# Patient Record
Sex: MALE | Race: WHITE | NOT HISPANIC OR LATINO | Employment: FULL TIME | URBAN - METROPOLITAN AREA
[De-identification: names, ages, dates, MRNs, and addresses within clinical notes are randomized per-mention and may not be internally consistent; named-entity substitution may affect disease eponyms.]

---

## 2017-12-18 ENCOUNTER — ALLSCRIPTS OFFICE VISIT (OUTPATIENT)
Dept: OTHER | Facility: OTHER | Age: 47
End: 2017-12-18

## 2017-12-28 NOTE — PROGRESS NOTES
Assessment   1  Bug bite (919 4,E906 4) (W57 XXXA)    Plan   Bug bite    · Cefuroxime Axetil 250 MG Oral Tablet; one tab po bid    Discussion/Summary      Does not appear as tick bite-but pt w concern sec hx tick born illness spider bite--will cover w abx as not healing w conservative mgt prn  Possible side effects of new medications were reviewed with the patient/guardian today  The treatment plan was reviewed with the patient/guardian  The patient/guardian understands and agrees with the treatment plan      Chief Complaint   Patient states that he was bit on the upper left thigh about one week  ago  States that he has had Lyme in the past  bh/lpn      History of Present Illness   Insect Bite/Sting (Brief): The patient is being seen for an initial evaluation of an insect bite/sting  Symptoms:  localized itching,-- localized pain,-- localized redness-- and-- localized swelling  Associated symptoms:  no fever,-- no headache-- and-- no vomiting  Active Problems   1  Bug bite (919 4,E906 4) (W57 XXXA)   2  Hyperlipidemia (272 4) (E78 5)   3  Insect bites, initial encounter   4  Local skin infection (686 9) (L08 9)    Past Medical History   1  History of Arthralgia (719 40) (M25 50)   2  History of Benign paroxysmal vertigo, unspecified laterality (386 11) (H81 10)   3  History of Dizziness (780 4) (R42)   4  History of babesiosis (V12 09) (Z86 19)   5  History of fatigue (V13 89) (Z87 898)   6  History of fever (V13 89) (Z87 898)   7  History of headache (V13 89) (Z87 898)   8  History of labyrinthitis (V12 49) (Z86 69)   9  History of Lateral epicondylitis, unspecified laterality (726 32) (M77 10)   10  History of MÃÂ©niÃÂ¨re's disease (386 00) (H81 09)   11  History of Other muscle spasm (728 85) (M62 838)   12  History of Pneumonia of left lower lobe due to infectious organism (486) (J18 1)   13  History of Vitamin D deficiency (268 9) (E55 9)    Family History   Family History    1   No pertinent family history    Social History    · Being A Social Drinker   · Daily Coffee Consumption (6  Cups/Day)   · Former smoker (P23 48) (J56 466)    Current Meds      The medication list was reviewed and updated today  Allergies   1  No Known Drug Allergies    Vitals    Recorded: [id number] 03:35PM   Temperature 97 9 F   Heart Rate 76   Respiration 12   Systolic 110   Diastolic 60   Height 6 ft    Weight 207 lb    BMI Calculated 28 07   BSA Calculated 2 16     Physical Exam        Constitutional      General appearance: No acute distress, well appearing and well nourished  overweight  Pulmonary      Respiratory effort: No increased work of breathing or signs of respiratory distress  Auscultation of lungs: Clear to auscultation, equal breath sounds bilaterally, no wheezes, no rales, no rhonci  Cardiovascular      Auscultation of heart: Normal rate and rhythm, normal S1 and S2, without murmurs  Lymphatic      Palpation of lymph nodes in neck: No lymphadenopathy  Musculoskeletal      Gait and station: Normal        Skin      Examination of the skin for lesions: Abnormal   A superficial bite was noted left lat thigh described as punctate w mild surounding erythema--no visualized FB-no streaking from site  Neurologic      Cranial nerves: Cranial nerves 2-12 intact  Psychiatric      Orientation to person, place and time: Normal        Mood and affect: Normal           Results/Data   PHQ-2 Adult Depression Screening [id number] 03:36PM User, s      Test Name Result Flag Reference   PHQ-2 Adult Depression Score 0     Over the last two weeks, how often have you been bothered by any of the following problems?      Little interest or pleasure in doing things: Not at all - 0     Feeling down, depressed, or hopeless: Not at all - 0   PHQ-2 Adult Depression Screening Negative          Signatures    Electronically signed by : VI Martinez ; Dec 27 2017  9:16AM EST (Author)

## 2018-01-23 VITALS
BODY MASS INDEX: 28.04 KG/M2 | SYSTOLIC BLOOD PRESSURE: 110 MMHG | HEART RATE: 76 BPM | TEMPERATURE: 97.9 F | DIASTOLIC BLOOD PRESSURE: 60 MMHG | WEIGHT: 207 LBS | RESPIRATION RATE: 12 BRPM | HEIGHT: 72 IN

## 2018-03-29 ENCOUNTER — OFFICE VISIT (OUTPATIENT)
Dept: FAMILY MEDICINE CLINIC | Facility: CLINIC | Age: 48
End: 2018-03-29
Payer: COMMERCIAL

## 2018-03-29 VITALS
TEMPERATURE: 97.8 F | WEIGHT: 205.2 LBS | SYSTOLIC BLOOD PRESSURE: 128 MMHG | RESPIRATION RATE: 16 BRPM | DIASTOLIC BLOOD PRESSURE: 78 MMHG | BODY MASS INDEX: 27.79 KG/M2 | HEART RATE: 94 BPM | HEIGHT: 72 IN

## 2018-03-29 DIAGNOSIS — H81.02 MENIERE DISEASE, LEFT: Primary | ICD-10-CM

## 2018-03-29 DIAGNOSIS — H61.23 BILATERAL IMPACTED CERUMEN: ICD-10-CM

## 2018-03-29 DIAGNOSIS — B35.6 JOCK ITCH: ICD-10-CM

## 2018-03-29 PROBLEM — H81.01 MENIERE'S DISEASE OF RIGHT EAR: Status: ACTIVE | Noted: 2018-03-29

## 2018-03-29 PROBLEM — H81.01 MENIERE'S DISEASE OF RIGHT EAR: Status: RESOLVED | Noted: 2018-03-29 | Resolved: 2018-03-29

## 2018-03-29 PROCEDURE — 99214 OFFICE O/P EST MOD 30 MIN: CPT | Performed by: FAMILY MEDICINE

## 2018-03-29 RX ORDER — MECLIZINE HYDROCHLORIDE 25 MG/1
25 TABLET ORAL 3 TIMES DAILY PRN
Qty: 60 TABLET | Refills: 1 | Status: SHIPPED | OUTPATIENT
Start: 2018-03-29 | End: 2018-09-11

## 2018-03-29 RX ORDER — CLOTRIMAZOLE AND BETAMETHASONE DIPROPIONATE 10; .64 MG/G; MG/G
CREAM TOPICAL 2 TIMES DAILY
Qty: 30 G | Refills: 6 | Status: SHIPPED | OUTPATIENT
Start: 2018-03-29 | End: 2018-09-11

## 2018-03-29 NOTE — PROGRESS NOTES
Assessment/Plan:    Problem List Items Addressed This Visit     Meniere disease, left - Primary     Referral to ENT and start meclizine as needed          Relevant Medications    meclizine (ANTIVERT) 25 mg tablet    Other Relevant Orders    Ambulatory Referral to Otolaryngology      Other Visit Diagnoses     Bilateral impacted cerumen        use debrox for wax removal     Jock itch        use lotrisone cream     Relevant Medications    clotrimazole-betamethasone (LOTRISONE) 1-0 05 % cream          Patient Instructions   Use meclizine as needed   Use debrox for ear wax  Follow up with ENT  Use lotrisone cream as needed       Return if symptoms worsen or fail to improve  Subjective:      Patient ID: Shante Beckwith is a 50 y o  male  Chief Complaint   Patient presents with    Earache     left ear , would like referral for specialist        Pt has hx of meniere on the left and was doing well  Always had ringing in the ear  4 days ago started with vertigo and vomiting  Has itching in the right inguinal area without a rash  Comes and goes  Feels worse when wearing underwear      Earache    There is pain in the left ear  This is a chronic problem  There has been no fever  Associated symptoms include hearing loss and vomiting  Pertinent negatives include no abdominal pain, diarrhea, ear discharge, headaches, rhinorrhea or sore throat  The following portions of the patient's history were reviewed and updated as appropriate: allergies, current medications, past family history, past medical history, past social history, past surgical history and problem list     Review of Systems   HENT: Positive for ear pain, hearing loss and tinnitus  Negative for ear discharge, postnasal drip, rhinorrhea, sinus pain, sinus pressure and sore throat  Eyes: Negative for visual disturbance  Gastrointestinal: Positive for vomiting  Negative for abdominal pain and diarrhea  Musculoskeletal: Negative for arthralgias  Neurological: Positive for dizziness  Negative for syncope, light-headedness, numbness and headaches  Hematological: Negative for adenopathy  Current Outpatient Prescriptions   Medication Sig Dispense Refill    clotrimazole-betamethasone (LOTRISONE) 1-0 05 % cream Apply topically 2 (two) times a day 30 g 6    meclizine (ANTIVERT) 25 mg tablet Take 1 tablet (25 mg total) by mouth 3 (three) times a day as needed for dizziness 60 tablet 1     No current facility-administered medications for this visit  Objective:    /78 (BP Location: Left arm, Patient Position: Sitting, Cuff Size: Standard)   Pulse 94   Temp 97 8 °F (36 6 °C)   Resp 16   Ht 6' (1 829 m)   Wt 93 1 kg (205 lb 3 2 oz)   BMI 27 83 kg/m²        Physical Exam   Constitutional: He appears well-developed and well-nourished  HENT:   Head: Normocephalic and atraumatic  Impacted cerumen b/l   Neck: Normal range of motion  Neck supple  Cardiovascular: Normal rate and regular rhythm  Pulmonary/Chest: Effort normal and breath sounds normal    Lymphadenopathy:     He has no cervical adenopathy  Psychiatric: He has a normal mood and affect                Desmond Johnson DO

## 2018-05-03 ENCOUNTER — OFFICE VISIT (OUTPATIENT)
Dept: AUDIOLOGY | Facility: CLINIC | Age: 48
End: 2018-05-03
Payer: COMMERCIAL

## 2018-05-03 ENCOUNTER — OFFICE VISIT (OUTPATIENT)
Dept: OTOLARYNGOLOGY | Facility: CLINIC | Age: 48
End: 2018-05-03
Payer: COMMERCIAL

## 2018-05-03 VITALS
BODY MASS INDEX: 28.24 KG/M2 | WEIGHT: 208.5 LBS | SYSTOLIC BLOOD PRESSURE: 120 MMHG | DIASTOLIC BLOOD PRESSURE: 80 MMHG | HEIGHT: 72 IN

## 2018-05-03 DIAGNOSIS — H61.23 BILATERAL IMPACTED CERUMEN: ICD-10-CM

## 2018-05-03 DIAGNOSIS — H81.02 MENIERE DISEASE, LEFT: Primary | ICD-10-CM

## 2018-05-03 DIAGNOSIS — H81.02 MENIERE'S DISEASE, LEFT: Primary | ICD-10-CM

## 2018-05-03 DIAGNOSIS — H90.42 SENSORINEURAL HEARING LOSS, UNILATERAL, LEFT EAR, WITH UNRESTRICTED HEARING ON THE CONTRALATERAL SIDE: ICD-10-CM

## 2018-05-03 PROCEDURE — 92567 TYMPANOMETRY: CPT | Performed by: AUDIOLOGIST

## 2018-05-03 PROCEDURE — 99243 OFF/OP CNSLTJ NEW/EST LOW 30: CPT | Performed by: OTOLARYNGOLOGY

## 2018-05-03 PROCEDURE — 92557 COMPREHENSIVE HEARING TEST: CPT | Performed by: AUDIOLOGIST

## 2018-05-03 RX ORDER — TRIAMTERENE AND HYDROCHLOROTHIAZIDE 37.5; 25 MG/1; MG/1
1 CAPSULE ORAL EVERY MORNING
Qty: 30 CAPSULE | Refills: 3 | Status: SHIPPED | OUTPATIENT
Start: 2018-05-03 | End: 2018-07-05 | Stop reason: SDUPTHER

## 2018-05-03 NOTE — PROGRESS NOTES
[facility] Otolaryngology New Patient Visit    Iva Hall is a 50 y o  who presents with a chief complaint of Meniere's disease    Pertinent elements of the history include:  He has a known diagnosis of Meniere's disease given to him by an audiologist 4 years ago  He is bothered by constant left sided high pitched tinnitus  He has a history of vertigo  Symptoms were controlled until about 2 months ago, when he developed ataxia and nausea  Symptoms lasted for about 2 days before he started to feel better  Prior to that, he was symptom free for about 1 year  He denies hearing fluctuation  His last audiogram was 2 years ago  He describes a muffled sensation (constant) in the left ear  Some associated aural fullness on the left side  Symptoms began 4 years ago with recurrent vertigo about every 3-4 months for the first year  Episodes were more spaced out after that  He denies any history of hearing fluctuation  Review of systems 10 point review of systems reviewed as documented in the intake form, scanned into the medical record under the media tab  Results reviewed; images from any scan have been personally reviewed: The past medical, surgical, social and family history have been reviewed as documented in today's record  Physical exam: (abnormal findings appear in bold and supercede any conflicting normal findings listed below)    /80 (BP Location: Left arm, Patient Position: Sitting, Cuff Size: Standard)   Ht 6' (1 829 m)   Wt 94 6 kg (208 lb 8 oz)   BMI 28 28 kg/m²     Constitutional:  Well developed, well nourished and groomed, in no acute distress  Eyes:  Extra-ocular movements intact, pupils equally round and reactive to light and accommodation, the lids and conjunctivae are normal in appearance      Head: Atraumatic, normocephalic, no visible scalp lesions, bony palpation unremarkable without stepoffs, parotid and submandibular salivary glands non-tender to palpation and without masses bilaterally  Ears:  Auricles normal in appearance bilaterally, mastoid prominence non-tender, external auditory canals clear bilaterally after disimpaction, tympanic membranes intact bilaterally without evidence of middle ear effusion or masses, normal appearing ossicles  Bilateral cerumen impaction removed with operating microscope and otologic suction  Nose/Sinuses:  External appearance unremarkable, no maxillary or frontal sinus tenderness to palpation bilaterally  Anterior rhinoscopy reveals: cobblestone mucosal edema     Oral Cavity:  Moist mucus membranes, gums and dentition unremarkable, no oral mucosal masses or lesions, floor of mouth soft, tongue mobile without masses or lesions  Oropharynx:  Base of tongue soft and without masses, tonsils bilaterally unremarkable, soft palate mucosa unremarkable, laryngeal mirror exam unrevealing  Neck:  No visible or palpable cervical lesions or lymphadenopathy, thyroid gland is normal in size and symmetry and without masses, normal laryngeal elevation with swallowing  Cardiovascular:  Normal rate and rhythm, no palpable thrills, no jugulovenous distension observed  Respiratory:  Normal respiratory effort without evidence of retractions or use of accessory muscles  Integument:  Normal appearing without observed masses or lesions  Neurologic:  Cranial nerves II-XII intact bilaterally  Psychiatric:  Alert and oriented to time, place and person, normal affect  Procedures      Assessment:   1  Meniere disease, left  Audiogram screen       Orders  Orders Placed This Encounter   Procedures    Audiogram screen     Standing Status:   Future     Standing Expiration Date:   5/3/2019         Discussion/Plan:    1  His audiogram demonstrates a left-sided asymmetric sensorineural hearing loss consistent with a diagnosis of left-sided Ménière's disease    Despite his concerns of recurrent vertigo I assured him that actually one episode of vertigo per year is fairly well controlled for Meniere's disease  2  I prescribed him Dyazide for his symptoms and asked that he back on salt and caffeine  3   Follow-up in 2 months  Thank you for allowing me to participate in the care of your patient

## 2018-05-03 NOTE — LETTER
May 3, 2018     Doris Fernandez MD  [address] [facility]    Patient: Ana Orozco   YOB: 1970   Date of Visit: 5/3/2018       Dear Dr Joana Russell: Thank you for referring Viktoriya Sheth to me for evaluation  Below are my notes for this consultation  If you have questions, please do not hesitate to call me  I look forward to following your patient along with you  Sincerely,        Nicole Hoyt MD        CC: No Recipients  Nicole Hoyt MD  5/3/2018 10:37 AM  Sign at close encounter  [facility] Otolaryngology New Patient Visit    Ana Orozco is a 50 y o  who presents with a chief complaint of Meniere's disease    Pertinent elements of the history include:  He has a known diagnosis of Meniere's disease given to him by an audiologist 4 years ago  He is bothered by constant left sided high pitched tinnitus  He has a history of vertigo  Symptoms were controlled until about 2 months ago, when he developed ataxia and nausea  Symptoms lasted for about 2 days before he started to feel better  Prior to that, he was symptom free for about 1 year  He denies hearing fluctuation  His last audiogram was 2 years ago  He describes a muffled sensation (constant) in the left ear  Some associated aural fullness on the left side  Symptoms began 4 years ago with recurrent vertigo about every 3-4 months for the first year  Episodes were more spaced out after that  He denies any history of hearing fluctuation  Review of systems 10 point review of systems reviewed as documented in the intake form, scanned into the medical record under the media tab  Results reviewed; images from any scan have been personally reviewed: The past medical, surgical, social and family history have been reviewed as documented in today's record      Physical exam: (abnormal findings appear in bold and supercede any conflicting normal findings listed below)    /80 (BP Location: Left arm, Patient Position: Sitting, Cuff Size: Standard)   Ht 6' (1 829 m)   Wt 94 6 kg (208 lb 8 oz)   BMI 28 28 kg/m²      Constitutional:  Well developed, well nourished and groomed, in no acute distress  Eyes:  Extra-ocular movements intact, pupils equally round and reactive to light and accommodation, the lids and conjunctivae are normal in appearance  Head: Atraumatic, normocephalic, no visible scalp lesions, bony palpation unremarkable without stepoffs, parotid and submandibular salivary glands non-tender to palpation and without masses bilaterally  Ears:  Auricles normal in appearance bilaterally, mastoid prominence non-tender, external auditory canals clear bilaterally after disimpaction, tympanic membranes intact bilaterally without evidence of middle ear effusion or masses, normal appearing ossicles  Bilateral cerumen impaction removed with operating microscope and otologic suction  Nose/Sinuses:  External appearance unremarkable, no maxillary or frontal sinus tenderness to palpation bilaterally  Anterior rhinoscopy reveals: cobblestone mucosal edema     Oral Cavity:  Moist mucus membranes, gums and dentition unremarkable, no oral mucosal masses or lesions, floor of mouth soft, tongue mobile without masses or lesions  Oropharynx:  Base of tongue soft and without masses, tonsils bilaterally unremarkable, soft palate mucosa unremarkable, laryngeal mirror exam unrevealing  Neck:  No visible or palpable cervical lesions or lymphadenopathy, thyroid gland is normal in size and symmetry and without masses, normal laryngeal elevation with swallowing  Cardiovascular:  Normal rate and rhythm, no palpable thrills, no jugulovenous distension observed  Respiratory:  Normal respiratory effort without evidence of retractions or use of accessory muscles  Integument:  Normal appearing without observed masses or lesions    Neurologic:  Cranial nerves II-XII intact bilaterally  Psychiatric:  Alert and oriented to time, place and person, normal affect  Procedures      Assessment:   1  Meniere disease, left  Audiogram screen       Orders  Orders Placed This Encounter   Procedures    Audiogram screen     Standing Status:   Future     Standing Expiration Date:   5/3/2019         Discussion/Plan:    1  His audiogram demonstrates a left-sided asymmetric sensorineural hearing loss consistent with a diagnosis of left-sided Ménière's disease  Despite his concerns of recurrent vertigo I assured him that actually one episode of vertigo per year is fairly well controlled for Meniere's disease  2  I prescribed him Dyazide for his symptoms and asked that he back on salt and caffeine  3   Follow-up in 2 months  Thank you for allowing me to participate in the care of your patient

## 2018-05-03 NOTE — LETTER
May 5, 2018     Annalee Hunter, [address]    Patient: Reina Andres   YOB: 1970   Date of Visit: 5/3/2018       Dear Dr Shanda Urrutia:    Thank you for referring Shira Pacheco to me for evaluation  Below are my notes for this consultation  His previous audiological evaluation from 2015 is attached for comparison purposes  If you have questions, please do not hesitate to call me  Sincerely,        Zo Weaver , CCC/A  Clinical Audiologist   [place]  [id number]      CC: MD Mitch Larios  2018 11:34 AM  Sign at close encounter  [facility] [address] EVALUATION      Name:  Reina Andres  :  1970  Age:  50 y o  Date of Evaluation: 5/3/18    History: Tinnitus, Dizziness and decreased hearing left ear/Meniere's L   Reason for visit: Reina Andres is being seen today at the request of Dr Shanda Urrutia for an evaluation of hearing  Patient reports previous evaluation and treatment for vertiginous episodes  He has recently experienced dizziness and increased hearing loss for the left ear  Tinnitus is described in the left ear  EVALUATION:    Otoscopic Evaluation:   Right Ear: scant cerumen about the perimeter    Left Ear: scant cerumen about the perimeter     Tympanometry:  Normal middle ear functioning bilaterally     Audiogram Results:    *see attached audiogram A previous audiogram from 2015 attached for comparison     RECOMMENDATIONS:  1  Follow-up with Dr Shanda Urrutia  2  Audiological re-evaluation upon request or with any changes noted by Mr Munoz  3  Determine if hearing loss is fluctuating or stable  If hearing issues a problem on a daily basis, the patient  may wish to consider a hearing aid trial/demo with medical clearance     PATIENT EDUCATION:   Discussed results and recommendations with Mr Sandee Gibbons    Questions were addressed and the patient was encouraged to contact our department should concerns arise      Zo Nelson , CCC-A, [place]# [id number], Hearing Aid Dispenser, [place]# [id number]  Clinical Audiologist

## 2018-05-05 NOTE — PROGRESS NOTES
AUDIOLOGY AUDIOMETRIC EVALUATION      Name:  Felisha English  :  1970  Age:  50 y o  Date of Evaluation: 5/3/18    History: Tinnitus, Dizziness and decreased hearing left ear/Meniere's L   Reason for visit: Felisha English is being seen today at the request of Dr Dorota Cagle for an evaluation of hearing  Patient reports previous evaluation and treatment for vertiginous episodes  He has recently experienced dizziness and increased hearing loss for the left ear  Tinnitus is described in the left ear  EVALUATION:    Otoscopic Evaluation:   Right Ear: scant cerumen about the perimeter    Left Ear: scant cerumen about the perimeter     Tympanometry:  Normal middle ear functioning bilaterally     Audiogram Results:    *see attached audiogram A previous audiogram from 2015 attached for comparison     RECOMMENDATIONS:  1  Follow-up with Dr Dorota Cagle  2  Audiological re-evaluation upon request or with any changes noted by Mr Munoz  3  Determine if hearing loss is fluctuating or stable  If hearing issues a problem on a daily basis, the patient  may wish to consider a hearing aid trial/demo with medical clearance     PATIENT EDUCATION:   Discussed results and recommendations with Mr Carie Ahumada  Questions were addressed and the patient was encouraged to contact our department should concerns arise      Zo Saunders , CCC-A, [place]# [id number], Hearing Aid Dispenser, [place]# [id number]  Clinical Audiologist

## 2018-07-05 ENCOUNTER — OFFICE VISIT (OUTPATIENT)
Dept: OTOLARYNGOLOGY | Facility: CLINIC | Age: 48
End: 2018-07-05
Payer: COMMERCIAL

## 2018-07-05 VITALS
WEIGHT: 202 LBS | BODY MASS INDEX: 27.36 KG/M2 | SYSTOLIC BLOOD PRESSURE: 134 MMHG | HEIGHT: 72 IN | HEART RATE: 68 BPM | DIASTOLIC BLOOD PRESSURE: 96 MMHG | TEMPERATURE: 98.6 F

## 2018-07-05 DIAGNOSIS — H81.02 MENIERE DISEASE, LEFT: ICD-10-CM

## 2018-07-05 PROCEDURE — 99213 OFFICE O/P EST LOW 20 MIN: CPT | Performed by: OTOLARYNGOLOGY

## 2018-07-05 RX ORDER — TRIAMTERENE AND HYDROCHLOROTHIAZIDE 37.5; 25 MG/1; MG/1
1 CAPSULE ORAL EVERY MORNING
Qty: 30 CAPSULE | Refills: 6 | Status: SHIPPED | OUTPATIENT
Start: 2018-07-05

## 2018-07-05 NOTE — LETTER
July 5, 2018     Nerissa Magana MD  [address] [facility]    Patient: Karma Hernandez   YOB: 1970   Date of Visit: 7/5/2018       Dear Dr Sunni Mary: Thank you for referring Trae Elizabeth to me for evaluation  Below are my notes for this consultation  If you have questions, please do not hesitate to call me  I look forward to following your patient along with you  Sincerely,        Ronny Ritchie MD        CC: No Recipients  Ronny Ritchei MD  7/5/2018  8:39 AM  Sign at close encounter  [facility] Follow up visit      CC: meniere's disease      Time interval of problem since last visit:  2 months    Pertinent interval elements of the history:  He returns after 2 months and had a brief vertigo attack 2 days ago with shaking vertigo and nausea/vomiting  He feels that when he increases salt in his diet, it triggers his symptoms  Prior to that, he has had brief episodes of dizziness but nothing as severe as the episode on Tuesday  He feels there is noticeable asymmetry in hearing loss in his left ear  He does not feel his hearing has changed at all since his last visit  Review of any relevant imaging:      Interval Review of systems:  General: no weight change, normal energy  CV: no palpitations or chest pain  Pulm: no shortness of breath    Interval Social History:  Social History     Social History    Marital status: /Civil Union     Spouse name: N/A    Number of children: N/A    Years of education: N/A     Occupational History    Not on file       Social History Main Topics    Smoking status: Former Smoker    Smokeless tobacco: Never Used    Alcohol use Yes      Comment: social    Drug use: No    Sexual activity: Not on file     Other Topics Concern    Not on file     Social History Narrative    Daily coffee consumption 6 cups/day       Interval Physical Examination:  /96 (BP Location: Right arm, Patient Position: Sitting, Cuff Size: Standard)   Pulse 68   Temp 98 6 °F (37 °C) (Tympanic)   Ht 6' (1 829 m)   Wt 91 6 kg (202 lb)   BMI 27 40 kg/m²    NAD  AS/AD: clear, no ME effusions, normal appearing TMs  Interval endoscopy:          Assessment:  1  Meniere disease, left  triamterene-hydrochlorothiazide (DYAZIDE) 37 5-25 mg per capsule       Plan:  1  His hearing is stable  He will continue Dyazide and watch the salt intake in his diet  2  He would benefit from a hearing aid consultation with audiology for hearing loss in his left ear  3  Follow up in 6 months

## 2018-07-05 NOTE — PROGRESS NOTES
[facility] Otolaryngology Follow up visit      CC: meniere's disease      Time interval of problem since last visit:  2 months    Pertinent interval elements of the history:  He returns after 2 months and had a brief vertigo attack 2 days ago with shaking vertigo and nausea/vomiting  He feels that when he increases salt in his diet, it triggers his symptoms  Prior to that, he has had brief episodes of dizziness but nothing as severe as the episode on Tuesday  He feels there is noticeable asymmetry in hearing loss in his left ear  He does not feel his hearing has changed at all since his last visit  Review of any relevant imaging:      Interval Review of systems:  General: no weight change, normal energy  CV: no palpitations or chest pain  Pulm: no shortness of breath    Interval Social History:  Social History     Social History    Marital status: /Civil Union     Spouse name: N/A    Number of children: N/A    Years of education: N/A     Occupational History    Not on file  Social History Main Topics    Smoking status: Former Smoker    Smokeless tobacco: Never Used    Alcohol use Yes      Comment: social    Drug use: No    Sexual activity: Not on file     Other Topics Concern    Not on file     Social History Narrative    Daily coffee consumption 6 cups/day       Interval Physical Examination:  /96 (BP Location: Right arm, Patient Position: Sitting, Cuff Size: Standard)   Pulse 68   Temp 98 6 °F (37 °C) (Tympanic)   Ht 6' (1 829 m)   Wt 91 6 kg (202 lb)   BMI 27 40 kg/m²   NAD  AS/AD: clear, no ME effusions, normal appearing TMs  Interval endoscopy:          Assessment:  1  Meniere disease, left  triamterene-hydrochlorothiazide (DYAZIDE) 37 5-25 mg per capsule       Plan:  1  His hearing is stable  He will continue Dyazide and watch the salt intake in his diet  2  He would benefit from a hearing aid consultation with audiology for hearing loss in his left ear    3  Follow up in 6 months

## 2018-07-12 ENCOUNTER — TELEPHONE (OUTPATIENT)
Dept: AUDIOLOGY | Facility: CLINIC | Age: 48
End: 2018-07-12

## 2018-09-11 ENCOUNTER — OFFICE VISIT (OUTPATIENT)
Dept: FAMILY MEDICINE CLINIC | Facility: CLINIC | Age: 48
End: 2018-09-11
Payer: COMMERCIAL

## 2018-09-11 VITALS
TEMPERATURE: 98.5 F | HEIGHT: 72 IN | DIASTOLIC BLOOD PRESSURE: 90 MMHG | HEART RATE: 80 BPM | RESPIRATION RATE: 16 BRPM | WEIGHT: 201 LBS | BODY MASS INDEX: 27.22 KG/M2 | SYSTOLIC BLOOD PRESSURE: 130 MMHG

## 2018-09-11 DIAGNOSIS — M25.50 ARTHRALGIA, UNSPECIFIED JOINT: Primary | ICD-10-CM

## 2018-09-11 DIAGNOSIS — T75.3XXS MOTION SICKNESS, SEQUELA: ICD-10-CM

## 2018-09-11 PROCEDURE — 3008F BODY MASS INDEX DOCD: CPT | Performed by: FAMILY MEDICINE

## 2018-09-11 PROCEDURE — 99213 OFFICE O/P EST LOW 20 MIN: CPT | Performed by: FAMILY MEDICINE

## 2018-09-11 RX ORDER — CEFUROXIME AXETIL 250 MG/1
500 TABLET ORAL EVERY 12 HOURS SCHEDULED
Refills: 0 | Status: CANCELLED | OUTPATIENT
Start: 2018-09-11

## 2018-09-11 RX ORDER — CEFUROXIME AXETIL 250 MG/1
500 TABLET ORAL EVERY 12 HOURS SCHEDULED
Qty: 20 TABLET | Refills: 0 | Status: SHIPPED | OUTPATIENT
Start: 2018-09-11 | End: 2018-09-21

## 2018-09-11 RX ORDER — SCOLOPAMINE TRANSDERMAL SYSTEM 1 MG/1
1 PATCH, EXTENDED RELEASE TRANSDERMAL
Qty: 3 PATCH | Refills: 0 | Status: SHIPPED | OUTPATIENT
Start: 2018-09-11

## 2018-09-11 NOTE — PROGRESS NOTES
Kiara Esparza 1970 male MRN: [id number]    [facility] PRACTICE ACUTE OFFICE VISIT  [facility] - [address]      ASSESSMENT/PLAN  Kiara Esparza is a 46 y o  male presents to the office for    1) Arthralgia:  - Likely Lyme disease, given history, and patient previous symptoms   - Repeat Lyme panel   - Started on Cefuroxime 500 mg BID x 10 days, will extend for total of 10 once labs returns    2) Motion Sickness:  - ppx scopolamine patch q 72hrs      Disposition:RTC in 1 week    Future Appointments  Date Time Provider Amara Torres   9/18/2018 8:00 AM Selina Shoemaker MD [facility]   1/3/2019 8:30 AM Leila Xie MD [facility]          SUBJECTIVE  CC: Joint Pain      HPI:  Kiara Esparza is a 46 y o  male who presents to the office for arthralgia of the lower extremities  Pt gets multiple tick bites a year, and recently had one this month  He was (+) for Lyme's in the past and is feeling the way he did in Dec and needed treatment  Received Cefuroxime at that time with relief  Patient not a big believe of abx  Patient would like a medication that does not make him drowsy for motion sickness prophylaxis  He will be going on a fishing trip for 3 days and would like to avoid using oral medications  Review of Systems   Constitutional: Negative for activity change, appetite change, chills, fatigue and fever  HENT: Negative for congestion  Eyes: Negative for visual disturbance  Respiratory: Negative for cough, chest tightness and shortness of breath  Cardiovascular: Negative for chest pain and leg swelling  Gastrointestinal: Negative for abdominal distention, abdominal pain, constipation, diarrhea, nausea and vomiting  Musculoskeletal: Positive for arthralgias  Allergic/Immunologic: Negative for environmental allergies  Neurological: Negative for dizziness, light-headedness and headaches          Motion sickness while fishing All other systems reviewed and are negative  Historical Information   The patient history was reviewed as follows:  Past Medical History:   Diagnosis Date    Arthralgia     last assessed 02/10/2015    Babesiosis     last assessed 12/30/14    Benign paroxysmal vertigo     last assessed 05/22/15    Meniere disease     last assessed 04/15/13    Pneumonia of left lower lobe due to infectious organism [facility])     last assessed 04/04/16    Vitamin D deficiency     last assessed 12/30/14         Past Surgical History:   Procedure Laterality Date    FIBULA FRACTURE SURGERY      TIBIA FRACTURE SURGERY       Family History   Problem Relation Age of Onset    No Known Problems Family       Social History   History   Alcohol Use    Yes     Comment: social     History   Drug Use No     History   Smoking Status    Former Smoker   Smokeless Tobacco    Never Used       Medications:     Current Outpatient Prescriptions:     triamterene-hydrochlorothiazide (DYAZIDE) 37 5-25 mg per capsule, Take 1 capsule by mouth every morning, Disp: 30 capsule, Rfl: 6    cefuroxime (CEFTIN) 250 mg tablet, Take 2 tablets (500 mg total) by mouth every 12 (twelve) hours for 10 days, Disp: 20 tablet, Rfl: 0    scopolamine (TRANSDERM-SCOP) 1 5 mg/3 days TD 72 hr patch, Place 1 patch on the skin every third day, Disp: 3 patch, Rfl: 0    No Known Allergies    OBJECTIVE  Vitals:   Vitals:    09/11/18 0755   BP: 130/90   BP Location: Left arm   Patient Position: Sitting   Cuff Size: Standard   Pulse: 80   Resp: 16   Temp: 98 5 °F (36 9 °C)   TempSrc: Tympanic   Weight: 91 2 kg (201 lb)   Height: 6' (1 829 m)         Physical Exam   Constitutional: He is oriented to person, place, and time  Vital signs are normal  He appears well-developed and well-nourished  HENT:   Head: Normocephalic and atraumatic     Right Ear: Hearing normal    Left Ear: Hearing normal    Eyes: Conjunctivae and EOM are normal  Pupils are equal, round, and reactive to light    Neck: Normal range of motion  Neck supple  Cardiovascular: Normal rate, regular rhythm, S1 normal, S2 normal, normal heart sounds and intact distal pulses  No murmur heard  Pulmonary/Chest: Effort normal and breath sounds normal  No respiratory distress  He has no wheezes  Abdominal: Soft  Bowel sounds are normal  He exhibits no distension  There is no tenderness  Musculoskeletal: Normal range of motion  He exhibits no edema  Neurological: He is alert and oriented to person, place, and time  He has normal strength  He displays normal reflexes  No cranial nerve deficit  He exhibits normal muscle tone  Coordination normal    Skin: Skin is warm  No rash noted  Psychiatric: He has a normal mood and affect  His speech is normal and behavior is normal  Judgment and thought content normal    Vitals reviewed         Eliceo Rogers MD,   [facility]  9/11/2018

## 2018-11-18 ENCOUNTER — OFFICE VISIT (OUTPATIENT)
Dept: URGENT CARE | Facility: CLINIC | Age: 48
End: 2018-11-18
Payer: COMMERCIAL

## 2018-11-18 VITALS
BODY MASS INDEX: 27.12 KG/M2 | DIASTOLIC BLOOD PRESSURE: 72 MMHG | HEART RATE: 87 BPM | SYSTOLIC BLOOD PRESSURE: 116 MMHG | RESPIRATION RATE: 16 BRPM | OXYGEN SATURATION: 100 % | WEIGHT: 200 LBS | TEMPERATURE: 99.4 F

## 2018-11-18 DIAGNOSIS — K04.7 DENTAL ABSCESS: Primary | ICD-10-CM

## 2018-11-18 PROCEDURE — 99213 OFFICE O/P EST LOW 20 MIN: CPT | Performed by: FAMILY MEDICINE

## 2018-11-18 RX ORDER — CLINDAMYCIN HYDROCHLORIDE 300 MG/1
300 CAPSULE ORAL 3 TIMES DAILY
Qty: 15 CAPSULE | Refills: 0 | Status: SHIPPED | OUTPATIENT
Start: 2018-11-18 | End: 2018-11-23

## 2018-11-18 NOTE — PROGRESS NOTES
[facility] [facility]        NAME: Tala Xavier is a 46 y o  male  : 1970    MRN: [id number]  DATE: 2018  TIME: 2:17 PM    Assessment and Plan   Dental abscess [K04 7]  1  Dental abscess  clindamycin (CLEOCIN) 300 MG capsule     Likely has a dental abscess beneath tooth #18 (left lower molar)  Clindamycin prescribed due to its anaerobic coverage  Patient strongly advised to follow up with dentist for further evaluation and management  Patient Instructions     Follow up with PCP in 3-5 days  Proceed to  ER if symptoms worsen  Chief Complaint     Chief Complaint   Patient presents with    Pain     left sided jaw and facial pain of unknown etiology         History of Present Illness     59-year-old male who presents today due to left jaw pain which has persisted for the past 4 days  Pain somewhat radiates to the left ear but not towards maxillary sinus  Had seen his dentist 3 weeks ago for an annual visit and there were no acute issues at the time  Denies any fevers, chills, headache, dizziness, but has pain with biting down in the left lower jaw  Has been taking ibuprofen 100 mg around the clock for pain relief  Review of Systems   Review of Systems   Constitutional: Negative for chills and fever  HENT: Positive for dental problem and facial swelling  Allergic/Immunologic: Negative for environmental allergies  Neurological: Negative for dizziness, speech difficulty and headaches           Current Medications       Current Outpatient Prescriptions:     triamterene-hydrochlorothiazide (DYAZIDE) 37 5-25 mg per capsule, Take 1 capsule by mouth every morning, Disp: 30 capsule, Rfl: 6    clindamycin (CLEOCIN) 300 MG capsule, Take 1 capsule (300 mg total) by mouth 3 (three) times a day for 5 days, Disp: 15 capsule, Rfl: 0    scopolamine (TRANSDERM-SCOP) 1 5 mg/3 days TD 72 hr patch, Place 1 patch on the skin every third day (Patient not taking: Reported on 2018 ), Disp: 3 patch, Rfl: 0    Current Allergies     Allergies as of 11/18/2018    (No Known Allergies)            The following portions of the patient's history were reviewed and updated as appropriate: allergies, current medications, past family history, past medical history, past social history, past surgical history and problem list      Past Medical History:   Diagnosis Date    Arthralgia     last assessed 02/10/2015    Babesiosis     last assessed 12/30/14    Benign paroxysmal vertigo     last assessed 05/22/15    Meniere disease     last assessed 04/15/13    Pneumonia of left lower lobe due to infectious organism [facility])     last assessed 04/04/16    Vitamin D deficiency     last assessed 12/30/14       Past Surgical History:   Procedure Laterality Date    FIBULA FRACTURE SURGERY      TIBIA FRACTURE SURGERY      TONSILLECTOMY         Family History   Problem Relation Age of Onset    No Known Problems Family     Meniere's disease Mother          Medications have been verified  Objective   /72   Pulse 87   Temp 99 4 °F (37 4 °C)   Resp 16   Wt 90 7 kg (200 lb)   SpO2 100%   BMI 27 12 kg/m²        Physical Exam     Physical Exam   Constitutional: He is oriented to person, place, and time  He appears well-developed and well-nourished  No distress  HENT:   Head: Atraumatic  Right Ear: External ear normal    Left Ear: External ear normal    Mouth/Throat: Oropharynx is clear and moist  No oropharyngeal exudate  Face is asymmetric with mild swelling of left lower cheek/jaw  No overlying skin changes  Multiple teeth with metal fillings  Tooth #18 is tender to palpation with a hard mass palpated on the left mandible in close proximity to the tooth  Eyes: Pupils are equal, round, and reactive to light  Conjunctivae are normal    Pulmonary/Chest: Effort normal    Neurological: He is alert and oriented to person, place, and time  Skin: Skin is warm  He is not diaphoretic  Psychiatric: He has a normal mood and affect   His behavior is normal  Judgment and thought content normal

## 2019-01-03 ENCOUNTER — OFFICE VISIT (OUTPATIENT)
Dept: OTOLARYNGOLOGY | Facility: CLINIC | Age: 49
End: 2019-01-03
Payer: COMMERCIAL

## 2019-01-03 ENCOUNTER — OFFICE VISIT (OUTPATIENT)
Dept: AUDIOLOGY | Facility: CLINIC | Age: 49
End: 2019-01-03
Payer: COMMERCIAL

## 2019-01-03 VITALS
BODY MASS INDEX: 28.04 KG/M2 | WEIGHT: 207 LBS | DIASTOLIC BLOOD PRESSURE: 80 MMHG | SYSTOLIC BLOOD PRESSURE: 120 MMHG | HEIGHT: 72 IN

## 2019-01-03 DIAGNOSIS — H81.02 MENIERE'S DISEASE OF LEFT EAR: Primary | ICD-10-CM

## 2019-01-03 DIAGNOSIS — H90.42 SENSORINEURAL HEARING LOSS, UNILATERAL, LEFT EAR, WITH UNRESTRICTED HEARING ON THE CONTRALATERAL SIDE: ICD-10-CM

## 2019-01-03 DIAGNOSIS — H81.02 MENIERE'S DISEASE, LEFT: Primary | ICD-10-CM

## 2019-01-03 PROCEDURE — 92557 COMPREHENSIVE HEARING TEST: CPT | Performed by: AUDIOLOGIST

## 2019-01-03 PROCEDURE — 99213 OFFICE O/P EST LOW 20 MIN: CPT | Performed by: OTOLARYNGOLOGY

## 2019-01-03 PROCEDURE — 92567 TYMPANOMETRY: CPT | Performed by: AUDIOLOGIST

## 2019-01-03 NOTE — PROGRESS NOTES
[facility] Otolaryngology Follow up visit      CC: meniere's disease      Time interval of problem since last visit:  6 months    Pertinent interval elements of the history:  He reports improvement in tinnitus for weeks with stable left sided hearing loss  It returned slightly yesterday  He has had 1 interval episode of vertigo in the past 6 months, was brief -- resolved within an hour  He has been using meclizine 1-6 times per week, whenever he feels " a bit off "  Described as a shaking sensation  He denies hearing fluctuation  He has stopped taking Dyazide  Instead, he has modified his diet to cut out salt and caffeine and this seems to have helped  Review of any relevant imaging:      Interval Review of systems:  General: no weight change, normal energy  CV: no palpitations or chest pain  Pulm: no shortness of breath    Interval Social History:  Social History     Social History    Marital status: /Civil Union     Spouse name: N/A    Number of children: N/A    Years of education: N/A     Occupational History    Not on file  Social History Main Topics    Smoking status: Former Smoker    Smokeless tobacco: Never Used    Alcohol use Yes      Comment: social    Drug use: No    Sexual activity: Not on file     Other Topics Concern    Not on file     Social History Narrative    Daily coffee consumption 6 cups/day       Interval Physical Examination:  /80 (BP Location: Left arm, Patient Position: Sitting, Cuff Size: Adult)   Ht 6' (1 829 m)   Wt 93 9 kg (207 lb)   BMI 28 07 kg/m²   NAD  AS/AD: clear, TMI, no effusions    Interval endoscopy:          Assessment:  1  Meniere's disease of left ear  Audiogram screen       Plan:  1  His audiogram demonstrates stable left-sided sensorineural hearing loss compared to his prior audiogram in July  Speech discrimination scores are 100% bilaterally  Tympanograms are type A bilaterally  2  His symptoms are stable with dietary controls  He is no longer taking Dyazide  This seems to be working for him and he is quite comfortable with managing his Ménière's disease in this fashion  I recommended continued dietary control and follow up again in 1 year

## 2019-01-03 NOTE — LETTER
January 3, 2019     Susen Koyanagi, MD  [address]    Patient: Justyna Freeman   YOB: 1970   Date of Visit: 1/3/2019       Dear Dr Abby Cagle:     Antwon Cervantes was seen for follow-up evaluation as part of an ENT assessment by Dr Zara Pallas  Below are my notes for this consultation  If you have questions, please do not hesitate to call me  I look forward to following your patient along with you  Sincerely,        Zo Perez , CCC/A  Clinical Audiologist   [place] [id number]        CC: No Recipients  Mango Strong  1/3/2019  4:38 PM  Sign at close encounter  HEARING EVALUATION    Name:  Justyna Freeman  :  1970  Age:  46 y o  Date of Evaluation: 19     History: Meniere's and hearing loss left ear  Reason for visit: Justyna Freeman is being seen today at the request of Dr Dell Lowe for an evaluation of hearing as part of an ENT follow-up  Mr Oneil Miller was previously tested audiologically on 5/3/18 which revealed a significant threshold shift, for the left ear, as compared to previous testing on 6/22/15  Today, the patient reports no significant changes noted in the hearing, tinnitus present at baseline and no dizziness for a number of months  EVALUATION:    Otoscopic Evaluation:   Right Ear: scant cerumen about the perimeter of the canal    Left Ear: scant cerumen about the perimeter of the canal     Tympanometry:   Right: Type A - normal middle ear pressure and compliance   Left: Type A - normal middle ear pressure and compliance    Audiogram Results: Thresholds are +/- 10 dBHL, in the left ear, from previous testing on 5/3/18  *see attached audiogram for details    RECOMMENDATIONS:  1  Follow-up audiologically in 6-12 months or sooner should symptoms/hearing change for the left ear  PATIENT EDUCATION:   Discussed results and recommendations with Mr Oneil Miller    Questions were addressed and the patient was encouraged to contact our department should concerns arise      Zo Coon , CCC-A, [place]# [id number], Hearing Aid Dispenser, [place]# [id number]  Clinical Audiologist

## 2019-01-03 NOTE — LETTER
January 3, 2019     Ale Carballo MD  [address] [facility]    Patient: Manjit Saldivar   YOB: 1970   Date of Visit: 1/3/2019       Dear Dr Geoff Vernon: Thank you for referring Nabeel Mathews to me for evaluation  Below are my notes for this consultation  If you have questions, please do not hesitate to call me  I look forward to following your patient along with you  Sincerely,        Mary Lincoln MD        CC: No Recipients  Mary Lincoln MD  1/3/2019  9:35 AM  Sign at close encounter  [facility] Otolaryngology Follow up visit      CC: meniere's disease      Time interval of problem since last visit:  6 months    Pertinent interval elements of the history:  He reports improvement in tinnitus for weeks with stable left sided hearing loss  It returned slightly yesterday  He has had 1 interval episode of vertigo in the past 6 months, was brief -- resolved within an hour  He has been using meclizine 1-6 times per week, whenever he feels " a bit off "  Described as a shaking sensation  He denies hearing fluctuation  He has stopped taking Dyazide  Instead, he has modified his diet to cut out salt and caffeine and this seems to have helped  Review of any relevant imaging:      Interval Review of systems:  General: no weight change, normal energy  CV: no palpitations or chest pain  Pulm: no shortness of breath    Interval Social History:  Social History     Social History    Marital status: /Civil Union     Spouse name: N/A    Number of children: N/A    Years of education: N/A     Occupational History    Not on file       Social History Main Topics    Smoking status: Former Smoker    Smokeless tobacco: Never Used    Alcohol use Yes      Comment: social    Drug use: No    Sexual activity: Not on file     Other Topics Concern    Not on file     Social History Narrative    Daily coffee consumption 6 cups/day       Interval Physical Examination:  /80 (BP Location: Left arm, Patient Position: Sitting, Cuff Size: Adult)   Ht 6' (1 829 m)   Wt 93 9 kg (207 lb)   BMI 28 07 kg/m²    NAD  AS/AD: clear, TMI, no effusions    Interval endoscopy:          Assessment:  1  Meniere's disease of left ear  Audiogram screen       Plan:  1  His audiogram demonstrates stable left-sided sensorineural hearing loss compared to his prior audiogram in July  Speech discrimination scores are 100% bilaterally  Tympanograms are type A bilaterally  2  His symptoms are stable with dietary controls  He is no longer taking Dyazide  This seems to be working for him and he is quite comfortable with managing his Ménière's disease in this fashion  I recommended continued dietary control and follow up again in 1 year

## 2019-01-03 NOTE — PROGRESS NOTES
HEARING EVALUATION    Name:  Maryan Mueller  :  1970  Age:  46 y o  Date of Evaluation: 19     History: Meniere's and hearing loss left ear  Reason for visit: Maryan Mueller is being seen today at the request of Dr Joey Albrecht for an evaluation of hearing as part of an ENT follow-up  Mr Vasquez Jeter was previously tested audiologically on 5/3/18 which revealed a significant threshold shift, for the left ear, as compared to previous testing on 6/22/15  Today, the patient reports no significant changes noted in the hearing, tinnitus present at baseline and no dizziness for a number of months  EVALUATION:    Otoscopic Evaluation:   Right Ear: scant cerumen about the perimeter of the canal    Left Ear: scant cerumen about the perimeter of the canal     Tympanometry:   Right: Type A - normal middle ear pressure and compliance   Left: Type A - normal middle ear pressure and compliance    Audiogram Results: Thresholds are +/- 10 dBHL, in the left ear, from previous testing on 5/3/18  *see attached audiogram for details    RECOMMENDATIONS:  1  Follow-up audiologically in 6-12 months or sooner should symptoms/hearing change for the left ear  PATIENT EDUCATION:   Discussed results and recommendations with Mr Vaqsuez Jeetr  Questions were addressed and the patient was encouraged to contact our department should concerns arise      Zo Massey , CCC-A, [place]# [id number], Hearing Aid Dispenser, [place]# [id number]  Clinical Audiologist

## 2020-01-30 ENCOUNTER — OFFICE VISIT (OUTPATIENT)
Dept: OTOLARYNGOLOGY | Facility: CLINIC | Age: 50
End: 2020-01-30
Payer: COMMERCIAL

## 2020-01-30 ENCOUNTER — OFFICE VISIT (OUTPATIENT)
Dept: AUDIOLOGY | Facility: CLINIC | Age: 50
End: 2020-01-30
Payer: COMMERCIAL

## 2020-01-30 VITALS
HEART RATE: 77 BPM | SYSTOLIC BLOOD PRESSURE: 128 MMHG | DIASTOLIC BLOOD PRESSURE: 82 MMHG | WEIGHT: 212.4 LBS | HEIGHT: 72 IN | OXYGEN SATURATION: 98 % | TEMPERATURE: 99.2 F | BODY MASS INDEX: 28.77 KG/M2

## 2020-01-30 DIAGNOSIS — H81.02 MENIERE'S DISEASE OF LEFT EAR: Primary | ICD-10-CM

## 2020-01-30 DIAGNOSIS — H90.42 SENSORINEURAL HEARING LOSS, UNILATERAL, LEFT EAR, WITH UNRESTRICTED HEARING ON THE CONTRALATERAL SIDE: ICD-10-CM

## 2020-01-30 DIAGNOSIS — H81.02 MENIERE'S DISEASE, LEFT: Primary | ICD-10-CM

## 2020-01-30 PROCEDURE — 92557 COMPREHENSIVE HEARING TEST: CPT | Performed by: AUDIOLOGIST

## 2020-01-30 PROCEDURE — 99213 OFFICE O/P EST LOW 20 MIN: CPT | Performed by: OTOLARYNGOLOGY

## 2020-01-30 PROCEDURE — 92567 TYMPANOMETRY: CPT | Performed by: AUDIOLOGIST

## 2020-01-30 NOTE — PROGRESS NOTES
Otolaryngology-- Head and Neck Surgery Follow up visit    CC: Meniere's disease      Time interval of problem since last visit:  1 year    Pertinent interval elements of the history:  He returns after 1 year and reports fluctuating tinnitus in the left ear with intermittent episodes of vertigo and some mild hearing fluctuation  He takes Dyazide intermittently  He has meclizine for his vertigo but rarely, because this makes him fatigued  Occasional associated nausea  Denies any right ear symptoms      Review of any relevant imaging:      Interval Review of systems:  General: no weight change, normal energy  CV: no palpitations or chest pain  Pulm: no shortness of breath    Interval Social History:  Social History     Socioeconomic History    Marital status: /Civil Union     Spouse name: Not on file    Number of children: Not on file    Years of education: Not on file    Highest education level: Not on file   Occupational History    Not on file   Social Needs    Financial resource strain: Not on file    Food insecurity:     Worry: Not on file     Inability: Not on file    Transportation needs:     Medical: Not on file     Non-medical: Not on file   Tobacco Use    Smoking status: Former Smoker    Smokeless tobacco: Never Used   Substance and Sexual Activity    Alcohol use: Yes     Comment: social    Drug use: No    Sexual activity: Not on file   Lifestyle    Physical activity:     Days per week: Not on file     Minutes per session: Not on file    Stress: Not on file   Relationships    Social connections:     Talks on phone: Not on file     Gets together: Not on file     Attends Rastafarian service: Not on file     Active member of club or organization: Not on file     Attends meetings of clubs or organizations: Not on file     Relationship status: Not on file    Intimate partner violence:     Fear of current or ex partner: Not on file     Emotionally abused: Not on file     Physically abused: Not on file     Forced sexual activity: Not on file   Other Topics Concern    Not on file   Social History Narrative    Daily coffee consumption 6 cups/day       Interval Physical Examination:  /82 (BP Location: Left arm, Patient Position: Sitting, Cuff Size: Adult)   Pulse 77   Temp 99 2 °F (37 3 °C) (Temporal)   Ht 6' (1 829 m)   Wt 96 3 kg (212 lb 6 4 oz)   SpO2 98%   BMI 28 81 kg/m²   NAD  AS/AD: clear    Interval endoscopy:          Assessment:  1  Meniere's disease of left ear  Audiogram screen       Plan:  1  His audiogram demonstrates a drop in pure tone thresholds in the low to mid frequencies in the left ear compared to his prior audiogram from 1 year ago  More significantly, his speech discrimination score has dropped from 100% to 44% in the left ear  He reports that today is a bad day with increased tinnitus  This likely explains the decrease in score  It is consistent with the fluctuation expected from Méniere's disease  I recommended restarting Dyazide on a daily basis  We also discussed the benefits of transtympanic steroids but he will hold off on this for now  I will see him if his current symptoms do not improve or again for surveillance in 1 year

## 2020-01-30 NOTE — LETTER
2020     Mitch Murray, [address] [facility]    Patient: Warren Costa   YOB: 1970   Date of Visit: 2020       Dear Dr Enid Hardy:     Jaguar Hinkle was seen for evaluation today as part of an annual ENT evaluation  Below are my notes for this consultation  His hearing, today, is greater in the left ear from previous testing  He wishes to set up a discussion with us regarding possible hearing aid options for that ear  If you have questions, please do not hesitate to call me  Sincerely,        Bonnie Kim , Zo FLANAGAN , CCC/A  Clinical Audiologist   [place]  [id number]        CC: No Recipients  Bonnie Cancer  2020 11:34 AM  Sign at close encounter  ENT HEARING EVALUATION    Name:  Warren Costa  :  1970  Age:  44 y o  Date of Evaluation: 20     History: ENT Audiogram / Meniere's Left ear / tinnitus, high level today  Reason for visit: Warren Costa is being seen today at the request of Dr Alfonso Cabot for an evaluation of hearing as part of their follow up ENT visit  It has been a year since his last evaluation  EVALUATION:    Otoscopic Evaluation:   Right Ear: wet cerumen about perimeter    Left Ear: wet cerumen about perimeter     Tympanometry:   Right: Type A - normal middle ear pressure and compliance   Left: Type A - normal middle ear pressure and compliance    Audiogram Results:  R ear: Within normal limits   L ear: Moderately severe SNHL with poor word recognition score today (44%) in quiet, with masking on right ear  These thresholds are significantly decreased from testing a year ago  *see attached audiogram    RECOMMENDATIONS:  1  Follow-up with Dr Alfonso Cabot    2  Repeat left thresholds when the patient is not experiencing increased tinnitus  Will discuss with patient  3  Consider a hearing aid evaluation / discussion    It is difficult to predict when the ear will have better hearing or less tinnitus, therefore, a hearing aid may not produce the anticipated results for him in the long term  However, patients have had success with devices when they are set at a time that the hearing is the "best/better" range and discuss use of volume control or setting a different program for when he needs it when the hearing is poorer  The device can also be set with a tinnitus program which may help alleviate the loudness of the tinnitus when he is having an active episode          Zo Thompson , CCC-A, [place]# [id number], Hearing Aid Dispenser, [place]# [id number]  Clinical Audiologist

## 2020-01-30 NOTE — PROGRESS NOTES
ENT HEARING EVALUATION    Name:  Claudette Side  :  1970  Age:  44 y o  Date of Evaluation: 20     History: ENT Audiogram / Meniere's Left ear / tinnitus, high level today  Reason for visit: Claudette Side is being seen today at the request of Dr Renée Velarde for an evaluation of hearing as part of their follow up ENT visit  It has been a year since his last evaluation  EVALUATION:    Otoscopic Evaluation:   Right Ear: wet cerumen about perimeter    Left Ear: wet cerumen about perimeter     Tympanometry:   Right: Type A - normal middle ear pressure and compliance   Left: Type A - normal middle ear pressure and compliance    Audiogram Results:  R ear: Within normal limits   L ear: Moderately severe SNHL with poor word recognition score today (44%) in quiet, with masking on right ear  These thresholds are significantly decreased from testing a year ago  *see attached audiogram    RECOMMENDATIONS:  1  Follow-up with Dr Renée Velarde    2  Repeat left thresholds when the patient is not experiencing increased tinnitus  Will discuss with patient  3  Consider a hearing aid evaluation / discussion  It is difficult to predict when the ear will have better hearing or less tinnitus, therefore, a hearing aid may not produce the anticipated results for him in the long term  However, patients have had success with devices when they are set at a time that the hearing is the "best/better" range and discuss use of volume control or setting a different program for when he needs it when the hearing is poorer  The device can also be set with a tinnitus program which may help alleviate the loudness of the tinnitus when he is having an active episode          Zo Holloway , CCC-A, [place]# [id number], Hearing Aid Dispenser, [place]# [id number]  Clinical Audiologist

## 2020-02-17 ENCOUNTER — OFFICE VISIT (OUTPATIENT)
Dept: AUDIOLOGY | Facility: CLINIC | Age: 50
End: 2020-02-17

## 2020-02-17 DIAGNOSIS — H81.02 MENIERE'S DISEASE, LEFT: ICD-10-CM

## 2020-02-17 DIAGNOSIS — H90.42 SENSORINEURAL HEARING LOSS, UNILATERAL, LEFT EAR, WITH UNRESTRICTED HEARING ON THE CONTRALATERAL SIDE: Primary | ICD-10-CM

## 2020-02-18 NOTE — PROGRESS NOTES
Hearing Aid Evaluation  Name:  Deni Lozano  :  1970  Age:  44 y o  Date of Evaluation: 2020    Audiologic Results: Audiologic testing was performed on 2020, testing revealed a moderately severe SNHL, left ear with a significant change in the word recognition score (was 44%)  Amplification is recommended in the left ear only  Hearing Aid Evaluation:  Mr  And Mrs Leopoldo Kiss and I discussed the pros and cons to use of a hearing aid for the left ear at this time  He reported that he is "tired of asking people to repeat what they say"  At work, home and in social situations  I repeated the tones from 500-4000 Hz today and the thresholds are the same to 2020's results  The word recognition score today is 72% at 85dBHL, up from the 44%  The tinnitus today is "loud" at a level consisitent with what he typically experiences  A demo OPN 1 minirite was set to step 1 minus 2 overall (as step 1 sounded tinny and felt like it was "moving his eardrum")  He was instructed to its use and instructed on use of the volume control  RTO on 3/2/2020 at 9:45  His wife wishes to reach out to [facility] to see if they would consider a referral although I discussed that it would not be directly for his work place (as he works as a )  Information provided        Zo Carlton , CCC-A, [place]# [id number], Hearing Aid Dispenser, [place]# [id number]  Clinical Audiologist

## 2020-03-02 ENCOUNTER — OFFICE VISIT (OUTPATIENT)
Dept: AUDIOLOGY | Facility: CLINIC | Age: 50
End: 2020-03-02

## 2020-03-02 DIAGNOSIS — H90.42 SENSORINEURAL HEARING LOSS, UNILATERAL, LEFT EAR, WITH UNRESTRICTED HEARING ON THE CONTRALATERAL SIDE: Primary | ICD-10-CM

## 2020-03-02 NOTE — PROGRESS NOTES
Progress Note    Name:  Rohan Frank  :  1970  Age:  44 y o  Date of Evaluation: 20     Patient returned the demo aid and reported that he did not try the aid as it was determined he "could not afford it"  His wife reportedly called [employer] and had a "short conversation" but that it appeared that they would not cover  (She was not in attendance with him today)  We discussed follow-up evaluations and I provided him with the [facility] Tinnitus informational brochure        Recommendations:  1  6 month hearing re-evaluation scheduled for 2020     Zo Davalos , CCC-A, [place]# [id number], Hearing Aid Dispenser, [place]# [id number]  Clinical Audiologist

## 2020-08-26 ENCOUNTER — TELEPHONE (OUTPATIENT)
Dept: FAMILY MEDICINE CLINIC | Facility: CLINIC | Age: 50
End: 2020-08-26

## 2020-08-26 NOTE — TELEPHONE ENCOUNTER
Called and spoke with patient regarding wife's earlier conversation with the front office staff  Per patient, approximately 12 bee stings on lower leg which has shown significant improvement in the last 2 hours with Benadryl cream  Patient feels an appointment is not warranted at this time as the swelling and irritation has significantly improved and is not spreading  Patient will continue to closely monitor and seek urgent medical attention should symptoms begin to worsen

## 2020-08-28 ENCOUNTER — OFFICE VISIT (OUTPATIENT)
Dept: FAMILY MEDICINE CLINIC | Facility: CLINIC | Age: 50
End: 2020-08-28
Payer: COMMERCIAL

## 2020-08-28 VITALS
WEIGHT: 206 LBS | RESPIRATION RATE: 14 BRPM | TEMPERATURE: 98.2 F | DIASTOLIC BLOOD PRESSURE: 90 MMHG | BODY MASS INDEX: 27.9 KG/M2 | SYSTOLIC BLOOD PRESSURE: 140 MMHG | HEIGHT: 72 IN | HEART RATE: 102 BPM
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
## 2020-08-28 DIAGNOSIS — S50.362A INSECT BITE OF LEFT ELBOW, INITIAL ENCOUNTER: Primary | ICD-10-CM

## 2020-08-28 DIAGNOSIS — Z12.11 COLON CANCER SCREENING: ICD-10-CM

## 2020-08-28 PROCEDURE — 3008F BODY MASS INDEX DOCD: CPT | Performed by: OTOLARYNGOLOGY

## 2020-08-28 PROCEDURE — 99213 OFFICE O/P EST LOW 20 MIN: CPT | Performed by: FAMILY MEDICINE

## 2020-08-31 ENCOUNTER — TELEPHONE (OUTPATIENT)
Dept: GASTROENTEROLOGY | Facility: AMBULARY SURGERY CENTER | Age: 50
End: 2020-08-31

## 2020-09-25 NOTE — PROGRESS NOTES
Assessment/Plan:    1  Insect bite of left elbow, initial encounter    2  Colon cancer screening  -     Ambulatory referral to Gastroenterology; Future        No areas of infection noted  No erythema, drainage  Patient advised to monitor for changes  Discussed on need for physical and colonoscopy for colon cancer screening  Return if symptoms worsen or fail to improve  Subjective:      Patient ID: Julian Guo is a 44 y o  male  Chief Complaint   Patient presents with    Insect Bite     bee stings    Generalized Body Aches       HPI  45 y/o male presents with concern of insect bite Patient wanted to make sure it wasn't infected  Patient states he is usually outside for work and was bite by insect and thinks it was a wasp  Denies any fevers or chills  No open wounds  No pain to the area but there is swelling  The following portions of the patient's history were reviewed and updated as appropriate: allergies, current medications, past family history, past medical history, past social history, past surgical history and problem list     Review of Systems   Constitutional: Negative for fever  HENT: Negative for sore throat  Respiratory: Negative for cough and shortness of breath  Cardiovascular: Negative for chest pain and leg swelling  Gastrointestinal: Negative for abdominal pain, constipation, nausea and vomiting  Skin: Positive for color change  Neurological: Negative for dizziness, weakness, light-headedness and headaches  Psychiatric/Behavioral: Negative for agitation           Current Outpatient Medications   Medication Sig Dispense Refill    scopolamine (TRANSDERM-SCOP) 1 5 mg/3 days TD 72 hr patch Place 1 patch on the skin every third day (Patient not taking: Reported on 11/18/2018 ) 3 patch 0    triamterene-hydrochlorothiazide (DYAZIDE) 37 5-25 mg per capsule Take 1 capsule by mouth every morning (Patient not taking: Reported on 8/28/2020) 30 capsule 6     No current facility-administered medications for this visit  Objective:    /90 (BP Location: Left arm, Patient Position: Sitting, Cuff Size: Adult)   Pulse 102   Temp 98 2 °F (36 8 °C) (Tympanic)   Resp 14   Ht 6' (1 829 m)   Wt 93 4 kg (206 lb)   BMI 27 94 kg/m²        Physical Exam  Vitals signs reviewed  Constitutional:       Appearance: He is well-developed  HENT:      Head: Normocephalic and atraumatic  Right Ear: External ear normal       Left Ear: External ear normal       Nose: Nose normal       Mouth/Throat:      Pharynx: No oropharyngeal exudate  Eyes:      General:         Right eye: No discharge  Left eye: No discharge  Neck:      Musculoskeletal: Normal range of motion and neck supple  Cardiovascular:      Rate and Rhythm: Normal rate and regular rhythm  Heart sounds: Normal heart sounds  Pulmonary:      Effort: Pulmonary effort is normal       Breath sounds: Normal breath sounds  No wheezing  Abdominal:      General: Bowel sounds are normal       Palpations: Abdomen is soft  Tenderness: There is no abdominal tenderness  Musculoskeletal:         General: No tenderness  Skin:     General: Skin is warm  Findings: No erythema  Comments: Left elbow area with some swelling   No erythema where bite genaro is   No drainage  Neurological:      Mental Status: He is alert and oriented to person, place, and time     Psychiatric:         Behavior: Behavior normal                 Thang Harry MD

## 2020-10-10 ENCOUNTER — TELEPHONE (OUTPATIENT)
Dept: GASTROENTEROLOGY | Facility: CLINIC | Age: 50
End: 2020-10-10

## 2021-01-18 ENCOUNTER — VBI (OUTPATIENT)
Dept: ADMINISTRATIVE | Facility: OTHER | Age: 51
End: 2021-01-18

## 2021-01-18 NOTE — TELEPHONE ENCOUNTER
01/18/21 3:45 PM     See documentation in the [employer] SmartForm       Leticia StaplesNew [place], [place]

## 2021-04-06 ENCOUNTER — TELEPHONE (OUTPATIENT)
Dept: FAMILY MEDICINE CLINIC | Facility: CLINIC | Age: 51
End: 2021-04-06

## 2021-04-13 DIAGNOSIS — Z23 ENCOUNTER FOR IMMUNIZATION: ICD-10-CM

## 2021-07-19 ENCOUNTER — TELEPHONE (OUTPATIENT)
Dept: FAMILY MEDICINE CLINIC | Facility: CLINIC | Age: 51
End: 2021-07-19

## 2021-07-30 ENCOUNTER — OFFICE VISIT (OUTPATIENT)
Dept: FAMILY MEDICINE CLINIC | Facility: CLINIC | Age: 51
End: 2021-07-30
Payer: COMMERCIAL

## 2021-07-30 VITALS
RESPIRATION RATE: 16 BRPM | DIASTOLIC BLOOD PRESSURE: 80 MMHG | TEMPERATURE: 96.6 F | HEIGHT: 72 IN | WEIGHT: 200.2 LBS | HEART RATE: 87 BPM | BODY MASS INDEX: 27.12 KG/M2 | OXYGEN SATURATION: 97 % | SYSTOLIC BLOOD PRESSURE: 140 MMHG

## 2021-07-30 DIAGNOSIS — G89.29 CHRONIC RIGHT SHOULDER PAIN: Primary | ICD-10-CM

## 2021-07-30 DIAGNOSIS — M25.511 CHRONIC RIGHT SHOULDER PAIN: Primary | ICD-10-CM

## 2021-07-30 DIAGNOSIS — M54.2 NECK PAIN: ICD-10-CM

## 2021-07-30 PROCEDURE — 1036F TOBACCO NON-USER: CPT | Performed by: NURSE PRACTITIONER

## 2021-07-30 PROCEDURE — 3725F SCREEN DEPRESSION PERFORMED: CPT | Performed by: NURSE PRACTITIONER

## 2021-07-30 PROCEDURE — 99213 OFFICE O/P EST LOW 20 MIN: CPT | Performed by: NURSE PRACTITIONER

## 2021-07-30 NOTE — PROGRESS NOTES
Evangelina Zaman is a 46 y o  male who presents with right shoulder pain  The symptoms began several years ago  Aggravating factors: no known event  Pain is located between the neck and shoulder  Discomfort is described as aching and sharp/stabbing  Symptoms are exacerbated by repetitive movements and overhead movements  Evaluation to date: none  Therapy to date includes: nothing specific  The following portions of the patient's history were reviewed and updated as appropriate: allergies, current medications, past family history, past medical history, past social history, past surgical history and problem list     Review of Systems  Pertinent items are noted in HPI       Objective     /80 (BP Location: Left arm, Patient Position: Sitting, Cuff Size: Standard)   Pulse 87   Temp (!) 96 6 °F (35 9 °C) (Temporal)   Resp 16   Ht 6' (1 829 m)   Wt 90 8 kg (200 lb 3 2 oz)   SpO2 97%   BMI 27 15 kg/m²   Right shoulder: non-specific diffuse tenderness about the shoulder, full ROM, negative for impingement sign, sensory exam normal, motor exam normal and radial pulse intact   Left shoulder: normal active ROM, no tenderness, no impingement sign       Assessment/Plan     Right shoulder pain  Natural history and expected course discussed  Questions answered  Reduction in offending activity  Gentle ROM exercises  Rest, ice, compression, and elevation (RICE) therapy  OTC analgesics as needed  Plain film x-rays  Orthopedics referral

## 2021-08-01 ENCOUNTER — APPOINTMENT (OUTPATIENT)
Dept: RADIOLOGY | Facility: CLINIC | Age: 51
End: 2021-08-01
Payer: COMMERCIAL

## 2021-08-01 DIAGNOSIS — M54.2 NECK PAIN: ICD-10-CM

## 2021-08-01 DIAGNOSIS — G89.29 CHRONIC RIGHT SHOULDER PAIN: ICD-10-CM

## 2021-08-01 DIAGNOSIS — M25.511 CHRONIC RIGHT SHOULDER PAIN: ICD-10-CM

## 2021-08-01 PROCEDURE — 73030 X-RAY EXAM OF SHOULDER: CPT

## 2021-08-01 PROCEDURE — 72050 X-RAY EXAM NECK SPINE 4/5VWS: CPT

## 2021-08-08 ENCOUNTER — TELEPHONE (OUTPATIENT)
Dept: FAMILY MEDICINE CLINIC | Facility: CLINIC | Age: 51
End: 2021-08-08

## 2021-08-10 ENCOUNTER — OFFICE VISIT (OUTPATIENT)
Dept: FAMILY MEDICINE CLINIC | Facility: CLINIC | Age: 51
End: 2021-08-10
Payer: COMMERCIAL

## 2021-08-10 VITALS
DIASTOLIC BLOOD PRESSURE: 100 MMHG | OXYGEN SATURATION: 98 % | TEMPERATURE: 97.9 F | WEIGHT: 198.4 LBS | HEART RATE: 77 BPM | HEIGHT: 72 IN | RESPIRATION RATE: 16 BRPM | BODY MASS INDEX: 26.87 KG/M2 | SYSTOLIC BLOOD PRESSURE: 150 MMHG

## 2021-08-10 DIAGNOSIS — Z71.2 ENCOUNTER TO DISCUSS TEST RESULTS: ICD-10-CM

## 2021-08-10 DIAGNOSIS — M54.12 CERVICAL RADICULOPATHY AT C6: Primary | ICD-10-CM

## 2021-08-10 DIAGNOSIS — R93.89 ABNORMAL X-RAY: ICD-10-CM

## 2021-08-10 PROCEDURE — 99213 OFFICE O/P EST LOW 20 MIN: CPT | Performed by: NURSE PRACTITIONER

## 2021-08-10 PROCEDURE — 3008F BODY MASS INDEX DOCD: CPT | Performed by: NURSE PRACTITIONER

## 2021-08-10 NOTE — PROGRESS NOTES
Assessment/Plan:    1  Cervical radiculopathy at C6  -     MRI cervical spine wo contrast; Future; Expected date: 08/10/2021  -     Ambulatory referral to Pain Management; Future  -     Ambulatory referral to Physical Therapy; Future    2  Abnormal x-ray  -     MRI cervical spine wo contrast; Future; Expected date: 08/10/2021  -     Ambulatory referral to Physical Therapy; Future    3  Encounter to discuss test results    The case discussed with patient using patient centered shared decision making  The patient was counseled regarding instructions for management,-- risk factor reductions,-- prognosis,-- impressions,-- risks and benefits of treatment options,-- importance of compliance with treatment  I have reviewed the instructions with the patient, answering all questions to his satisfaction  Patient would like to pursue management of cervical radiculopathy  He declines medication at this time  Agrees with MRI, PT, pain management consult    BMI Counseling: Body mass index is 26 91 kg/m²  The BMI is above normal  Nutrition recommendations include decreasing portion sizes, moderation in carbohydrate intake and increasing intake of lean protein  Exercise recommendations include exercising 3-5 times per week  There are no Patient Instructions on file for this visit  No follow-ups on file  Subjective:      Patient ID: Adalberto Casiano is a 46 y o  male  Chief Complaint   Patient presents with    Follow-up     xrays neck pain       Here to review recent imaging   I saw pt few weeks ago for right sided neck, right shoulder pain, numbness and tingling forearm    Shoulder xr nl    Cervical xr revealed:   FINDINGS:     No fracture       Normal alignment without subluxation      There is disc space narrowing with small posterior osteophytes at C5-6       There is moderate neural foraminal narrowing at C5-6 on the right secondary to uncovertebral hypertrophy    There is no significant neuroforaminal narrowing on the left      The prevertebral soft tissues are within normal limits        The lung apices are clear      IMPRESSION:     Degenerative change at C5-C6 with moderate right-sided neuroforaminal narrowing due to uncovertebral hypertrophy  The following portions of the patient's history were reviewed and updated as appropriate: allergies, current medications, past family history, past medical history, past social history, past surgical history and problem list     Review of Systems   Constitutional: Negative  Musculoskeletal: Positive for arthralgias and neck pain  Neurological: Positive for numbness  Negative for dizziness  Current Outpatient Medications   Medication Sig Dispense Refill    scopolamine (TRANSDERM-SCOP) 1 5 mg/3 days TD 72 hr patch Place 1 patch on the skin every third day (Patient not taking: Reported on 11/18/2018 ) 3 patch 0    triamterene-hydrochlorothiazide (DYAZIDE) 37 5-25 mg per capsule Take 1 capsule by mouth every morning (Patient not taking: Reported on 8/28/2020) 30 capsule 6     No current facility-administered medications for this visit  Objective:    /100 (BP Location: Left arm, Patient Position: Sitting, Cuff Size: Standard)   Pulse 77   Temp 97 9 °F (36 6 °C) (Temporal)   Resp 16   Ht 6' (1 829 m)   Wt 90 kg (198 lb 6 4 oz)   SpO2 98%   BMI 26 91 kg/m²        Physical Exam  Vitals and nursing note reviewed  Constitutional:       Appearance: Normal appearance  He is well-developed  Cardiovascular:      Rate and Rhythm: Normal rate and regular rhythm  Pulses: Normal pulses  Heart sounds: Normal heart sounds  Pulmonary:      Effort: Pulmonary effort is normal       Breath sounds: Normal breath sounds  Abdominal:      General: Bowel sounds are normal    Musculoskeletal:      Cervical back: Pain with movement, spinous process tenderness and muscular tenderness present  Decreased range of motion     Skin:     General: Skin is warm  Neurological:      General: No focal deficit present  Mental Status: He is alert  Mental status is at baseline     Psychiatric:         Mood and Affect: Mood normal          Behavior: Behavior normal                 BERNADETTE Parnell

## 2021-09-17 ENCOUNTER — VBI (OUTPATIENT)
Dept: ADMINISTRATIVE | Facility: OTHER | Age: 51
End: 2021-09-17

## 2021-09-30 ENCOUNTER — TELEPHONE (OUTPATIENT)
Dept: FAMILY MEDICINE CLINIC | Facility: CLINIC | Age: 51
End: 2021-09-30

## 2021-10-04 ENCOUNTER — EVALUATION (OUTPATIENT)
Dept: PHYSICAL THERAPY | Facility: CLINIC | Age: 51
End: 2021-10-04
Payer: COMMERCIAL

## 2021-10-04 DIAGNOSIS — M54.12 CERVICAL RADICULOPATHY: ICD-10-CM

## 2021-10-04 DIAGNOSIS — R93.89 ABNORMAL X-RAY: ICD-10-CM

## 2021-10-04 DIAGNOSIS — M54.12 CERVICAL RADICULOPATHY AT C6: Primary | ICD-10-CM

## 2021-10-04 PROCEDURE — 97161 PT EVAL LOW COMPLEX 20 MIN: CPT

## 2021-10-07 ENCOUNTER — OFFICE VISIT (OUTPATIENT)
Dept: PHYSICAL THERAPY | Facility: CLINIC | Age: 51
End: 2021-10-07
Payer: COMMERCIAL

## 2021-10-07 DIAGNOSIS — M54.12 CERVICAL RADICULOPATHY AT C6: Primary | ICD-10-CM

## 2021-10-07 PROCEDURE — 97112 NEUROMUSCULAR REEDUCATION: CPT | Performed by: PHYSICAL THERAPIST

## 2021-10-11 ENCOUNTER — OFFICE VISIT (OUTPATIENT)
Dept: PHYSICAL THERAPY | Facility: CLINIC | Age: 51
End: 2021-10-11
Payer: COMMERCIAL

## 2021-10-11 DIAGNOSIS — M54.12 CERVICAL RADICULOPATHY AT C6: Primary | ICD-10-CM

## 2021-10-11 PROCEDURE — 97110 THERAPEUTIC EXERCISES: CPT

## 2021-10-11 PROCEDURE — 97112 NEUROMUSCULAR REEDUCATION: CPT

## 2021-10-11 PROCEDURE — 97140 MANUAL THERAPY 1/> REGIONS: CPT

## 2021-10-14 ENCOUNTER — OFFICE VISIT (OUTPATIENT)
Dept: PHYSICAL THERAPY | Facility: CLINIC | Age: 51
End: 2021-10-14
Payer: COMMERCIAL

## 2021-10-14 DIAGNOSIS — M54.12 CERVICAL RADICULOPATHY AT C6: Primary | ICD-10-CM

## 2021-10-14 PROCEDURE — 97110 THERAPEUTIC EXERCISES: CPT

## 2021-10-18 ENCOUNTER — OFFICE VISIT (OUTPATIENT)
Dept: PHYSICAL THERAPY | Facility: CLINIC | Age: 51
End: 2021-10-18
Payer: COMMERCIAL

## 2021-10-18 DIAGNOSIS — M54.12 CERVICAL RADICULOPATHY AT C6: Primary | ICD-10-CM

## 2021-10-18 DIAGNOSIS — R93.89 ABNORMAL X-RAY: ICD-10-CM

## 2021-10-18 PROCEDURE — 97110 THERAPEUTIC EXERCISES: CPT

## 2021-10-18 PROCEDURE — 97140 MANUAL THERAPY 1/> REGIONS: CPT

## 2021-10-21 ENCOUNTER — OFFICE VISIT (OUTPATIENT)
Dept: PHYSICAL THERAPY | Facility: CLINIC | Age: 51
End: 2021-10-21
Payer: COMMERCIAL

## 2021-10-21 DIAGNOSIS — M54.12 CERVICAL RADICULOPATHY AT C6: Primary | ICD-10-CM

## 2021-10-21 PROCEDURE — 97110 THERAPEUTIC EXERCISES: CPT

## 2021-10-21 PROCEDURE — 97140 MANUAL THERAPY 1/> REGIONS: CPT

## 2021-10-25 ENCOUNTER — OFFICE VISIT (OUTPATIENT)
Dept: PHYSICAL THERAPY | Facility: CLINIC | Age: 51
End: 2021-10-25
Payer: COMMERCIAL

## 2021-10-25 DIAGNOSIS — M54.12 CERVICAL RADICULOPATHY AT C6: Primary | ICD-10-CM

## 2021-10-25 DIAGNOSIS — M54.12 CERVICAL RADICULOPATHY: ICD-10-CM

## 2021-10-25 PROCEDURE — 97110 THERAPEUTIC EXERCISES: CPT

## 2021-10-25 PROCEDURE — 97140 MANUAL THERAPY 1/> REGIONS: CPT

## 2021-10-28 ENCOUNTER — OFFICE VISIT (OUTPATIENT)
Dept: PHYSICAL THERAPY | Facility: CLINIC | Age: 51
End: 2021-10-28
Payer: COMMERCIAL

## 2021-10-28 DIAGNOSIS — M54.12 CERVICAL RADICULOPATHY AT C6: Primary | ICD-10-CM

## 2021-10-28 DIAGNOSIS — M54.12 CERVICAL RADICULOPATHY: ICD-10-CM

## 2021-10-28 PROCEDURE — 97110 THERAPEUTIC EXERCISES: CPT

## 2021-11-01 ENCOUNTER — OFFICE VISIT (OUTPATIENT)
Dept: PHYSICAL THERAPY | Facility: CLINIC | Age: 51
End: 2021-11-01
Payer: COMMERCIAL

## 2021-11-01 DIAGNOSIS — M54.12 CERVICAL RADICULOPATHY: ICD-10-CM

## 2021-11-01 DIAGNOSIS — M54.12 CERVICAL RADICULOPATHY AT C6: Primary | ICD-10-CM

## 2021-11-01 PROCEDURE — 97140 MANUAL THERAPY 1/> REGIONS: CPT

## 2021-11-01 PROCEDURE — 97110 THERAPEUTIC EXERCISES: CPT

## 2021-11-04 ENCOUNTER — OFFICE VISIT (OUTPATIENT)
Dept: PHYSICAL THERAPY | Facility: CLINIC | Age: 51
End: 2021-11-04
Payer: COMMERCIAL

## 2021-11-04 DIAGNOSIS — M54.12 CERVICAL RADICULOPATHY: ICD-10-CM

## 2021-11-04 DIAGNOSIS — M54.12 CERVICAL RADICULOPATHY AT C6: Primary | ICD-10-CM

## 2021-11-04 PROCEDURE — 97110 THERAPEUTIC EXERCISES: CPT

## 2021-11-04 PROCEDURE — 97112 NEUROMUSCULAR REEDUCATION: CPT

## 2021-11-05 ENCOUNTER — OFFICE VISIT (OUTPATIENT)
Dept: FAMILY MEDICINE CLINIC | Facility: CLINIC | Age: 51
End: 2021-11-05
Payer: COMMERCIAL

## 2021-11-05 VITALS
BODY MASS INDEX: 26.76 KG/M2 | SYSTOLIC BLOOD PRESSURE: 122 MMHG | OXYGEN SATURATION: 98 % | TEMPERATURE: 99 F | RESPIRATION RATE: 18 BRPM | DIASTOLIC BLOOD PRESSURE: 84 MMHG | HEART RATE: 92 BPM | WEIGHT: 197.6 LBS | HEIGHT: 72 IN

## 2021-11-05 DIAGNOSIS — B02.9 HERPES ZOSTER WITHOUT COMPLICATION: Primary | ICD-10-CM

## 2021-11-05 PROCEDURE — 3008F BODY MASS INDEX DOCD: CPT | Performed by: NURSE PRACTITIONER

## 2021-11-05 PROCEDURE — 1036F TOBACCO NON-USER: CPT | Performed by: NURSE PRACTITIONER

## 2021-11-05 PROCEDURE — 99213 OFFICE O/P EST LOW 20 MIN: CPT | Performed by: NURSE PRACTITIONER

## 2021-11-05 RX ORDER — VALACYCLOVIR HYDROCHLORIDE 1 G/1
1000 TABLET, FILM COATED ORAL 3 TIMES DAILY
Qty: 21 TABLET | Refills: 0 | Status: SHIPPED | OUTPATIENT
Start: 2021-11-05 | End: 2021-11-12

## 2021-11-08 ENCOUNTER — APPOINTMENT (OUTPATIENT)
Dept: PHYSICAL THERAPY | Facility: CLINIC | Age: 51
End: 2021-11-08
Payer: COMMERCIAL

## 2021-11-11 ENCOUNTER — APPOINTMENT (OUTPATIENT)
Dept: PHYSICAL THERAPY | Facility: CLINIC | Age: 51
End: 2021-11-11
Payer: COMMERCIAL

## 2021-11-15 ENCOUNTER — OFFICE VISIT (OUTPATIENT)
Dept: PHYSICAL THERAPY | Facility: CLINIC | Age: 51
End: 2021-11-15
Payer: COMMERCIAL

## 2021-11-15 DIAGNOSIS — M54.12 CERVICAL RADICULOPATHY AT C6: Primary | ICD-10-CM

## 2021-11-15 DIAGNOSIS — M54.12 CERVICAL RADICULOPATHY: ICD-10-CM

## 2021-11-15 PROCEDURE — 97110 THERAPEUTIC EXERCISES: CPT

## 2021-11-18 ENCOUNTER — OFFICE VISIT (OUTPATIENT)
Dept: PHYSICAL THERAPY | Facility: CLINIC | Age: 51
End: 2021-11-18
Payer: COMMERCIAL

## 2021-11-18 DIAGNOSIS — M54.12 CERVICAL RADICULOPATHY: ICD-10-CM

## 2021-11-18 DIAGNOSIS — M54.12 CERVICAL RADICULOPATHY AT C6: Primary | ICD-10-CM

## 2021-11-18 PROCEDURE — 97110 THERAPEUTIC EXERCISES: CPT

## 2022-03-01 DIAGNOSIS — J06.9 ACUTE URI: Primary | ICD-10-CM

## 2022-03-01 RX ORDER — AZITHROMYCIN 250 MG/1
TABLET, FILM COATED ORAL
Qty: 6 TABLET | Refills: 0 | Status: SHIPPED | OUTPATIENT
Start: 2022-03-01 | End: 2022-03-05

## 2022-03-01 RX ORDER — BENZONATATE 200 MG/1
200 CAPSULE ORAL 3 TIMES DAILY PRN
Qty: 20 CAPSULE | Refills: 0 | Status: SHIPPED | OUTPATIENT
Start: 2022-03-01

## 2022-03-01 NOTE — TELEPHONE ENCOUNTER
Pt had a 9:00 appointment  Unable to attend for work  Having ongoing sinus pressure cough (10 days) since flu like illness   Refused to take covid test  Abx and cough pearles rx'd

## 2022-05-20 ENCOUNTER — TELEPHONE (OUTPATIENT)
Dept: FAMILY MEDICINE CLINIC | Facility: CLINIC | Age: 52
End: 2022-05-20

## 2022-05-20 DIAGNOSIS — M54.2 CHRONIC NECK PAIN: ICD-10-CM

## 2022-05-20 DIAGNOSIS — M54.12 CERVICAL RADICULOPATHY AT C6: Primary | ICD-10-CM

## 2022-05-20 DIAGNOSIS — G89.29 CHRONIC NECK PAIN: ICD-10-CM

## 2022-05-20 NOTE — TELEPHONE ENCOUNTER
Patient has completed 6 weeks of physical therapy with no improvement  Cont with cervical radiculopathy on left side   MRI ordered will follow up after

## 2022-08-12 ENCOUNTER — VBI (OUTPATIENT)
Dept: ADMINISTRATIVE | Facility: OTHER | Age: 52
End: 2022-08-12

## 2022-09-07 ENCOUNTER — TELEPHONE (OUTPATIENT)
Dept: FAMILY MEDICINE CLINIC | Facility: CLINIC | Age: 52
End: 2022-09-07

## 2022-09-07 NOTE — TELEPHONE ENCOUNTER
Lm to confirm that this family will be staying with [facility] and not following [facility]

## 2022-11-07 ENCOUNTER — TELEPHONE (OUTPATIENT)
Dept: UROLOGY | Facility: AMBULATORY SURGERY CENTER | Age: 52
End: 2022-11-07

## 2022-11-07 ENCOUNTER — APPOINTMENT (EMERGENCY)
Dept: RADIOLOGY | Facility: HOSPITAL | Age: 52
End: 2022-11-07

## 2022-11-07 ENCOUNTER — HOSPITAL ENCOUNTER (EMERGENCY)
Facility: HOSPITAL | Age: 52
Discharge: HOME | End: 2022-11-07
Attending: EMERGENCY MEDICINE

## 2022-11-07 ENCOUNTER — TELEPHONE (OUTPATIENT)
Dept: FAMILY MEDICINE CLINIC | Facility: CLINIC | Age: 52
End: 2022-11-07

## 2022-11-07 VITALS
RESPIRATION RATE: 18 BRPM | BODY MASS INDEX: 26.68 KG/M2 | TEMPERATURE: 98.6 F | WEIGHT: 197 LBS | HEART RATE: 66 BPM | HEIGHT: 72 IN | SYSTOLIC BLOOD PRESSURE: 135 MMHG | DIASTOLIC BLOOD PRESSURE: 84 MMHG | OXYGEN SATURATION: 98 %

## 2022-11-07 DIAGNOSIS — R06.00 DYSPNEA: ICD-10-CM

## 2022-11-07 DIAGNOSIS — N28.89 RENAL MASS, RIGHT: Primary | ICD-10-CM

## 2022-11-07 DIAGNOSIS — R91.8 GROUND GLASS OPACITY PRESENT ON IMAGING OF LUNG: ICD-10-CM

## 2022-11-07 LAB
ALBUMIN SERPL BCP-MCNC: 3.2 G/DL (ref 3.5–5)
ALP SERPL-CCNC: 95 U/L (ref 46–116)
ALT SERPL W P-5'-P-CCNC: 36 U/L (ref 12–78)
ANION GAP SERPL CALCULATED.3IONS-SCNC: 7 MMOL/L (ref 4–13)
ATRIAL RATE: 81 BPM
BASOPHILS # BLD AUTO: 0.04 THOUSANDS/ÂΜL (ref 0–0.1)
BASOPHILS NFR BLD AUTO: 1 % (ref 0–1)
BILIRUB SERPL-MCNC: 0.54 MG/DL (ref 0.2–1)
BUN SERPL-MCNC: 17 MG/DL (ref 5–25)
CALCIUM ALBUM COR SERPL-MCNC: 9.8 MG/DL (ref 8.3–10.1)
CALCIUM SERPL-MCNC: 9.2 MG/DL (ref 8.3–10.1)
CARDIAC TROPONIN I PNL SERPL HS: 2 NG/L
CHLORIDE SERPL-SCNC: 105 MMOL/L (ref 96–108)
CO2 SERPL-SCNC: 30 MMOL/L (ref 21–32)
CREAT SERPL-MCNC: 1 MG/DL (ref 0.6–1.3)
D DIMER PPP FEU-MCNC: 1.27 UG/ML FEU
EOSINOPHIL # BLD AUTO: 0.07 THOUSAND/ÂΜL (ref 0–0.61)
EOSINOPHIL NFR BLD AUTO: 1 % (ref 0–6)
ERYTHROCYTE [DISTWIDTH] IN BLOOD BY AUTOMATED COUNT: 12.6 % (ref 11.6–15.1)
GFR SERPL CREATININE-BSD FRML MDRD: 84 ML/MIN/1.73SQ M
GLUCOSE SERPL-MCNC: 100 MG/DL (ref 65–140)
HCT VFR BLD AUTO: 42.5 % (ref 36.5–49.3)
HGB BLD-MCNC: 13.7 G/DL (ref 12–17)
IMM GRANULOCYTES # BLD AUTO: 0.02 THOUSAND/UL (ref 0–0.2)
IMM GRANULOCYTES NFR BLD AUTO: 0 % (ref 0–2)
LYMPHOCYTES # BLD AUTO: 0.9 THOUSANDS/ÂΜL (ref 0.6–4.47)
LYMPHOCYTES NFR BLD AUTO: 15 % (ref 14–44)
MCH RBC QN AUTO: 29.8 PG (ref 26.8–34.3)
MCHC RBC AUTO-ENTMCNC: 32.2 G/DL (ref 31.4–37.4)
MCV RBC AUTO: 92 FL (ref 82–98)
MONOCYTES # BLD AUTO: 0.6 THOUSAND/ÂΜL (ref 0.17–1.22)
MONOCYTES NFR BLD AUTO: 10 % (ref 4–12)
NEUTROPHILS # BLD AUTO: 4.24 THOUSANDS/ÂΜL (ref 1.85–7.62)
NEUTS SEG NFR BLD AUTO: 73 % (ref 43–75)
NRBC BLD AUTO-RTO: 0 /100 WBCS
P AXIS: 66 DEGREES
PLATELET # BLD AUTO: 289 THOUSANDS/UL (ref 149–390)
PMV BLD AUTO: 9.6 FL (ref 8.9–12.7)
POTASSIUM SERPL-SCNC: 4.5 MMOL/L (ref 3.5–5.3)
PR INTERVAL: 158 MS
PROT SERPL-MCNC: 7.5 G/DL (ref 6.4–8.4)
QRS AXIS: 23 DEGREES
QRSD INTERVAL: 128 MS
QT INTERVAL: 372 MS
QTC INTERVAL: 432 MS
RBC # BLD AUTO: 4.6 MILLION/UL (ref 3.88–5.62)
SODIUM SERPL-SCNC: 142 MMOL/L (ref 135–147)
T WAVE AXIS: 51 DEGREES
VENTRICULAR RATE: 81 BPM
WBC # BLD AUTO: 5.87 THOUSAND/UL (ref 4.31–10.16)

## 2022-11-07 RX ADMIN — IOHEXOL 100 ML: 350 INJECTION, SOLUTION INTRAVENOUS at 13:14

## 2022-11-07 NOTE — DISCHARGE INSTRUCTIONS
The CT obtained in the emergency department shows of large mass on your right kidney  You must follow-up with urology and nephrology for further evaluation of this mass  You must contact your primary care physician for the soonest follow-up appointment

## 2022-11-07 NOTE — TELEPHONE ENCOUNTER
New Patient    What is the reason for the patient’s appointment? Patient went to the ED today for shortness of breath and found a renal mass on a CT  What office location does the patient prefer? Prefers [place] but willing to go to any location if needed  Imaging/Lab Results: Ct abdomen pelvis    IMPRESSION:     14 6 x 9 6 x 11 cm heterogeneous complex mass at the lower pole of the right kidney  Nonspecific adjacent lymph nodes  Urologic consultation recommended        Do we accept the patient's insurance or is the patient Self-Pay? Yes, [employer]  Insurance Provider:  Plan Type/Number:  Member ID#: Has the patient had any previous Urologist(s)? No    Have patient records been requested? If not are records showing in Epic:     Has the patient had any outside testing done? CBC  And CMP    Does the patient have a personal history of cancer? No    Please advise for scheduling since there are no upcoming new patient appt       Patient's wife can be reached [phone]

## 2022-11-07 NOTE — Clinical Note
Santy Magallon was seen and treated in our emergency department on 11/7/2022  Diagnosis:     Clovia Sovereign  may return to work on return date  He may return on this date: 11/10/2022         If you have any questions or concerns, please don't hesitate to call        Jamie Mojica, DO    ______________________________           _______________          _______________  Hospital Representative                              Date                                Time

## 2022-11-07 NOTE — TELEPHONE ENCOUNTER
Pt called office c/o heart racing, dizziness and SOB pt advised ER for evaluation per Dr Geni Stephens

## 2022-11-07 NOTE — ED PROVIDER NOTES
History  Chief Complaint   Patient presents with   • Chest Pain     Chest pressure on/off for 2 weeks, states he feels like there are weights on his chest and he has to keep yawning to get air  Patient presents to the emergency department with complaint of a sensation of "not getting a full breath" for 2-3 weeks  He states that he has had to yawn to get a full breath of air  Patient also complains of intermittent episodes of dizziness, with position change  He has a history of Meniere's disease, however he states that this is different  He works as a , bends down and over often, and feels like he has to give himself some time to adapt to position change  He denies trauma  Patient admits that he does not see his primary care physician, last visit was over 2 years ago and he states that he is not prescribed any medication  He denies cough, fevers or chills  He denies recent immobilization  Denies appetite changes, early satiety, urinary changes  History provided by:  Patient   used: No    Shortness of Breath  Severity:  Moderate  Onset quality:  Gradual  Timing:  Intermittent  Progression:  Waxing and waning  Chronicity:  New  Relieved by:  Nothing  Worsened by:  Nothing  Ineffective treatments:  None tried  Associated symptoms: no abdominal pain, no chest pain, no cough, no fever, no headaches, no rash, no sore throat, no vomiting and no wheezing        Prior to Admission Medications   Prescriptions Last Dose Informant Patient Reported?  Taking?   benzonatate (TESSALON) 200 MG capsule   No No   Sig: Take 1 capsule (200 mg total) by mouth 3 (three) times a day as needed for cough   scopolamine (TRANSDERM-SCOP) 1 5 mg/3 days TD 72 hr patch   No No   Sig: Place 1 patch on the skin every third day   Patient not taking: Reported on 11/18/2018    triamterene-hydrochlorothiazide (DYAZIDE) 37 5-25 mg per capsule  Self No No   Sig: Take 1 capsule by mouth every morning   Patient not taking: Reported on 8/28/2020   valACYclovir (VALTREX) 1,000 mg tablet   No No   Sig: Take 1 tablet (1,000 mg total) by mouth 3 (three) times a day for 7 days      Facility-Administered Medications: None       Past Medical History:   Diagnosis Date   • Arthralgia     last assessed 02/10/2015   • Babesiosis     last assessed 12/30/14   • Benign paroxysmal vertigo     last assessed 05/22/15   • Meniere disease     last assessed 04/15/13   • Pneumonia of left lower lobe due to infectious organism     last assessed 04/04/16   • Vitamin D deficiency     last assessed 12/30/14       Past Surgical History:   Procedure Laterality Date   • FIBULA FRACTURE SURGERY     • TIBIA FRACTURE SURGERY     • TONSILLECTOMY         Family History   Problem Relation Age of Onset   • No Known Problems Family    • Meniere's disease Mother      I have reviewed and agree with the history as documented  E-Cigarette/Vaping     E-Cigarette/Vaping Substances     Social History     Tobacco Use   • Smoking status: Former Smoker   • Smokeless tobacco: Never Used   Substance Use Topics   • Alcohol use: Yes     Comment: social   • Drug use: No       Review of Systems   Constitutional: Negative for appetite change, chills and fever  HENT: Negative for sore throat and trouble swallowing  Eyes: Negative for redness and visual disturbance  Respiratory: Positive for shortness of breath  Negative for cough and wheezing  Cardiovascular: Negative for chest pain and palpitations  Gastrointestinal: Negative for abdominal pain, constipation, diarrhea, nausea and vomiting  Genitourinary: Negative for dysuria, flank pain, hematuria and urgency  Musculoskeletal: Negative for back pain and gait problem  Skin: Negative for color change and rash  Neurological: Positive for light-headedness  Negative for tremors, syncope, facial asymmetry, weakness and headaches  Psychiatric/Behavioral: The patient is nervous/anxious      All other systems reviewed and are negative  Physical Exam  Physical Exam  Vitals and nursing note reviewed  Constitutional:       Appearance: He is well-developed  HENT:      Head: Normocephalic and atraumatic  Eyes:      Pupils: Pupils are equal, round, and reactive to light  Cardiovascular:      Rate and Rhythm: Normal rate and regular rhythm  Heart sounds: Normal heart sounds  Pulmonary:      Effort: Pulmonary effort is normal       Breath sounds: Normal breath sounds  No decreased breath sounds or wheezing  Abdominal:      General: Bowel sounds are normal  There is no distension  Palpations: Abdomen is soft  There is no mass  Tenderness: There is no abdominal tenderness  There is no guarding or rebound  Skin:     General: Skin is warm and dry  Capillary Refill: Capillary refill takes less than 2 seconds  Neurological:      General: No focal deficit present  Mental Status: He is alert and oriented to person, place, and time  Psychiatric:         Mood and Affect: Mood is anxious  Behavior: Behavior normal          Thought Content:  Thought content normal          Judgment: Judgment normal          Vital Signs  ED Triage Vitals [11/07/22 0949]   Temperature Pulse Respirations Blood Pressure SpO2   98 6 °F (37 °C) 87 18 (!) 171/99 100 %      Temp src Heart Rate Source Patient Position - Orthostatic VS BP Location FiO2 (%)   -- -- -- -- --      Pain Score       No Pain           Vitals:    11/07/22 0949 11/07/22 1115 11/07/22 1300 11/07/22 1400   BP: (!) 171/99 139/90 127/80 135/84   Pulse: 87 72 70 66         Visual Acuity      ED Medications  Medications   iohexol (OMNIPAQUE) 350 MG/ML injection (SINGLE-DOSE) 100 mL (100 mL Intravenous Given 11/7/22 1314)       Diagnostic Studies  Results Reviewed     Procedure Component Value Units Date/Time    D-Dimer [[id number]]  (Abnormal) Collected: 11/07/22 1128    Lab Status: Final result Specimen: Blood from Arm, Left Updated: 11/07/22 1226     D-Dimer, Quant 1 27 ug/ml FEU     Narrative: In the evaluation for possible pulmonary embolism, in the appropriate (Well's Score of 4 or less) patient, the age adjusted d-dimer cutoff for this patient can be calculated as:    Age x 0 01 (in ug/mL) for Age-adjusted D-dimer exclusion threshold for a patient over 50 years      HS Troponin 0hr (reflex protocol) [[id number]]  (Normal) Collected: 11/07/22 1128    Lab Status: Final result Specimen: Blood from Arm, Left Updated: 11/07/22 1159     hs TnI 0hr 2 ng/L     Comprehensive metabolic panel [[id number]]  (Abnormal) Collected: 11/07/22 1128    Lab Status: Final result Specimen: Blood from Arm, Left Updated: 11/07/22 1153     Sodium 142 mmol/L      Potassium 4 5 mmol/L      Chloride 105 mmol/L      CO2 30 mmol/L      ANION GAP 7 mmol/L      BUN 17 mg/dL      Creatinine 1 00 mg/dL      Glucose 100 mg/dL      Calcium 9 2 mg/dL      Corrected Calcium 9 8 mg/dL      AST --     ALT 36 U/L      Alkaline Phosphatase 95 U/L      Total Protein 7 5 g/dL      Albumin 3 2 g/dL      Total Bilirubin 0 54 mg/dL      eGFR 84 ml/min/1 73sq m     Narrative:      [facility] guidelines for Chronic Kidney Disease (CKD):   •  Stage 1 with normal or high GFR (GFR > 90 mL/min/1 73 square meters)  •  Stage 2 Mild CKD (GFR = 60-89 mL/min/1 73 square meters)  •  Stage 3A Moderate CKD (GFR = 45-59 mL/min/1 73 square meters)  •  Stage 3B Moderate CKD (GFR = 30-44 mL/min/1 73 square meters)  •  Stage 4 Severe CKD (GFR = 15-29 mL/min/1 73 square meters)  •  Stage 5 End Stage CKD (GFR <15 mL/min/1 73 square meters)  Note: GFR calculation is accurate only with a steady state creatinine    CBC and differential [[id number]] Collected: 11/07/22 1128    Lab Status: Final result Specimen: Blood from Arm, Left Updated: 11/07/22 1134     WBC 5 87 Thousand/uL      RBC 4 60 Million/uL      Hemoglobin 13 7 g/dL      Hematocrit 42 5 %      MCV 92 fL      MCH 29 8 pg      MCHC 32 2 g/dL      RDW 12 6 %      MPV 9 6 fL      Platelets 329 Thousands/uL      nRBC 0 /100 WBCs      Neutrophils Relative 73 %      Immat GRANS % 0 %      Lymphocytes Relative 15 %      Monocytes Relative 10 %      Eosinophils Relative 1 %      Basophils Relative 1 %      Neutrophils Absolute 4 24 Thousands/µL      Immature Grans Absolute 0 02 Thousand/uL      Lymphocytes Absolute 0 90 Thousands/µL      Monocytes Absolute 0 60 Thousand/µL      Eosinophils Absolute 0 07 Thousand/µL      Basophils Absolute 0 04 Thousands/µL                  CT abdomen pelvis w contrast   Final Result by Zulay Gimenez MD (11/07 6803)      14 6 x 9 6 x 11 cm heterogeneous complex mass at the lower pole of the right kidney  Nonspecific adjacent lymph nodes  Urologic consultation recommended  Workstation performed: [id number]         CTA ED chest PE study   Final Result by Wilda Ulrich MD (11/07 7022)      No pulmonary embolus  Mild patchy peripheral lower lobe groundglass opacity  Covid not excluded although not the classic appearance  Consider aspiration versus other infectious/inflammatory etiology  Partially imaged large right renal mass  See abdomen CT  The study was marked in [facility] for immediate notification               Workstation performed: [id number]                    Procedures  ECG 12 Lead Documentation Only    Date/Time: 11/7/2022 11:00 AM  Performed by: Breonna Alba DO  Authorized by: Breonna Alba DO     Indications / Diagnosis:  Sob  ECG reviewed by me, the ED Provider: yes    Patient location:  ED  Previous ECG:     Previous ECG:  Unavailable    Comparison to cardiac monitor: Yes    Interpretation:     Interpretation: non-specific    Rate:     ECG rate:  81bpm    ECG rate assessment: normal    Rhythm:     Rhythm: sinus rhythm    Ectopy:     Ectopy: none    QRS:     QRS axis:  Normal  Conduction:     Conduction: abnormal      Abnormal conduction: complete RBBB ST segments:     ST segments:  Normal  T waves:     T waves: normal               ED Course                               SBIRT 22yo+    Flowsheet Row Most Recent Value   SBIRT (25 yo +)    In order to provide better care to our patients, we are screening all of our patients for alcohol and drug use  Would it be okay to ask you these screening questions? No Filed at: 11/07/2022 1114          Wells' Criteria for PE    Flowsheet Row Most Recent Value   Wells' Criteria for PE    Clinical signs and symptoms of DVT 0 Filed at: 11/07/2022 1241   PE is primary diagnosis or equally likely 3 Filed at: 11/07/2022 1241   HR >100 0 Filed at: 11/07/2022 1241   Immobilization at least 3 days or Surgery in the previous 4 weeks 0 Filed at: 11/07/2022 1241   Previous, objectively diagnosed PE or DVT 0 Filed at: 11/07/2022 1241   Hemoptysis 0 Filed at: 11/07/2022 1241   Malignancy with treatment within 6 months or palliative 0 Filed at: 11/07/2022 1241   Wells' Criteria Total 3 Filed at: 11/07/2022 1241                MDM  Number of Diagnoses or Management Options  Dyspnea: new and requires workup  Ground glass opacity present on imaging of lung: new and requires workup  Renal mass, right: new and requires workup  Diagnosis management comments: I reviewed labs and CT report with patient  I informed him of renal mass and for need for outpatient follow-up  Patient is in agreement  States that he follows with [facility] will call them for soonest appointment to reestablish primary care  Patient given information for Urology and Nephrology in [place] and [place], so he may be able to get the earliest appointment  Patient agrees return to emergency department for further concerns         Amount and/or Complexity of Data Reviewed  Clinical lab tests: ordered and reviewed  Tests in the radiology section of CPT®: ordered and reviewed    Risk of Complications, Morbidity, and/or Mortality  Presenting problems: high  Diagnostic procedures: high  Management options: high    Patient Progress  Patient progress: stable      Disposition  Final diagnoses:   Renal mass, right   Ground glass opacity present on imaging of lung   Dyspnea     Time reflects when diagnosis was documented in both MDM as applicable and the Disposition within this note     Time User Action Codes Description Comment    11/7/2022  3:00 PM Danuta Lama Add [N28 89] Renal mass, right     11/7/2022  3:00 PM Danuta Lama O Add [R91 8] Ground glass opacity present on imaging of lung     11/7/2022  3:01 PM Danuta Lama Add [R06 00] Dyspnea       ED Disposition     ED Disposition   Discharge    Condition   Stable    Date/Time   Mon Nov 7, 2022  2:59 PM    Comment   Ruby Saldivar discharge to home/self care                 Follow-up Information     Follow up With Specialties Details Why Contact Info Additional [address] [place] for Urology [facility] Urology Schedule an appointment as soon as possible for a visit in 1 day for follow up [address] [facility] [phone]  [address] [facility] for Urology [address], [facility] [id number], [place], [place], [address], Critical access hospital [facility] Nephrology Schedule an appointment as soon as possible for a visit in 1 day for follow up [address] [facility] [address] [address] [facility] Nephrology [address], [address] [facility], [facility] [address], [facility] 153    [facility] For Urology [place] [facility] Schedule an appointment as soon as possible for a visit in 1 day for follow up [address] [facility] [address]  [address] [facility] For Urology [place], [address] [place], [facility], [address] [facility] [facility] [address]    [facility] Schedule an appointment as soon as possible for a visit in 1 day for follow up [address]  [address]  [address] [facility] Nephrology [address], [address], [address], [place], [place], [address] [facility] Pulmonology Schedule an appointment as soon as possible for a visit in 1 day for follow up [address] [facility] [address] [facility] Pulmonary [address], [address] [place], [place], [address]    [address] [place] [address],[address] Pulmonology Schedule an appointment as soon as possible for a visit in 1 day for follow up [address] [address]  [address] [facility], [address], [place], [place], [address]          Discharge Medication List as of 11/7/2022  3:03 PM      CONTINUE these medications which have NOT CHANGED    Details   benzonatate (TESSALON) 200 MG capsule Take 1 capsule (200 mg total) by mouth 3 (three) times a day as needed for cough, Starting Tue 3/1/2022, Normal      scopolamine (TRANSDERM-SCOP) 1 5 mg/3 days TD 72 hr patch Place 1 patch on the skin every third day, Starting Tue 9/11/2018, Normal      triamterene-hydrochlorothiazide (DYAZIDE) 37 5-25 mg per capsule Take 1 capsule by mouth every morning, Starting Thu 7/5/2018, Normal      valACYclovir (VALTREX) 1,000 mg tablet Take 1 tablet (1,000 mg total) by mouth 3 (three) times a day for 7 days, Starting Fri 11/5/2021, Until Fri 11/12/2021, Normal             No discharge procedures on file      PDMP Review     None          ED Provider  Electronically Signed by           Shanice Delaney DO  11/08/22 0745

## 2022-11-08 NOTE — TELEPHONE ENCOUNTER
Call placed to patient  He did not answer  LMOM asking patient to contact the office to schedule a new patient appointment at [facility]  Office number was provided for the patient to call back to schedule

## 2022-11-08 NOTE — TELEPHONE ENCOUNTER
Patient's wife called stating she saw on my chart a call was made and she stated she did not have a voicemail    Home phone not working well  Patient has renal mass and needs to see Urology as soon as possible  Patient can be reached at wife's cell number [phone]

## 2022-11-08 NOTE — TELEPHONE ENCOUNTER
Call placed to patient and spoke with his wife  Wife stated that her insurance does cover PA doctors  Offered patient an appointment for tomorrow with Dr Dawson Gale Wife accepted this appointment at 11:30am in the [facility] office

## 2022-11-09 ENCOUNTER — HOSPITAL ENCOUNTER (OUTPATIENT)
Dept: RADIOLOGY | Facility: HOSPITAL | Age: 52
Discharge: HOME | End: 2022-11-09
Attending: STUDENT IN AN ORGANIZED HEALTH CARE EDUCATION/TRAINING PROGRAM

## 2022-11-09 ENCOUNTER — HOSPITAL ENCOUNTER (OUTPATIENT)
Dept: RADIOLOGY | Facility: HOSPITAL | Age: 52
Discharge: HOME | End: 2022-11-09

## 2022-11-09 ENCOUNTER — OFFICE VISIT (OUTPATIENT)
Dept: UROLOGY | Facility: MEDICAL CENTER | Age: 52
End: 2022-11-09

## 2022-11-09 VITALS
SYSTOLIC BLOOD PRESSURE: 124 MMHG | DIASTOLIC BLOOD PRESSURE: 80 MMHG | OXYGEN SATURATION: 98 % | RESPIRATION RATE: 18 BRPM | HEIGHT: 72 IN | BODY MASS INDEX: 26.68 KG/M2 | HEART RATE: 78 BPM | WEIGHT: 197 LBS

## 2022-11-09 DIAGNOSIS — H05.50 FOREIGN BODY, OLD, ORBIT: ICD-10-CM

## 2022-11-09 DIAGNOSIS — N28.89 RIGHT RENAL MASS: ICD-10-CM

## 2022-11-09 DIAGNOSIS — N28.89 RIGHT RENAL MASS: Primary | ICD-10-CM

## 2022-11-09 RX ADMIN — GADOBUTROL 9 ML: 604.72 INJECTION INTRAVENOUS at 14:15

## 2022-11-09 NOTE — PROGRESS NOTES
UROLOGY OUTPATIENT CONSULT NOTE     Name: Faith Gonzalez  : 1970  MRN: [id number]  Date of Service: 2022      CHIEF COMPLAINT   Right renal mass    History of Present Illness:     Faith Gonzalez is a 45 y o  male presenting for evaluation of right renal mass  The patient went to the emergency room 2 days ago with chest pressure/pain with deep inspiration which had been occurring over the previous 2-3 weeks  He had a CTA of the chest that was negative for PE but demonstrated a large right renal mass  He then had a CT of the abdomen and pelvis that demonstrated a 14 6 x 9 6 x 11 cm heterogeneous mass originating from the lower pole of the right kidney  There are 2 subcentimeter retroperitoneal nodes and no other evidence for metastasis  The abdominal imaging was performed in the excretion phase due to the contrast being given for PE study  I independently reviewed the images  The patient reports weight loss over the past calendar year and has dropped a pan size from 36-32  He thinks he probably weighs about 160 lb now  He works as a   He has never smoked and denies hematuria  He denies taking blood thinners and denies prior abdominal surgeries      PAST MEDICAL HISTORY:     Past Medical History:   Diagnosis Date   • Arthralgia     last assessed 02/10/2015   • Babesiosis     last assessed 14   • Benign paroxysmal vertigo     last assessed 05/22/15   • Meniere disease     last assessed 04/15/13   • Pneumonia of left lower lobe due to infectious organism     last assessed 16   • Vitamin D deficiency     last assessed 14       PAST SURGICAL HISTORY:     Past Surgical History:   Procedure Laterality Date   • FIBULA FRACTURE SURGERY     • TIBIA FRACTURE SURGERY     • TONSILLECTOMY         CURRENT MEDICATIONS:     Current Outpatient Medications   Medication Sig Dispense Refill   • benzonatate (TESSALON) 200 MG capsule Take 1 capsule (200 mg total) by mouth 3 (three) times a day as needed for cough 20 capsule 0   • scopolamine (TRANSDERM-SCOP) 1 5 mg/3 days TD 72 hr patch Place 1 patch on the skin every third day (Patient not taking: No sig reported) 3 patch 0   • triamterene-hydrochlorothiazide (DYAZIDE) 37 5-25 mg per capsule Take 1 capsule by mouth every morning (Patient not taking: No sig reported) 30 capsule 6   • valACYclovir (VALTREX) 1,000 mg tablet Take 1 tablet (1,000 mg total) by mouth 3 (three) times a day for 7 days 21 tablet 0     No current facility-administered medications for this visit  ALLERGIES:   No Known Allergies    SOCIAL HISTORY:     Social History     Socioeconomic History   • Marital status: /Civil Union     Spouse name: None   • Number of children: None   • Years of education: None   • Highest education level: None   Occupational History   • None   Tobacco Use   • Smoking status: Former Smoker   • Smokeless tobacco: Never Used   Substance and Sexual Activity   • Alcohol use: Yes     Comment: social   • Drug use: No   • Sexual activity: None   Other Topics Concern   • None   Social History Narrative    Daily coffee consumption 6 cups/day     Social Determinants of Health     Financial Resource Strain: Not on file   Food Insecurity: Not on file   Transportation Needs: Not on file   Physical Activity: Not on file   Stress: Not on file   Social Connections: Not on file   Intimate Partner Violence: Not on file   Housing Stability: Not on file       FAMILY HISTORY:     Family History   Problem Relation Age of Onset   • No Known Problems Family    • Meniere's disease Mother        REVIEW OF SYSTEMS:     Review of Systems   Constitutional: Positive for unexpected weight change (weight loss over the past year  went from size 36 to 32 )  Negative for chills and fever  HENT: Negative for hearing loss and sore throat  Eyes: Negative for redness and visual disturbance  Respiratory: Positive for shortness of breath  Negative for cough  Cardiovascular: Negative for chest pain, palpitations and leg swelling  Gastrointestinal: Negative for blood in stool, constipation, diarrhea, nausea and vomiting  Endocrine: Negative for cold intolerance and heat intolerance  Genitourinary:        Per HPI   Musculoskeletal: Negative for arthralgias, back pain and myalgias  Skin: Negative for color change and rash  Neurological: Positive for dizziness  Negative for seizures and headaches  Hematological: Does not bruise/bleed easily  PHYSICAL EXAM:     /80 (BP Location: Right arm, Patient Position: Sitting, Cuff Size: Standard)   Pulse 78   Resp 18   Ht 6' (1 829 m)   Wt 89 4 kg (197 lb)   SpO2 98%   BMI 26 72 kg/m²     General:  Healthy appearing male in no acute distress  Head:  Normocephalic, atraumatic  Eyes: no scleral icterus  ENMT: Nares patent, moist mucous membranes  Cardiovascular:  Regular rate  Respiratory:  Patient has unlabored respirations  Abdomen:  Abdomen is soft, NT, ND, palpable mass in the right abdomen  Musculoskeletal: Normal gait  Neurological: Grossly intact  Psych: Normal affect      LABS:     CBC:   Lab Results   Component Value Date    WBC 5 87 11/07/2022    HGB 13 7 11/07/2022    HCT 42 5 11/07/2022    MCV 92 11/07/2022     11/07/2022       BMP:   Lab Results   Component Value Date    CALCIUM 9 2 11/07/2022    K 4 5 11/07/2022    CO2 30 11/07/2022     11/07/2022    BUN 17 11/07/2022    CREATININE 1 00 11/07/2022       IMAGING:     CT ABDOMEN AND PELVIS WITHOUT IV CONTRAST     INDICATION:   renal mass      COMPARISON:  Chest CT from earlier today     TECHNIQUE:  CT examination of the abdomen and pelvis was performed without contrast  Lack of IV contrast limits the sensitivity for pathology within the visualized solid organs  Lack of oral contrast limits the sensitivity for pathology within the bowel      Axial, sagittal, and coronal 2D reformatted images were created from the source data and submitted for interpretation      Radiation dose length product (DLP) for this visit:  475 mGy-cm   This examination, like all CT scans performed in the [facility], was performed utilizing techniques to minimize radiation dose exposure, including the use of iterative   reconstruction and automated exposure control      IV Contrast:  Not given  Enteric Contrast:  Enteric contrast was not administered      FINDINGS:     ABDOMEN     LOWER CHEST:  Redemonstration of mild patchy lower lobe groundglass opacities      LIVER/BILIARY TREE:  Unremarkable      GALLBLADDER:  No calcified gallstones  No pericholecystic inflammatory change      SPLEEN:  Unremarkable      PANCREAS:  Unremarkable      ADRENAL GLANDS:  Unremarkable      KIDNEYS/URETERS:  There is contrast excretion from both kidneys from prior CT scan of the chest   As mentioned on the chest CT, there is confirmation of a large heterogeneous complex mass at the lower pole of the right kidney  This measures 14 6 x 9 6 x   11 cm   (2/41, 601/62)  There is heterogeneous decreased central attenuation suggestive of necrosis  There is a 3 3 cm right renal cyst   Prominent adjacent vascular structures      Left kidney is unremarkable      STOMACH AND BOWEL:  Unremarkable      APPENDIX:  No findings to suggest appendicitis      ABDOMINOPELVIC CAVITY:  There is a nonspecific 8 mm short axis lymph node behind the inferior vena cava (2/28)  7 mm aortocaval lymph node (2/30)  No ascites  No pneumoperitoneum      VESSELS:  Unremarkable for patient's age      PELVIS     REPRODUCTIVE ORGANS:  Unremarkable for patient's age      URINARY BLADDER:  Unremarkable      ABDOMINAL WALL/INGUINAL REGIONS:  Unremarkable      OSSEOUS STRUCTURES:  No acute fracture or destructive osseous lesion      IMPRESSION:     14 6 x 9 6 x 11 cm heterogeneous complex mass at the lower pole of the right kidney  Nonspecific adjacent lymph nodes    Urologic consultation recommended  CTA - CHEST WITH IV CONTRAST - PULMONARY ANGIOGRAM     INDICATION:   sob        Per my review of the medical record, the patient  presents with intermittent chest pressure for 2 weeks, feels like there are weights on his chest, has to keep yawning to get air      COMPARISON: Abdomen CT from today      TECHNIQUE: CT angiogram timed for optimal opacification of the pulmonary arteries  Axial, sagittal, and coronal 2D reformats created from source data  Coronal 3D MIP postprocessing on the acquisition scanner        Radiation dose length product (DLP):  450 58 mGy-cm   Radiation dose exposure minimized using iterative reconstruction and automated exposure control      IV Contrast:  100 mL of iohexol (OMNIPAQUE)     FINDINGS:     PULMONARY ARTERIES:  No pulmonary embolus       LUNGS:  Mild patchy peripheral lower lobe ground glass opacity  No metastases      AIRWAYS: No significant filling defects      PLEURA:  Unremarkable      HEART/GREAT VESSELS:  Normal heart size  Mild coronary artery calcification indicating atherosclerotic heart disease      MEDIASTINUM AND ELLIE:  Unremarkable      CHEST WALL AND LOWER NECK: Benign lipoma in the right lateral chest wall      UPPER ABDOMEN:  Partially imaged right renal mass  3 5 cm right renal cyst      OSSEOUS STRUCTURES:  Normal for age      IMPRESSION:     No pulmonary embolus      Mild patchy peripheral lower lobe groundglass opacity  Covid not excluded although not the classic appearance  Consider aspiration versus other infectious/inflammatory etiology  ASSESSMENT:     45 y o  male with 14 cm right renal mass  I discussed these findings with the patient and explained that a masslike this represents a kidney cancer  I reviewed the images and the abdominal films are in the excretion phase  The renal vein looks patent but is not fully characterize given the excretion of contrast   I would like to further evaluate this with an MRI    I recommend expedited surgical excision of this mass which will need to be done via an open approach given the size of the mass and the patient's habitus  I discussed with the surgery entails and went through the expected recovery for surgery like this  PLAN:     1  MRI to eval renal vein  2  Return to clinic next week to discuss results and schedule open nephrectomy with vascular surgery    [facility] for Urology    This note was written using fluency dictation software  Please excuse any resulting minor grammatical errors

## 2022-11-11 ENCOUNTER — DOCUMENTATION (OUTPATIENT)
Dept: HEMATOLOGY ONCOLOGY | Facility: CLINIC | Age: 52
End: 2022-11-11

## 2022-11-11 NOTE — PROGRESS NOTES
In-basket message received from Dr Sarah Dacosta  to add pt to 11/15/22  tumor board  Chart Reviewed and tumor board prep completed

## 2022-11-14 ENCOUNTER — OFFICE VISIT (OUTPATIENT)
Dept: UROLOGY | Facility: CLINIC | Age: 52
End: 2022-11-14

## 2022-11-14 VITALS
HEIGHT: 72 IN | DIASTOLIC BLOOD PRESSURE: 72 MMHG | OXYGEN SATURATION: 99 % | HEART RATE: 94 BPM | WEIGHT: 188.4 LBS | RESPIRATION RATE: 16 BRPM | BODY MASS INDEX: 25.52 KG/M2 | SYSTOLIC BLOOD PRESSURE: 122 MMHG

## 2022-11-14 DIAGNOSIS — N28.89 RIGHT RENAL MASS: Primary | ICD-10-CM

## 2022-11-14 NOTE — H&P (VIEW-ONLY)
11/14/2022    Juma Jaime  1970  [id number]      Chief Complaint: Follow-up for right renal mass    History of Present Illness  Juma Jaime is a 45 y o  male with a history of 15 cm right renal mass presenting in follow-up for discussion of imaging and management  The patient initially presented to me last week with a very large renal mass that was found on a CT of the chest   We discussed surgery and plan for MRI to evaluate the renal vein for invasion  His MRI has been completed and I reviewed the images  The renal vein does not appear to be involved by the tumor  There are multiple parasitizing vessels as well as enlarged pericaval nodes which were also visible on CT scan  There are some atypical appearing liver lesions for which interval imaging surveillance is recommended  He presents today to discuss surgery further    Review of Systems  Review of Systems   Constitutional: Negative for chills, fever and unexpected weight change  HENT: Negative for hearing loss and sore throat  Eyes: Negative for redness and visual disturbance  Respiratory: Positive for shortness of breath  Negative for cough  Cardiovascular: Negative for chest pain, palpitations and leg swelling  Gastrointestinal: Negative for blood in stool, constipation, diarrhea, nausea and vomiting  Endocrine: Negative for cold intolerance and heat intolerance  Genitourinary:        Per HPI   Musculoskeletal: Negative for arthralgias, back pain and myalgias  Skin: Negative for color change and rash  Neurological: Negative for dizziness, seizures and headaches  Hematological: Does not bruise/bleed easily         Past Medical History  Past Medical History:   Diagnosis Date   • Arthralgia     last assessed 02/10/2015   • Babesiosis     last assessed 12/30/14   • Benign paroxysmal vertigo     last assessed 05/22/15   • Meniere disease     last assessed 04/15/13   • Pneumonia of left lower lobe due to infectious organism last assessed 04/04/16   • Vitamin D deficiency     last assessed 12/30/14       Past Surgical History  Past Surgical History:   Procedure Laterality Date   • FIBULA FRACTURE SURGERY     • TIBIA FRACTURE SURGERY     • TONSILLECTOMY         Physical Exam  /72 (BP Location: Left arm, Patient Position: Sitting, Cuff Size: Large)   Pulse 94   Resp 16   Ht 6' (1 829 m)   Wt 85 5 kg (188 lb 6 4 oz)   SpO2 99%   BMI 25 55 kg/m²     General:  Healthy appearing male in no acute distress  Head:  Normocephalic, atraumatic  ENMT: Nares patent, moist mucous membranes  Cardiovascular:  Regular rate  Respiratory:  Patient has unlabored respirations  Abdomen:  Abdomen nondistended  Musculoskeletal: Normal gait  Neurological: Grossly intact  Psych: Normal affect    Results  I have personally reviewed all pertinent lab results and reviewed with patient  MRI - ABDOMEN - WITH AND WITHOUT CONTRAST     INDICATION: 45 years / Male  follow-up mass      COMPARISON: 11/7/2022     TECHNIQUE:  The following pulse sequences were obtained:  axial T1 weighted in/out of phase images, multiplanar T2 weighted images, axial DWI/ADC, pre-contrast axial T1 with fat saturation and dynamic multiphase post-contrast fat suppressed T1 weighted   images  Imaging performed on 1 5T MRI       IV Contrast:  9 mL of Gadobutrol injection (SINGLE-DOSE)      FINDINGS:     LOWER CHEST:   Unremarkable      LIVER:   Normal in size and configuration  There is a 1 2 cm arterially enhancing lesion within segment 4A on series 11 image 30  This is persistently hyperintense on delayed imaging and demonstrates hyperintensity on heavily T2-weighted images consistent with atypical hemangioma  There is a T2   hyperintense lesion in segment 6 on series 7 image 29 measuring 1 0 x 1 7 cm   This is also likely a hemangioma though definite discontinuous peripheral nodular enhancement is not seen and the lesion demonstrates peripheral enhancement on delayed imaging  The hepatic veins and portal veins are patent      BILE DUCTS: No intrahepatic or extrahepatic bile duct dilation       GALLBLADDER:  Normal      PANCREAS:  Unremarkable      ADRENAL GLANDS:  Normal      SPLEEN:  Normal      KIDNEYS/PROXIMAL URETERS: As seen on CT there is a large heterogeneously enhancing mass exophytic off the right lower pole measuring 11 2 x 15 4 x 11 0 cm  Nonenhancing areas are consistent with necrosis  There is extension into the renal sinus though no   evidence of renal vein thrombosis or hydronephrosis  There is a right upper pole cyst      BOWEL:   No dilated loops of bowel       PERITONEUM/RETROPERITONEUM: No mass  No ascites       LYMPH NODES: There are retroperitoneal lymph nodes adjacent to the IVC measuring up to 9 mm      VASCULAR STRUCTURES: There are multiple retroperitoneal collaterals posteriorly draining into the IVC  No aneurysm      ABDOMINAL WALL:  Unremarkable      OSSEOUS STRUCTURES:  No suspicious osseous lesion      IMPRESSION:     1   Large exophytic right lower pole heterogeneously enhancing renal mass extending into the renal sinus consistent with renal cell carcinoma  There are retroperitoneal collaterals posteriorly which drain into the IVC though no evidence of right renal   vein thrombosis  No hydronephrosis      2  Borderline paracaval lymph nodes, likely early lymph node metastasis      3   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  However, given primary malignancy and atypical features, a short interval follow-up MRI in 3 months is recommended to exclude metastasis  Assessment  58-year-old male with 15 cm right renal mass, concerning pericaval lymph nodes and atypical liver lesions  I advised that I would like to discuss his case at our tumor board tomorrow morning    The discussion points would be the approach to surgery with the but no dissection, need for biopsy of these liver lesions, and consideration of preoperative embolization either at the time of surgery or the night before  Our discussion may also include the need for adjuvant therapies  Informed consent was obtained for open right nephrectomy possible pericaval lymph node dissection  Risks discussed included but were not limited to bleeding, infection, damage to surrounding structures including the liver, bowel, or blood vessels, progression to renal failure or dialysis, need for additional therapies or procedures  I discussed that this would be performed with the 2nd urologist as well as a vascular surgeon on standby  Plan  1  Discuss patient's case in tumor board tomorrow morning  2  Informed consent obtained for right open nephrectomy, possible paracaval lymph node dissection  3  Tentative surgery date November 21, 2022    Ramona Marques MD  [facility]    This note was written using fluency dictation software  Please excuse any resulting minor grammatical errors

## 2022-11-14 NOTE — PROGRESS NOTES
11/14/2022    Ray Pérez  1970  [id number]      Chief Complaint: Follow-up for right renal mass    History of Present Illness  Ray Pérez is a 45 y o  male with a history of 15 cm right renal mass presenting in follow-up for discussion of imaging and management  The patient initially presented to me last week with a very large renal mass that was found on a CT of the chest   We discussed surgery and plan for MRI to evaluate the renal vein for invasion  His MRI has been completed and I reviewed the images  The renal vein does not appear to be involved by the tumor  There are multiple parasitizing vessels as well as enlarged pericaval nodes which were also visible on CT scan  There are some atypical appearing liver lesions for which interval imaging surveillance is recommended  He presents today to discuss surgery further    Review of Systems  Review of Systems   Constitutional: Negative for chills, fever and unexpected weight change  HENT: Negative for hearing loss and sore throat  Eyes: Negative for redness and visual disturbance  Respiratory: Positive for shortness of breath  Negative for cough  Cardiovascular: Negative for chest pain, palpitations and leg swelling  Gastrointestinal: Negative for blood in stool, constipation, diarrhea, nausea and vomiting  Endocrine: Negative for cold intolerance and heat intolerance  Genitourinary:        Per HPI   Musculoskeletal: Negative for arthralgias, back pain and myalgias  Skin: Negative for color change and rash  Neurological: Negative for dizziness, seizures and headaches  Hematological: Does not bruise/bleed easily         Past Medical History  Past Medical History:   Diagnosis Date   • Arthralgia     last assessed 02/10/2015   • Babesiosis     last assessed 12/30/14   • Benign paroxysmal vertigo     last assessed 05/22/15   • Meniere disease     last assessed 04/15/13   • Pneumonia of left lower lobe due to infectious organism last assessed 04/04/16   • Vitamin D deficiency     last assessed 12/30/14       Past Surgical History  Past Surgical History:   Procedure Laterality Date   • FIBULA FRACTURE SURGERY     • TIBIA FRACTURE SURGERY     • TONSILLECTOMY         Physical Exam  /72 (BP Location: Left arm, Patient Position: Sitting, Cuff Size: Large)   Pulse 94   Resp 16   Ht 6' (1 829 m)   Wt 85 5 kg (188 lb 6 4 oz)   SpO2 99%   BMI 25 55 kg/m²     General:  Healthy appearing male in no acute distress  Head:  Normocephalic, atraumatic  ENMT: Nares patent, moist mucous membranes  Cardiovascular:  Regular rate  Respiratory:  Patient has unlabored respirations  Abdomen:  Abdomen nondistended  Musculoskeletal: Normal gait  Neurological: Grossly intact  Psych: Normal affect    Results  I have personally reviewed all pertinent lab results and reviewed with patient  MRI - ABDOMEN - WITH AND WITHOUT CONTRAST     INDICATION: 45 years / Male  follow-up mass      COMPARISON: 11/7/2022     TECHNIQUE:  The following pulse sequences were obtained:  axial T1 weighted in/out of phase images, multiplanar T2 weighted images, axial DWI/ADC, pre-contrast axial T1 with fat saturation and dynamic multiphase post-contrast fat suppressed T1 weighted   images  Imaging performed on 1 5T MRI       IV Contrast:  9 mL of Gadobutrol injection (SINGLE-DOSE)      FINDINGS:     LOWER CHEST:   Unremarkable      LIVER:   Normal in size and configuration  There is a 1 2 cm arterially enhancing lesion within segment 4A on series 11 image 30  This is persistently hyperintense on delayed imaging and demonstrates hyperintensity on heavily T2-weighted images consistent with atypical hemangioma  There is a T2   hyperintense lesion in segment 6 on series 7 image 29 measuring 1 0 x 1 7 cm   This is also likely a hemangioma though definite discontinuous peripheral nodular enhancement is not seen and the lesion demonstrates peripheral enhancement on delayed imaging  The hepatic veins and portal veins are patent      BILE DUCTS: No intrahepatic or extrahepatic bile duct dilation       GALLBLADDER:  Normal      PANCREAS:  Unremarkable      ADRENAL GLANDS:  Normal      SPLEEN:  Normal      KIDNEYS/PROXIMAL URETERS: As seen on CT there is a large heterogeneously enhancing mass exophytic off the right lower pole measuring 11 2 x 15 4 x 11 0 cm  Nonenhancing areas are consistent with necrosis  There is extension into the renal sinus though no   evidence of renal vein thrombosis or hydronephrosis  There is a right upper pole cyst      BOWEL:   No dilated loops of bowel       PERITONEUM/RETROPERITONEUM: No mass  No ascites       LYMPH NODES: There are retroperitoneal lymph nodes adjacent to the IVC measuring up to 9 mm      VASCULAR STRUCTURES: There are multiple retroperitoneal collaterals posteriorly draining into the IVC  No aneurysm      ABDOMINAL WALL:  Unremarkable      OSSEOUS STRUCTURES:  No suspicious osseous lesion      IMPRESSION:     1   Large exophytic right lower pole heterogeneously enhancing renal mass extending into the renal sinus consistent with renal cell carcinoma  There are retroperitoneal collaterals posteriorly which drain into the IVC though no evidence of right renal   vein thrombosis  No hydronephrosis      2  Borderline paracaval lymph nodes, likely early lymph node metastasis      3   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  However, given primary malignancy and atypical features, a short interval follow-up MRI in 3 months is recommended to exclude metastasis  Assessment  57-year-old male with 15 cm right renal mass, concerning pericaval lymph nodes and atypical liver lesions  I advised that I would like to discuss his case at our tumor board tomorrow morning    The discussion points would be the approach to surgery with the but no dissection, need for biopsy of these liver lesions, and consideration of preoperative embolization either at the time of surgery or the night before  Our discussion may also include the need for adjuvant therapies  Informed consent was obtained for open right nephrectomy possible pericaval lymph node dissection  Risks discussed included but were not limited to bleeding, infection, damage to surrounding structures including the liver, bowel, or blood vessels, progression to renal failure or dialysis, need for additional therapies or procedures  I discussed that this would be performed with the 2nd urologist as well as a vascular surgeon on standby  Plan  1  Discuss patient's case in tumor board tomorrow morning  2  Informed consent obtained for right open nephrectomy, possible paracaval lymph node dissection  3  Tentative surgery date November 21, 2022    Val Bullard MD  [facility]    This note was written using fluency dictation software  Please excuse any resulting minor grammatical errors

## 2022-11-15 ENCOUNTER — TELEPHONE (OUTPATIENT)
Dept: UROLOGY | Facility: MEDICAL CENTER | Age: 52
End: 2022-11-15

## 2022-11-15 ENCOUNTER — DOCUMENTATION (OUTPATIENT)
Dept: HEMATOLOGY ONCOLOGY | Facility: CLINIC | Age: 52
End: 2022-11-15

## 2022-11-15 NOTE — PROGRESS NOTES
Oncology Navigator Note: Multidisciplinary Urology Case Review 11/15/2022        Diagnosis: Augustina Mcwilliams is a 45 y o  male who was presented at the Urology Oncology Multidisciplinary Tumor Conference today  Jayson Dalton presented to the ER for SOB and CTA Chest PE study done  Negative for PE but incidental finding of large right renal mass prompting CT abd/pelvis showing 14 6 x 9 6 x 11 cm heterogeneous complex mass at the lower pole of the right kidney  Nonspecific adjacent lymph nodes  11/9/22 MRI Abdomen:  IMPRESSION:     1   Large exophytic right lower pole heterogeneously enhancing renal mass extending into the renal sinus consistent with renal cell carcinoma  There are retroperitoneal collaterals posteriorly which drain into the IVC though no evidence of right renal   vein thrombosis  No hydronephrosis      2  Borderline paracaval lymph nodes, likely early lymph node metastasis      3   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  However, given primary malignancy and atypical features, a short interval follow-up MRI in 3 months is recommended to exclude metastasis      Recommendations of Group:  Right Nephrectomy with vascular on standby, biopsy of paracaval lymph nodes if accessible  If considering embolization, then advised day before surgery  Upcoming Appointments:  No future appointments  Patient was discussed at the Multidisciplinary Urology Case review on 11/15/2022      NCCN guidelines were available for review  The final treatment plan will be left to the discretion of the patient and the treating physician  DISCLAIMERS:  TO THE TREATING PHYSICIAN:  This conference is a meeting of clinicians from various specialty areas who evaluate and discuss patients for whom a multidisciplinary treatment approach is being considered   Please note that the above opinion was a consensus of the conference attendees and is intended only to assist in quality care of the discussed patient  The responsibility for follow up on the input given during the conference, along with any final decisions regarding plan of care, is that of the patient and the patient's provider  Accordingly, appointments have only been recommended based on this information and have NOT been scheduled unless otherwise noted  TO THE PATIENT:  This summary is a brief record of major aspects of your cancer treatment  You may choose to share a copy with any of your doctors or nurses  However, this is not a detailed or comprehensive record of your care

## 2022-11-15 NOTE — TELEPHONE ENCOUNTER
I left a voice mail message for the patient to confirm plan to schedule his surgery next Monday 11/21/2022 at [facility] with Dr charly Crowley/Darin/Edd  I did ask the patient to call back to confirm that the date works for him and with any questions    -advised that if the date works he will need to go to a [facility] in the next day or so to have  T&S and Urine C&S done   Orders in chart  -advised he will need to be NPO, have a   [facility] will call Friday afternoon  With his arrival time  -[facility] - on [address] [place] [address] tracker and Email sent to [facility] 11/15/2022

## 2022-11-16 ENCOUNTER — ANESTHESIA EVENT (INPATIENT)
Dept: PERIOP | Facility: HOSPITAL | Age: 52
End: 2022-11-16

## 2022-11-16 ENCOUNTER — APPOINTMENT (OUTPATIENT)
Dept: LAB | Facility: CLINIC | Age: 52
End: 2022-11-16

## 2022-11-16 ENCOUNTER — LAB REQUISITION (OUTPATIENT)
Dept: LAB | Facility: HOSPITAL | Age: 52
End: 2022-11-16

## 2022-11-16 DIAGNOSIS — N28.89 RIGHT RENAL MASS: ICD-10-CM

## 2022-11-16 DIAGNOSIS — N28.89 OTHER SPECIFIED DISORDERS OF KIDNEY AND URETER: ICD-10-CM

## 2022-11-16 PROBLEM — Z87.898 HISTORY OF ANESTHESIA COMPLICATIONS: Status: ACTIVE | Noted: 2022-11-16

## 2022-11-16 LAB
ABO GROUP BLD: NORMAL
BLD GP AB SCN SERPL QL: NEGATIVE
RH BLD: POSITIVE
SPECIMEN EXPIRATION DATE: NORMAL

## 2022-11-16 NOTE — ANESTHESIA PREPROCEDURE EVALUATION
Procedure:  NEPHRECTOMY ABDOMINAL APPROACH (Right: Abdomen)    Relevant Problems   ANESTHESIA   (+) History of anesthesia complications (woke up combative)      CARDIO   (+) Hyperlipidemia      NEURO/PSYCH   (+) History of anesthesia complications (woke up combative)     CT ABD:  14 6 x 9 6 x 11 cm heterogeneous complex mass at the lower pole of the right kidney  Nonspecific adjacent lymph nodes  Urologic consultation recommended  Normal sinus rhythm  Possible Left atrial enlargement  Right bundle branch block  Abnormal ECG  No previous ECGs available  Confirmed by Maricruz Baig ([id number]) on 11/7/2022 4:54:23 PM     Patient wakes up from anesthesia combative  Physical Exam    Airway    Mallampati score: II  TM Distance: >3 FB  Neck ROM: full     Dental   Comment: No loose/no missing,     Cardiovascular  Rhythm: regular, Rate: normal,     Pulmonary  Breath sounds clear to auscultation,     Other Findings        Anesthesia Plan  ASA Score- 4     Anesthesia Type- general with ASA Monitors  Additional Monitors:   Airway Plan: ETT  Plan Factors-Exercise tolerance (METS): >4 METS  Chart reviewed  EKG reviewed  Existing labs reviewed  Patient summary reviewed  Patient is not a current smoker (quit 3 months ago)  Induction- intravenous  Postoperative Plan- Plan for postoperative opioid use  Planned trial extubation    Informed Consent- Anesthetic plan and risks discussed with patient  I personally reviewed this patient with the CRNA  Discussed and agreed on the Anesthesia Plan with the CRNA  Renan Benitez

## 2022-11-17 ENCOUNTER — PREP FOR PROCEDURE (OUTPATIENT)
Dept: INTERVENTIONAL RADIOLOGY/VASCULAR | Facility: CLINIC | Age: 52
End: 2022-11-17

## 2022-11-17 ENCOUNTER — TELEPHONE (OUTPATIENT)
Dept: OTHER | Facility: HOSPITAL | Age: 52
End: 2022-11-17

## 2022-11-17 ENCOUNTER — TELEPHONE (OUTPATIENT)
Dept: RADIOLOGY | Facility: HOSPITAL | Age: 52
End: 2022-11-17

## 2022-11-17 DIAGNOSIS — N28.89 RIGHT RENAL MASS: Primary | ICD-10-CM

## 2022-11-17 DIAGNOSIS — N28.89 RENAL MASS: Primary | ICD-10-CM

## 2022-11-17 LAB — BACTERIA UR CULT: NORMAL

## 2022-11-17 RX ORDER — SODIUM CHLORIDE 9 MG/ML
30 INJECTION, SOLUTION INTRAVENOUS CONTINUOUS
Status: CANCELLED | OUTPATIENT
Start: 2022-11-17

## 2022-11-17 NOTE — PRE-PROCEDURE INSTRUCTIONS
Pre-procedure Instructions for Interventional Radiology  Charlene Phillip [address] [phone]    You are scheduled for a/an Right Kidney Embolization  On Friday 11/18/22  Your tentative arrival time is 1330  Short stay will notify you the day before your procedure with the exact arrival time and the location to arrive  To prepare for your procedure:  1  Please arrange for someone to drive you home after the procedure and stay with you until the next morning if you are instructed to do so  This is typically for patients receiving some type of sedative or anesthetic for the procedure  2  DO NOT EAT OR DRINK ANYTHING after midnight on the evening before your procedure including candy & gum   3  ONLY SIPS OF WATER with your medications are allowed on the morning of your procedure  4  TAKE ALL OF YOUR REGULAR MEDICATIONS THE MORNING OF YOUR PROCEDURE with sips of water! We may call you to stop some of your blood sugar, blood pressure and blood thinning medications depending on the procedure  Please take all of these medications unless we instruct you to stop them  5  If you have an allergy to x-ray dye, please contact Interventional Radiology for an x-ray dye preparation which usually consists of an oral steroid and Benadryl  The day of your procedure:  1  Bring a list of the medications you take at home  2  Bring medications you take for breathing problems (such as inhalers), medications for chest pain, or both  3  Bring a case for your glasses or contacts  4  Bring your insurance card and a form of photo ID   5  Please leave all valuables such as credit cards and jewelry at home  6  Report to the registration desk in the main lobby at the [address] [place] [address],[address], Entr[address] B  Ask to be directed to [facility]    7  While your procedure is being performed, your family may wait in the Radiology Waiting Room on the 1st floor in Radiology  if they need to leave, they may provide a number to be called following the procedure  8  Be prepared to stay overnight just in case  Sometimes procedures will indicate the need for further observation or treatment  9  If you are scheduled for a follow-up visit with the Interventional Radiologist after your procedure, you will be called with a date and time  10  No Covid vaccine   Vaccine encouraged  Special Instructions (Medications to stop taking before your procedure etc ):  Patient to be admitted prior to the procedure tomorrow ;if need to keep outpatient time his wife knows to follow above  All Above reviewed with his wife Garfield Novoa

## 2022-11-18 ENCOUNTER — ANESTHESIA EVENT (INPATIENT)
Dept: RADIOLOGY | Facility: HOSPITAL | Age: 52
End: 2022-11-18

## 2022-11-18 ENCOUNTER — APPOINTMENT (INPATIENT)
Dept: RADIOLOGY | Facility: HOSPITAL | Age: 52
End: 2022-11-18

## 2022-11-18 ENCOUNTER — ANESTHESIA (INPATIENT)
Dept: RADIOLOGY | Facility: HOSPITAL | Age: 52
End: 2022-11-18

## 2022-11-18 ENCOUNTER — HOSPITAL ENCOUNTER (INPATIENT)
Facility: HOSPITAL | Age: 52
LOS: 5 days | Discharge: HOME | End: 2022-11-23
Attending: STUDENT IN AN ORGANIZED HEALTH CARE EDUCATION/TRAINING PROGRAM | Admitting: STUDENT IN AN ORGANIZED HEALTH CARE EDUCATION/TRAINING PROGRAM

## 2022-11-18 ENCOUNTER — TELEPHONE (OUTPATIENT)
Dept: UROLOGY | Facility: MEDICAL CENTER | Age: 52
End: 2022-11-18

## 2022-11-18 DIAGNOSIS — N28.89 RIGHT RENAL MASS: Primary | ICD-10-CM

## 2022-11-18 LAB
ABO GROUP BLD: NORMAL
ALBUMIN SERPL BCP-MCNC: 3.1 G/DL (ref 3.5–5)
ALP SERPL-CCNC: 95 U/L (ref 46–116)
ALT SERPL W P-5'-P-CCNC: 46 U/L (ref 12–78)
ANION GAP SERPL CALCULATED.3IONS-SCNC: 7 MMOL/L (ref 4–13)
AST SERPL W P-5'-P-CCNC: 29 U/L (ref 5–45)
BILIRUB SERPL-MCNC: 0.81 MG/DL (ref 0.2–1)
BLD GP AB SCN SERPL QL: NEGATIVE
BUN SERPL-MCNC: 17 MG/DL (ref 5–25)
CALCIUM ALBUM COR SERPL-MCNC: 10.5 MG/DL (ref 8.3–10.1)
CALCIUM SERPL-MCNC: 9.8 MG/DL (ref 8.3–10.1)
CHLORIDE SERPL-SCNC: 106 MMOL/L (ref 96–108)
CO2 SERPL-SCNC: 26 MMOL/L (ref 21–32)
CREAT SERPL-MCNC: 0.98 MG/DL (ref 0.6–1.3)
ERYTHROCYTE [DISTWIDTH] IN BLOOD BY AUTOMATED COUNT: 12.4 % (ref 11.6–15.1)
GFR SERPL CREATININE-BSD FRML MDRD: 87 ML/MIN/1.73SQ M
GLUCOSE SERPL-MCNC: 96 MG/DL (ref 65–140)
HCT VFR BLD AUTO: 40.7 % (ref 36.5–49.3)
HGB BLD-MCNC: 12.9 G/DL (ref 12–17)
INR PPP: 1.05 (ref 0.84–1.19)
MCH RBC QN AUTO: 29.3 PG (ref 26.8–34.3)
MCHC RBC AUTO-ENTMCNC: 31.7 G/DL (ref 31.4–37.4)
MCV RBC AUTO: 93 FL (ref 82–98)
PLATELET # BLD AUTO: 307 THOUSANDS/UL (ref 149–390)
PMV BLD AUTO: 9.3 FL (ref 8.9–12.7)
POTASSIUM SERPL-SCNC: 4.3 MMOL/L (ref 3.5–5.3)
PROT SERPL-MCNC: 7.3 G/DL (ref 6.4–8.4)
PROTHROMBIN TIME: 13.9 SECONDS (ref 11.6–14.5)
RBC # BLD AUTO: 4.4 MILLION/UL (ref 3.88–5.62)
RH BLD: POSITIVE
SODIUM SERPL-SCNC: 139 MMOL/L (ref 135–147)
SPECIMEN EXPIRATION DATE: NORMAL
WBC # BLD AUTO: 5.64 THOUSAND/UL (ref 4.31–10.16)

## 2022-11-18 PROCEDURE — 30233N1 TRANSFUSION OF NONAUTOLOGOUS RED BLOOD CELLS INTO PERIPHERAL VEIN, PERCUTANEOUS APPROACH: ICD-10-PCS | Performed by: PHYSICIAN ASSISTANT

## 2022-11-18 PROCEDURE — 04L93DZ OCCLUSION OF RIGHT RENAL ARTERY WITH INTRALUMINAL DEVICE, PERCUTANEOUS APPROACH: ICD-10-PCS | Performed by: RADIOLOGY

## 2022-11-18 RX ORDER — LIDOCAINE HYDROCHLORIDE 10 MG/ML
INJECTION, SOLUTION EPIDURAL; INFILTRATION; INTRACAUDAL; PERINEURAL AS NEEDED
Status: COMPLETED | OUTPATIENT
Start: 2022-11-18 | End: 2022-11-18

## 2022-11-18 RX ORDER — OXYCODONE HYDROCHLORIDE 10 MG/1
10 TABLET ORAL EVERY 4 HOURS PRN
Status: DISCONTINUED | OUTPATIENT
Start: 2022-11-18 | End: 2022-11-19

## 2022-11-18 RX ORDER — SODIUM CHLORIDE 9 MG/ML
75 INJECTION, SOLUTION INTRAVENOUS CONTINUOUS
Status: DISCONTINUED | OUTPATIENT
Start: 2022-11-18 | End: 2022-11-23 | Stop reason: HOSPADM

## 2022-11-18 RX ORDER — ONDANSETRON 2 MG/ML
INJECTION INTRAMUSCULAR; INTRAVENOUS AS NEEDED
Status: DISCONTINUED | OUTPATIENT
Start: 2022-11-18 | End: 2022-11-18

## 2022-11-18 RX ORDER — FENTANYL CITRATE/PF 50 MCG/ML
25 SYRINGE (ML) INJECTION
Status: DISCONTINUED | OUTPATIENT
Start: 2022-11-18 | End: 2022-11-18 | Stop reason: HOSPADM

## 2022-11-18 RX ORDER — SODIUM PHOSPHATE, DIBASIC AND SODIUM PHOSPHATE, MONOBASIC 7; 19 G/133ML; G/133ML
1 ENEMA RECTAL ONCE
Status: COMPLETED | OUTPATIENT
Start: 2022-11-20 | End: 2022-11-20

## 2022-11-18 RX ORDER — MIDAZOLAM HYDROCHLORIDE 2 MG/2ML
INJECTION, SOLUTION INTRAMUSCULAR; INTRAVENOUS AS NEEDED
Status: DISCONTINUED | OUTPATIENT
Start: 2022-11-18 | End: 2022-11-18

## 2022-11-18 RX ORDER — HYDROMORPHONE HCL/PF 1 MG/ML
0.5 SYRINGE (ML) INJECTION
Status: DISCONTINUED | OUTPATIENT
Start: 2022-11-18 | End: 2022-11-19

## 2022-11-18 RX ORDER — ONDANSETRON 2 MG/ML
4 INJECTION INTRAMUSCULAR; INTRAVENOUS EVERY 6 HOURS PRN
Status: DISCONTINUED | OUTPATIENT
Start: 2022-11-18 | End: 2022-11-19

## 2022-11-18 RX ORDER — CEFAZOLIN SODIUM 2 G/50ML
SOLUTION INTRAVENOUS AS NEEDED
Status: DISCONTINUED | OUTPATIENT
Start: 2022-11-18 | End: 2022-11-18

## 2022-11-18 RX ORDER — HYDROMORPHONE HCL/PF 1 MG/ML
0.5 SYRINGE (ML) INJECTION
Status: DISCONTINUED | OUTPATIENT
Start: 2022-11-18 | End: 2022-11-18 | Stop reason: HOSPADM

## 2022-11-18 RX ORDER — PROPOFOL 10 MG/ML
INJECTION, EMULSION INTRAVENOUS AS NEEDED
Status: DISCONTINUED | OUTPATIENT
Start: 2022-11-18 | End: 2022-11-18

## 2022-11-18 RX ORDER — OXYCODONE HYDROCHLORIDE 5 MG/1
5 TABLET ORAL EVERY 4 HOURS PRN
Status: DISCONTINUED | OUTPATIENT
Start: 2022-11-18 | End: 2022-11-19

## 2022-11-18 RX ORDER — ACETAMINOPHEN 325 MG/1
650 TABLET ORAL EVERY 6 HOURS PRN
Status: DISCONTINUED | OUTPATIENT
Start: 2022-11-18 | End: 2022-11-21

## 2022-11-18 RX ORDER — DIPHENHYDRAMINE HYDROCHLORIDE 50 MG/ML
12.5 INJECTION INTRAMUSCULAR; INTRAVENOUS ONCE AS NEEDED
Status: DISCONTINUED | OUTPATIENT
Start: 2022-11-18 | End: 2022-11-18 | Stop reason: HOSPADM

## 2022-11-18 RX ORDER — ALCOHOL 1 ML/ML
INJECTION, SOLUTION PERCUTANEOUS AS NEEDED
Status: COMPLETED | OUTPATIENT
Start: 2022-11-18 | End: 2022-11-18

## 2022-11-18 RX ORDER — ONDANSETRON 2 MG/ML
4 INJECTION INTRAMUSCULAR; INTRAVENOUS ONCE AS NEEDED
Status: DISCONTINUED | OUTPATIENT
Start: 2022-11-18 | End: 2022-11-18 | Stop reason: HOSPADM

## 2022-11-18 RX ORDER — SODIUM CHLORIDE 9 MG/ML
30 INJECTION, SOLUTION INTRAVENOUS CONTINUOUS
Status: DISCONTINUED | OUTPATIENT
Start: 2022-11-18 | End: 2022-11-21

## 2022-11-18 RX ORDER — FENTANYL CITRATE 50 UG/ML
INJECTION, SOLUTION INTRAMUSCULAR; INTRAVENOUS AS NEEDED
Status: DISCONTINUED | OUTPATIENT
Start: 2022-11-18 | End: 2022-11-18

## 2022-11-18 RX ORDER — DEXAMETHASONE SODIUM PHOSPHATE 10 MG/ML
INJECTION, SOLUTION INTRAMUSCULAR; INTRAVENOUS AS NEEDED
Status: DISCONTINUED | OUTPATIENT
Start: 2022-11-18 | End: 2022-11-18

## 2022-11-18 RX ORDER — POLYETHYLENE GLYCOL 3350 17 G/17G
238 POWDER, FOR SOLUTION ORAL ONCE
Status: COMPLETED | OUTPATIENT
Start: 2022-11-20 | End: 2022-11-20

## 2022-11-18 RX ORDER — LIDOCAINE HYDROCHLORIDE 10 MG/ML
INJECTION, SOLUTION EPIDURAL; INFILTRATION; INTRACAUDAL; PERINEURAL AS NEEDED
Status: DISCONTINUED | OUTPATIENT
Start: 2022-11-18 | End: 2022-11-18

## 2022-11-18 RX ADMIN — FENTANYL CITRATE 50 MCG: 50 INJECTION INTRAMUSCULAR; INTRAVENOUS at 15:39

## 2022-11-18 RX ADMIN — LIDOCAINE HYDROCHLORIDE 100 MG: 10 INJECTION, SOLUTION EPIDURAL; INFILTRATION; INTRACAUDAL; PERINEURAL at 15:31

## 2022-11-18 RX ADMIN — FENTANYL CITRATE 50 MCG: 50 INJECTION INTRAMUSCULAR; INTRAVENOUS at 16:10

## 2022-11-18 RX ADMIN — HYDROMORPHONE HYDROCHLORIDE 0.5 MG: 1 INJECTION, SOLUTION INTRAMUSCULAR; INTRAVENOUS; SUBCUTANEOUS at 17:41

## 2022-11-18 RX ADMIN — Medication 25 MCG: at 17:21

## 2022-11-18 RX ADMIN — Medication 25 MCG: at 17:36

## 2022-11-18 RX ADMIN — DEXAMETHASONE SODIUM PHOSPHATE 8 MG: 10 INJECTION, SOLUTION INTRAMUSCULAR; INTRAVENOUS at 15:51

## 2022-11-18 RX ADMIN — HYDROMORPHONE HYDROCHLORIDE 0.5 MG: 1 INJECTION, SOLUTION INTRAMUSCULAR; INTRAVENOUS; SUBCUTANEOUS at 17:46

## 2022-11-18 RX ADMIN — CEFAZOLIN SODIUM 2000 MG: 2 SOLUTION INTRAVENOUS at 15:35

## 2022-11-18 RX ADMIN — PROPOFOL 100 MG: 10 INJECTION, EMULSION INTRAVENOUS at 15:35

## 2022-11-18 RX ADMIN — PROPOFOL 200 MG: 10 INJECTION, EMULSION INTRAVENOUS at 15:31

## 2022-11-18 RX ADMIN — SODIUM CHLORIDE 100 ML/HR: 0.9 INJECTION, SOLUTION INTRAVENOUS at 13:58

## 2022-11-18 RX ADMIN — Medication 25 MCG: at 17:26

## 2022-11-18 RX ADMIN — SODIUM CHLORIDE 75 ML/HR: 0.9 INJECTION, SOLUTION INTRAVENOUS at 17:32

## 2022-11-18 RX ADMIN — ALCOHOL 10 ML: 1 INJECTION, SOLUTION PERCUTANEOUS at 16:19

## 2022-11-18 RX ADMIN — LIDOCAINE HYDROCHLORIDE 10 ML: 10 INJECTION, SOLUTION EPIDURAL; INFILTRATION; INTRACAUDAL; PERINEURAL at 15:52

## 2022-11-18 RX ADMIN — OXYCODONE HYDROCHLORIDE 10 MG: 10 TABLET ORAL at 22:31

## 2022-11-18 RX ADMIN — IOHEXOL 32 ML: 350 INJECTION, SOLUTION INTRAVENOUS at 16:58

## 2022-11-18 RX ADMIN — Medication 25 MCG: at 17:31

## 2022-11-18 RX ADMIN — MIDAZOLAM 2 MG: 1 INJECTION INTRAMUSCULAR; INTRAVENOUS at 15:29

## 2022-11-18 RX ADMIN — HYDROMORPHONE HYDROCHLORIDE 0.5 MG: 1 INJECTION, SOLUTION INTRAMUSCULAR; INTRAVENOUS; SUBCUTANEOUS at 17:35

## 2022-11-18 RX ADMIN — ONDANSETRON 4 MG: 2 INJECTION INTRAMUSCULAR; INTRAVENOUS at 16:34

## 2022-11-18 NOTE — BRIEF OP NOTE (RAD/CATH)
INTERVENTIONAL RADIOLOGY PROCEDURE NOTE    Date: 11/18/2022    Procedure:  IR RENAL ANGIOGRAM WITH EMBOLIZATION    Preoperative diagnosis:   1  Right renal mass         Postoperative diagnosis: Same  Surgeon: Avelino Rivera MD     Assistant: None  No qualified resident was available  Blood loss:  2 mL    Specimens:  None    Findings:  Single right renal artery present  Large hypervascular lower pole mass extending from the right kidney  No shunting demonstrated  The vascular bed of the kidney and mass were successfully embolized with 10 mL of ethanol which was allowed to dwell for 10 minutes, resulting in complete thrombosis of the vascular bed  Complications: None immediate      Anesthesia: general anesthesia

## 2022-11-18 NOTE — DISCHARGE INSTRUCTIONS
Embolization   AMBULATORY CARE:   What you need to know about embolization: Embolization is a procedure to create a clot, or block, in a blood vessel  This stops blood from flowing to the area  The procedure may be used to treat many conditions  It can help stop heavy bleeding (hemorrhage), or prevent an aneurysm from rupturing  An abnormal connection between arteries can be removed  Embolization can stop blood flow to a tumor, such as a uterine fibroid or a cancer tumor  Chemotherapy medicine may be given during an embolization to treat a cancer tumor  This is called chemoembolization  How to prepare for the procedure: Embolization is sometimes done as an emergency procedure  This means you will not have time to prepare  For an embolization that is not an emergency, the following are general guidelines for how to prepare:  Tell your provider about all your allergies  This includes if you have ever had an allergic reaction to contrast liquid, anesthesia, or antibiotics  You may be told not to eat or drink anything after midnight the night before your procedure  Arrange to have someone drive you home  The person should stay with you to help you and watch for problems that may develop  Give your provider a list of your medicines  Include all medicines and supplements you take  You may need to stop taking blood thinners or aspirin several days before your procedure  This will help decrease your risk for bleeding  Do not stop taking medicines unless your healthcare provider tells you to stop  Your provider will tell you which medicines to take or not take on the day of your procedure  You may need blood tests to check how well your blood clots and to check your kidney function  Depending on the reason for this procedure, you may an MRI, ultrasound, x-ray, or CT scan  These pictures will help your healthcare provider examine the area to be worked on       If you are a woman, tell your provider if you know or think you might be pregnant  You may not be able to have certain tests because they may harm an unborn baby  Your provider may need to take extra precautions for other tests  What will happen during the procedure: You may be given general anesthesia to keep you asleep and pain-free  You may instead be given moderate sedation  This means you will be awake during the procedure, but you should not feel any pain  Your provider will put numbing medicine on your skin where the procedure will be done  A small incision will be made over an artery  A catheter (thin tube) will be guided into the artery  Contrast liquid will be used to help your healthcare provider see your arteries more easily  Your provider will use a type of x-ray that gives a moving picture of the arteries  This will help him or her move the catheter into the right place  The catheter is moved up until it reaches the correct artery  Your provider will put medicine or a material into the artery to slow or stop blood from flowing  This may be a coil, foam, beads, a plug, or liquid  The liquid may also contain material that is larger than blood cells  Your provider will remove the catheter  Pressure will be used to stop any bleeding that happens  The incision area does not need to be closed with stitches  It will be small and close on its own  It will be covered with a bandage to keep it from becoming infected  What to expect after the procedure: You may have pain for a few days  Depending on the reason you had this procedure, you may also have a headache or cramps  You may have pain, bleeding, or bruising where the catheter went into your leg  All of these symptoms are normal and should get better soon  You may be given pain medicine through your IV or a pump  A pump allows you to control when the pain medicine is given  You should expect to stay in the hospital at least overnight   If you had this procedure to treat heavy bleeding, it may take 24 hours to know if the bleeding stopped  Healthcare providers will help you walk around after your procedure  This will help prevent blood clots  Do not get up until healthcare providers say it is okay  They may want you to lie in one position for a certain amount of time  When they say it is okay to walk, they will help you stand and walk safely  Risks of embolization: You may bleed more than expected or develop an infection  The area being treated may be damaged during the procedure  Your artery may be damaged from the catheter, or you may develop a blood clot  Your kidneys may be damaged from the contrast liquid  The material being put into the artery may go to the wrong place  This can stop blood flow to healthy tissue  The procedure may not work, or it may not relieve your symptoms  Call your doctor or specialist if:   You have a fever higher than 100 4°F (38°C)  You have a fever, pain, and nausea that last longer than 3 days  You suddenly have severe abdominal pain  You cannot urinate, or you urinate very little  You have signs of an infection at the catheter site, such as red streaks, pain, or swelling  You have new or worsening pain  You have questions or concerns about your condition or care  Medicines: You may need any of the following:  NSAIDs help decrease swelling and pain or fever  This medicine is available with or without a doctor's order  NSAIDs can cause stomach bleeding or kidney problems in certain people  If you take blood thinner medicine, always ask your healthcare provider if NSAIDs are safe for you  Always read the medicine label and follow directions  Prescription pain medicine may be given  Ask your healthcare provider how to take this medicine safely  Some prescription pain medicines contain acetaminophen  Do not take other medicines that contain acetaminophen without talking to your healthcare provider   Too much acetaminophen may cause liver damage  Prescription pain medicine may cause constipation  Ask your healthcare provider how to prevent or treat constipation  Take your medicine as directed  Contact your healthcare provider if you think your medicine is not helping or if you have side effects  Tell him or her if you are allergic to any medicine  Keep a list of the medicines, vitamins, and herbs you take  Include the amounts, and when and why you take them  Bring the list or the pill bottles to follow-up visits  Carry your medicine list with you in case of an emergency  Self-care:   Rest as needed  Rest and sleep will help your body heal      Follow your healthcare provider's instructions for activity  He or she will tell you when it is okay to return to your normal activities and to start driving  He or she may want you to wait 1 to 2 weeks to return to work  Care for the catheter site as directed  It is okay to shower after the procedure  You will only have a small cut in your skin from where the catheter went into your leg  Check the catheter site for signs of infection, including red streaks, pain, and swelling  Treat symptoms of postembolization syndrome  This syndrome is common after an embolization procedure  It usually starts within 72 hours of the procedure and may last a few days  The main symptoms are fever, pain, and nausea  You will probably be able to manage your symptoms at home  Acetaminophen or an NSAID, such as ibuprofen, can reduce a fever and pain  You may need to eat lightly to manage nausea  Drink more liquids for the first week after the procedure to prevent dehydration  Follow up with your doctor or specialist as directed: You may need to have more tests to check if the procedure worked  Write down your questions so you remember to ask them during your visits    © Copyright [address] Information is for End User's use only and may not be sold, redistributed or otherwise used for commercial purposes  All illustrations and images included in CareNotes® are the copyrighted property of A D [employer] M [employer] Inc  or [employer] Paredes  The above information is an  only  It is not intended as medical advice for individual conditions or treatments  Talk to your doctor, nurse or pharmacist before following any medical regimen to see if it is safe and effective for you

## 2022-11-18 NOTE — PLAN OF CARE
Problem: Nutrition/Hydration-ADULT  Goal: Nutrient/Hydration intake appropriate for improving, restoring or maintaining nutritional needs  Description: Monitor and assess patient's nutrition/hydration status for malnutrition  Collaborate with interdisciplinary team and initiate plan and interventions as ordered  Monitor patient's weight and dietary intake as ordered or per policy  Utilize nutrition screening tool and intervene as necessary  Determine patient's food preferences and provide high-protein, high-caloric foods as appropriate       INTERVENTIONS:  - Monitor oral intake, urinary output, labs, and treatment plans  - Assess nutrition and hydration status and recommend course of action  - Evaluate amount of meals eaten  - Assist patient with eating if necessary   - Allow adequate time for meals  - Recommend/ encourage appropriate diets, oral nutritional supplements, and vitamin/mineral supplements  - Order, calculate, and assess calorie counts as needed  - Recommend, monitor, and adjust tube feedings and TPN/PPN based on assessed needs  - Assess need for intravenous fluids  - Provide specific nutrition/hydration education as appropriate  - Include patient/family/caregiver in decisions related to nutrition  Outcome: Progressing     Problem: PAIN - ADULT  Goal: Verbalizes/displays adequate comfort level or baseline comfort level  Description: Interventions:  - Encourage patient to monitor pain and request assistance  - Assess pain using appropriate pain scale  - Administer analgesics based on type and severity of pain and evaluate response  - Implement non-pharmacological measures as appropriate and evaluate response  - Consider cultural and social influences on pain and pain management  - Notify physician/advanced practitioner if interventions unsuccessful or patient reports new pain  Outcome: Progressing     Problem: INFECTION - ADULT  Goal: Absence or prevention of progression during hospitalization  Description: INTERVENTIONS:  - Assess and monitor for signs and symptoms of infection  - Monitor lab/diagnostic results  - Monitor all insertion sites, i e  indwelling lines, tubes, and drains  - Monitor endotracheal if appropriate and nasal secretions for changes in amount and color  - [place] appropriate cooling/warming therapies per order  - Administer medications as ordered  - Instruct and encourage patient and family to use good hand hygiene technique  - Identify and instruct in appropriate isolation precautions for identified infection/condition  Outcome: Progressing  Goal: Absence of fever/infection during neutropenic period  Description: INTERVENTIONS:  - Monitor WBC    Outcome: Progressing     Problem: SAFETY ADULT  Goal: Patient will remain free of falls  Description: INTERVENTIONS:  - Educate patient/family on patient safety including physical limitations  - Instruct patient to call for assistance with activity   - Consult OT/PT to assist with strengthening/mobility   - Keep Call bell within reach  - Keep bed low and locked with side rails adjusted as appropriate  - Keep care items and personal belongings within reach  - Initiate and maintain comfort rounds  - Make Fall Risk Sign visible to staff  - Offer Toileting every  Hours, in advance of need  - Initiate/Maintain alarm  - Obtain necessary fall risk management equipment:   - Apply yellow socks and bracelet for high fall risk patients  - Consider moving patient to room near nurses station  Outcome: Progressing  Goal: Maintain or return to baseline ADL function  Description: INTERVENTIONS:  -  Assess patient's ability to carry out ADLs; assess patient's baseline for ADL function and identify physical deficits which impact ability to perform ADLs (bathing, care of mouth/teeth, toileting, grooming, dressing, etc )  - Assess/evaluate cause of self-care deficits   - Assess range of motion  - Assess patient's mobility; develop plan if impaired  - Assess patient's need for assistive devices and provide as appropriate  - Encourage maximum independence but intervene and supervise when necessary  - Involve family in performance of ADLs  - Assess for home care needs following discharge   - Consider OT consult to assist with ADL evaluation and planning for discharge  - Provide patient education as appropriate  Outcome: Progressing  Goal: Maintains/Returns to pre admission functional level  Description: INTERVENTIONS:  - Perform BMAT or MOVE assessment daily    - Set and communicate daily mobility goal to care team and patient/family/caregiver  - Collaborate with rehabilitation services on mobility goals if consulted  - Perform Range of Motion  times a day  - Reposition patient every  hours    - Dangle patient  times a day  - Stand patient  times a day  - Ambulate patient  times a day  - Out of bed to chair  times a day   - Out of bed for meals times a day  - Out of bed for toileting  - Record patient progress and toleration of activity level   Outcome: Progressing     Problem: DISCHARGE PLANNING  Goal: Discharge to home or other facility with appropriate resources  Description: INTERVENTIONS:  - Identify barriers to discharge w/patient and caregiver  - Arrange for needed discharge resources and transportation as appropriate  - Identify discharge learning needs (meds, wound care, etc )  - Arrange for interpretive services to assist at discharge as needed  - Refer to Case Management Department for coordinating discharge planning if the patient needs post-hospital services based on physician/advanced practitioner order or complex needs related to functional status, cognitive ability, or social support system  Outcome: Progressing     Problem: Knowledge Deficit  Goal: Patient/family/caregiver demonstrates understanding of disease process, treatment plan, medications, and discharge instructions  Description: Complete learning assessment and assess knowledge base    Interventions:  - Provide teaching at level of understanding  - Provide teaching via preferred learning methods  Outcome: Progressing

## 2022-11-18 NOTE — H&P
H&P: Williams Up 45 y o  male [id number]    Unit/Bed #: [facility] 911-01  Encounter: [id number]        Assessment  Plan  :  Right renal mass:  -plan for surgery on 11/21 for right radical nephrectomy with Dr Abigail Meredith  -patient readmitted for optimization preoperatively, plan for IR right renal embolization today  -baseline labs ordered CMP, CBC with platelet, INR, type and screen ordered, 4 units of PRBCs to be on hold prior to surgical intervention on Monday   -patient may have regular diet after IR procedure today  -plan for clear liquid diet on Sunday 11/20, Glycolax bowel prep and Fleet enema ordered for bowel prep on preoperatively  -NPO at midnight of 11/21  -p r n  Pain medication provided, antiemetics  -vitals currently stable  -labs within normal limits, creatinine at baseline, no leukocytosis hemoglobin 12 9 baseline, plan for repeat labs tomorrow after IR procedure   -gentle IV fluids 75 cc/hr  normal saline   -can consider re-initiation of DVT prophylaxis after IR procedure  Would need to hold again prior to planned surgical procedure on Monday  Continue with medical optimization preoperatively    Subjective :    Chris Chandler  is a 45 y o  male who was admitted for Right renal mass [N28 89]  Chris Chandler is a 45 y o  male presenting for evaluation of right renal mass  The patient went to the emergency room 2 days ago with chest pressure/pain with deep inspiration which had been occurring over the previous 2-3 weeks  He had a CTA of the chest that was negative for PE but demonstrated a large right renal mass  He then had a CT of the abdomen and pelvis that demonstrated a 14 6 x 9 6 x 11 cm heterogeneous mass originating from the lower pole of the right kidney  There are 2 subcentimeter retroperitoneal nodes and no other evidence for metastasis  The abdominal imaging was performed in the excretion phase due to the contrast being given for PE study    I independently reviewed the images  The patient reports weight loss over the past calendar year and has dropped a pan size from 36-32  He thinks he probably weighs about 160 lb now  He works as a   He has never smoked and denies hematuria  He denies taking blood thinners and denies prior abdominal surgeries  Past Medical History:   Diagnosis Date   • Arthralgia     last assessed 02/10/2015   • Babesiosis     last assessed 12/30/14   • Benign paroxysmal vertigo     last assessed 05/22/15   • Meniere disease     last assessed 04/15/13   • Pneumonia of left lower lobe due to infectious organism     last assessed 04/04/16   • Vitamin D deficiency     last assessed 12/30/14     Current Outpatient Medications   Medication Instructions   • benzonatate (TESSALON) 200 mg, Oral, 3 times daily PRN   • scopolamine (TRANSDERM-SCOP) 1 5 mg/3 days TD 72 hr patch 1 patch, Transdermal, Every 72 hours   • triamterene-hydrochlorothiazide (DYAZIDE) 37 5-25 mg per capsule 1 capsule, Oral, Every morning   • valACYclovir (VALTREX) 1,000 mg, Oral, 3 times daily      No Known Allergies  Past Surgical History:   Procedure Laterality Date   • FIBULA FRACTURE SURGERY     • TIBIA FRACTURE SURGERY     • TONSILLECTOMY       Social History     Social History Narrative    Daily coffee consumption 6 cups/day        Review of Systems   Review of Systems   Constitutional: Negative  Negative for chills and fever  HENT: Negative  Eyes: Negative  Respiratory: Negative  Cardiovascular: Negative  Gastrointestinal: Negative  Negative for abdominal pain, diarrhea, nausea and vomiting  Endocrine: Negative  Genitourinary: Negative  Negative for difficulty urinating, dysuria, flank pain, frequency, hematuria and urgency  Musculoskeletal: Negative  Skin: Negative  Allergic/Immunologic: Negative  Neurological: Negative  Hematological: Negative  Psychiatric/Behavioral: Negative           Objective     Physical Exam  Constitutional: General: He is not in acute distress  Appearance: He is normal weight  He is not ill-appearing, toxic-appearing or diaphoretic  HENT:      Head: Normocephalic and atraumatic  Right Ear: External ear normal       Left Ear: External ear normal       Nose: Nose normal       Mouth/Throat:      Pharynx: Oropharynx is clear  Eyes:      General: No scleral icterus  Conjunctiva/sclera: Conjunctivae normal    Cardiovascular:      Rate and Rhythm: Normal rate and regular rhythm  Pulses: Normal pulses  Heart sounds: No murmur heard  No friction rub  No gallop  Pulmonary:      Effort: Pulmonary effort is normal  No respiratory distress  Breath sounds: No wheezing, rhonchi or rales  Abdominal:      General: Bowel sounds are normal  There is no distension  Palpations: Abdomen is soft  Tenderness: There is no abdominal tenderness  Musculoskeletal:      Cervical back: Normal range of motion  Right lower leg: No edema  Left lower leg: No edema  Neurological:      General: No focal deficit present  Mental Status: He is alert and oriented to person, place, and time  Psychiatric:         Mood and Affect: Mood normal          Behavior: Behavior normal          Thought Content: Thought content normal          Judgment: Judgment normal           Imaging:  MRI - ABDOMEN - WITH AND WITHOUT CONTRAST     INDICATION: 45 years / Male  follow-up mass      COMPARISON: 11/7/2022     TECHNIQUE:  The following pulse sequences were obtained:  axial T1 weighted in/out of phase images, multiplanar T2 weighted images, axial DWI/ADC, pre-contrast axial T1 with fat saturation and dynamic multiphase post-contrast fat suppressed T1 weighted   images  Imaging performed on 1 5T MRI       IV Contrast:  9 mL of Gadobutrol injection (SINGLE-DOSE)      FINDINGS:     LOWER CHEST:   Unremarkable      LIVER:   Normal in size and configuration     There is a 1 2 cm arterially enhancing lesion within segment 4A on series 11 image 30  This is persistently hyperintense on delayed imaging and demonstrates hyperintensity on heavily T2-weighted images consistent with atypical hemangioma  There is a T2   hyperintense lesion in segment 6 on series 7 image 29 measuring 1 0 x 1 7 cm  This is also likely a hemangioma though definite discontinuous peripheral nodular enhancement is not seen and the lesion demonstrates peripheral enhancement on delayed imaging  The hepatic veins and portal veins are patent      BILE DUCTS: No intrahepatic or extrahepatic bile duct dilation       GALLBLADDER:  Normal      PANCREAS:  Unremarkable      ADRENAL GLANDS:  Normal      SPLEEN:  Normal      KIDNEYS/PROXIMAL URETERS: As seen on CT there is a large heterogeneously enhancing mass exophytic off the right lower pole measuring 11 2 x 15 4 x 11 0 cm  Nonenhancing areas are consistent with necrosis  There is extension into the renal sinus though no   evidence of renal vein thrombosis or hydronephrosis  There is a right upper pole cyst      BOWEL:   No dilated loops of bowel       PERITONEUM/RETROPERITONEUM: No mass  No ascites       LYMPH NODES: There are retroperitoneal lymph nodes adjacent to the IVC measuring up to 9 mm      VASCULAR STRUCTURES: There are multiple retroperitoneal collaterals posteriorly draining into the IVC  No aneurysm      ABDOMINAL WALL:  Unremarkable      OSSEOUS STRUCTURES:  No suspicious osseous lesion      IMPRESSION:     1   Large exophytic right lower pole heterogeneously enhancing renal mass extending into the renal sinus consistent with renal cell carcinoma  There are retroperitoneal collaterals posteriorly which drain into the IVC though no evidence of right renal   vein thrombosis  No hydronephrosis      2  Borderline paracaval lymph nodes, likely early lymph node metastasis      3   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   However, given primary malignancy and atypical features, a short interval follow-up MRI in 3 months is recommended to exclude metastasis      Labs:  Lab Results   Component Value Date    SODIUM 142 11/07/2022    K 4 5 11/07/2022     11/07/2022    CO2 30 11/07/2022    BUN 17 11/07/2022    CREATININE 1 00 11/07/2022    GLUC 100 11/07/2022    CALCIUM 9 2 11/07/2022       Lab Results   Component Value Date    WBC 5 87 11/07/2022    HGB 13 7 11/07/2022    HCT 42 5 11/07/2022    MCV 92 11/07/2022     11/07/2022           VTE Pharmacologic Prophylaxis: Reason for no pharmacologic prophylaxis On hold for surgical procedure today  VTE Mechanical Prophylaxis: sequential compression device      Mariel Roche PA-C

## 2022-11-18 NOTE — ANESTHESIA PREPROCEDURE EVALUATION
Procedure:  IR RENAL ANGIOGRAM    Relevant Problems   ANESTHESIA   (+) History of anesthesia complications      CARDIO   (+) Hyperlipidemia      NEURO/PSYCH   (+) History of anesthesia complications        Physical Exam    Airway    Mallampati score: I  TM Distance: <3 FB  Neck ROM: full     Dental       Cardiovascular      Pulmonary      Other Findings        Anesthesia Plan  ASA Score- 2     Anesthesia Type- general with ASA Monitors  Additional Monitors:   Airway Plan:           Plan Factors-    Chart reviewed  Patient summary reviewed  Patient is not a current smoker  Patient did not smoke on day of surgery  Induction-     Postoperative Plan- Plan for postoperative opioid use  Planned trial extubation    Informed Consent- Anesthetic plan and risks discussed with patient  I personally reviewed this patient with the CRNA  Discussed and agreed on the Anesthesia Plan with the CRNA  Jodi Morris

## 2022-11-18 NOTE — SEDATION DOCUMENTATION
Embolization of right kidney mass performed by Dr Amalia Duckworth  Anesthesia present for case  Perclose closure device used in right groin   IR Procedure Bedrest Start Time is 1640  Patient transported with [facility] CRNA to PACU  Report given

## 2022-11-18 NOTE — ANESTHESIA POSTPROCEDURE EVALUATION
Post-Op Assessment Note    CV Status:  Stable    Pain management: adequate     Mental Status:  Alert and awake   Hydration Status:  Euvolemic   PONV Controlled:  Controlled   Airway Patency:  Patent      Post Op Vitals Reviewed: Yes            No notable events documented      BP   87/62   Temp   98 1   Pulse  74   Resp   12   SpO2   100% 6L fm

## 2022-11-18 NOTE — INTERVAL H&P NOTE
The history and physical were reviewed, along with progress notes, and the patient was examined  There are no changes since it was written      /79 (BP Location: Left arm)   Pulse 79   Temp 97 9 °F (36 6 °C) (Temporal)   Resp 18   Ht 6' (1 829 m)   Wt 81 8 kg (180 lb 5 4 oz)   SpO2 99%   BMI 24 46 kg/m²        Willian Beach MD/November 18, 2022/4:47 PM

## 2022-11-19 LAB
ANION GAP SERPL CALCULATED.3IONS-SCNC: 7 MMOL/L (ref 4–13)
BASOPHILS # BLD AUTO: 0.01 THOUSANDS/ÂΜL (ref 0–0.1)
BASOPHILS NFR BLD AUTO: 0 % (ref 0–1)
BUN SERPL-MCNC: 16 MG/DL (ref 5–25)
CALCIUM SERPL-MCNC: 8.9 MG/DL (ref 8.3–10.1)
CHLORIDE SERPL-SCNC: 107 MMOL/L (ref 96–108)
CO2 SERPL-SCNC: 24 MMOL/L (ref 21–32)
CREAT SERPL-MCNC: 1.2 MG/DL (ref 0.6–1.3)
EOSINOPHIL # BLD AUTO: 0 THOUSAND/ÂΜL (ref 0–0.61)
EOSINOPHIL NFR BLD AUTO: 0 % (ref 0–6)
ERYTHROCYTE [DISTWIDTH] IN BLOOD BY AUTOMATED COUNT: 12.1 % (ref 11.6–15.1)
GFR SERPL CREATININE-BSD FRML MDRD: 68 ML/MIN/1.73SQ M
GLUCOSE SERPL-MCNC: 142 MG/DL (ref 65–140)
HCT VFR BLD AUTO: 40 % (ref 36.5–49.3)
HGB BLD-MCNC: 12.5 G/DL (ref 12–17)
IMM GRANULOCYTES # BLD AUTO: 0.04 THOUSAND/UL (ref 0–0.2)
IMM GRANULOCYTES NFR BLD AUTO: 0 % (ref 0–2)
LYMPHOCYTES # BLD AUTO: 0.53 THOUSANDS/ÂΜL (ref 0.6–4.47)
LYMPHOCYTES NFR BLD AUTO: 6 % (ref 14–44)
MCH RBC QN AUTO: 28.9 PG (ref 26.8–34.3)
MCHC RBC AUTO-ENTMCNC: 31.3 G/DL (ref 31.4–37.4)
MCV RBC AUTO: 93 FL (ref 82–98)
MONOCYTES # BLD AUTO: 0.32 THOUSAND/ÂΜL (ref 0.17–1.22)
MONOCYTES NFR BLD AUTO: 4 % (ref 4–12)
NEUTROPHILS # BLD AUTO: 8.33 THOUSANDS/ÂΜL (ref 1.85–7.62)
NEUTS SEG NFR BLD AUTO: 90 % (ref 43–75)
NRBC BLD AUTO-RTO: 0 /100 WBCS
PLATELET # BLD AUTO: 285 THOUSANDS/UL (ref 149–390)
PMV BLD AUTO: 9.9 FL (ref 8.9–12.7)
POTASSIUM SERPL-SCNC: 4.3 MMOL/L (ref 3.5–5.3)
RBC # BLD AUTO: 4.32 MILLION/UL (ref 3.88–5.62)
SODIUM SERPL-SCNC: 138 MMOL/L (ref 135–147)
WBC # BLD AUTO: 9.23 THOUSAND/UL (ref 4.31–10.16)

## 2022-11-19 RX ORDER — METOCLOPRAMIDE HYDROCHLORIDE 5 MG/ML
5 INJECTION INTRAMUSCULAR; INTRAVENOUS EVERY 6 HOURS PRN
Status: DISCONTINUED | OUTPATIENT
Start: 2022-11-19 | End: 2022-11-21

## 2022-11-19 RX ORDER — ONDANSETRON 2 MG/ML
4 INJECTION INTRAMUSCULAR; INTRAVENOUS EVERY 6 HOURS PRN
Status: DISCONTINUED | OUTPATIENT
Start: 2022-11-19 | End: 2022-11-21

## 2022-11-19 RX ORDER — DIPHENHYDRAMINE HYDROCHLORIDE 50 MG/ML
25 INJECTION INTRAMUSCULAR; INTRAVENOUS EVERY 6 HOURS PRN
Status: DISCONTINUED | OUTPATIENT
Start: 2022-11-19 | End: 2022-11-23 | Stop reason: HOSPADM

## 2022-11-19 RX ADMIN — OXYCODONE HYDROCHLORIDE 10 MG: 10 TABLET ORAL at 12:43

## 2022-11-19 RX ADMIN — SODIUM CHLORIDE 75 ML/HR: 0.9 INJECTION, SOLUTION INTRAVENOUS at 16:37

## 2022-11-19 RX ADMIN — OXYCODONE HYDROCHLORIDE 10 MG: 10 TABLET ORAL at 07:56

## 2022-11-19 RX ADMIN — DIPHENHYDRAMINE HYDROCHLORIDE 25 MG: 50 INJECTION, SOLUTION INTRAMUSCULAR; INTRAVENOUS at 16:05

## 2022-11-19 RX ADMIN — SODIUM CHLORIDE 75 ML/HR: 0.9 INJECTION, SOLUTION INTRAVENOUS at 03:34

## 2022-11-19 RX ADMIN — HYDROMORPHONE HYDROCHLORIDE: 10 INJECTION INTRAMUSCULAR; INTRAVENOUS; SUBCUTANEOUS at 15:06

## 2022-11-19 RX ADMIN — HYDROMORPHONE HYDROCHLORIDE 0.5 MG: 1 INJECTION, SOLUTION INTRAMUSCULAR; INTRAVENOUS; SUBCUTANEOUS at 01:05

## 2022-11-19 NOTE — PLAN OF CARE
Problem: Nutrition/Hydration-ADULT  Goal: Nutrient/Hydration intake appropriate for improving, restoring or maintaining nutritional needs  Description: Monitor and assess patient's nutrition/hydration status for malnutrition  Collaborate with interdisciplinary team and initiate plan and interventions as ordered  Monitor patient's weight and dietary intake as ordered or per policy  Utilize nutrition screening tool and intervene as necessary  Determine patient's food preferences and provide high-protein, high-caloric foods as appropriate       INTERVENTIONS:  - Monitor oral intake, urinary output, labs, and treatment plans  - Assess nutrition and hydration status and recommend course of action  - Evaluate amount of meals eaten  - Assist patient with eating if necessary   - Allow adequate time for meals  - Recommend/ encourage appropriate diets, oral nutritional supplements, and vitamin/mineral supplements  - Order, calculate, and assess calorie counts as needed  - Recommend, monitor, and adjust tube feedings and TPN/PPN based on assessed needs  - Assess need for intravenous fluids  - Provide specific nutrition/hydration education as appropriate  - Include patient/family/caregiver in decisions related to nutrition  Outcome: Progressing     Problem: PAIN - ADULT  Goal: Verbalizes/displays adequate comfort level or baseline comfort level  Description: Interventions:  - Encourage patient to monitor pain and request assistance  - Assess pain using appropriate pain scale  - Administer analgesics based on type and severity of pain and evaluate response  - Implement non-pharmacological measures as appropriate and evaluate response  - Consider cultural and social influences on pain and pain management  - Notify physician/advanced practitioner if interventions unsuccessful or patient reports new pain  Outcome: Progressing     Problem: INFECTION - ADULT  Goal: Absence or prevention of progression during hospitalization  Description: INTERVENTIONS:  - Assess and monitor for signs and symptoms of infection  - Monitor lab/diagnostic results  - Monitor all insertion sites, i e  indwelling lines, tubes, and drains  - Monitor endotracheal if appropriate and nasal secretions for changes in amount and color  - [place] appropriate cooling/warming therapies per order  - Administer medications as ordered  - Instruct and encourage patient and family to use good hand hygiene technique  - Identify and instruct in appropriate isolation precautions for identified infection/condition  Outcome: Progressing  Goal: Absence of fever/infection during neutropenic period  Description: INTERVENTIONS:  - Monitor WBC    Outcome: Progressing     Problem: SAFETY ADULT  Goal: Patient will remain free of falls  Description: INTERVENTIONS:  - Educate patient/family on patient safety including physical limitations  - Instruct patient to call for assistance with activity   - Consult OT/PT to assist with strengthening/mobility   - Keep Call bell within reach  - Keep bed low and locked with side rails adjusted as appropriate  - Keep care items and personal belongings within reach  - Initiate and maintain comfort rounds  - Make Fall Risk Sign visible to staff  - Offer Toileting every  Hours, in advance of need  - Initiate/Maintain alarm  - Obtain necessary fall risk management equipment:   - Apply yellow socks and bracelet for high fall risk patients  - Consider moving patient to room near nurses station  Outcome: Progressing  Goal: Maintain or return to baseline ADL function  Description: INTERVENTIONS:  -  Assess patient's ability to carry out ADLs; assess patient's baseline for ADL function and identify physical deficits which impact ability to perform ADLs (bathing, care of mouth/teeth, toileting, grooming, dressing, etc )  - Assess/evaluate cause of self-care deficits   - Assess range of motion  - Assess patient's mobility; develop plan if impaired  - Assess patient's need for assistive devices and provide as appropriate  - Encourage maximum independence but intervene and supervise when necessary  - Involve family in performance of ADLs  - Assess for home care needs following discharge   - Consider OT consult to assist with ADL evaluation and planning for discharge  - Provide patient education as appropriate  Outcome: Progressing  Goal: Maintains/Returns to pre admission functional level  Description: INTERVENTIONS:  - Perform BMAT or MOVE assessment daily    - Set and communicate daily mobility goal to care team and patient/family/caregiver  - Collaborate with rehabilitation services on mobility goals if consulted  - Perform Range of Motion  times a day  - Reposition patient every  hours    - Dangle patient  times a day  - Stand patient  times a day  - Ambulate patient  times a day  - Out of bed to chair  times a day   - Out of bed for meals times a day  - Out of bed for toileting  - Record patient progress and toleration of activity level   Outcome: Progressing     Problem: DISCHARGE PLANNING  Goal: Discharge to home or other facility with appropriate resources  Description: INTERVENTIONS:  - Identify barriers to discharge w/patient and caregiver  - Arrange for needed discharge resources and transportation as appropriate  - Identify discharge learning needs (meds, wound care, etc )  - Arrange for interpretive services to assist at discharge as needed  - Refer to Case Management Department for coordinating discharge planning if the patient needs post-hospital services based on physician/advanced practitioner order or complex needs related to functional status, cognitive ability, or social support system  Outcome: Progressing     Problem: Knowledge Deficit  Goal: Patient/family/caregiver demonstrates understanding of disease process, treatment plan, medications, and discharge instructions  Description: Complete learning assessment and assess knowledge base    Interventions:  - Provide teaching at level of understanding  - Provide teaching via preferred learning methods  Outcome: Progressing

## 2022-11-19 NOTE — UTILIZATION REVIEW
Initial Clinical Review    Admission: Date/Time/Statement:   Admission Orders (From admission, onward)     Ordered        11/18/22 1221  Inpatient Admission  Once                      Orders Placed This Encounter   Procedures   • Inpatient Admission     Standing Status:   Standing     Number of Occurrences:   1     Order Specific Question:   Level of Care     Answer:   Med Surg [16]     Order Specific Question:   Estimated length of stay     Answer:   More than 2 Midnights     Order Specific Question:   Certification     Answer:   I certify that inpatient services are medically necessary for this patient for a duration of greater than two midnights  See H&P and MD Progress Notes for additional information about the patient's course of treatment  Initial Presentation: 45 y o  male  Directly admitted prior to elective surgery  11/21 for right radical nephrectomy  Needs optimization pre op, plan   IR  Intervention  Admit  Ip with Large renal mass, consistent with renal cell carcinoma, likely  Early lymph node mets, liver lesions and plan is  IR intervention  And  Planned surgery 11/21  Date: 11/18/2022     Procedure:  IR RENAL ANGIOGRAM WITH EMBOLIZATION     Findings:  Single right renal artery present  Large hypervascular lower pole mass extending from the right kidney  No shunting demonstrated  The vascular bed of the kidney and mass were successfully embolized with 10 mL of ethanol which was allowed to dwell for 10 minutes, resulting in complete thrombosis of the vascular bed          Date:    11/19       Day 2:   Continue  IVF and PCA  Monitor labs  Continue current meds/treatment plan         Wt Readings from Last 1 Encounters:   11/18/22 81 8 kg (180 lb 5 4 oz)     Additional Vital Signs:   11/19/22 14:46:58 97 3 °F (36 3 °C) Abnormal  71 17 141/85 104 99 % -- -- -- -- --   11/19/22 14:46:32 97 3 °F (36 3 °C) Abnormal  69 17 141/85 104 99 % -- -- -- -- --   11/19/22 08:24:55 96 8 °F (36 °C) Abnormal  63 17 138/85 103 98 % -- -- -- -- --   11/19/22 08:24:30 96 8 °F (36 °C) Abnormal  68 17 138/85 103 96 % -- -- -- -- --   11/19/22 0803 -- -- -- -- -- -- -- -- -- None (Room air) --   11/18/22 2120 -- -- -- -- -- -- -- -- -- None (Room air) --   11/18/22 21:18:18 97 2 °F (36 2 °C) Abnormal  68 16 118/79 92 96 % -- -- -- -- --   11/18/22 18:28:51 97 2 °F (36 2 °C) Abnormal  67 16 118/80 93 97 % -- -- -- -- --   11/18/22 1800 -- 62 19 117/80 96 100 % 32 -- 3 L/min Nasal cannula --   11/18/22 1746 -- -- -- -- -- 98 % 32 -- 3 L/min Nasal cannula --   11/18/22 1745 -- 72 13 117/80 93 94 % -- -- -- None (Room air) --   11/18/22 1730 -- 74 20 123/90 109 100 % -- -- -- None (Room air) --   11/18/22 1715 -- 74 21 115/82 92 97 % -- -- -- None (Room air) --   11/18/22 1700 -- 72 14 108/72 83 100 % -- -- -- None (Room air) --   11/18/22 1647 98 1 °F (36 7 °C) 74 15 87/62 Abnormal  74 100 % -- 6 L/min -- Simple mask --   11/18/22 12:12:39 97 9 °F (36 6 °C) 79 18 119/79 92 99 % -- -- -- -- Sitting   11/18/22 12:11:23 97 9 °F (36 6 °C) -- -- 119/79 92 -- -- -- -- -- --       Pertinent Labs/Diagnostic Test Results:   IR renal angiogram   Final Result by Kareem Lo MD (11/18 1934)   Impression:    1  Large lower pole right renal hypervascular mass identified with supply from the solitary right renal artery only   2  Successful ethanol embolization of the right kidney and lower pole mass as described above                    Workstation performed: [id number]               Results from last 7 days   Lab Units 11/19/22  0624 11/18/22  1349   WBC Thousand/uL 9 23 5 64   HEMOGLOBIN g/dL 12 5 12 9   HEMATOCRIT % 40 0 40 7   PLATELETS Thousands/uL 285 307   NEUTROS ABS Thousands/µL 8 33*  --          Results from last 7 days   Lab Units 11/19/22  0624 11/18/22  1349   SODIUM mmol/L 138 139   POTASSIUM mmol/L 4 3 4 3   CHLORIDE mmol/L 107 106   CO2 mmol/L 24 26   ANION GAP mmol/L 7 7   BUN mg/dL 16 17   CREATININE mg/dL 1 20 0 98   EGFR ml/min/1 73sq m 68 87   CALCIUM mg/dL 8 9 9 8     Results from last 7 days   Lab Units 11/18/22  1349   AST U/L 29   ALT U/L 46   ALK PHOS U/L 95   TOTAL PROTEIN g/dL 7 3   ALBUMIN g/dL 3 1*   TOTAL BILIRUBIN mg/dL 0 81         Results from last 7 days   Lab Units 11/19/22  0624 11/18/22  1349   GLUCOSE RANDOM mg/dL 142* 96           Results from last 7 days   Lab Units 11/18/22  1348   PROTIME seconds 13 9   INR  1 05           Results from last 7 days   Lab Units 11/16/22  1157   URINE CULTURE  No Growth <1000 cfu/mL             Present on Admission:  • Right renal mass      Admitting Diagnosis: Right renal mass [N28 89]  Age/Sex: 45 y o  male  Admission Orders:  Scheduled Medications:  [START ON 11/20/2022] polyethylene glycol, 238 g, Oral, Once  [START ON 11/20/2022] sodium phosphate-biphosphate, 1 enema, Rectal, Once      Continuous IV Infusions:  HYDROmorphone, , Intravenous, Continuous  sodium chloride, 75 mL/hr, Intravenous, Continuous  sodium chloride, 30 mL/hr, Intravenous, Continuous      PRN Meds:  acetaminophen, 650 mg, Oral, Q6H PRN  diphenhydrAMINE, 25 mg, Intravenous, Q6H PRN  metoclopramide, 5 mg, Intravenous, Q6H PRN  ondansetron, 4 mg, Intravenous, Q6H PRN      Network Utilization Review Department  ATTENTION: Please call with any questions or concerns to [phone] and carefully listen to the prompts so that you are directed to the right person  All voicemails are confidential   Joshua Russell all requests for admission clinical reviews, approved or denied determinations and any other requests to dedicated fax number below belonging to the campus where the patient is receiving treatment   List of dedicated fax numbers for the Facilities:  [address] DENIALS (Administrative/Medical Necessity) [phone]   [address] (Maternity/NICU/Pediatrics) Ana Laura Mujica [address] [place] [address] [place] [phone] [address] [place] [address] [id number] [place] [address] [address] [facility] [address] [facility] [place] [address] [phone]

## 2022-11-19 NOTE — PROGRESS NOTES
Progress Note - Christopher Bagley 45 y o  male MRN: [id number]    Unit/Bed#: [facility] 911-01 Encounter: [id number]      Assessment:  Christopher Bagley is a 78-year-old male known to our service for right renal mass with substantial mass effect and resultant intractable flank pain  Patient is afebrile, hemodynamically stable and voiding  But is unable to achieve pain relief  He is without leukocytosis  Hemoglobin stable at 12 5  No hematuria noted  Renal function normal with serum creatinine at 1 2  Postprocedure day 1 IR renal angiogram with embolization  Plan:  1  Begin Dilaudid PCA for advanced pain management  2  Begin clear liquid diet tomorrow  3  Proceed with preoperative bowel regimen tomorrow afternoon  4  NPO after midnight 11/21 at 12:01 a m  for planned nephrectomy  Subjective:   “nothing works”    Objective:     Vitals: Blood pressure 141/85, pulse 71, temperature (!) 97 3 °F (36 3 °C), resp  rate 17, height 6' (1 829 m), weight 81 8 kg (180 lb 5 4 oz), SpO2 99 %  ,Body mass index is 24 46 kg/m²        Intake/Output Summary (Last 24 hours) at 11/19/2022 1621  Last data filed at 11/19/2022 0334  Gross per 24 hour   Intake 1362 5 ml   Output --   Net 1362 5 ml       Physical Exam: General appearance: alert and oriented, in no acute distress, appears stated age, cooperative and no distress  Head: Normocephalic, without obvious abnormality, atraumatic  Neck: no adenopathy, no carotid bruit, no JVD, supple, symmetrical, trachea midline and thyroid not enlarged, symmetric, no tenderness/mass/nodules  Lungs: clear to auscultation bilaterally  Heart: regular rate and rhythm, S1, S2 normal, no murmur, click, rub or gallop  Abdomen: abnormal findings:  marked tenderness in the right flank  Extremities: extremities normal, warm and well-perfused; no cyanosis, clubbing, or edema  Pulses: 2+ and symmetric  Neurologic: Grossly normal  No urinary drains     Invasive Devices     Peripheral Intravenous Line  Duration Peripheral IV 11/18/22 Left;Ventral (anterior) Forearm 1 day              Lab Results   Component Value Date    WBC 9 23 11/19/2022    HGB 12 5 11/19/2022    HCT 40 0 11/19/2022    MCV 93 11/19/2022     11/19/2022     Lab Results   Component Value Date    SODIUM 138 11/19/2022    K 4 3 11/19/2022     11/19/2022    CO2 24 11/19/2022    BUN 16 11/19/2022    CREATININE 1 20 11/19/2022    GLUC 142 (H) 11/19/2022    CALCIUM 8 9 11/19/2022       Lab, Imaging and other studies: I have personally reviewed pertinent reports

## 2022-11-19 NOTE — PLAN OF CARE
Problem: PAIN - ADULT  Goal: Verbalizes/displays adequate comfort level or baseline comfort level  Description: Interventions:  - Encourage patient to monitor pain and request assistance  - Assess pain using appropriate pain scale  - Administer analgesics based on type and severity of pain and evaluate response  - Implement non-pharmacological measures as appropriate and evaluate response  - Consider cultural and social influences on pain and pain management  - Notify physician/advanced practitioner if interventions unsuccessful or patient reports new pain  Outcome: Progressing     Problem: INFECTION - ADULT  Goal: Absence or prevention of progression during hospitalization  Description: INTERVENTIONS:  - Assess and monitor for signs and symptoms of infection  - Monitor lab/diagnostic results  - Monitor all insertion sites, i e  indwelling lines, tubes, and drains  - Monitor endotracheal if appropriate and nasal secretions for changes in amount and color  - [place] appropriate cooling/warming therapies per order  - Administer medications as ordered  - Instruct and encourage patient and family to use good hand hygiene technique  - Identify and instruct in appropriate isolation precautions for identified infection/condition  Outcome: Progressing  Goal: Absence of fever/infection during neutropenic period  Description: INTERVENTIONS:  - Monitor WBC    Outcome: Progressing     Problem: SAFETY ADULT  Goal: Patient will remain free of falls  Description: INTERVENTIONS:  - Educate patient/family on patient safety including physical limitations  - Instruct patient to call for assistance with activity   - Consult OT/PT to assist with strengthening/mobility   - Keep Call bell within reach  - Keep bed low and locked with side rails adjusted as appropriate  - Keep care items and personal belongings within reach  - Initiate and maintain comfort rounds  - Make Fall Risk Sign visible to staff  - Offer Toileting every  Hours, in advance of need  - Initiate/Maintain alarm  - Obtain necessary fall risk management equipment:   - Apply yellow socks and bracelet for high fall risk patients  - Consider moving patient to room near nurses station  Outcome: Progressing  Goal: Maintain or return to baseline ADL function  Description: INTERVENTIONS:  -  Assess patient's ability to carry out ADLs; assess patient's baseline for ADL function and identify physical deficits which impact ability to perform ADLs (bathing, care of mouth/teeth, toileting, grooming, dressing, etc )  - Assess/evaluate cause of self-care deficits   - Assess range of motion  - Assess patient's mobility; develop plan if impaired  - Assess patient's need for assistive devices and provide as appropriate  - Encourage maximum independence but intervene and supervise when necessary  - Involve family in performance of ADLs  - Assess for home care needs following discharge   - Consider OT consult to assist with ADL evaluation and planning for discharge  - Provide patient education as appropriate  Outcome: Progressing  Goal: Maintains/Returns to pre admission functional level  Description: INTERVENTIONS:  - Perform BMAT or MOVE assessment daily    - Set and communicate daily mobility goal to care team and patient/family/caregiver  - Collaborate with rehabilitation services on mobility goals if consulted  - Perform Range of Motion  times a day  - Reposition patient every  hours    - Dangle patient  times a day  - Stand patient  times a day  - Ambulate patient  times a day  - Out of bed to chair  times a day   - Out of bed for meal times a day  - Out of bed for toileting  - Record patient progress and toleration of activity level   Outcome: Progressing     Problem: DISCHARGE PLANNING  Goal: Discharge to home or other facility with appropriate resources  Description: INTERVENTIONS:  - Identify barriers to discharge w/patient and caregiver  - Arrange for needed discharge resources and transportation as appropriate  - Identify discharge learning needs (meds, wound care, etc )  - Arrange for interpretive services to assist at discharge as needed  - Refer to Case Management Department for coordinating discharge planning if the patient needs post-hospital services based on physician/advanced practitioner order or complex needs related to functional status, cognitive ability, or social support system  Outcome: Progressing     Problem: Knowledge Deficit  Goal: Patient/family/caregiver demonstrates understanding of disease process, treatment plan, medications, and discharge instructions  Description: Complete learning assessment and assess knowledge base    Interventions:  - Provide teaching at level of understanding  - Provide teaching via preferred learning methods  Outcome: Progressing

## 2022-11-19 NOTE — CASE MANAGEMENT
Case Management Assessment & Discharge Planning Note    Patient name Fidel Crabtree  Location [facility] 911/[facility] 289-45 MRN [id number]  : 1970 Date 2022       Current Admission Date: 2022  Current Admission Diagnosis:Right renal mass   Patient Active Problem List    Diagnosis Date Noted   • Right renal mass 2022   • History of anesthesia complications    • Meniere disease, left 2018   • Hyperlipidemia 10/25/2013      LOS (days): 1  Geometric Mean LOS (GMLOS) (days):   Days to GMLOS:     OBJECTIVE:    Risk of Unplanned Readmission Score: 7 08         Current admission status: Inpatient       Preferred Pharmacy:   [address] [place] [address] [id number] [place] [id number]  Phone: [phone] Fax: [phone]    Primary Care Provider: Chelsi Zelaya MD    Primary Insurance: BLUE CROSS  Secondary Insurance:     ASSESSMENT:  [place] Proxies    There are no active Health Care Proxies on file  Patient Information  Admitted from[de-identified] Home  Mental Status: Alert  During Assessment patient was accompanied by: Not accompanied during assessment  Assessment information provided by[de-identified] Patient  Primary Caregiver: Self  Support Systems: Self, Spouse/significant other  [place] of Residence: [address] [place] [address] do you live in?: [facility]  97  entry access options   Select all that apply : Stairs  Number of steps to enter home : 3  Type of Current Residence: [address] home  Living Arrangements: Lives w/ Spouse/significant other    Activities of Daily Living Prior to Admission  Functional Status: Independent  Completes ADLs independently?: Yes  Ambulates independently?: Yes  Does patient use assisted devices?: No  Does patient currently own DME?: No  Does patient have a history of Outpatient Therapy (PT/OT)?: No  Does the patient have a history of Short-Term Rehab?: No  Does patient have a history of HHC?: No  Does patient currently have Mariella Mallory?: No         Patient Information Continued  Income Source: Employed         [employer] of Transport to [facility][de-identified] Drives Self        DISCHARGE DETAILS:    Discharge planning discussed with[de-identified] Cm met with introduced self, role and discharged process  Pt lives with spouse and indep with ADLS PTA  Pt to discharged home when medically cleared pending recommendation and spouse to transport  Cm will continue to follow and assist with discharged planning needs

## 2022-11-20 RX ORDER — BACLOFEN 10 MG/1
10 TABLET ORAL 3 TIMES DAILY
Status: DISCONTINUED | OUTPATIENT
Start: 2022-11-20 | End: 2022-11-23 | Stop reason: HOSPADM

## 2022-11-20 RX ADMIN — BACLOFEN 10 MG: 10 TABLET ORAL at 20:57

## 2022-11-20 RX ADMIN — SODIUM PHOSPHATE 1 ENEMA: 7; 19 ENEMA RECTAL at 18:32

## 2022-11-20 RX ADMIN — POLYETHYLENE GLYCOL 3350 238 G: 17 POWDER, FOR SOLUTION ORAL at 11:05

## 2022-11-20 RX ADMIN — SODIUM CHLORIDE 75 ML/HR: 0.9 INJECTION, SOLUTION INTRAVENOUS at 19:40

## 2022-11-20 NOTE — PLAN OF CARE
Problem: Nutrition/Hydration-ADULT  Goal: Nutrient/Hydration intake appropriate for improving, restoring or maintaining nutritional needs  Description: Monitor and assess patient's nutrition/hydration status for malnutrition  Collaborate with interdisciplinary team and initiate plan and interventions as ordered  Monitor patient's weight and dietary intake as ordered or per policy  Utilize nutrition screening tool and intervene as necessary  Determine patient's food preferences and provide high-protein, high-caloric foods as appropriate       INTERVENTIONS:  - Monitor oral intake, urinary output, labs, and treatment plans  - Assess nutrition and hydration status and recommend course of action  - Evaluate amount of meals eaten  - Assist patient with eating if necessary   - Allow adequate time for meals  - Recommend/ encourage appropriate diets, oral nutritional supplements, and vitamin/mineral supplements  - Order, calculate, and assess calorie counts as needed  - Recommend, monitor, and adjust tube feedings and TPN/PPN based on assessed needs  - Assess need for intravenous fluids  - Provide specific nutrition/hydration education as appropriate  - Include patient/family/caregiver in decisions related to nutrition  Outcome: Progressing     Problem: PAIN - ADULT  Goal: Verbalizes/displays adequate comfort level or baseline comfort level  Description: Interventions:  - Encourage patient to monitor pain and request assistance  - Assess pain using appropriate pain scale  - Administer analgesics based on type and severity of pain and evaluate response  - Implement non-pharmacological measures as appropriate and evaluate response  - Consider cultural and social influences on pain and pain management  - Notify physician/advanced practitioner if interventions unsuccessful or patient reports new pain  Outcome: Progressing     Problem: INFECTION - ADULT  Goal: Absence or prevention of progression during hospitalization  Description: INTERVENTIONS:  - Assess and monitor for signs and symptoms of infection  - Monitor lab/diagnostic results  - Monitor all insertion sites, i e  indwelling lines, tubes, and drains  - Monitor endotracheal if appropriate and nasal secretions for changes in amount and color  - [place] appropriate cooling/warming therapies per order  - Administer medications as ordered  - Instruct and encourage patient and family to use good hand hygiene technique  - Identify and instruct in appropriate isolation precautions for identified infection/condition  Outcome: Progressing  Goal: Absence of fever/infection during neutropenic period  Description: INTERVENTIONS:  - Monitor WBC    Outcome: Progressing     Problem: SAFETY ADULT  Goal: Patient will remain free of falls  Description: INTERVENTIONS:  - Educate patient/family on patient safety including physical limitations  - Instruct patient to call for assistance with activity   - Consult OT/PT to assist with strengthening/mobility   - Keep Call bell within reach  - Keep bed low and locked with side rails adjusted as appropriate  - Keep care items and personal belongings within reach  - Initiate and maintain comfort rounds  - Make Fall Risk Sign visible to staff  - Offer Toileting every  Hours, in advance of need  - Initiate/Maintain alarm  - Obtain necessary fall risk management equipment:   - Apply yellow socks and bracelet for high fall risk patients  - Consider moving patient to room near nurses station  Outcome: Progressing  Goal: Maintain or return to baseline ADL function  Description: INTERVENTIONS:  -  Assess patient's ability to carry out ADLs; assess patient's baseline for ADL function and identify physical deficits which impact ability to perform ADLs (bathing, care of mouth/teeth, toileting, grooming, dressing, etc )  - Assess/evaluate cause of self-care deficits   - Assess range of motion  - Assess patient's mobility; develop plan if impaired  - Assess patient's need for assistive devices and provide as appropriate  - Encourage maximum independence but intervene and supervise when necessary  - Involve family in performance of ADLs  - Assess for home care needs following discharge   - Consider OT consult to assist with ADL evaluation and planning for discharge  - Provide patient education as appropriate  Outcome: Progressing  Goal: Maintains/Returns to pre admission functional level  Description: INTERVENTIONS:  - Perform BMAT or MOVE assessment daily    - Set and communicate daily mobility goal to care team and patient/family/caregiver  - Collaborate with rehabilitation services on mobility goals if consulted  - Perform Range of Motion  times a day  - Reposition patient every  hours    - Dangle patient  times a day  - Stand patient  times a day  - Ambulate patient  times a day  - Out of bed to chair  times a day   - Out of bed for meals times a day  - Out of bed for toileting  - Record patient progress and toleration of activity level   Outcome: Progressing     Problem: DISCHARGE PLANNING  Goal: Discharge to home or other facility with appropriate resources  Description: INTERVENTIONS:  - Identify barriers to discharge w/patient and caregiver  - Arrange for needed discharge resources and transportation as appropriate  - Identify discharge learning needs (meds, wound care, etc )  - Arrange for interpretive services to assist at discharge as needed  - Refer to Case Management Department for coordinating discharge planning if the patient needs post-hospital services based on physician/advanced practitioner order or complex needs related to functional status, cognitive ability, or social support system  Outcome: Progressing     Problem: Knowledge Deficit  Goal: Patient/family/caregiver demonstrates understanding of disease process, treatment plan, medications, and discharge instructions  Description: Complete learning assessment and assess knowledge base    Interventions:  - Provide teaching at level of understanding  - Provide teaching via preferred learning methods  Outcome: Progressing

## 2022-11-20 NOTE — PLAN OF CARE
Problem: Nutrition/Hydration-ADULT  Goal: Nutrient/Hydration intake appropriate for improving, restoring or maintaining nutritional needs  Description: Monitor and assess patient's nutrition/hydration status for malnutrition  Collaborate with interdisciplinary team and initiate plan and interventions as ordered  Monitor patient's weight and dietary intake as ordered or per policy  Utilize nutrition screening tool and intervene as necessary  Determine patient's food preferences and provide high-protein, high-caloric foods as appropriate       INTERVENTIONS:  - Monitor oral intake, urinary output, labs, and treatment plans  - Assess nutrition and hydration status and recommend course of action  - Evaluate amount of meals eaten  - Assist patient with eating if necessary   - Allow adequate time for meals  - Recommend/ encourage appropriate diets, oral nutritional supplements, and vitamin/mineral supplements  - Order, calculate, and assess calorie counts as needed  - Recommend, monitor, and adjust tube feedings and TPN/PPN based on assessed needs  - Assess need for intravenous fluids  - Provide specific nutrition/hydration education as appropriate  - Include patient/family/caregiver in decisions related to nutrition  Outcome: Progressing     Problem: PAIN - ADULT  Goal: Verbalizes/displays adequate comfort level or baseline comfort level  Description: Interventions:  - Encourage patient to monitor pain and request assistance  - Assess pain using appropriate pain scale  - Administer analgesics based on type and severity of pain and evaluate response  - Implement non-pharmacological measures as appropriate and evaluate response  - Consider cultural and social influences on pain and pain management  - Notify physician/advanced practitioner if interventions unsuccessful or patient reports new pain  Outcome: Progressing     Problem: INFECTION - ADULT  Goal: Absence or prevention of progression during hospitalization  Description: INTERVENTIONS:  - Assess and monitor for signs and symptoms of infection  - Monitor lab/diagnostic results  - Monitor all insertion sites, i e  indwelling lines, tubes, and drains  - Monitor endotracheal if appropriate and nasal secretions for changes in amount and color  - [place] appropriate cooling/warming therapies per order  - Administer medications as ordered  - Instruct and encourage patient and family to use good hand hygiene technique  - Identify and instruct in appropriate isolation precautions for identified infection/condition  Outcome: Progressing  Goal: Absence of fever/infection during neutropenic period  Description: INTERVENTIONS:  - Monitor WBC    Outcome: Progressing     Problem: SAFETY ADULT  Goal: Patient will remain free of falls  Description: INTERVENTIONS:  - Educate patient/family on patient safety including physical limitations  - Instruct patient to call for assistance with activity   - Consult OT/PT to assist with strengthening/mobility   - Keep Call bell within reach  - Keep bed low and locked with side rails adjusted as appropriate  - Keep care items and personal belongings within reach  - Initiate and maintain comfort rounds  - Make Fall Risk Sign visible to staff  - Offer Toileting every  Hours, in advance of need  - Initiate/Maintain alarm  - Obtain necessary fall risk management equipment:   - Apply yellow socks and bracelet for high fall risk patients  - Consider moving patient to room near nurses station  Outcome: Progressing  Goal: Maintain or return to baseline ADL function  Description: INTERVENTIONS:  -  Assess patient's ability to carry out ADLs; assess patient's baseline for ADL function and identify physical deficits which impact ability to perform ADLs (bathing, care of mouth/teeth, toileting, grooming, dressing, etc )  - Assess/evaluate cause of self-care deficits   - Assess range of motion  - Assess patient's mobility; develop plan if impaired  - Assess patient's need for assistive devices and provide as appropriate  - Encourage maximum independence but intervene and supervise when necessary  - Involve family in performance of ADLs  - Assess for home care needs following discharge   - Consider OT consult to assist with ADL evaluation and planning for discharge  - Provide patient education as appropriate  Outcome: Progressing  Goal: Maintains/Returns to pre admission functional level  Description: INTERVENTIONS:  - Perform BMAT or MOVE assessment daily    - Set and communicate daily mobility goal to care team and patient/family/caregiver  - Collaborate with rehabilitation services on mobility goals if consulted  - Perform Range of Motion  times a day  - Reposition patient every  hours    - Dangle patient  times a day  - Stand patient  times a day  - Ambulate patient  times a day  - Out of bed to chair  times a day   - Out of bed for meals times a day  - Out of bed for toileting  - Record patient progress and toleration of activity level   Outcome: Progressing     Problem: DISCHARGE PLANNING  Goal: Discharge to home or other facility with appropriate resources  Description: INTERVENTIONS:  - Identify barriers to discharge w/patient and caregiver  - Arrange for needed discharge resources and transportation as appropriate  - Identify discharge learning needs (meds, wound care, etc )  - Arrange for interpretive services to assist at discharge as needed  - Refer to Case Management Department for coordinating discharge planning if the patient needs post-hospital services based on physician/advanced practitioner order or complex needs related to functional status, cognitive ability, or social support system  Outcome: Progressing     Problem: Knowledge Deficit  Goal: Patient/family/caregiver demonstrates understanding of disease process, treatment plan, medications, and discharge instructions  Description: Complete learning assessment and assess knowledge base    Interventions:  - Provide teaching at level of understanding  - Provide teaching via preferred learning methods  Outcome: Progressing

## 2022-11-21 ENCOUNTER — ANESTHESIA (INPATIENT)
Dept: PERIOP | Facility: HOSPITAL | Age: 52
End: 2022-11-21

## 2022-11-21 LAB
ABO GROUP BLD: NORMAL
ANION GAP SERPL CALCULATED.3IONS-SCNC: 5 MMOL/L (ref 4–13)
BUN SERPL-MCNC: 14 MG/DL (ref 5–25)
CALCIUM SERPL-MCNC: 8.7 MG/DL (ref 8.3–10.1)
CHLORIDE SERPL-SCNC: 110 MMOL/L (ref 96–108)
CO2 SERPL-SCNC: 21 MMOL/L (ref 21–32)
CREAT SERPL-MCNC: 1.22 MG/DL (ref 0.6–1.3)
ERYTHROCYTE [DISTWIDTH] IN BLOOD BY AUTOMATED COUNT: 13 % (ref 11.6–15.1)
GFR SERPL CREATININE-BSD FRML MDRD: 66 ML/MIN/1.73SQ M
GLUCOSE SERPL-MCNC: 121 MG/DL (ref 65–140)
HCT VFR BLD AUTO: 36.1 % (ref 36.5–49.3)
HGB BLD-MCNC: 11.3 G/DL (ref 12–17)
MCH RBC QN AUTO: 28.8 PG (ref 26.8–34.3)
MCHC RBC AUTO-ENTMCNC: 31.3 G/DL (ref 31.4–37.4)
MCV RBC AUTO: 92 FL (ref 82–98)
PLATELET # BLD AUTO: 212 THOUSANDS/UL (ref 149–390)
PMV BLD AUTO: 9.5 FL (ref 8.9–12.7)
POTASSIUM SERPL-SCNC: 4.5 MMOL/L (ref 3.5–5.3)
RBC # BLD AUTO: 3.92 MILLION/UL (ref 3.88–5.62)
RH BLD: POSITIVE
SODIUM SERPL-SCNC: 136 MMOL/L (ref 135–147)
WBC # BLD AUTO: 13.11 THOUSAND/UL (ref 4.31–10.16)

## 2022-11-21 PROCEDURE — 0TT00ZZ RESECTION OF RIGHT KIDNEY, OPEN APPROACH: ICD-10-PCS | Performed by: STUDENT IN AN ORGANIZED HEALTH CARE EDUCATION/TRAINING PROGRAM

## 2022-11-21 RX ORDER — ONDANSETRON 2 MG/ML
4 INJECTION INTRAMUSCULAR; INTRAVENOUS EVERY 6 HOURS PRN
Status: DISCONTINUED | OUTPATIENT
Start: 2022-11-21 | End: 2022-11-23 | Stop reason: HOSPADM

## 2022-11-21 RX ORDER — OXYCODONE HYDROCHLORIDE 10 MG/1
10 TABLET ORAL EVERY 4 HOURS PRN
Status: DISCONTINUED | OUTPATIENT
Start: 2022-11-21 | End: 2022-11-23 | Stop reason: HOSPADM

## 2022-11-21 RX ORDER — AMOXICILLIN 250 MG
2 CAPSULE ORAL
Status: DISCONTINUED | OUTPATIENT
Start: 2022-11-21 | End: 2022-11-23 | Stop reason: HOSPADM

## 2022-11-21 RX ORDER — MAGNESIUM HYDROXIDE 1200 MG/15ML
LIQUID ORAL AS NEEDED
Status: DISCONTINUED | OUTPATIENT
Start: 2022-11-21 | End: 2022-11-21 | Stop reason: HOSPADM

## 2022-11-21 RX ORDER — ONDANSETRON 2 MG/ML
INJECTION INTRAMUSCULAR; INTRAVENOUS AS NEEDED
Status: DISCONTINUED | OUTPATIENT
Start: 2022-11-21 | End: 2022-11-21

## 2022-11-21 RX ORDER — FENTANYL CITRATE 50 UG/ML
INJECTION, SOLUTION INTRAMUSCULAR; INTRAVENOUS AS NEEDED
Status: DISCONTINUED | OUTPATIENT
Start: 2022-11-21 | End: 2022-11-21

## 2022-11-21 RX ORDER — SODIUM CHLORIDE 9 MG/ML
INJECTION, SOLUTION INTRAVENOUS CONTINUOUS PRN
Status: DISCONTINUED | OUTPATIENT
Start: 2022-11-21 | End: 2022-11-21

## 2022-11-21 RX ORDER — FENTANYL CITRATE/PF 50 MCG/ML
25 SYRINGE (ML) INJECTION
Status: DISCONTINUED | OUTPATIENT
Start: 2022-11-21 | End: 2022-11-21 | Stop reason: HOSPADM

## 2022-11-21 RX ORDER — ONDANSETRON 2 MG/ML
4 INJECTION INTRAMUSCULAR; INTRAVENOUS ONCE AS NEEDED
Status: DISCONTINUED | OUTPATIENT
Start: 2022-11-21 | End: 2022-11-21 | Stop reason: HOSPADM

## 2022-11-21 RX ORDER — ACETAMINOPHEN 325 MG/1
975 TABLET ORAL EVERY 6 HOURS SCHEDULED
Status: DISCONTINUED | OUTPATIENT
Start: 2022-11-21 | End: 2022-11-23 | Stop reason: HOSPADM

## 2022-11-21 RX ORDER — METOCLOPRAMIDE HYDROCHLORIDE 5 MG/ML
5 INJECTION INTRAMUSCULAR; INTRAVENOUS EVERY 6 HOURS PRN
Status: DISCONTINUED | OUTPATIENT
Start: 2022-11-21 | End: 2022-11-23 | Stop reason: HOSPADM

## 2022-11-21 RX ORDER — CEFAZOLIN SODIUM 1 G/3ML
INJECTION, POWDER, FOR SOLUTION INTRAMUSCULAR; INTRAVENOUS AS NEEDED
Status: DISCONTINUED | OUTPATIENT
Start: 2022-11-21 | End: 2022-11-21

## 2022-11-21 RX ORDER — OXYCODONE HYDROCHLORIDE 5 MG/1
5 TABLET ORAL EVERY 4 HOURS PRN
Status: DISCONTINUED | OUTPATIENT
Start: 2022-11-21 | End: 2022-11-23 | Stop reason: HOSPADM

## 2022-11-21 RX ORDER — HYDROMORPHONE HCL/PF 1 MG/ML
0.5 SYRINGE (ML) INJECTION
Status: DISCONTINUED | OUTPATIENT
Start: 2022-11-21 | End: 2022-11-22

## 2022-11-21 RX ORDER — HYDROMORPHONE HCL/PF 1 MG/ML
SYRINGE (ML) INJECTION AS NEEDED
Status: DISCONTINUED | OUTPATIENT
Start: 2022-11-21 | End: 2022-11-21

## 2022-11-21 RX ORDER — ROPIVACAINE HYDROCHLORIDE 2 MG/ML
INJECTION, SOLUTION EPIDURAL; INFILTRATION; PERINEURAL AS NEEDED
Status: DISCONTINUED | OUTPATIENT
Start: 2022-11-21 | End: 2022-11-21

## 2022-11-21 RX ORDER — LIDOCAINE HYDROCHLORIDE 10 MG/ML
INJECTION, SOLUTION EPIDURAL; INFILTRATION; INTRACAUDAL; PERINEURAL AS NEEDED
Status: DISCONTINUED | OUTPATIENT
Start: 2022-11-21 | End: 2022-11-21

## 2022-11-21 RX ORDER — HEPARIN SODIUM 5000 [USP'U]/ML
5000 INJECTION, SOLUTION INTRAVENOUS; SUBCUTANEOUS EVERY 12 HOURS SCHEDULED
Status: DISCONTINUED | OUTPATIENT
Start: 2022-11-21 | End: 2022-11-22

## 2022-11-21 RX ORDER — CEFAZOLIN SODIUM 2 G/50ML
2000 SOLUTION INTRAVENOUS EVERY 8 HOURS
Status: COMPLETED | OUTPATIENT
Start: 2022-11-21 | End: 2022-11-22

## 2022-11-21 RX ORDER — CEFAZOLIN SODIUM 2 G/50ML
2000 SOLUTION INTRAVENOUS
Status: ACTIVE | OUTPATIENT
Start: 2022-11-21 | End: 2022-11-22

## 2022-11-21 RX ORDER — PROPOFOL 10 MG/ML
INJECTION, EMULSION INTRAVENOUS AS NEEDED
Status: DISCONTINUED | OUTPATIENT
Start: 2022-11-21 | End: 2022-11-21

## 2022-11-21 RX ORDER — DEXAMETHASONE SODIUM PHOSPHATE 10 MG/ML
INJECTION, SOLUTION INTRAMUSCULAR; INTRAVENOUS AS NEEDED
Status: DISCONTINUED | OUTPATIENT
Start: 2022-11-21 | End: 2022-11-21

## 2022-11-21 RX ORDER — MIDAZOLAM HYDROCHLORIDE 2 MG/2ML
INJECTION, SOLUTION INTRAMUSCULAR; INTRAVENOUS AS NEEDED
Status: DISCONTINUED | OUTPATIENT
Start: 2022-11-21 | End: 2022-11-21

## 2022-11-21 RX ORDER — ROCURONIUM BROMIDE 10 MG/ML
INJECTION, SOLUTION INTRAVENOUS AS NEEDED
Status: DISCONTINUED | OUTPATIENT
Start: 2022-11-21 | End: 2022-11-21

## 2022-11-21 RX ORDER — HYDROMORPHONE HCL IN WATER/PF 6 MG/30 ML
0.2 PATIENT CONTROLLED ANALGESIA SYRINGE INTRAVENOUS
Status: DISCONTINUED | OUTPATIENT
Start: 2022-11-21 | End: 2022-11-21 | Stop reason: HOSPADM

## 2022-11-21 RX ORDER — SODIUM CHLORIDE, SODIUM LACTATE, POTASSIUM CHLORIDE, CALCIUM CHLORIDE 600; 310; 30; 20 MG/100ML; MG/100ML; MG/100ML; MG/100ML
INJECTION, SOLUTION INTRAVENOUS CONTINUOUS PRN
Status: DISCONTINUED | OUTPATIENT
Start: 2022-11-21 | End: 2022-11-21

## 2022-11-21 RX ADMIN — SODIUM CHLORIDE: 9 INJECTION, SOLUTION INTRAVENOUS at 12:35

## 2022-11-21 RX ADMIN — ROCURONIUM BROMIDE 50 MG: 50 INJECTION INTRAVENOUS at 11:06

## 2022-11-21 RX ADMIN — SUGAMMADEX 200 MG: 100 INJECTION, SOLUTION INTRAVENOUS at 14:13

## 2022-11-21 RX ADMIN — HYDROMORPHONE HYDROCHLORIDE 0.5 MG: 1 INJECTION, SOLUTION INTRAMUSCULAR; INTRAVENOUS; SUBCUTANEOUS at 18:09

## 2022-11-21 RX ADMIN — FENTANYL CITRATE 50 MCG: 50 INJECTION INTRAMUSCULAR; INTRAVENOUS at 11:06

## 2022-11-21 RX ADMIN — HYDROMORPHONE HYDROCHLORIDE 0.5 MG: 1 INJECTION, SOLUTION INTRAMUSCULAR; INTRAVENOUS; SUBCUTANEOUS at 14:33

## 2022-11-21 RX ADMIN — HEPARIN SODIUM 5000 UNITS: 5000 INJECTION INTRAVENOUS; SUBCUTANEOUS at 21:25

## 2022-11-21 RX ADMIN — LIDOCAINE HYDROCHLORIDE 100 MG: 10 INJECTION, SOLUTION EPIDURAL; INFILTRATION; INTRACAUDAL; PERINEURAL at 11:06

## 2022-11-21 RX ADMIN — FENTANYL CITRATE 50 MCG: 50 INJECTION INTRAMUSCULAR; INTRAVENOUS at 12:19

## 2022-11-21 RX ADMIN — Medication: at 23:28

## 2022-11-21 RX ADMIN — ROPIVACAINE HYDROCHLORIDE 3 ML: 2 INJECTION, SOLUTION EPIDURAL; INFILTRATION; PERINEURAL at 14:17

## 2022-11-21 RX ADMIN — Medication 10 MG: at 12:10

## 2022-11-21 RX ADMIN — Medication 25 MCG: at 15:35

## 2022-11-21 RX ADMIN — ROPIVACAINE HYDROCHLORIDE 3 ML: 2 INJECTION, SOLUTION EPIDURAL; INFILTRATION; PERINEURAL at 14:14

## 2022-11-21 RX ADMIN — SODIUM CHLORIDE: 9 INJECTION, SOLUTION INTRAVENOUS at 12:11

## 2022-11-21 RX ADMIN — ROCURONIUM BROMIDE 20 MG: 50 INJECTION INTRAVENOUS at 13:47

## 2022-11-21 RX ADMIN — SODIUM CHLORIDE, SODIUM LACTATE, POTASSIUM CHLORIDE, AND CALCIUM CHLORIDE: .6; .31; .03; .02 INJECTION, SOLUTION INTRAVENOUS at 13:11

## 2022-11-21 RX ADMIN — PROPOFOL 200 MG: 10 INJECTION, EMULSION INTRAVENOUS at 11:06

## 2022-11-21 RX ADMIN — SODIUM CHLORIDE: 9 INJECTION, SOLUTION INTRAVENOUS at 11:15

## 2022-11-21 RX ADMIN — ROCURONIUM BROMIDE 20 MG: 50 INJECTION INTRAVENOUS at 12:08

## 2022-11-21 RX ADMIN — SODIUM CHLORIDE 75 ML/HR: 0.9 INJECTION, SOLUTION INTRAVENOUS at 08:37

## 2022-11-21 RX ADMIN — ACETAMINOPHEN 975 MG: 325 TABLET ORAL at 18:09

## 2022-11-21 RX ADMIN — SENNOSIDES AND DOCUSATE SODIUM 2 TABLET: 8.6; 5 TABLET ORAL at 21:25

## 2022-11-21 RX ADMIN — ROCURONIUM BROMIDE 20 MG: 50 INJECTION INTRAVENOUS at 11:30

## 2022-11-21 RX ADMIN — ONDANSETRON 4 MG: 2 INJECTION INTRAMUSCULAR; INTRAVENOUS at 14:06

## 2022-11-21 RX ADMIN — ROPIVACAINE HYDROCHLORIDE 4 ML: 2 INJECTION, SOLUTION EPIDURAL; INFILTRATION; PERINEURAL at 14:26

## 2022-11-21 RX ADMIN — Medication: at 16:42

## 2022-11-21 RX ADMIN — BACLOFEN 10 MG: 10 TABLET ORAL at 19:38

## 2022-11-21 RX ADMIN — CEFAZOLIN 2000 MG: 1 INJECTION, POWDER, FOR SOLUTION INTRAMUSCULAR; INTRAVENOUS at 11:41

## 2022-11-21 RX ADMIN — FENTANYL CITRATE 100 MCG: 50 INJECTION INTRAMUSCULAR; INTRAVENOUS at 09:50

## 2022-11-21 RX ADMIN — SODIUM CHLORIDE 0.3 MCG/KG/HR: 900 INJECTION INTRAVENOUS at 12:02

## 2022-11-21 RX ADMIN — MIDAZOLAM 2 MG: 1 INJECTION INTRAMUSCULAR; INTRAVENOUS at 09:50

## 2022-11-21 RX ADMIN — SODIUM CHLORIDE: 0.9 INJECTION, SOLUTION INTRAVENOUS at 11:06

## 2022-11-21 RX ADMIN — CEFAZOLIN SODIUM 2000 MG: 2 SOLUTION INTRAVENOUS at 21:29

## 2022-11-21 RX ADMIN — DEXAMETHASONE SODIUM PHOSPHATE 10 MG: 10 INJECTION, SOLUTION INTRAMUSCULAR; INTRAVENOUS at 12:05

## 2022-11-21 NOTE — OP NOTE
OPERATIVE REPORT  PATIENT NAME: Samuel You    :  1970  MRN: [id number]  Pt Location: [facility] OR ROOM 14    SURGERY DATE: 2022    Surgeon(s) and Role: * Lio Edmondson MD - Primary     * Vivi Kaur MD (assisting)     * Zeb Brian MD (assisting)   The assistance of Dr Desi Kebede and Dr Walter Bumpers was necessary due to the complex nature of the case for patient safety  Preop Diagnosis:  Right renal mass [N28 89]    Post-Op Diagnosis Codes:     * Right renal mass [N28 89]    Procedure(s) (LRB):  NEPHRECTOMY ABDOMINAL APPROACH (Right)    Specimen(s):  ID Type Source Tests Collected by Time Destination   1 :  Tissue Kidney, Right TISSUE EXAM Lio Edmondson MD 2022 1213        Estimated Blood Loss:   50 mL    Drains:  Urethral Catheter 16 Fr  (Active)   Reasons to continue Urinary Catheter  Post-operative urological requirements 22 1208   Goal for Removal Will consult urology 22 1208   Site Assessment Clean;Skin intact 22 1445   Ramos Care Done 22   Collection Container Standard drainage bag 22 1445   Securement Method Securing device (Describe) 22 1445   Output (mL) 550 mL 22 0301   Number of days: 1       Anesthesia Type:   General    Operative Indications:  Right renal mass [N28 89]      Operative Findings:  1  Inflammatory reaction around the inferior vena cava and renal hilum precluding skeletonization of the vessels  2  Renal vein and artery together with a vascular stapler close to the kidney  Inspection after removal of the kidney revealed no injury to the inferior vena cava or surrounding structures  3  Lymph node dissection not performed due to inflammatory reaction surrounding the inferior vena cava  4  Excellent hemostasis at the end of the case  Complications:   None    Procedure and Technique:  The patient was brought to the operating room and general anesthesia was initiated    We ensured that adequate access had been obtained with 2 large-bore IVs as well as an A-line  The patient was shaved and placed in the supine position with a bump under the right flank  The patient was prepped and draped keeping landmarks in the field  A time-out was performed to confirm the patient, surgery and laterality  A chevron incision was marked and incision was made the entire length of the costal margin on the right side and approximately 6 cm on the left side  The dissection was carried down with electrocautery through the anterior fascia, muscle and posterior fascia  The peritoneum was entered sharply with Metzenbaum scissors at the midline  The peritoneum was then completely opened  The peritoneum overlying the kidney was opened with electrocautery and the incision was carried inferiorly towards the lower pole  The incision was carried superiorly towards the upper pole of the kidney to facilitate mobilization of the bowel medially  The duodenum was identified and carefully kocherized  The inferior vena cava was identified inferior to duodenum and was noted to have significant inflammatory reaction surrounding it  He continued to mobilize the bowel medially we used the Enseal to transect several parasitizing vessels at the lower pole of the kidney communicating with the mass  When we had enough mobilization inferiorly and medially to place a Bookwalter retractor using a malleable in two body walls  The ureter was noted to be coursing along the anterior aspect of the IVC and was used as a landmark to developed the plane between the kidney and the IVC which had significant inflammatory reaction from the previous embolization  The inferior and lateral attachments of the kidney were progressively incised using the EnSeal   Care was taken to avoid injury to the appendix or intestine  The upper pole of the kidney was mobilized as much as possible using a combination of blunt dissection and electrocautery    A window was created around the hilum using blunt dissection  The ureter was transected using 45 mm vascular stapler  The same stapler was then used to transect the hilum the stapler oriented perpendicular to the IVC  The renal artery and vein were taken together  There were several posterior attachments to the kidney that were freed using the EnSeal   Medial attachments were taken using another vascular staple load  When the kidney had been freed completely was lifted out of the abdomen and passed off the field  The abdomen was copiously irrigated with sterile water  Hemostasis was noted to be excellent  Surgicel powder was applied at the staple line  Due to the inflammatory reaction around the inferior vena cava we decided not to perform a lymph node dissection  The peritoneum was reapproximated using Vicryl suture  The transversalis and internal oblique were closed in continuous fashion with the posterior rectus sheath using Vicryl suture  The external oblique fascia and anterior rectus sheaths were closed in continuous fashion with two 1-0 Looped PDS sutures  Reva's was reapproximated with Vicryl suture and the skin was reapproximated with staples  A sterile dressing was applied  The patient was awoken from anesthesia and transferred to recovery in stable condition       I was present for the entire procedure    Patient Disposition:  PACU     SIGNATURE: Devin Fuentes MD  DATE: November 21, 2022  TIME: 3:29 PM

## 2022-11-21 NOTE — ANESTHESIA POSTPROCEDURE EVALUATION
Post-Op Assessment Note    CV Status:  Stable  Pain Score: 0    Pain management: adequate     Mental Status:  Alert and awake   Hydration Status:  Euvolemic   PONV Controlled:  Controlled   Airway Patency:  Patent      Post Op Vitals Reviewed: Yes      Staff: Anesthesiologist, CRNA         No notable events documented      BP   144/89   Temp   99 3   Pulse  87   Resp   16   SpO2   98

## 2022-11-21 NOTE — PLAN OF CARE
Problem: Nutrition/Hydration-ADULT  Goal: Nutrient/Hydration intake appropriate for improving, restoring or maintaining nutritional needs  Description: Monitor and assess patient's nutrition/hydration status for malnutrition  Collaborate with interdisciplinary team and initiate plan and interventions as ordered  Monitor patient's weight and dietary intake as ordered or per policy  Utilize nutrition screening tool and intervene as necessary  Determine patient's food preferences and provide high-protein, high-caloric foods as appropriate       INTERVENTIONS:  - Monitor oral intake, urinary output, labs, and treatment plans  - Assess nutrition and hydration status and recommend course of action  - Evaluate amount of meals eaten  - Assist patient with eating if necessary   - Allow adequate time for meals  - Recommend/ encourage appropriate diets, oral nutritional supplements, and vitamin/mineral supplements  - Order, calculate, and assess calorie counts as needed  - Recommend, monitor, and adjust tube feedings and TPN/PPN based on assessed needs  - Assess need for intravenous fluids  - Provide specific nutrition/hydration education as appropriate  - Include patient/family/caregiver in decisions related to nutrition  Outcome: Progressing     Problem: PAIN - ADULT  Goal: Verbalizes/displays adequate comfort level or baseline comfort level  Description: Interventions:  - Encourage patient to monitor pain and request assistance  - Assess pain using appropriate pain scale  - Administer analgesics based on type and severity of pain and evaluate response  - Implement non-pharmacological measures as appropriate and evaluate response  - Consider cultural and social influences on pain and pain management  - Notify physician/advanced practitioner if interventions unsuccessful or patient reports new pain  Outcome: Progressing     Problem: INFECTION - ADULT  Goal: Absence or prevention of progression during hospitalization  Description: INTERVENTIONS:  - Assess and monitor for signs and symptoms of infection  - Monitor lab/diagnostic results  - Monitor all insertion sites, i e  indwelling lines, tubes, and drains  - Monitor endotracheal if appropriate and nasal secretions for changes in amount and color  - [place] appropriate cooling/warming therapies per order  - Administer medications as ordered  - Instruct and encourage patient and family to use good hand hygiene technique  - Identify and instruct in appropriate isolation precautions for identified infection/condition  Outcome: Progressing  Goal: Absence of fever/infection during neutropenic period  Description: INTERVENTIONS:  - Monitor WBC    Outcome: Progressing     Problem: SAFETY ADULT  Goal: Patient will remain free of falls  Description: INTERVENTIONS:  - Educate patient/family on patient safety including physical limitations  - Instruct patient to call for assistance with activity   - Consult OT/PT to assist with strengthening/mobility   - Keep Call bell within reach  - Keep bed low and locked with side rails adjusted as appropriate  - Keep care items and personal belongings within reach  - Initiate and maintain comfort rounds  - Make Fall Risk Sign visible to staff  - Offer Toileting every  Hours, in advance of need  - Initiate/Maintain alarm  - Obtain necessary fall risk management equipment:   - Apply yellow socks and bracelet for high fall risk patients  - Consider moving patient to room near nurses station  Outcome: Progressing  Goal: Maintain or return to baseline ADL function  Description: INTERVENTIONS:  -  Assess patient's ability to carry out ADLs; assess patient's baseline for ADL function and identify physical deficits which impact ability to perform ADLs (bathing, care of mouth/teeth, toileting, grooming, dressing, etc )  - Assess/evaluate cause of self-care deficits   - Assess range of motion  - Assess patient's mobility; develop plan if impaired  - Assess patient's need for assistive devices and provide as appropriate  - Encourage maximum independence but intervene and supervise when necessary  - Involve family in performance of ADLs  - Assess for home care needs following discharge   - Consider OT consult to assist with ADL evaluation and planning for discharge  - Provide patient education as appropriate  Outcome: Progressing  Goal: Maintains/Returns to pre admission functional level  Description: INTERVENTIONS:  - Perform BMAT or MOVE assessment daily    - Set and communicate daily mobility goal to care team and patient/family/caregiver  - Collaborate with rehabilitation services on mobility goals if consulted  - Perform Range of Motion  times a day  - Reposition patient every  hours    - Dangle patient  times a day  - Stand patient  times a day  - Ambulate patient  times a day  - Out of bed to chair  times a day   - Out of bed for meal times a day  - Out of bed for toileting  - Record patient progress and toleration of activity level   Outcome: Progressing     Problem: DISCHARGE PLANNING  Goal: Discharge to home or other facility with appropriate resources  Description: INTERVENTIONS:  - Identify barriers to discharge w/patient and caregiver  - Arrange for needed discharge resources and transportation as appropriate  - Identify discharge learning needs (meds, wound care, etc )  - Arrange for interpretive services to assist at discharge as needed  - Refer to Case Management Department for coordinating discharge planning if the patient needs post-hospital services based on physician/advanced practitioner order or complex needs related to functional status, cognitive ability, or social support system  Outcome: Progressing     Problem: Knowledge Deficit  Goal: Patient/family/caregiver demonstrates understanding of disease process, treatment plan, medications, and discharge instructions  Description: Complete learning assessment and assess knowledge base    Interventions:  - Provide teaching at level of understanding  - Provide teaching via preferred learning methods  Outcome: Progressing

## 2022-11-21 NOTE — PROGRESS NOTES
UROLOGY PROGRESS NOTE  Patient Identifiers: Stefani Ochoa (MRN [id number])  Date of Service: 11/21/2022    Subjective:    24 HR EVENTS:   no significant events  Patient has  no complaints  He states that his pain is better controlled this morning  Has been NPO since midnight  Had an epidural placed with anesthesia this morning  Had a campuzano placed for retention  Objective:    VITALS:    Vitals:    11/21/22 0656   BP: 97/65   Pulse: 92   Resp:    Temp: 97 7 °F (36 5 °C)   SpO2: 96%       INS & OUTS:  I/O last 24 hours:   In: 3007 5 [I V :3007 5]  Out: 3612 [Urine:1555]    LABS:  Lab Results   Component Value Date    HGB 12 5 11/19/2022    HCT 40 0 11/19/2022    WBC 9 23 11/19/2022     11/19/2022   ]    Lab Results   Component Value Date    K 4 3 11/19/2022     11/19/2022    CO2 24 11/19/2022    BUN 16 11/19/2022    CREATININE 1 20 11/19/2022    CALCIUM 8 9 11/19/2022   ]    INPATIENT MEDS:    Current Facility-Administered Medications:   •  [[facility] Hold] acetaminophen (TYLENOL) tablet 650 mg, 650 mg, Oral, Q6H PRN, Erika Roque PA-C  •  [facility] Hold] baclofen tablet 10 mg, 10 mg, Oral, TID, Ahsan Duel, CRNP, 10 mg at 11/20/22 2057  •  [MAR Hold] diphenhydrAMINE (BENADRYL) injection 25 mg, 25 mg, Intravenous, Q6H PRN, Kandi Braden PA-C, 25 mg at 11/19/22 1605  •  HYDROmorphone (DILAUDID) 1 mg/mL 50 mL PCA, , Intravenous, Continuous, Kandi Braden PA-C, Stopped at 11/21/22 0920  •  [MAR Hold] metoclopramide (REGLAN) injection 5 mg, 5 mg, Intravenous, Q6H PRN, Kandi Braden PA-C  •  [MAR Hold] ondansetron (ZOFRAN) injection 4 mg, 4 mg, Intravenous, Q6H PRN, Kandi Braden PA-C  •  sodium chloride 0 9 % infusion, 75 mL/hr, Intravenous, Continuous, Sidra Garcia PA-C, Last Rate: 75 mL/hr at 11/21/22 0837, 75 mL/hr at 11/21/22 0837  •  sodium chloride 0 9 % infusion, 30 mL/hr, Intravenous, Continuous, Mela Hou MD      Physical Exam:    GEN: alert and oriented x 3    RESP: breathing comfortably with no accessory muscle use    ABD: soft, non-tender, non-distended   EXT: no significant peripheral edema   MODI: in place draining clear yellow urine      RADIOLOGY:   Reviewed  Assessment:  52yoM with large right renal mass s/p right renal artery embolization 11/18/2022       Plan:  -to OR for open right radical nephrectomy  -Ancef on call      Mildred Severe

## 2022-11-21 NOTE — PROGRESS NOTES
Progress Note - López Salinas 45 y o  male MRN: [id number]    Unit/Bed#: [facility] 911-01 Encounter: [id number]      Assessment:  López Salinas is a 49-year-old male known to our service for right renal mass with substantial mass effect and resultant intractable flank pain  Patient is afebrile, hemodynamically stable and voiding  But wa unable to achieve pain relief  He is without leukocytosis  Hemoglobin stable at 12 5  No hematuria noted  Renal function normal with serum creatinine at 1 2  Postprocedure day 2 IR renal angiogram with embolization  Developed urinary retention likely due to opiate use--status post Ramos insertion    Plan:  1  Continue clear liquid diet and bowel prep  2  Maintain Ramos  3  Experiencing hiccups--baclofen 10 mg q 8 hours  4  NPO after midnight  5  For OR in a m  nephrectomy    Subjective:   “I feel much better since yesterday  ”    Objective:     Vitals: Blood pressure 120/85, pulse 104, temperature 97 9 °F (36 6 °C), resp  rate 19, height 6' (1 829 m), weight 81 8 kg (180 lb 5 4 oz), SpO2 95 %  ,Body mass index is 24 46 kg/m²        Intake/Output Summary (Last 24 hours) at 11/20/2022 1929  Last data filed at 11/20/2022 1856  Gross per 24 hour   Intake 6 2 ml   Output 1005 ml   Net -998 8 ml       Physical Exam:   General appearance: alert and oriented, in no acute distress, appears stated age, cooperative and no distress  Head: Normocephalic, without obvious abnormality, atraumatic  Neck: no adenopathy, no carotid bruit, no JVD, supple, symmetrical, trachea midline and thyroid not enlarged, symmetric, no tenderness/mass/nodules  Lungs: clear to auscultation bilaterally  Heart: regular rate and rhythm, S1, S2 normal, no murmur, click, rub or gallop  Abdomen: abnormal findings:  moderate tenderness in the right flank  Extremities: extremities normal, warm and well-perfused; no cyanosis, clubbing, or edema  Pulses: 2+ and symmetric  Neurologic: Grossly normal  Ramos--clear yellow urine      Invasive Devices     Peripheral Intravenous Line  Duration           Peripheral IV 11/20/22 Left;Proximal;Ventral (anterior) Forearm <1 day          Drain  Duration           Urethral Catheter 16 Fr  <1 day              Lab Results   Component Value Date    WBC 9 23 11/19/2022    HGB 12 5 11/19/2022    HCT 40 0 11/19/2022    MCV 93 11/19/2022     11/19/2022     Lab Results   Component Value Date    SODIUM 138 11/19/2022    K 4 3 11/19/2022     11/19/2022    CO2 24 11/19/2022    BUN 16 11/19/2022    CREATININE 1 20 11/19/2022    GLUC 142 (H) 11/19/2022    CALCIUM 8 9 11/19/2022       Lab, Imaging and other studies: I have personally reviewed pertinent reports

## 2022-11-21 NOTE — ANESTHESIA PROCEDURE NOTES
Epidural Block    Patient location during procedure: pre-op  Start time: 11/21/2022 9:55 AM  Reason for block: procedure for pain and at surgeon's request  Staffing  Performed: Anesthesiologist   Anesthesiologist: Minerva Chatterjee DO  Preanesthetic Checklist  Completed: patient identified, IV checked, site marked, risks and benefits discussed, surgical consent, monitors and equipment checked, pre-op evaluation and timeout performed  Epidural  Patient position: sitting  Prep: ChloraPrep  Patient monitoring: cardiac monitor and frequent blood pressure checks  Approach: midline  Location: thoracic  Injection technique: SHANELL air  Needle  Needle type: Tuohy   Needle gauge: 18 G  Catheter type: side hole  Catheter size: 20 G  Catheter at skin depth: 10 cm  Catheter securement method: stabilization device  Test dose: negative  Assessment  Number of attempts: 1negative aspiration for CSF, negative aspiration for heme and no paresthesia on injection  patient tolerated the procedure well with no immediate complications

## 2022-11-22 LAB
ABO GROUP BLD BPU: NORMAL
ANION GAP SERPL CALCULATED.3IONS-SCNC: 5 MMOL/L (ref 4–13)
BPU ID: NORMAL
BUN SERPL-MCNC: 17 MG/DL (ref 5–25)
CALCIUM SERPL-MCNC: 8.8 MG/DL (ref 8.3–10.1)
CHLORIDE SERPL-SCNC: 108 MMOL/L (ref 96–108)
CO2 SERPL-SCNC: 24 MMOL/L (ref 21–32)
CREAT SERPL-MCNC: 1.33 MG/DL (ref 0.6–1.3)
CROSSMATCH: NORMAL
ERYTHROCYTE [DISTWIDTH] IN BLOOD BY AUTOMATED COUNT: 12.6 % (ref 11.6–15.1)
GFR SERPL CREATININE-BSD FRML MDRD: 60 ML/MIN/1.73SQ M
GLUCOSE SERPL-MCNC: 136 MG/DL (ref 65–140)
HCT VFR BLD AUTO: 36.2 % (ref 36.5–49.3)
HGB BLD-MCNC: 11.7 G/DL (ref 12–17)
MCH RBC QN AUTO: 29.5 PG (ref 26.8–34.3)
MCHC RBC AUTO-ENTMCNC: 32.3 G/DL (ref 31.4–37.4)
MCV RBC AUTO: 91 FL (ref 82–98)
PLATELET # BLD AUTO: 283 THOUSANDS/UL (ref 149–390)
PMV BLD AUTO: 9.7 FL (ref 8.9–12.7)
POTASSIUM SERPL-SCNC: 4.2 MMOL/L (ref 3.5–5.3)
RBC # BLD AUTO: 3.97 MILLION/UL (ref 3.88–5.62)
SODIUM SERPL-SCNC: 137 MMOL/L (ref 135–147)
UNIT DISPENSE STATUS: NORMAL
UNIT PRODUCT CODE: NORMAL
UNIT PRODUCT VOLUME: 300 ML
UNIT PRODUCT VOLUME: 350 ML
UNIT RH: NORMAL
WBC # BLD AUTO: 13.52 THOUSAND/UL (ref 4.31–10.16)

## 2022-11-22 RX ORDER — LORAZEPAM 0.5 MG/1
0.5 TABLET ORAL EVERY 8 HOURS PRN
Status: DISCONTINUED | OUTPATIENT
Start: 2022-11-22 | End: 2022-11-23 | Stop reason: HOSPADM

## 2022-11-22 RX ORDER — HYDROMORPHONE HCL IN WATER/PF 6 MG/30 ML
0.2 PATIENT CONTROLLED ANALGESIA SYRINGE INTRAVENOUS EVERY 4 HOURS PRN
Status: DISCONTINUED | OUTPATIENT
Start: 2022-11-22 | End: 2022-11-23 | Stop reason: HOSPADM

## 2022-11-22 RX ORDER — POLYETHYLENE GLYCOL 3350 17 G/17G
17 POWDER, FOR SOLUTION ORAL DAILY
Status: DISCONTINUED | OUTPATIENT
Start: 2022-11-22 | End: 2022-11-23 | Stop reason: HOSPADM

## 2022-11-22 RX ORDER — HEPARIN SODIUM 5000 [USP'U]/ML
5000 INJECTION, SOLUTION INTRAVENOUS; SUBCUTANEOUS EVERY 12 HOURS SCHEDULED
Status: COMPLETED | OUTPATIENT
Start: 2022-11-22 | End: 2022-11-22

## 2022-11-22 RX ADMIN — POLYETHYLENE GLYCOL 3350 17 G: 17 POWDER, FOR SOLUTION ORAL at 13:50

## 2022-11-22 RX ADMIN — HEPARIN SODIUM 5000 UNITS: 5000 INJECTION INTRAVENOUS; SUBCUTANEOUS at 08:17

## 2022-11-22 RX ADMIN — Medication: at 11:47

## 2022-11-22 RX ADMIN — ACETAMINOPHEN 975 MG: 325 TABLET ORAL at 23:52

## 2022-11-22 RX ADMIN — ACETAMINOPHEN 975 MG: 325 TABLET ORAL at 17:50

## 2022-11-22 RX ADMIN — BACLOFEN 10 MG: 10 TABLET ORAL at 08:17

## 2022-11-22 RX ADMIN — BACLOFEN 10 MG: 10 TABLET ORAL at 15:53

## 2022-11-22 RX ADMIN — SENNOSIDES AND DOCUSATE SODIUM 2 TABLET: 8.6; 5 TABLET ORAL at 21:23

## 2022-11-22 RX ADMIN — CEFAZOLIN SODIUM 2000 MG: 2 SOLUTION INTRAVENOUS at 04:16

## 2022-11-22 RX ADMIN — SODIUM CHLORIDE 75 ML/HR: 0.9 INJECTION, SOLUTION INTRAVENOUS at 15:54

## 2022-11-22 RX ADMIN — ACETAMINOPHEN 975 MG: 325 TABLET ORAL at 11:45

## 2022-11-22 RX ADMIN — BACLOFEN 10 MG: 10 TABLET ORAL at 21:23

## 2022-11-22 RX ADMIN — HEPARIN SODIUM 5000 UNITS: 5000 INJECTION INTRAVENOUS; SUBCUTANEOUS at 21:23

## 2022-11-22 NOTE — PROGRESS NOTES
Epidural Follow-up Note - Acute Pain Service    Kriss Martinez 45 y o  male MRN: [id number]  Unit/Bed#: [facility] 911-01 Encounter: [id number]      Assessment:   Principal Problem:    Right renal mass      Kriss Martinez is a 45y o  year old male with right renal mass s/p nephrectomy abdominal approach on 11/21, now POD1  Patient had a thoracic epidural placed sean-op for post op pain  Patient seen on rounds this AM, reports he is comfortable in his abdomen with the epidural  He has tolerated a CLD and is eager to walk the halls later this morning  Epidural site assessed, dressing is clear, dry and intact  He reports prior constipation/retention with opioid use  Plan:  Analgesia:  - Continue Thoracic epidural infusion of Ropivacaine 0 1% with fentanyl 5 mcg/mL at 10/8/10/3  - Recommend Dilaudid 0 2 mg IV q4hr PRN for breakthrough pain  - Continue Oxycodone 5mg/10mg PO q4hrs PRN for mod/sev pain  - Continue Tylenol 975mg PO q6hrs standing  - Anticipate epidural removal and transition to primarily PO regimen 1-2 days    Bowel Regimen:  - Bowel regimen when appropriate from surgical perspective  - Recommend Docusate (Colace) 100 mg PO twice daily, Senna 1 tablet PO qhs    [facility] will continue to follow  Please contact Acute Pain Service - [facility] via [employer] from [phone] with additional questions or concerns  See Maureen or Yudith for additional contacts and after hours information  Pain History  Current pain location(s): abdomen  Pain Scale:   1-2/10  Quality: dull ache  24 hour history: POD 1 s/p nephrectomy  Epidural working well  Patient ambulating and tolerating diet      PCEA use: Appropriate  Opioid requirement previous 24 hours: none since surgery    Meds/Allergies   all current active meds have been reviewed    No Known Allergies    Objective     Temp:  [96 6 °F (35 9 °C)-99 3 °F (37 4 °C)] 97 3 °F (36 3 °C)  HR:  [72-85] 74  Resp:  [12-25] 16  BP: (113-144)/(63-89) 113/71    Physical Exam  Constitutional: General: He is not in acute distress  Appearance: Normal appearance  Eyes:      Extraocular Movements: Extraocular movements intact  Conjunctiva/sclera: Conjunctivae normal    Cardiovascular:      Rate and Rhythm: Normal rate and regular rhythm  Pulmonary:      Effort: Pulmonary effort is normal  No respiratory distress  Abdominal:      General: Abdomen is flat  There is no distension  Musculoskeletal:         General: Normal range of motion  Cervical back: Normal range of motion and neck supple  Skin:     General: Skin is warm and dry  Neurological:      General: No focal deficit present  Mental Status: He is alert and oriented to person, place, and time  Psychiatric:         Mood and Affect: Mood normal          Behavior: Behavior normal        Epidural: Site clean/dry/intact, no surrounding erythema/edema/induration, infusion functioning appropriately    Lab Results:   Results from last 7 days   Lab Units 11/21/22  1525   WBC Thousand/uL 13 11*   HEMOGLOBIN g/dL 11 3*   HEMATOCRIT % 36 1*   PLATELETS Thousands/uL 212      Results from last 7 days   Lab Units 11/21/22  1525 11/19/22  0624 11/18/22  1349   POTASSIUM mmol/L 4 5   < > 4 3   CHLORIDE mmol/L 110*   < > 106   CO2 mmol/L 21   < > 26   BUN mg/dL 14   < > 17   CREATININE mg/dL 1 22   < > 0 98   CALCIUM mg/dL 8 7   < > 9 8   ALK PHOS U/L  --   --  95   ALT U/L  --   --  46   AST U/L  --   --  29    < > = values in this interval not displayed  Results from last 7 days   Lab Units 11/18/22  1348   INR  1 05       Please note that the APS provides consultative services regarding pain management only  With the exception of ketamine, peripheral nerve catheters, and epidural infusions (and except when indicated), final decisions regarding starting or changing doses of analgesic medications are at the discretion of the consulting service    Off hours consultation and/or medication management is generally not available      Jennie White MD  Acute Pain Service

## 2022-11-22 NOTE — PROGRESS NOTES
40mL Ropivacaine and Fentanyl 5mcg wasted per pharmacy's instructions with Elia Molina  Unable to waste in accudose since this is not a dosage we carry on the floor

## 2022-11-22 NOTE — RESTORATIVE TECHNICIAN NOTE
Restorative Technician Note      Patient Name: Ryann King     Restorative Tech Visit Date: 11/22/22  Note Type: Mobility  Patient Position Upon Consult: Seated edge of bed  Activity Performed: Ambulated  Assistive Device: Roller walker  Patient Position at End of Consult: Seated edge of bed;  All needs within [facility]

## 2022-11-22 NOTE — PLAN OF CARE
Problem: Nutrition/Hydration-ADULT  Goal: Nutrient/Hydration intake appropriate for improving, restoring or maintaining nutritional needs  Description: Monitor and assess patient's nutrition/hydration status for malnutrition  Collaborate with interdisciplinary team and initiate plan and interventions as ordered  Monitor patient's weight and dietary intake as ordered or per policy  Utilize nutrition screening tool and intervene as necessary  Determine patient's food preferences and provide high-protein, high-caloric foods as appropriate       INTERVENTIONS:  - Monitor oral intake, urinary output, labs, and treatment plans  - Assess nutrition and hydration status and recommend course of action  - Evaluate amount of meals eaten  - Assist patient with eating if necessary   - Allow adequate time for meals  - Recommend/ encourage appropriate diets, oral nutritional supplements, and vitamin/mineral supplements  - Order, calculate, and assess calorie counts as needed  - Recommend, monitor, and adjust tube feedings and TPN/PPN based on assessed needs  - Assess need for intravenous fluids  - Provide specific nutrition/hydration education as appropriate  - Include patient/family/caregiver in decisions related to nutrition  Outcome: Progressing     Problem: PAIN - ADULT  Goal: Verbalizes/displays adequate comfort level or baseline comfort level  Description: Interventions:  - Encourage patient to monitor pain and request assistance  - Assess pain using appropriate pain scale  - Administer analgesics based on type and severity of pain and evaluate response  - Implement non-pharmacological measures as appropriate and evaluate response  - Consider cultural and social influences on pain and pain management  - Notify physician/advanced practitioner if interventions unsuccessful or patient reports new pain  Outcome: Progressing     Problem: INFECTION - ADULT  Goal: Absence or prevention of progression during hospitalization  Description: INTERVENTIONS:  - Assess and monitor for signs and symptoms of infection  - Monitor lab/diagnostic results  - Monitor all insertion sites, i e  indwelling lines, tubes, and drains  - Monitor endotracheal if appropriate and nasal secretions for changes in amount and color  - [place] appropriate cooling/warming therapies per order  - Administer medications as ordered  - Instruct and encourage patient and family to use good hand hygiene technique  - Identify and instruct in appropriate isolation precautions for identified infection/condition  Outcome: Progressing  Goal: Absence of fever/infection during neutropenic period  Description: INTERVENTIONS:  - Monitor WBC    Outcome: Progressing     Problem: SAFETY ADULT  Goal: Patient will remain free of falls  Description: INTERVENTIONS:  - Educate patient/family on patient safety including physical limitations  - Instruct patient to call for assistance with activity   - Consult OT/PT to assist with strengthening/mobility   - Keep Call bell within reach  - Keep bed low and locked with side rails adjusted as appropriate  - Keep care items and personal belongings within reach  - Initiate and maintain comfort rounds  - Make Fall Risk Sign visible to staff  - Offer Toileting every  Hours, in advance of need  - Initiate/Maintain alarm  - Obtain necessary fall risk management equipment:   - Apply yellow socks and bracelet for high fall risk patients  - Consider moving patient to room near nurses station  Outcome: Progressing  Goal: Maintain or return to baseline ADL function  Description: INTERVENTIONS:  -  Assess patient's ability to carry out ADLs; assess patient's baseline for ADL function and identify physical deficits which impact ability to perform ADLs (bathing, care of mouth/teeth, toileting, grooming, dressing, etc )  - Assess/evaluate cause of self-care deficits   - Assess range of motion  - Assess patient's mobility; develop plan if impaired  - Assess patient's need for assistive devices and provide as appropriate  - Encourage maximum independence but intervene and supervise when necessary  - Involve family in performance of ADLs  - Assess for home care needs following discharge   - Consider OT consult to assist with ADL evaluation and planning for discharge  - Provide patient education as appropriate  Outcome: Progressing  Goal: Maintains/Returns to pre admission functional level  Description: INTERVENTIONS:  - Perform BMAT or MOVE assessment daily    - Set and communicate daily mobility goal to care team and patient/family/caregiver  - Collaborate with rehabilitation services on mobility goals if consulted  - Perform Range of Motion  times a day  - Reposition patient every  hours    - Dangle patient  times a day  - Stand patient  times a day  - Ambulate patient  times a day  - Out of bed to chair  times a day   - Out of bed for meals  times a day  - Out of bed for toileting  - Record patient progress and toleration of activity level   Outcome: Progressing     Problem: DISCHARGE PLANNING  Goal: Discharge to home or other facility with appropriate resources  Description: INTERVENTIONS:  - Identify barriers to discharge w/patient and caregiver  - Arrange for needed discharge resources and transportation as appropriate  - Identify discharge learning needs (meds, wound care, etc )  - Arrange for interpretive services to assist at discharge as needed  - Refer to Case Management Department for coordinating discharge planning if the patient needs post-hospital services based on physician/advanced practitioner order or complex needs related to functional status, cognitive ability, or social support system  Outcome: Progressing     Problem: Knowledge Deficit  Goal: Patient/family/caregiver demonstrates understanding of disease process, treatment plan, medications, and discharge instructions  Description: Complete learning assessment and assess knowledge base  Interventions:  - Provide teaching at level of understanding  - Provide teaching via preferred learning methods  Outcome: Progressing     Problem: MOBILITY - ADULT  Goal: Maintain or return to baseline ADL function  Description: INTERVENTIONS:  -  Assess patient's ability to carry out ADLs; assess patient's baseline for ADL function and identify physical deficits which impact ability to perform ADLs (bathing, care of mouth/teeth, toileting, grooming, dressing, etc )  - Assess/evaluate cause of self-care deficits   - Assess range of motion  - Assess patient's mobility; develop plan if impaired  - Assess patient's need for assistive devices and provide as appropriate  - Encourage maximum independence but intervene and supervise when necessary  - Involve family in performance of ADLs  - Assess for home care needs following discharge   - Consider OT consult to assist with ADL evaluation and planning for discharge  - Provide patient education as appropriate  Outcome: Progressing  Goal: Maintains/Returns to pre admission functional level  Description: INTERVENTIONS:  - Perform BMAT or MOVE assessment daily    - Set and communicate daily mobility goal to care team and patient/family/caregiver  - Collaborate with rehabilitation services on mobility goals if consulted  - Perform Range of Motion  times a day  - Reposition patient every  hours    - Dangle patient  times a day  - Stand patient  times a day  - Ambulate patient  times a day  - Out of bed to chair  times a day   - Out of bed for meal times a day  - Out of bed for toileting  - Record patient progress and toleration of activity level   Outcome: Progressing

## 2022-11-22 NOTE — PROGRESS NOTES
UROLOGY PROGRESS NOTE  Patient Identifiers: Christina Cuevas (MRN [id number])  Date of Service: 11/22/2022    Subjective:    24 HR EVENTS:   S/p open right nephrectomy  No events overnight  The patient is doing well this morning  Pain is well controlled with epidural   Hemoglobin stable at 11 7 from 11 3 yesterday  Not passing flatus yet denies nausea  Tolerating clear liquid diet  Does not have much of an appetite for solids just yet  Making good urine, 1125 cc over the last 24 hours  He would like to keep this catheter today but will ambulate in the hallways today  Objective:    VITALS:    Vitals:    11/22/22 0741   BP: 113/71   Pulse:    Resp:    Temp: (!) 97 3 °F (36 3 °C)   SpO2:        INS & OUTS:  I/O last 24 hours: In: 7600 [P O :240;  I V :7671]  Out: 1525 [ORLYT:5435; Blood:50]    LABS:  Lab Results   Component Value Date    HGB 11 7 (L) 11/22/2022    HCT 36 2 (L) 11/22/2022    WBC 13 52 (H) 11/22/2022     11/22/2022   ]    Lab Results   Component Value Date    K 4 2 11/22/2022     11/22/2022    CO2 24 11/22/2022    BUN 17 11/22/2022    CREATININE 1 33 (H) 11/22/2022    CALCIUM 8 8 11/22/2022   ]    INPATIENT MEDS:    Current Facility-Administered Medications:   •  acetaminophen (TYLENOL) tablet 975 mg, 975 mg, Oral, Q6H [address], Warden Keisha MD, 975 mg at 11/22/22 1145  •  baclofen tablet 10 mg, 10 mg, Oral, TID, Warden Keisha MD, 10 mg at 11/22/22 2922  •  diphenhydrAMINE (BENADRYL) injection 25 mg, 25 mg, Intravenous, Q6H PRN, Warden Keisha MD, 25 mg at 11/19/22 1605  •  heparin (porcine) subcutaneous injection 5,000 Units, 5,000 Units, Subcutaneous, Q12H [address], Warden Keisha MD, 5,000 Units at 11/22/22 1768  •  HYDROmorphone HCl (DILAUDID) injection 0 2 mg, 0 2 mg, Intravenous, Q4H PRN, Justin Tinajero MD  •  metoclopramide (REGLAN) injection 5 mg, 5 mg, Intravenous, Q6H PRN, Warden Keisha MD  •  ondansetron [facility]) injection 4 mg, 4 mg, Intravenous, Q6H PRN, Yair Kelly MD  •  oxyCODONE (ROXICODONE) immediate release tablet 10 mg, 10 mg, Oral, Q4H PRN, Yair Kelly MD  •  oxyCODONE (ROXICODONE) IR tablet 5 mg, 5 mg, Oral, Q4H PRN, Yair Kelly MD  •  ropivacaine 0 1% and fentaNYL 5 mcg/mL PCEA, , Epidural, Continuous, Yair Kelly MD, New Bag at 11/22/22 1147  •  senna-docusate sodium (SENOKOT S) 8 6-50 mg per tablet 2 tablet, 2 tablet, Oral, HS, Yair Kelly MD, 2 tablet at 11/21/22 2125  •  sodium chloride 0 9 % infusion, 75 mL/hr, Intravenous, Continuous, Yair Kelly MD, Last Rate: 75 mL/hr at 11/21/22 1458, 75 mL/hr at 11/21/22 1458      Physical Exam:    GEN: alert and oriented x 3    RESP: breathing comfortably with no accessory muscle use    ABD: soft, appropriately tender to palpation, nondistended, clean dressing over surgical incision  EXT: no significant peripheral edema   MODI: in place draining clear yellow urine        Assessment:  52yoM POD1 s/p open right radical nephrectomy doing well    Plan:  - advance to regular diet as tolerated  - will keep catheter in today and plan for trial of void in the morning per patient preference  - will plan for epidural removal tomorrow morning  Patient already on p r n  Oxycodone and around the clock Tylenol   - heparin q 12 hours, hold a m   Dose for epidural removal  - bowel reg Senokot-S 2 tablets every evening will add MiraLax daily    Yairbess Kelly

## 2022-11-23 VITALS
WEIGHT: 180.34 LBS | HEIGHT: 72 IN | BODY MASS INDEX: 24.43 KG/M2 | SYSTOLIC BLOOD PRESSURE: 136 MMHG | TEMPERATURE: 97.3 F | DIASTOLIC BLOOD PRESSURE: 84 MMHG | RESPIRATION RATE: 18 BRPM | OXYGEN SATURATION: 97 % | HEART RATE: 60 BPM

## 2022-11-23 LAB
ANION GAP SERPL CALCULATED.3IONS-SCNC: 5 MMOL/L (ref 4–13)
BUN SERPL-MCNC: 16 MG/DL (ref 5–25)
CALCIUM SERPL-MCNC: 8.7 MG/DL (ref 8.3–10.1)
CHLORIDE SERPL-SCNC: 112 MMOL/L (ref 96–108)
CO2 SERPL-SCNC: 23 MMOL/L (ref 21–32)
CREAT SERPL-MCNC: 1.23 MG/DL (ref 0.6–1.3)
ERYTHROCYTE [DISTWIDTH] IN BLOOD BY AUTOMATED COUNT: 12.9 % (ref 11.6–15.1)
GFR SERPL CREATININE-BSD FRML MDRD: 66 ML/MIN/1.73SQ M
GLUCOSE SERPL-MCNC: 93 MG/DL (ref 65–140)
HCT VFR BLD AUTO: 37.8 % (ref 36.5–49.3)
HGB BLD-MCNC: 12.1 G/DL (ref 12–17)
MCH RBC QN AUTO: 29.4 PG (ref 26.8–34.3)
MCHC RBC AUTO-ENTMCNC: 32 G/DL (ref 31.4–37.4)
MCV RBC AUTO: 92 FL (ref 82–98)
PLATELET # BLD AUTO: 252 THOUSANDS/UL (ref 149–390)
PMV BLD AUTO: 10 FL (ref 8.9–12.7)
POTASSIUM SERPL-SCNC: 3.8 MMOL/L (ref 3.5–5.3)
RBC # BLD AUTO: 4.11 MILLION/UL (ref 3.88–5.62)
SODIUM SERPL-SCNC: 140 MMOL/L (ref 135–147)
WBC # BLD AUTO: 7.62 THOUSAND/UL (ref 4.31–10.16)

## 2022-11-23 RX ORDER — OXYCODONE HYDROCHLORIDE 5 MG/1
5 TABLET ORAL EVERY 6 HOURS PRN
Qty: 10 TABLET | Refills: 0 | Status: SHIPPED | OUTPATIENT
Start: 2022-11-23 | End: 2022-11-25 | Stop reason: SDUPTHER

## 2022-11-23 RX ORDER — ONDANSETRON 4 MG/1
4 TABLET, FILM COATED ORAL EVERY 8 HOURS PRN
Qty: 15 TABLET | Refills: 0 | Status: SHIPPED | OUTPATIENT
Start: 2022-11-23

## 2022-11-23 RX ORDER — ACETAMINOPHEN 325 MG/1
650 TABLET ORAL EVERY 6 HOURS PRN
Qty: 60 TABLET | Refills: 0 | Status: SHIPPED | OUTPATIENT
Start: 2022-11-23

## 2022-11-23 RX ORDER — DOCUSATE SODIUM 100 MG/1
100 CAPSULE, LIQUID FILLED ORAL 2 TIMES DAILY
Qty: 30 CAPSULE | Refills: 0 | Status: SHIPPED | OUTPATIENT
Start: 2022-11-23

## 2022-11-23 RX ADMIN — POLYETHYLENE GLYCOL 3350 17 G: 17 POWDER, FOR SOLUTION ORAL at 08:07

## 2022-11-23 RX ADMIN — OXYCODONE HYDROCHLORIDE 10 MG: 10 TABLET ORAL at 11:01

## 2022-11-23 RX ADMIN — ACETAMINOPHEN 975 MG: 325 TABLET ORAL at 05:19

## 2022-11-23 RX ADMIN — ACETAMINOPHEN 975 MG: 325 TABLET ORAL at 11:02

## 2022-11-23 RX ADMIN — BACLOFEN 10 MG: 10 TABLET ORAL at 08:07

## 2022-11-23 NOTE — QUICK NOTE
Doing well  Ramos and epidural removed this morning  He is voiding  He had a BM  He is ambulating  Tolerating full diet, oral analgesia  Discussed discharge summary and perioperative plans/followup with him and his wife at bedside  He is ready and eager to go home today   2 week OV for path review and staple removal     Monae Alvarado  11/23/22

## 2022-11-23 NOTE — PLAN OF CARE
Problem: Nutrition/Hydration-ADULT  Goal: Nutrient/Hydration intake appropriate for improving, restoring or maintaining nutritional needs  Description: Monitor and assess patient's nutrition/hydration status for malnutrition  Collaborate with interdisciplinary team and initiate plan and interventions as ordered  Monitor patient's weight and dietary intake as ordered or per policy  Utilize nutrition screening tool and intervene as necessary  Determine patient's food preferences and provide high-protein, high-caloric foods as appropriate       INTERVENTIONS:  - Monitor oral intake, urinary output, labs, and treatment plans  - Assess nutrition and hydration status and recommend course of action  - Evaluate amount of meals eaten  - Assist patient with eating if necessary   - Allow adequate time for meals  - Recommend/ encourage appropriate diets, oral nutritional supplements, and vitamin/mineral supplements  - Order, calculate, and assess calorie counts as needed  - Recommend, monitor, and adjust tube feedings and TPN/PPN based on assessed needs  - Assess need for intravenous fluids  - Provide specific nutrition/hydration education as appropriate  - Include patient/family/caregiver in decisions related to nutrition  Outcome: Progressing     Problem: PAIN - ADULT  Goal: Verbalizes/displays adequate comfort level or baseline comfort level  Description: Interventions:  - Encourage patient to monitor pain and request assistance  - Assess pain using appropriate pain scale  - Administer analgesics based on type and severity of pain and evaluate response  - Implement non-pharmacological measures as appropriate and evaluate response  - Consider cultural and social influences on pain and pain management  - Notify physician/advanced practitioner if interventions unsuccessful or patient reports new pain  Outcome: Progressing     Problem: INFECTION - ADULT  Goal: Absence or prevention of progression during hospitalization  Description: INTERVENTIONS:  - Assess and monitor for signs and symptoms of infection  - Monitor lab/diagnostic results  - Monitor all insertion sites, i e  indwelling lines, tubes, and drains  - Monitor endotracheal if appropriate and nasal secretions for changes in amount and color  - [place] appropriate cooling/warming therapies per order  - Administer medications as ordered  - Instruct and encourage patient and family to use good hand hygiene technique  - Identify and instruct in appropriate isolation precautions for identified infection/condition  Outcome: Progressing  Goal: Absence of fever/infection during neutropenic period  Description: INTERVENTIONS:  - Monitor WBC    Outcome: Progressing     Problem: SAFETY ADULT  Goal: Patient will remain free of falls  Description: INTERVENTIONS:  - Educate patient/family on patient safety including physical limitations  - Instruct patient to call for assistance with activity   - Consult OT/PT to assist with strengthening/mobility   - Keep Call bell within reach  - Keep bed low and locked with side rails adjusted as appropriate  - Keep care items and personal belongings within reach  - Initiate and maintain comfort rounds  - Make Fall Risk Sign visible to staff  - Offer Toileting every  Hours, in advance of need  - Initiate/Maintain alarm  - Obtain necessary fall risk management equipment:   - Apply yellow socks and bracelet for high fall risk patients  - Consider moving patient to room near nurses station  Outcome: Progressing  Goal: Maintain or return to baseline ADL function  Description: INTERVENTIONS:  -  Assess patient's ability to carry out ADLs; assess patient's baseline for ADL function and identify physical deficits which impact ability to perform ADLs (bathing, care of mouth/teeth, toileting, grooming, dressing, etc )  - Assess/evaluate cause of self-care deficits   - Assess range of motion  - Assess patient's mobility; develop plan if impaired  - Assess patient's need for assistive devices and provide as appropriate  - Encourage maximum independence but intervene and supervise when necessary  - Involve family in performance of ADLs  - Assess for home care needs following discharge   - Consider OT consult to assist with ADL evaluation and planning for discharge  - Provide patient education as appropriate  Outcome: Progressing  Goal: Maintains/Returns to pre admission functional level  Description: INTERVENTIONS:  - Perform BMAT or MOVE assessment daily    - Set and communicate daily mobility goal to care team and patient/family/caregiver  - Collaborate with rehabilitation services on mobility goals if consulted  - Perform Range of Motion  times a day  - Reposition patient every  hours    - Dangle patient  times a day  - Stand patient  times a day  - Ambulate patient  times a day  - Out of bed to chair  times a day   - Out of bed for meals  times a day  - Out of bed for toileting  - Record patient progress and toleration of activity level   Outcome: Progressing     Problem: DISCHARGE PLANNING  Goal: Discharge to home or other facility with appropriate resources  Description: INTERVENTIONS:  - Identify barriers to discharge w/patient and caregiver  - Arrange for needed discharge resources and transportation as appropriate  - Identify discharge learning needs (meds, wound care, etc )  - Arrange for interpretive services to assist at discharge as needed  - Refer to Case Management Department for coordinating discharge planning if the patient needs post-hospital services based on physician/advanced practitioner order or complex needs related to functional status, cognitive ability, or social support system  Outcome: Progressing     Problem: Knowledge Deficit  Goal: Patient/family/caregiver demonstrates understanding of disease process, treatment plan, medications, and discharge instructions  Description: Complete learning assessment and assess knowledge base  Interventions:  - Provide teaching at level of understanding  - Provide teaching via preferred learning methods  Outcome: Progressing     Problem: MOBILITY - ADULT  Goal: Maintain or return to baseline ADL function  Description: INTERVENTIONS:  -  Assess patient's ability to carry out ADLs; assess patient's baseline for ADL function and identify physical deficits which impact ability to perform ADLs (bathing, care of mouth/teeth, toileting, grooming, dressing, etc )  - Assess/evaluate cause of self-care deficits   - Assess range of motion  - Assess patient's mobility; develop plan if impaired  - Assess patient's need for assistive devices and provide as appropriate  - Encourage maximum independence but intervene and supervise when necessary  - Involve family in performance of ADLs  - Assess for home care needs following discharge   - Consider OT consult to assist with ADL evaluation and planning for discharge  - Provide patient education as appropriate  Outcome: Progressing  Goal: Maintains/Returns to pre admission functional level  Description: INTERVENTIONS:  - Perform BMAT or MOVE assessment daily    - Set and communicate daily mobility goal to care team and patient/family/caregiver  - Collaborate with rehabilitation services on mobility goals if consulted  - Perform Range of Motion  times a day  - Reposition patient every  hours    - Dangle patient  times a day  - Stand patient  times a day  - Ambulate patient times a day  - Out of bed to chair  times a day   - Out of bed for thomas times a day  - Out of bed for toileting  - Record patient progress and toleration of activity level   Outcome: Progressing

## 2022-11-23 NOTE — DISCHARGE SUMMARY
Discharge Summary - Atif Manzo 45 y o  male MRN: [id number]    Unit/Bed#: [facility] 911-01 Encounter: [id number]    Admission Date: 11/18/2022     Discharge Date: 11/23/22    HPI: Atif Manzo is a 45 y o  male who presented for right radical nephrectomy with preop angioembolization by Dr Augustina Espinoza  Procedure(s):  NEPHRECTOMY ABDOMINAL APPROACH  Surgeon(s):  MD Yemi Mueller Cha, MD Rexene Pellet, MD Marcus Baize, MD Mort Bard, MD  11/21/2022    Hospital Course: Ignacia Han was admitted for planned preoperative angioembolization of a very large 15cm renal mass  Two days later had planned open right radical nephrectomy via chevron incision  Surgery went well  Postoperative diet was advanced  Ramos catheter and epidural were removed on POD#2  He is voiding, ambulating, had a bowel movement, and has been on solid diet as well as oral analgesia for 36 hours  Appropriate for discharge home today  Discussed postoperative plan and expectations with patient and his wife at the bedside  Discharge Diagnosis: Right renal mass    Condition at Discharge: good    Discharge Medications:  See after visit summary for reconciled discharge medications provided to patient and family  Patient was discharged home on home medications with the addition of colace, tylenol, oxycodone  Discharge instructions/Information to patient and family:   See after visit summary for written and verbal information which has been provided to patient and family  Provisions for Follow-Up Care:  See after visit summary for information related to follow-up care and any pertinent home health orders  Disposition: Home    Planned Readmission: No    Discharge Statement   I spent 30 minutes discharging the patient  This time was spent on the day of discharge  I had direct contact with the patient on the day of discharge  Additional documentation is required if more than 30 minutes were spent on discharge  Signature:    Danny Gonzalez PA-C  Date: 11/23/2022 Time: 3:25 PM

## 2022-11-23 NOTE — PROGRESS NOTES
Progress Note - Urology  Deni Lozano 1970, 45 y o  male MRN: [id number]    Unit/Bed#: [facility] 911-01 Encounter: [id number]    Assessment  Large 15 cm Right renal mass  Day four Status post preoperative angioembolization  Day two Status post open right nephrectomy  Hemoglobin 12  Creatinine 1 2    Plan  Ramos catheter removed this morning  Incisional dressings taken down  Epidural removal today-a m  Heparin held for this reason  After epidural removal with resume TID heparin and begin IV and PO analgesia  Regular diet  Ambulate  Incentive spirometry  Possible discharge home this evening most likely tomorrow    Subjective: incisional pain with movement/positioning  Otherwise feels real good  Urine clear  Eating solids  Review of Systems   Constitutional: Negative for activity change, appetite change, chills, fever and unexpected weight change  HENT: Negative  Respiratory: Negative  Negative for shortness of breath  Cardiovascular: Negative  Negative for chest pain  Gastrointestinal: Negative for abdominal pain, diarrhea, nausea and vomiting  Endocrine: Negative  Genitourinary: Negative for decreased urine volume, difficulty urinating, dysuria, flank pain, frequency, hematuria and urgency  Musculoskeletal: Negative for back pain and gait problem  Skin: Negative  Allergic/Immunologic: Negative  Neurological: Negative  Hematological: Negative for adenopathy  Does not bruise/bleed easily  Objective:  Vitals: Blood pressure 120/76, pulse 65, temperature (!) 97 3 °F (36 3 °C), resp  rate 18, height 6' (1 829 m), weight 81 8 kg (180 lb 5 4 oz), SpO2 96 %  ,Body mass index is 24 46 kg/m²      Intake/Output Summary (Last 24 hours) at 11/23/2022 0916  Last data filed at 11/23/2022 0845  Gross per 24 hour   Intake 368 ml   Output 1225 ml   Net -857 ml     Invasive Devices     Peripheral Intravenous Line  Duration           Peripheral IV 11/21/22 Left Hand 1 day    Peripheral IV 11/21/22 Left Wrist 1 day    Peripheral IV 11/21/22 Right Forearm 1 day          Epidural Line  Duration           Epidural Catheter 11/21/22 1 day          Drain  Duration           Urethral Catheter 16 Fr  2 days                Physical Exam  Vitals and nursing note reviewed  Constitutional:       Appearance: He is well-developed and well-nourished  HENT:      Head: Normocephalic and atraumatic  Cardiovascular:      Rate and Rhythm: Normal rate and regular rhythm  Heart sounds: Normal heart sounds  No murmur heard  Pulmonary:      Effort: Pulmonary effort is normal       Breath sounds: Normal breath sounds  Abdominal:      General: Bowel sounds are normal  There is no distension  Palpations: Abdomen is soft  Tenderness: There is no guarding or rebound  Comments: Mervat incision clean and dry  Valentine-incisional tenderness without deep abdominal tenderness or guarding  No drains  Genitourinary:     Comments: Circumcised penis, normal phallus  Ramos catheter per urethra draining clear yellow urine  Musculoskeletal:         General: No edema  Normal range of motion  Skin:     General: Skin is warm and dry  Capillary Refill: Capillary refill takes less than 2 seconds  Coloration: Skin is not pale  Neurological:      Mental Status: He is alert and oriented to person, place, and time           Labs:  Recent Labs     11/21/22  1525 11/22/22  1053 11/23/22  0519   WBC 13 11* 13 52* 7 62     Recent Labs     11/21/22  1525 11/22/22  1053 11/23/22  0519   HGB 11 3* 11 7* 12 1       Recent Labs     11/21/22  1525 11/22/22  1053 11/23/22  0519   CREATININE 1 22 1 33* 1 23       History:    Past Medical History:   Diagnosis Date   • Arthralgia     last assessed 02/10/2015   • Babesiosis     last assessed 12/30/14   • Benign paroxysmal vertigo     last assessed 05/22/15   • Meniere disease     last assessed 04/15/13   • Pneumonia of left lower lobe due to infectious organism     last assessed 04/04/16   • Vitamin D deficiency     last assessed 12/30/14     Past Surgical History:   Procedure Laterality Date   • FIBULA FRACTURE SURGERY     • IR RENAL ANGIOGRAM  11/18/2022   • [place] REMV [facility] Right 11/21/2022    Procedure: NEPHRECTOMY ABDOMINAL APPROACH;  Surgeon: Lio Edmondson MD;  [facility]: [facility];  Service: Urology   • TIBIA FRACTURE SURGERY     • TONSILLECTOMY       Family History   Problem Relation Age of Onset   • No Known Problems Family    • Meniere's disease Mother      Social History     Socioeconomic History   • Marital status: /Civil Union     Spouse name: Not on file   • Number of children: Not on file   • Years of education: Not on file   • Highest education level: Not on file   Occupational History   • Not on file   Tobacco Use   • Smoking status: Former   • Smokeless tobacco: Never   • Tobacco comments:     Quit 2 mos ago   Vaping Use   • Vaping Use: Never used   Substance and Sexual Activity   • Alcohol use: Yes     Comment: social   • Drug use: No   • Sexual activity: Yes   Other Topics Concern   • Not on file   Social History Narrative    Daily coffee consumption 6 cups/day     Social Determinants of Health     Financial Resource Strain: Not on file   Food Insecurity: Not on file   Transportation Needs: Not on file   Physical Activity: Not on file   Stress: Not on file   Social Connections: Not on file   Intimate Partner Violence: Not on file   Housing Stability: Not on file         Marion Kulkarni  Date: 11/23/2022 Time: 9:16 AM

## 2022-11-23 NOTE — CASE MANAGEMENT
Case Management Discharge Planning Note    Patient name [facility]  Location [facility] 911/[facility] 671-74 MRN [id number]  : 1970 Date 2022       Current Admission Date: 2022  Current Admission Diagnosis:Right renal mass   Patient Active Problem List    Diagnosis Date Noted   • Right renal mass 2022   • History of anesthesia complications    • Meniere disease, left 2018   • Hyperlipidemia 10/25/2013      LOS (days): 5  Geometric Mean LOS (GMLOS) (days): 1 80  Days to GMLOS:-3 3     OBJECTIVE:  Risk of Unplanned Readmission Score: 7 82         Current admission status: Inpatient   Preferred Pharmacy:   [id number] [place] [id number]  Phone: [phone] Fax: [address] [place] [address] [address] [facility]  Phone: [phone] Fax: [phone]    Primary Care Provider: Viv Henning MD    Primary Insurance: BLUE CROSS  Secondary Insurance:     DISCHARGE DETAILS:             Additional Comments: Patient is currently written for discharge and CM has not been notified of any needs for time of discharge

## 2022-11-23 NOTE — PROGRESS NOTES
Epidural Follow-up Note - Acute Pain Service    Samuel You 45 y o  male MRN: [id number]  Unit/Bed#: [facility] 911-01 Encounter: [id number]      Assessment:   Principal Problem:    Right renal mass      Samuel You is a 45y o  year old male with right renal mass s/p nephrectomy abdominal approach on 11/21, now POD2  Patient had a thoracic epidural placed sean-op for post op pain  Patient seen on rounds today, he is resting in bed  His epidural pump is empty for anticipated epidural removal, and he is experiencing and expected increase in pain  Epidural removed this morning  Plan:  Analgesia:  - Discontinue Thoracic epidural infusion   - heparin SQ last at last evening   - Last platelet count   Lab Results   Component Value Date     11/23/2022     - Transition to standard oral opioid regimen with oxycodone 5 mg/10 mg PO q4hrs PRN for moderate/severe pain  - Dilaudid 0 5 mg IV q2hrs PRN for breakthrough pain    Multimodal analgesia:  - Tylenol 975 mg PO q8hrs standing  - Robaxin 750 mg PO q8hrs   - Lidocaine patches to affected areas 12 hours on, 12 hours off    Bowel Regimen:  - Docusate (Colace) 100 mg PO twice daily  - Senna 1 tablet PO qhs    APS will continue to follow  Please contact Acute Pain Service - [facility] via [employer] from [phone] with additional questions or concerns  See Maureen or Yudith for additional contacts and after hours information  Plan discussed with primary team    Pain History  Current pain location(s): abdomen, left flank  Pain Scale:   2-6/10  Quality: ache, sharp  24 hour history: Continues to do well with recovery from surgery  Heparin held this morning, epidural removed      PCEA use: appropriate  Opioid requirement previous 24 hours: n/a    Meds/Allergies   all current active meds have been reviewed    No Known Allergies    Objective     Temp:  [97 3 °F (36 3 °C)-97 5 °F (36 4 °C)] 97 3 °F (36 3 °C)  HR:  [65-80] 65  Resp:  [18-20] 18  BP: (120-127)/(76-85) 120/76    Physical Exam  Constitutional:       General: He is not in acute distress  Appearance: Normal appearance  Eyes:      Extraocular Movements: Extraocular movements intact  Conjunctiva/sclera: Conjunctivae normal    Cardiovascular:      Rate and Rhythm: Normal rate and regular rhythm  Pulmonary:      Effort: Pulmonary effort is normal  No respiratory distress  Abdominal:      General: Abdomen is flat  There is no distension  Musculoskeletal:         General: Normal range of motion  Cervical back: Normal range of motion and neck supple  Skin:     General: Skin is warm and dry  Neurological:      General: No focal deficit present  Mental Status: He is alert and oriented to person, place, and time  Psychiatric:         Mood and Affect: Mood normal          Behavior: Behavior normal        Epidural: Site clean/dry/intact, no surrounding erythema/edema/induration, infusion functioning appropriately    Lab Results:   Results from last 7 days   Lab Units 11/23/22  0519   WBC Thousand/uL 7 62   HEMOGLOBIN g/dL 12 1   HEMATOCRIT % 37 8   PLATELETS Thousands/uL 252      Results from last 7 days   Lab Units 11/23/22  0519 11/19/22  0624 11/18/22  1349   POTASSIUM mmol/L 3 8   < > 4 3   CHLORIDE mmol/L 112*   < > 106   CO2 mmol/L 23   < > 26   BUN mg/dL 16   < > 17   CREATININE mg/dL 1 23   < > 0 98   CALCIUM mg/dL 8 7   < > 9 8   ALK PHOS U/L  --   --  95   ALT U/L  --   --  46   AST U/L  --   --  29    < > = values in this interval not displayed  Results from last 7 days   Lab Units 11/18/22  1348   INR  1 05       Please note that the APS provides consultative services regarding pain management only  With the exception of ketamine, peripheral nerve catheters, and epidural infusions (and except when indicated), final decisions regarding starting or changing doses of analgesic medications are at the discretion of the consulting service    Off hours consultation and/or medication management is generally not available      Alicia Perkins MD  Acute Pain Service

## 2022-11-25 ENCOUNTER — TELEPHONE (OUTPATIENT)
Dept: UROLOGY | Facility: AMBULATORY SURGERY CENTER | Age: 52
End: 2022-11-25

## 2022-11-25 DIAGNOSIS — N28.89 RIGHT RENAL MASS: ICD-10-CM

## 2022-11-25 RX ORDER — OXYCODONE HYDROCHLORIDE 5 MG/1
5 TABLET ORAL EVERY 6 HOURS PRN
Qty: 15 TABLET | Refills: 0 | Status: SHIPPED | OUTPATIENT
Start: 2022-11-25

## 2022-11-25 NOTE — TELEPHONE ENCOUNTER
----- Message from Paul Doss RN sent at 11/25/2022  8:13 AM EST -----  Regarding: FW: Pain medicine   Contact: [phone]    ----- Message -----  From: Radha Walls  Sent: 11/23/2022   9:14 PM EST  To: [facility]  Subject: Pain medicine                                    Hi Dr Sebastian Black    I am again sorry to bother you  But i think the pain medication that we got was wrong  Our notes say he can take up to 10 days but he only got 10 pills  Now I am only concerned because he is in alot of pain and the holiday weekend is here  Is there a way to send a prescription to our pharmacy for the difference in the amount? What he has will only last two and half days      Thank you again  Jenifer

## 2022-11-25 NOTE — TELEPHONE ENCOUNTER
I spoke with patient to see how he was doing with his procedure  He is still in a pretty much pain  He would like more of the pain medication sent over since he was only given 10 tablets  He only has 4 left

## 2022-11-28 NOTE — TELEPHONE ENCOUNTER
Aneudy Freedman MD  You; [facility] 3 days ago     Please arrange RTC with nursing in 12-14 days for staple removal and with me in 3 weeks for path discussion/POC  Post Op Note    Christopher Bagley is a 45 y o  male s/p NEPHRECTOMY ABDOMINAL APPROACH (Right: Abdomen)  performed 11-  Christopher Bagley is a patient of Dr Wiliam Farmer and is seen at the [place] office  How would you rate your pain on a scale from 1 to 10, 10 being the worst pain ever? 0  Have you had a fever? No    Do you have any difficulty urinating? No  If the patient has an incision- do you have any redness around the incision or any drainage from the incision? No    Do you have any other questions or concerns that I can address at this time? Spoke with the patient and he is doing well s/p procedure  He informed me that his pain level is under control and taking the prescribed medications as directed  Incision site is C/D/I according to patient  Reviewed with him the plan of care and follow up per Dr Wiliam Farmer  He is scheduled on 12-5-2022 at 11am at [facility] with the nurse for staple removal    He is scheduled on 12- in the [facility] office for his post operative visit with MD due to availability at [place]  Pt confirmed these appointments and will contact the office with any questions or concerns he should have prior to his follow up

## 2022-11-30 NOTE — TELEPHONE ENCOUNTER
I called and spoke to patient's wife  I provided her the [facility] office fax number and asked her to try resending the paperwork

## 2022-11-30 NOTE — TELEPHONE ENCOUNTER
FORMS RECEIVED AND EMAILED TO [employer]  TO COMPLETE  INTAKE PAPER SENT TO [address] [place] [address] FOR CHARGE ENTRY

## 2022-11-30 NOTE — TELEPHONE ENCOUNTER
Pt wife called and left  asking if working papers were received that she faxed prior and if the paperwork would be done in time to  the day of upcoming appt on 12/5/22    Pt call [phone] Marcelino Nahunselvin (wife)

## 2022-12-01 NOTE — TELEPHONE ENCOUNTER
Called and spoke with pt's wife Jenifer: I let her know the form is filled out, ready for   She did pay the $15 fee over the phone  Pt has appt next Monday at the [facility] office so, per wife, I faxed over both the disability form and the payment receipt to that office and made clerical aware pt will  at appt

## 2022-12-05 ENCOUNTER — PROCEDURE VISIT (OUTPATIENT)
Dept: UROLOGY | Facility: CLINIC | Age: 52
End: 2022-12-05

## 2022-12-05 VITALS
DIASTOLIC BLOOD PRESSURE: 78 MMHG | BODY MASS INDEX: 24.24 KG/M2 | WEIGHT: 179 LBS | HEART RATE: 84 BPM | SYSTOLIC BLOOD PRESSURE: 118 MMHG | HEIGHT: 72 IN | OXYGEN SATURATION: 100 %

## 2022-12-05 DIAGNOSIS — N28.89 RIGHT RENAL MASS: Primary | ICD-10-CM

## 2022-12-05 NOTE — PROGRESS NOTES
12/5/2022    Josué Montalvo is a 45 y o  male  [id number]    Diagnosis:  Chief Complaint    Post-op         Patient presents for staple removal s/p R nephrectomy on 11/21/22 with Dr Fransisco Cunha:  F/u with Dr Adrien Garcia as previously scheduled    Procedure: Thirty three staples were removed from patient without incident  He tolerated the removal well  Betadine was placed over the epigastric incisional area alone with steristrips as this area seemed to be more sensitive to patient  Incision was closed and looked as though it is healing normally  No signs of infection or drainage present  He agrees to f/u as scheduled  Advised to call the office sooner if he encounters any problems or concerns        Vitals:    12/05/22 1101   BP: 118/78   BP Location: Right arm   Patient Position: Sitting   Pulse: 84   SpO2: 100%   Weight: 81 2 kg (179 lb)   Height: 6' (1 829 m)         Main oMnroy RN,BSN

## 2022-12-15 ENCOUNTER — DOCUMENTATION (OUTPATIENT)
Dept: HEMATOLOGY ONCOLOGY | Facility: CLINIC | Age: 52
End: 2022-12-15

## 2022-12-15 ENCOUNTER — TELEPHONE (OUTPATIENT)
Dept: UROLOGY | Facility: MEDICAL CENTER | Age: 52
End: 2022-12-15

## 2022-12-15 ENCOUNTER — OFFICE VISIT (OUTPATIENT)
Dept: UROLOGY | Facility: MEDICAL CENTER | Age: 52
End: 2022-12-15

## 2022-12-15 VITALS
SYSTOLIC BLOOD PRESSURE: 126 MMHG | DIASTOLIC BLOOD PRESSURE: 84 MMHG | WEIGHT: 184 LBS | HEART RATE: 67 BPM | HEIGHT: 72 IN | BODY MASS INDEX: 24.92 KG/M2 | OXYGEN SATURATION: 100 %

## 2022-12-15 DIAGNOSIS — C64.1 CLEAR CELL CARCINOMA OF RIGHT KIDNEY (HCC): Primary | ICD-10-CM

## 2022-12-15 NOTE — PROGRESS NOTES
Received message from Dr Chio Slater with request for pt to be added for discussion at next  Working Group  Chart review completed and documentation added to agenda for 12/20/2022

## 2022-12-16 ENCOUNTER — PATIENT OUTREACH (OUTPATIENT)
Dept: HEMATOLOGY ONCOLOGY | Facility: CLINIC | Age: 52
End: 2022-12-16

## 2022-12-20 ENCOUNTER — DOCUMENTATION (OUTPATIENT)
Dept: HEMATOLOGY ONCOLOGY | Facility: CLINIC | Age: 52
End: 2022-12-20

## 2022-12-20 NOTE — PROGRESS NOTES
Oncology Navigator Note: Multidisciplinary Urology Case Review 12/20/2022        Diagnosis: Paulo Cleary is a 45 y o  male who was presented at the Urology Oncology Multidisciplinary Tumor Conference today  Giorgio Brower presented to the ER for SOB and CTA Chest PE study done  Negative for PE but incidental finding of large right renal mass prompting CT abd/pelvis showing 14 6 x 9 6 x 11 cm heterogeneous complex mass at the lower pole of the right kidney   Nonspecific adjacent lymph nodes  11/9/22 MRI Abdomen:  IMPRESSION:     1   Large exophytic right lower pole heterogeneously enhancing renal mass extending into the renal sinus consistent with renal cell carcinoma  There are retroperitoneal collaterals posteriorly which drain into the IVC though no evidence of right renal   vein thrombosis  No hydronephrosis      2   Borderline paracaval lymph nodes, likely early lymph node metastasis      3   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  However, given primary malignancy and atypical features, a short interval follow-up MRI in 3 months is recommended to exclude metastasis    S/p right radical nephrectomy showing Clear Cell Renal Cell Carcinoma with 1/1 LN positive for metastatic carcinoma  Recommendations of Group: MedOnc referral for adjuvant Keytruda  Continue following liver lesions if their is progression and/or considered metastatic disease, add TKI to [place] regimen  Repeat imaging currently scheduled for March 2023  Upcoming Appointments:    Future Appointments   Date Time Provider Amara Torres   1/4/2023  3:20 PM Lilliam Bryant MD HEM [facility]   3/15/2023 10:30 AM [place]  [address]   3/15/2023 11:15 AM [place]  [address]   3/20/2023 11:30 AM Vishal Mei MD [facility]            Patient was discussed at the Multidisciplinary Urology Case review on 12/20/2022      NCCN guidelines were available for review      The final treatment plan will be left to the discretion of the patient and the treating physician  DISCLAIMERS:  TO THE TREATING PHYSICIAN:  This conference is a meeting of clinicians from various specialty areas who evaluate and discuss patients for whom a multidisciplinary treatment approach is being considered  Please note that the above opinion was a consensus of the conference attendees and is intended only to assist in quality care of the discussed patient  The responsibility for follow up on the input given during the conference, along with any final decisions regarding plan of care, is that of the patient and the patient's provider  Accordingly, appointments have only been recommended based on this information and have NOT been scheduled unless otherwise noted  TO THE PATIENT:  This summary is a brief record of major aspects of your cancer treatment  You may choose to share a copy with any of your doctors or nurses  However, this is not a detailed or comprehensive record of your care

## 2022-12-20 NOTE — PROGRESS NOTES
12/15/2022    Chang Fountain  1970  [id number]      Chief Complaint: Follow-up for right renal mass    History of Present Illness  Chang Fountain is a 45 y o  male with a history of 15 cm right renal mass  Staging imaging included CTA Chest, CT abdomen/pelvis and MRI abdomen with contrast   He was found to have liver lesions on his MRI which were reviewed in detail during our tumor board conference and was thought to be most likely benign representing hemangioma  Retroperitoneal lymph nodes were noted adjacent to the tumor measuring up to 9 mm  The patient is now status post open right radical nephrectomy on November 21, 2022 with preoperative embolization  The patient had a routine hospital course and was discharged on postop day 2  His staples were removed 12/5/2022  He presents to discuss his pathology  He is found to have clear cell RCC, grade 3, all margins negative, 1 positive hilar lymph node (pT2bN1, cM0)  Overall he is doing well, he is still sore  Final Diagnosis   A  Kidney, Right, nephrectomy:  - Clear cell renal cell carcinoma  See comment and synoptic report  - One lymph node positive for metastatic carcinoma (1/1)         Past Medical History  Past Medical History:   Diagnosis Date   • Arthralgia     last assessed 02/10/2015   • Babesiosis     last assessed 12/30/14   • Benign paroxysmal vertigo     last assessed 05/22/15   • Meniere disease     last assessed 04/15/13   • Pneumonia of left lower lobe due to infectious organism     last assessed 04/04/16   • Vitamin D deficiency     last assessed 12/30/14       Past Surgical History  Past Surgical History:   Procedure Laterality Date   • FIBULA FRACTURE SURGERY     • IR RENAL ANGIOGRAM  11/18/2022   • [place] REMV José Cochran Right 11/21/2022    Procedure: NEPHRECTOMY ABDOMINAL APPROACH;  Surgeon: Maribell Ambrocio MD;  Location: [facility];  Service: Urology   • TIBIA FRACTURE SURGERY     • TONSILLECTOMY         Physical Exam  /84 (BP Location: Left arm, Patient Position: Sitting, Cuff Size: Adult)   Pulse 67   Ht 6' (1 829 m)   Wt 83 5 kg (184 lb)   SpO2 100%   BMI 24 95 kg/m²     General:  Healthy appearing male in no acute distress  Head:  Normocephalic, atraumatic  ENMT: Nares patent, moist mucous membranes  Cardiovascular:  Regular rate  Respiratory:  Patient has unlabored respirations  Abdomen:  Abdomen soft, ATTP, nondistended, incision clean dry and intact  Musculoskeletal: Normal gait  Neurological: Grossly intact  Psych: Normal affect    Assessment  64-year-old male with new diagnosis of stage III clear-cell RCC  Based on NCCN guidelines I recommend referral to medical oncology for consideration of adjuvant pembrolizumab  Plan  1  Referral to oncology genetics given young age at diagnosis  2  Referral to medical oncology for consideration of adjuvant pembrolizumab  3  RTC with Surveillance chest and abdominal imaging in 3 months    Roland Larson MD  [facility]    Portions of the above record have been created with voice recognition software  Occasional wrong word or "sound alike" substitution may have occurred due to the inherent limitations of voice recognition software  Please read the chart carefully and recognize, using context, where substitution may have occurred  For further clarification, please contact me directly

## 2022-12-21 ENCOUNTER — TELEPHONE (OUTPATIENT)
Dept: GENETICS | Facility: CLINIC | Age: 52
End: 2022-12-21

## 2022-12-21 NOTE — TELEPHONE ENCOUNTER
I called [facility] to schedule a new patient appointment with the Cancer Risk and Genetics Program       Outcome:   I left a voice message encouraging the patient to call the genetics team at [phone] to schedule this appointment  Will need to collect a family hx  Follow-up:   At this time the referral will be closed and we will wait to hear back from the patient regarding scheduling this appointment

## 2023-01-04 ENCOUNTER — CONSULT (OUTPATIENT)
Dept: HEMATOLOGY ONCOLOGY | Facility: MEDICAL CENTER | Age: 53
End: 2023-01-04

## 2023-01-04 VITALS
DIASTOLIC BLOOD PRESSURE: 75 MMHG | HEIGHT: 72 IN | WEIGHT: 188 LBS | SYSTOLIC BLOOD PRESSURE: 130 MMHG | RESPIRATION RATE: 18 BRPM | OXYGEN SATURATION: 99 % | BODY MASS INDEX: 25.47 KG/M2 | HEART RATE: 67 BPM

## 2023-01-04 DIAGNOSIS — C64.1 CLEAR CELL CARCINOMA OF RIGHT KIDNEY (HCC): ICD-10-CM

## 2023-01-04 DIAGNOSIS — N28.89 RIGHT RENAL MASS: Primary | ICD-10-CM

## 2023-01-04 NOTE — PROGRESS NOTES
Derrek Moran  1970  [facility] [place] [address] [facility] [address]  HEMATOLOGY/ONCOLOGY CONSULTATION REPORT    DISCUSSION/SUMMARY:    69-year-old male recently diagnosed with pT2b pN1 (1/1) cM0 G3 R0 = stage III clear-cell renal cell carcinoma s/p resection  Mr Abdulaziz Iqbal feels physically well; clinically there are no concerning findings  Patient will follow-up with urology as directed  NCCN guidelines 3 2023 states that for patients with nonmetastatic disease, primary treatment includes radical or partial nephrectomy (if clinically indicated)  For patients with clear-cell histology, adjuvant options include pembrolizumab or surveillance or sunitinib (category 3)  Positive lymph nodes and high nuclear grade were considered risk factors that increased the likelihood of disease recurrence in the below NEJM article below  We discussed adjuvant pembrolizumab at length  Nursing staff spent time with patient/wife going over the potential benefits versus side effects/toxicities  Literature was provided  Patient has decided to begin treatment  Mr Abdulaziz Iqbal has good IV access - port on hold for the time being  We also discussed the MRI findings specifically the liver lesions  Mr Abdulaziz Iqbal understands that there is no clear/absolute evidence of metastatic disease and he is being treated for adjuvant stage III disease  The plan is to repeat the CAT scan of the chest on the MRI now for a new baseline  Patient understands that a treatment change will be needed if in the future, there is evidence of metastatic/progressive disease  Patient was discussed at the multidisciplinary urology case review on December 20, 2022  Adjuvant Pembrolizumab after Nephrectomy in Renal-Cell Carcinoma    ANN Barrios al, August 19, 2021, N Engl J Med 2021; [id number]    BACKGROUND  Patients with renal-cell carcinoma who undergo nephrectomy have no options for   adjuvant therapy to reduce the risk of recurrence that have high levels of supporting evidence  METHODS  In a double-blind, phase 3 trial, we randomly assigned, in a 1:1 ratio, patients with   clear-cell renal-cell carcinoma who were at high risk for recurrence after nephrectomy, with or without metastasectomy, to receive either adjuvant pembrolizumab   (at a dose of 200 mg) or placebo intravenously once every 3 weeks for up to 17   cycles (approximately 1 year)  The primary end point was disease-free survival according to the investigator’s assessment  Overall survival was a key secondary end   point  Safety was a secondary end point  RESULTS  A total of 496 patients were randomly assigned to receive pembrolizumab, and 498   to receive placebo  At the prespecified interim analysis, the median time from   randomization to the data-cutoff date was 24 1 months  Pembrolizumab therapy   was associated with significantly longer disease-free survival than placebo (disease-free survival at 24 months, 77 3% vs  68 1%; hazard ratio for recurrence or   death, 0 68; 95% confidence interval [CI], 0 53 to 0 87; P=0 002 [two-sided])  The   estimated percentage of patients who remained alive at 24 months was 96 6% in   the pembrolizumab group and 93 5% in the placebo group (hazard ratio for death,   0 54; 95% CI, 0 30 to 0 96)  Grade 3 or higher adverse events of any cause occurred   in 32 4% of the patients who received pembrolizumab and in 17 7% of those who   received placebo  No deaths related to pembrolizumab therapy occurred  CONCLUSIONS  Pembrolizumab treatment led to a significant improvement in disease-free survival as compared with placebo after surgery among patients with kidney cancer   who were at high risk for recurrence  (Funded by [employer] and [employer], a   subsidiary of "[facility]"; [place]  gov number, [id number] )    Patient is to return in 3-4 weeks but this may change depending upon the above  Patient knows to call the hematology/oncology office if there are any other questions or concerns  Carefully review your medication list and verify that the list is accurate and up-to-date  Please call the hematology/oncology office if there are medications missing from the list, medications on the list that you are not currently taking or if there is a dosage or instruction that is different from how you're taking that medication  Patient goals and areas of care: Begin adjuvant treatment  Barriers to care: None  Patient is able to self-care   ______________________________________________________________________________________    Chief Complaint   Patient presents with   • Consult   • New diagnosis of renal cell carcinoma     History of Present Illness: 49-year-old male with relatively good general health presenting to the emergency room recently with abrupt shortness of breath and dyspnea on exertion  CTA/chest did not demonstrate any PE but patient was found to have an incidental right renal mass  Work-up was initiated; this included a urology evaluation  Mr John Banegas was found to have clear-cell renal cell carcinoma and underwent planned preoperative angioembolization with subsequent right radical nephrectomy via chevron incision  Patient was subsequently discharged and referred to oncology for evaluation  Presently Mr John Banegas states feeling okay, getting back to baseline  Still with occasional dyspnea on exertion  No shortness of breath at rest   No chest pain or pressure  No abdominal pain although patient states that his abdomen is still somewhat tender  No fevers, chills or sweats  No GI problems, no  issues or bleeding  Activities are still limited  Review of Systems   Constitutional: Positive for fatigue  HENT: Negative  Eyes: Negative  Respiratory: Negative  Cardiovascular: Negative  Gastrointestinal: Negative  Endocrine: Negative  Genitourinary: Negative  Musculoskeletal: Negative  Skin: Negative  Allergic/Immunologic: Negative  Neurological: Negative  Hematological: Negative  Psychiatric/Behavioral: The patient is nervous/anxious  All other systems reviewed and are negative      Patient Active Problem List   Diagnosis   • Hyperlipidemia   • Meniere disease, left   • History of anesthesia complications   • Right renal mass   • Clear cell carcinoma of right kidney [facility])     Past Medical History:   Diagnosis Date   • Arthralgia     last assessed 02/10/2015   • Babesiosis     last assessed 12/30/14   • Benign paroxysmal vertigo     last assessed 05/22/15   • Meniere disease     last assessed 04/15/13   • Pneumonia of left lower lobe due to infectious organism     last assessed 04/04/16   • Vitamin D deficiency     last assessed 12/30/14     Past Surgical History:   Procedure Laterality Date   • FIBULA FRACTURE SURGERY     • IR RENAL ANGIOGRAM  11/18/2022   • FL NEPHRECTOMY W/PRTL URETERECT OPEN RIB RESCJ RAD Right 11/21/2022    Procedure: NEPHRECTOMY ABDOMINAL APPROACH;  Surgeon: Deborah Wu MD;  Location: [facility];  Service: Urology   • TIBIA FRACTURE SURGERY     • TONSILLECTOMY       Family History   Problem Relation Age of Onset   • No Known Problems Family    • Meniere's disease Mother      Social History     Socioeconomic History   • Marital status: /Civil Union     Spouse name: Not on file   • Number of children: Not on file   • Years of education: Not on file   • Highest education level: Not on file   Occupational History   • Not on file   Tobacco Use   • Smoking status: Former   • Smokeless tobacco: Never   • Tobacco comments:     Quit 2 mos ago   Vaping Use   • Vaping Use: Never used   Substance and Sexual Activity   • Alcohol use: Not Currently     Comment: social   • Drug use: No   • Sexual activity: Yes   Other Topics Concern   • Not on file   Social History Narrative Daily coffee consumption 6 cups/day     Social Determinants of Health     Financial Resource Strain: Not on file   Food Insecurity: Not on file   Transportation Needs: Not on file   Physical Activity: Not on file   Stress: Not on file   Social Connections: Not on file   Intimate Partner Violence: Not on file   Housing Stability: Not on file       Current Outpatient Medications:   •  acetaminophen (TYLENOL) 325 mg tablet, Take 2 tablets (650 mg total) by mouth every 6 (six) hours as needed for mild pain or moderate pain (Patient not taking: Reported on 12/15/2022), Disp: 60 tablet, Rfl: 0  •  docusate sodium (COLACE) 100 mg capsule, Take 1 capsule (100 mg total) by mouth 2 (two) times a day (Patient not taking: Reported on 1/4/2023), Disp: 30 capsule, Rfl: 0  •  ondansetron (ZOFRAN) 4 mg tablet, Take 1 tablet (4 mg total) by mouth every 8 (eight) hours as needed for nausea or vomiting (Patient not taking: Reported on 12/15/2022), Disp: 15 tablet, Rfl: 0  •  oxyCODONE (Roxicodone) 5 immediate release tablet, Take 1 tablet (5 mg total) by mouth every 6 (six) hours as needed for severe pain Max Daily Amount: 20 mg, Disp: 15 tablet, Rfl: 0    No Known Allergies    Vitals:    01/04/23 1610   BP: 130/75   Pulse: 67   Resp: 18   SpO2: 99%     Physical Exam  Constitutional:       Appearance: He is well-developed  HENT:      Head: Normocephalic and atraumatic  Right Ear: External ear normal       Left Ear: External ear normal    Eyes:      Conjunctiva/sclera: Conjunctivae normal       Pupils: Pupils are equal, round, and reactive to light  Cardiovascular:      Rate and Rhythm: Normal rate and regular rhythm  Heart sounds: Normal heart sounds  Pulmonary:      Effort: Pulmonary effort is normal       Breath sounds: Normal breath sounds  Abdominal:      General: Bowel sounds are normal       Palpations: Abdomen is soft  Musculoskeletal:         General: Normal range of motion        Cervical back: Normal range of motion and neck supple  Skin:     General: Skin is warm  Neurological:      Mental Status: He is alert and oriented to person, place, and time  Deep Tendon Reflexes: Reflexes are normal and symmetric  Psychiatric:         Behavior: Behavior normal          Thought Content: Thought content normal          Judgment: Judgment normal      Extremities: No lower extreme edema bilaterally, no cords, pulses are 1+    Labs    11/23/2022 WBC = 7 62 hemoglobin = 12 1 hematocrit = 37 8 platelet = 772    [id number] BUN = 17 creatinine = 0 98 calcium = 9 8 AST = 29 ALT = 46 alkaline phosphatase = 95 total protein = 7 3 total bilirubin = 0 81    Imaging    11/9/2022 MRI abdomen    IMPRESSION:     1   Large exophytic right lower pole heterogeneously enhancing renal mass extending into the renal sinus consistent with renal cell carcinoma  There are retroperitoneal collaterals posteriorly which drain into the IVC though no evidence of right renal   vein thrombosis  No hydronephrosis      2  Borderline paracaval lymph nodes, likely early lymph node metastasis      3   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  However, given primary malignancy and atypical features, a short interval follow-up MRI in 3 months is recommended to exclude metastasis  11/7/2022 CTA chest PE study    IMPRESSION:     No pulmonary embolus  Mild patchy peripheral lower lobe groundglass opacity  Covid not excluded although not the classic appearance  Consider aspiration versus other infectious/inflammatory etiology  Partially imaged large right renal mass  See abdomen CT      Pathology    Case Report   Surgical Pathology Report                         Case: [id number]                                    Authorizing Provider: Gold Correia MD        Collected:           11/21/2022 1213               Ordering Location:     [facility]      Received:            11/21/2022 9258                                      Hospital Operating Room                                                       Pathologist:           Yeny Evans MD                                                                  Specimen:    Kidney, Right                                                                              Final Diagnosis   A  Kidney, Right, nephrectomy:  - Clear cell renal cell carcinoma  See comment and synoptic report  - One lymph node positive for metastatic carcinoma (1/1)     Comment: Immunohistochemistry is diffusely positive in the tumor cells for PAX8 and carbonic anhydrase IX  CK7 and P504S have non-specific staining  The findings are consistent with the diagnosis      8th Ed AJCC Tumor Stage:  at least Stage III - pT2b, pN1, G3    Representative tumor block: A7   Electronically signed by Liliya Medina MD on 12/5/2022 at 10:16 AM     Synoptic Checklist   KIDNEY: Nephrectomy  8th Edition - Protocol posted: 6/30/2021  KIDNEY: NEPHRECTOMY, PARTIAL OR RADICAL - All Specimens  SPECIMEN   Procedure  Total nephrectomy    Specimen Laterality  Right    TUMOR   Tumor Focality  Unifocal    Tumor Size  Greatest Dimension (Centimeters): 13 5 cm   Histologic Type  Clear cell renal cell carcinoma    Histologic Grade (WHO / ISUP)  G3 (nucleoli conspicuous and eosinophilic at 923Q magnification)    Tumor Extent  Limited to kidney    Sarcomatoid Features  Not identified    Rhabdoid Features  Not identified    Tumor Necrosis  Present    Percentage of Tumor Necrosis  30 %   Lymphovascular Invasion  Not identified    MARGINS   Margin Status  All margins negative for invasive carcinoma    REGIONAL LYMPH NODES   Regional Lymph Node Status  Tumor present in regional lymph node(s)    Number of Lymph Nodes with Tumor  1    Jabari Site(s) with Tumor  Hilar    Size of Largest Jabari Metastatic Deposit  0 3 cm   Number of Lymph Nodes Examined 1    PATHOLOGIC STAGE CLASSIFICATION (pTNM, AJCC 8th Edition)   Reporting of pT, pN, and (when applicable) pM categories is based on information available to the pathologist at the time the report is issued  As per the AJCC (Chapter 1, 8th Ed ) it is the managing physician’s responsibility to establish the final pathologic stage based upon all pertinent information, including but potentially not limited to this pathology report  Primary Tumor (pT)  pT2b    Regional Lymph Nodes (pN)  pN1    ADDITIONAL FINDINGS   Additional Findings in Nonneoplastic Kidney  Tubuloglomerular necrosis

## 2023-01-09 ENCOUNTER — TELEPHONE (OUTPATIENT)
Dept: HEMATOLOGY ONCOLOGY | Facility: CLINIC | Age: 53
End: 2023-01-09

## 2023-01-09 ENCOUNTER — DOCUMENTATION (OUTPATIENT)
Dept: HEMATOLOGY ONCOLOGY | Facility: MEDICAL CENTER | Age: 53
End: 2023-01-09

## 2023-01-09 ENCOUNTER — TELEPHONE (OUTPATIENT)
Dept: HEMATOLOGY ONCOLOGY | Facility: MEDICAL CENTER | Age: 53
End: 2023-01-09

## 2023-01-09 NOTE — TELEPHONE ENCOUNTER
CALL TRANSFER   Reason for patient call? Patients spouse (on communication consent) returning call to Ethan Pillai   Patient's primary physician? Dr Anderson Hall RN call was transferred to and time it was transferred? Ethan Pillai @ 10:44AM   Informed patient that the message will be forwarded to the team and someone will get back to them as soon as possible    Did you relay this information to the patient?  N/A

## 2023-01-09 NOTE — PROGRESS NOTES
Left voicemail for patient asking him to call back to go over questions to schedule an MRI and a CT scan

## 2023-01-10 ENCOUNTER — NURSE TRIAGE (OUTPATIENT)
Dept: OTHER | Facility: OTHER | Age: 53
End: 2023-01-10

## 2023-01-10 NOTE — TELEPHONE ENCOUNTER
Reason for Disposition  • [1] Small red area or streak AND [2] no fever  • Unable to have a bowel movement (BM) without laxative or enema    Answer Assessment - Initial Assessment Questions  1  SYMPTOM: "What's the main symptom you're concerned about?" (e g , redness, pain, drainage)     Partial  Numbness and pain at the incision site  2  ONSET: "When did numbess  start?"      Since day of surgery  3  SURGERY: "What surgery was performed?"      Kidney removal  4  DATE of SURGERY: "When was surgery performed?"       11/21/22  5  INCISION SITE: "Where is the incision located?"       Slight redness at the site  6  REDNESS: "Is there any redness at the incision site?" If yes, ask: "How wide across is the redness?" (Inches, centimeters)       Slight redness  7  PAIN: "Is there any pain?" If Yes, ask: "How bad is it?"  (Scale 1-10; or mild, moderate, severe)      5/10 when bending over and then when he stands up it disappears  8  BLEEDING: "Is there any bleeding?" If Yes, ask: "How much?" and "Where?"      none  9  DRAINAGE: "Is there any drainage from the incision site?" If yes, ask: "What color and how much?" (e g , red, cloudy, pus; drops, teaspoon)      none  10  FEVER: "Do you have a fever?" If Yes, ask: "What is your temperature, how was it measured, and when did it start?"        none  11  OTHER SYMPTOMS: "Do you have any other symptoms?" (e g , shaking chills, weakness, rash elsewhere on body)        constipation    Answer Assessment - Initial Assessment Questions  1  STOOL PATTERN OR FREQUENCY: "How often do you pass bowel movements (BMs)?"  (Normal range: tid to q 3 days)  "When was the last BM passed?"        Usually goes every day  But now only able to go every two days with help of laxatives  2  STRAINING: "Do you have to strain to have a BM?"       No straining  3  RECTAL PAIN: "Does your rectum hurt when the stool comes out?" If Yes, ask: "Do you have hemorrhoids?  How bad is the pain?"  (Scale 1-10; or mild, moderate, severe)      No pain  4  STOOL COMPOSITION: "Are the stools hard?"      The stool is loose with stool softener and dulcolax  5  BLOOD ON STOOLS: "Has there been any blood on the toilet tissue or on the surface of the BM?" If Yes, ask: "When was the last time?"       none  6  CHRONIC CONSTIPATION: "Is this a new problem for you?"  If no, ask: "How long have you had this problem?" (days, weeks, months)       none  7  CHANGES IN DIET OR HYDRATION: "Have there been any recent changes in your diet?" "How much fluids are you drinking consuming on a daily basis?"  "How much have you had to drink today?"      Reports trying to stay as hydrated as possible  8  MEDICATIONS: "Have you been taking any new medications?" "Are you taking any narcotic pain medications?" (e g , Vicoden, Percocet, morphine, dilaudid)      none  9  LAXATIVES: "Have you been using any stool softeners, laxatives, or enemas?"  If yes, ask "What, how often, and when was the last time?" He takes a stool softner and dulcolax when feels he needs to have a BM   10 ACTIVITY:  "How much walking do you do every day? on a daily basis?"  "Has your activity level decreased in the past week?"         He is staying as active as he can  11  CAUSE: "What do you think is causing the constipation?"         unsure  12  OTHER SYMPTOMS: "Do you have any other symptoms?" (e g , abdominal pain, bloating, fever, vomiting)        Mild bloating observed by patient    13  MEDICAL HISTORY: "Do you have a history of hemorrhoids, rectal fissures, or rectal surgery or rectal abscess?"          none    Protocols used: POST-OP INCISION SYMPTOMS AND QUESTIONS-ADULT-, CONSTIPATION-ADULT-AH

## 2023-01-10 NOTE — TELEPHONE ENCOUNTER
Regarding: abdomen sore and numb/ bowel issues  ----- Message from Kaila Batres sent at 1/10/2023  4:52 PM EST -----  " I had a procedure done a few months back but I still feel sore and numb in that area and I am having an issue going to the bathroom also, I feel pretty stopped up "

## 2023-01-11 ENCOUNTER — TELEPHONE (OUTPATIENT)
Dept: HEMATOLOGY ONCOLOGY | Facility: MEDICAL CENTER | Age: 53
End: 2023-01-11

## 2023-01-11 DIAGNOSIS — C64.1 CLEAR CELL CARCINOMA OF RIGHT KIDNEY (HCC): Primary | ICD-10-CM

## 2023-01-11 RX ORDER — SODIUM CHLORIDE 9 MG/ML
20 INJECTION, SOLUTION INTRAVENOUS ONCE
OUTPATIENT
Start: 2023-02-08

## 2023-01-11 NOTE — TELEPHONE ENCOUNTER
While we try to accommodate patient requests, our priority is to schedule treatment according to Doctor's orders and site availability  Does the Provider use the intake sheet or checkout note? PLEASE SCHEDULE THE FIRST WEEK OF FEBRUARY  What would be a preferred day of the week that would work best for your infusion appointment? WED  Do you prefer mornings or afternoons for your appointments? AFTERNOON  Are there any days or dates that do not work for your schedule, including any upcoming vacations? NO  We are going to try our best to schedule you at the infusion center closest to your home  In the event that we are unable to what would be your next preferred infusion site or sites? [facility]  2    3      Do you have transportation to take you to all of your appointments?  YES  Would you like the infusion center to draw labs from your port? (disregard if patient doesn't have a port or need labs for infusion appointment) NO

## 2023-01-12 ENCOUNTER — RA CDI HCC (OUTPATIENT)
Dept: OTHER | Facility: HOSPITAL | Age: 53
End: 2023-01-12

## 2023-01-12 ENCOUNTER — TELEPHONE (OUTPATIENT)
Dept: UROLOGY | Facility: MEDICAL CENTER | Age: 53
End: 2023-01-12

## 2023-01-12 ENCOUNTER — HOSPITAL ENCOUNTER (OUTPATIENT)
Dept: RADIOLOGY | Facility: HOSPITAL | Age: 53
Discharge: HOME | End: 2023-01-12

## 2023-01-12 DIAGNOSIS — N28.89 RIGHT RENAL MASS: ICD-10-CM

## 2023-01-12 DIAGNOSIS — C64.1 CLEAR CELL CARCINOMA OF RIGHT KIDNEY (HCC): ICD-10-CM

## 2023-01-12 NOTE — TELEPHONE ENCOUNTER
I returned the patient's call yesterday to discuss his symptoms  He states that he is still sore just below his scar  He is not requiring any pain medicines anymore  When he is sitting still he does not have any pain but when he bends or moves in a particular way he has some soreness  He does complain of constipation and has been taking liquid Dulcolax  He is passing gas daily  He feels like he is eating less than usual and feels a little bit bloated  I discussed with the patient that having some soreness is not unexpected  Since we cut through the muscle on the muscles contract that will trigger some soreness  He is not having excruciating pain and not requiring any pain medicine at this point  I explained that it can sometimes take a while for the appetite to recover after major operation  If he does not feel like he can tolerate large meals taking protein shakes can be helpful in maintaining his nutrition  He has a CT chest and MRI of the abdomen coming up  If he has any changes or concerns in the meantime I can move his appointment with me up  He was satisfied with our discussion

## 2023-01-12 NOTE — PROGRESS NOTES
[facility]  coding opportunities       Chart reviewed, no opportunity found: CHART REVIEWED, NO OPPORTUNITY FOUND        Patients Insurance        Commercial Insurance: Leo Espinal

## 2023-01-18 ENCOUNTER — PATIENT OUTREACH (OUTPATIENT)
Dept: HEMATOLOGY ONCOLOGY | Facility: CLINIC | Age: 53
End: 2023-01-18

## 2023-01-18 ENCOUNTER — TELEPHONE (OUTPATIENT)
Dept: OTHER | Facility: OTHER | Age: 53
End: 2023-01-18

## 2023-01-18 NOTE — TELEPHONE ENCOUNTER
Patient called saying that he's supposed to go back to work on [address] [place] [address] but he is still have when he getting up or sitting down and is asking if the office can give him a call regarding the matter

## 2023-01-18 NOTE — PROGRESS NOTES
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

## 2023-01-19 ENCOUNTER — TELEPHONE (OUTPATIENT)
Dept: HEMATOLOGY ONCOLOGY | Facility: MEDICAL CENTER | Age: 53
End: 2023-01-19

## 2023-01-19 NOTE — TELEPHONE ENCOUNTER
FMLA paperwork received and completed  Dr Madisyn Mullins agreed to providing patient with 3 additional weeks of FMLA for recovery  Once form has been signed, office will fax to #[phone] as requested by wife

## 2023-01-20 ENCOUNTER — OFFICE VISIT (OUTPATIENT)
Dept: FAMILY MEDICINE CLINIC | Facility: CLINIC | Age: 53
End: 2023-01-20

## 2023-01-20 VITALS
BODY MASS INDEX: 25.09 KG/M2 | HEART RATE: 78 BPM | WEIGHT: 185.2 LBS | SYSTOLIC BLOOD PRESSURE: 108 MMHG | RESPIRATION RATE: 16 BRPM | TEMPERATURE: 97.9 F | DIASTOLIC BLOOD PRESSURE: 84 MMHG | HEIGHT: 72 IN

## 2023-01-20 DIAGNOSIS — Z00.00 ANNUAL PHYSICAL EXAM: Primary | ICD-10-CM

## 2023-01-20 DIAGNOSIS — L85.3 DRY SKIN DERMATITIS: ICD-10-CM

## 2023-01-20 DIAGNOSIS — I45.10 RBBB: ICD-10-CM

## 2023-01-20 DIAGNOSIS — Z90.5 H/O RIGHT NEPHRECTOMY: ICD-10-CM

## 2023-01-20 DIAGNOSIS — C78.00 MALIGNANT NEOPLASM METASTATIC TO LUNG, UNSPECIFIED LATERALITY (HCC): ICD-10-CM

## 2023-01-20 DIAGNOSIS — B36.9 FUNGAL DERMATITIS: ICD-10-CM

## 2023-01-20 DIAGNOSIS — C64.1 CLEAR CELL CARCINOMA OF RIGHT KIDNEY (HCC): ICD-10-CM

## 2023-01-20 LAB
SL AMB  POCT GLUCOSE, UA: ABNORMAL
SL AMB LEUKOCYTE ESTERASE,UA: 25
SL AMB POCT BILIRUBIN,UA: 1
SL AMB POCT BLOOD,UA: 50
SL AMB POCT CLARITY,UA: CLEAR
SL AMB POCT COLOR,UA: ABNORMAL
SL AMB POCT KETONES,UA: 15
SL AMB POCT NITRITE,UA: ABNORMAL
SL AMB POCT PH,UA: 5
SL AMB POCT SPECIFIC GRAVITY,UA: 1.02
SL AMB POCT URINE PROTEIN: ABNORMAL
SL AMB POCT UROBILINOGEN: 1

## 2023-01-20 RX ORDER — CLOTRIMAZOLE AND BETAMETHASONE DIPROPIONATE 10; .64 MG/G; MG/G
CREAM TOPICAL 2 TIMES DAILY
Qty: 45 G | Refills: 1 | Status: SHIPPED | OUTPATIENT
Start: 2023-01-20

## 2023-01-20 RX ORDER — POLYETHYLENE GLYCOL 3350 17 G/17G
17 POWDER, FOR SOLUTION ORAL DAILY
COMMUNITY

## 2023-01-20 NOTE — PROGRESS NOTES
[facility] HEALTH MAINTENANCE OFFICE VISIT  [facility] - [facility]    NAME: Lex Faith  AGE: 45 y o  SEX: male  : 1970     DATE: 2023    Assessment and Plan     1  Annual physical exam  -     POCT urine dip auto non-scope  -     Lipid panel; Future  -     Lipase; Future    2  Clear cell carcinoma of right kidney ([address] )    3  H/O right nephrectomy    4  Malignant neoplasm metastatic to lung, unspecified laterality [facility])  Assessment & Plan:  Report visible after vist--reviewed oncology addendum r/t same--specialist reviewed results with patient and tx options/timeline addressed  MRI abd pending--await further recommendations post result      5  Fungal dermatitis  -     clotrimazole-betamethasone (LOTRISONE) 1-0 05 % cream; Apply topically 2 (two) times a day    6  Dry skin dermatitis  -     Ammonium Lactate 10 % LOTN; Apply topically 2 (two) times a day    7  RBBB  Assessment & Plan:  In addition to current finding d/w pt establishing cardiac fxn baseline prior to ca tx  Orders:  -     Ambulatory Referral to Cardiology; Future    8  BMI 25 0-25 9,adult      · Patient Counseling:   · Nutrition: Stressed importance of a well balanced diet, moderation of sodium/saturated fat, caloric balance and sufficient intake of fiber  · Exercise: Stressed the importance of regular exercise with a goal of 150 minutes per week  · Dental Health: Discussed daily flossing and brushing and regular dental visits   · Injury Prevention: Discussed Safety Belts, Safety Helmets, and Smoke Detectors    · Immunizations reviewed: Risks and Benefits discussed  · Discussed benefits of:  Colon Cancer Screening, Prostate Cancer Screening  and Screening labs  • BMI Counseling: Body mass index is 25 12 kg/m²  Discussed with patient's BMI with him   The BMI is at goal           Chief Complaint     Chief Complaint   Patient presents with   • Physical Exam     Pt request corizone cream for his feet, it is very itchy between his toes        History of Present Illness     HPI    Well Adult Physical   Patient here for a comprehensive physical exam   Interval hx reviewed  Dx clear cell renal ca--s/p right nephrectomy  Urologist-Dr Isa Aragon  Awaiting further imaging results to establish furhter tx plan w oncologist-Dr Judith Ramos  Noted weight loss, fatigue  Denies any sig chest or abd pain  Some diff w constipation--starting to improve  No n/v/fever or chills  C/o dry skin/itchiess rodriguez between toes on feet and upper chest/shoulder areas      Diet and Physical Activity  Diet: well balanced diet  Exercise: intermittently      Depression Screen  PHQ-2/9 Depression Screening    Little interest or pleasure in doing things: 3 - nearly every day  Feeling down, depressed, or hopeless: 1 - several days  Trouble falling or staying asleep, or sleeping too much: 0 - not at all  Feeling tired or having little energy: 1 - several days  Poor appetite or overeating: 3 - nearly every day  Feeling bad about yourself - or that you are a failure or have let yourself or your family down: 0 - not at all  Trouble concentrating on things, such as reading the newspaper or watching television: 0 - not at all  Moving or speaking so slowly that other people could have noticed   Or the opposite - being so fidgety or restless that you have been moving around a lot more than usual: 0 - not at all  Thoughts that you would be better off dead, or of hurting yourself in some way: 0 - not at all  PHQ-2 Score: 4  PHQ-2 Interpretation: POSITIVE depression screen  PHQ-9 Score: 8   PHQ-9 Interpretation: Mild depression      appetite and activity currently affected by dx renal ca this past year     General Health  Hearing: Normal:  bilateral  Vision: goes for regular eye exams  Dental: regular dental visits    Reproductive Health  following w urologist-new dx renal ca      The following portions of the patient's history were reviewed and updated as appropriate: allergies, current medications, past family history, past medical history, past social history, past surgical history and problem list     Review of Systems     Review of Systems   Constitutional: Positive for fatigue  Negative for fever  Respiratory: Negative  Cardiovascular: Negative  Gastrointestinal: Negative  Genitourinary: Positive for hematuria  Renal ca   Psychiatric/Behavioral: Positive for sleep disturbance  The patient is nervous/anxious          Past Medical History     Past Medical History:   Diagnosis Date   • Arthralgia     last assessed 02/10/2015   • Babesiosis     last assessed 14   • Benign paroxysmal vertigo     last assessed 05/22/15   • Meniere disease     last assessed 04/15/13   • Pneumonia of left lower lobe due to infectious organism     last assessed 16   • Vitamin D deficiency     last assessed 14       Past Surgical History     Past Surgical History:   Procedure Laterality Date   • FIBULA FRACTURE SURGERY     • IR RENAL ANGIOGRAM  2022   • NE NEPHRECTOMY W/PRTL URETERECT OPEN RIB RESCJ RAD Right 2022    Procedure: NEPHRECTOMY ABDOMINAL APPROACH;  Surgeon: Otis Mcpherson MD;  [facility];  Service: Urology   • TIBIA FRACTURE SURGERY     • TONSILLECTOMY         Social History     Social History     Socioeconomic History   • Marital status: /Civil Union     Spouse name: None   • Number of children: None   • Years of education: None   • Highest education level: None   Occupational History   • None   Tobacco Use   • Smoking status: Former     Types: Cigarettes     Start date: 1984     Quit date: 2022     Years since quittin 3   • Smokeless tobacco: None   • Tobacco comments:     Quit 2 mos ago   Vaping Use   • Vaping Use: Never used   Substance and Sexual Activity   • Alcohol use: Not Currently     Comment: social   • Drug use: No   • Sexual activity: Yes   Other Topics Concern   • None Social History Narrative    Daily coffee consumption 6 cups/day     Social Determinants of Health     Financial Resource Strain: Not on file   Food Insecurity: Not on file   Transportation Needs: Not on file   Physical Activity: Not on file   Stress: Not on file   Social Connections: Not on file   Intimate Partner Violence: Not on file   Housing Stability: Not on file       Family History     Family History   Problem Relation Age of Onset   • Meniere's disease Mother    • Cancer Father         ?renal/bladder? • No Known Problems Family        Current Medications       Current Outpatient Medications:   •  Ammonium Lactate 10 % LOTN, Apply topically 2 (two) times a day, Disp: 473  12 mL, Rfl: 1  •  clotrimazole-betamethasone (LOTRISONE) 1-0 05 % cream, Apply topically 2 (two) times a day, Disp: 45 g, Rfl: 1  •  polyethylene glycol (MIRALAX) 17 g packet, Take 17 g by mouth daily, Disp: , Rfl:      Allergies     No Known Allergies    Immunization History   Administered Date(s) Administered   • Tdap 07/30/2013     Objective     /84 (BP Location: Left arm, Patient Position: Sitting, Cuff Size: Large)   Pulse 78   Temp 97 9 °F (36 6 °C)   Resp 16   Ht 6' (1 829 m)   Wt 84 kg (185 lb 3 2 oz)   BMI 25 12 kg/m²      Physical Exam  Vitals and nursing note reviewed  Constitutional:       General: He is not in acute distress  Appearance: Normal appearance  Eyes:      General: No scleral icterus  Conjunctiva/sclera: Conjunctivae normal    Cardiovascular:      Rate and Rhythm: Normal rate and regular rhythm  Pulmonary:      Effort: Pulmonary effort is normal  No respiratory distress  Breath sounds: Normal breath sounds  Abdominal:      General: Bowel sounds are normal       Palpations: Abdomen is soft  Tenderness: There is no guarding or rebound  Comments: Mild tenderness-surgical site well-healed -c/d/i   Musculoskeletal:      Cervical back: Neck supple  No tenderness        Right lower leg: No edema  Left lower leg: No edema  Skin:     General: Skin is warm and dry  Coloration: Skin is not jaundiced  Findings: No erythema  Neurological:      General: No focal deficit present  Mental Status: He is alert and oriented to person, place, and time  Cranial Nerves: No cranial nerve deficit     Psychiatric:         Mood and Affect: Mood normal            Vision Screening    Right eye Left eye Both eyes   Without correction 20/25 20/70 20/25   With correction              Cecelia Milton MD  [address]

## 2023-01-22 PROBLEM — Z90.5 H/O RIGHT NEPHRECTOMY: Status: ACTIVE | Noted: 2023-01-22

## 2023-01-22 PROBLEM — C78.00 MALIGNANT NEOPLASM METASTATIC TO LUNG (HCC): Status: ACTIVE | Noted: 2023-01-22

## 2023-01-22 NOTE — ASSESSMENT & PLAN NOTE
Report visible after vist--reviewed oncology addendum r/t same--specialist reviewed results with patient and tx options/timeline addressed  MRI abd pending--await further recommendations post result

## 2023-01-23 ENCOUNTER — TELEPHONE (OUTPATIENT)
Dept: HEMATOLOGY ONCOLOGY | Facility: CLINIC | Age: 53
End: 2023-01-23

## 2023-01-23 ENCOUNTER — TELEPHONE (OUTPATIENT)
Dept: HEMATOLOGY ONCOLOGY | Facility: MEDICAL CENTER | Age: 53
End: 2023-01-23

## 2023-01-23 ENCOUNTER — DOCUMENTATION (OUTPATIENT)
Dept: HEMATOLOGY ONCOLOGY | Facility: CLINIC | Age: 53
End: 2023-01-23

## 2023-01-23 NOTE — TELEPHONE ENCOUNTER
Recent CT scan reviewed by Dr Germán Dominguez  Patient to begin inlyta 5 mg q 12 hr in addition to immunotherapy  Dr Germán Dominguez discussed plan with patient on 1/20/2023  email sent to oral chemo team

## 2023-01-23 NOTE — TELEPHONE ENCOUNTER
Spoke to patient's wife regarding immunotherapy scheduling and process for approval of inlyta  Wife verbalizes understanding of plan

## 2023-01-23 NOTE — TELEPHONE ENCOUNTER
CALL RETURN FORM   Reason for patient call? Wife of patient calling, asking about immunotherapy scheduling   Patient's primary oncologist? Timur Nicholson    Name of person the patient was calling for? Timur Nicholson   Any additional information to add, if applicable? [phone]   Informed patient that the message will be forwarded to the team and someone will get back to them as soon as possible    Did you relay this information to the patient?   yes

## 2023-01-23 NOTE — PROGRESS NOTES
1-23-23  Rcvd new oral chemo start- INLYTA // Veronique Martines is required per [facility]//    Patient has been enrolled with Carly Ferrell  ID# [id number]  BIN# [id number]  [facility]# [id number]  Copay $0      Exp 12/31/23    Forwarded inform to team

## 2023-01-24 ENCOUNTER — OFFICE VISIT (OUTPATIENT)
Dept: HEMATOLOGY ONCOLOGY | Facility: CLINIC | Age: 53
End: 2023-01-24

## 2023-01-24 ENCOUNTER — TELEPHONE (OUTPATIENT)
Dept: HEMATOLOGY ONCOLOGY | Facility: CLINIC | Age: 53
End: 2023-01-24

## 2023-01-24 ENCOUNTER — TELEPHONE (OUTPATIENT)
Dept: UROLOGY | Facility: CLINIC | Age: 53
End: 2023-01-24

## 2023-01-24 VITALS — RESPIRATION RATE: 18 BRPM | BODY MASS INDEX: 25.06 KG/M2 | HEIGHT: 72 IN | WEIGHT: 185 LBS

## 2023-01-24 DIAGNOSIS — C78.00 MALIGNANT NEOPLASM METASTATIC TO LUNG, UNSPECIFIED LATERALITY (HCC): Primary | ICD-10-CM

## 2023-01-24 DIAGNOSIS — C64.1 CLEAR CELL CARCINOMA OF RIGHT KIDNEY (HCC): Primary | ICD-10-CM

## 2023-01-24 DIAGNOSIS — Z90.5 H/O RIGHT NEPHRECTOMY: ICD-10-CM

## 2023-01-24 DIAGNOSIS — C64.1 CLEAR CELL CARCINOMA OF RIGHT KIDNEY (HCC): ICD-10-CM

## 2023-01-24 NOTE — TELEPHONE ENCOUNTER
Patient's wife returning call  They will take the 11:45 with Dr Carmen Casillas in [facility] tomorrow

## 2023-01-24 NOTE — PROGRESS NOTES
Yomi Swain  1970 [address] [facility] [place]  [address]  [place] [id number]    DISCUSSION/SUMMARY:    52-year-old male initially diagnosed with pT2b pN1 (1/1) cM0 G3 R0 = stage III clear-cell renal cell carcinoma s/p resection  Mr Ondina Randhawa was seen a few weeks ago in consultation  The plan was to perform a CAT scan of the chest and MRI of the liver (questionable liver lesions)  The CAT scan of the chest was recently completed and demonstrated multiple pulmonary nodules, sizes ranging from 0 3 cm to 0 9 cm  There was also concern for enlarging retroperitoneal lymph nodes  Patient feels +/-, same as before, no respiratory issues  We rediscussed treatment options  Mr Ondina Randhawa and his wife understand that his stage is now stage IV  As above, the MRI of the liver is still pending  Patient was discussed at the urology multidisciplinary tumor board on December 20, 2022  The recommendations of the group included referral for the possibility of adjuvant Keytruda (patient that time was stage III disease), follow up the liver lesions and if scans demonstrate evidence of metastatic disease, add a TKI  Patient has been scheduled for his first dose of Keytruda in approximately 1 week, patient will also begin axitinib 5 mg twice a day  Nursing staff provided literature on the new medication, teaching performed  The etiology for the decreased appetite and abdominal discomfort is not clear  We discussed options including palliative care and oncology nutrition evaluation - patient deferred both  Mr Ondina Randhawa will make an earlier follow-up appointment with urology  Patient is to return in 4 weeks but this may change depending upon the above  Patient knows to call the hematology/oncology office if there are any other questions or concerns  Carefully review your medication list and verify that the list is accurate and up-to-date   Please call the hematology/oncology office if there are medications missing from the list, medications on the list that you are not currently taking or if there is a dosage or instruction that is different from how you're taking that medication  Patient goals and areas of care: Begin pembrolizumab and axitinib  Barriers to care: None  Patient is able to self-care   ______________________________________________________________________________________    Chief Complaint   Patient presents with   • Metastatic renal cell carcinoma   • Follow-up     History of Present Illness: 41-year-old male with relatively good general health presenting to the emergency room recently with abrupt shortness of breath and dyspnea on exertion  CTA/chest did not demonstrate any PE but patient was found to have an incidental right renal mass  Work-up was initiated; this included a urology evaluation  Mr Ayaan So was found to have clear-cell renal cell carcinoma and underwent planned preoperative angioembolization with subsequent right radical nephrectomy via chevron incision  Patient was subsequently discharged and referred to oncology for evaluation  Recent CAT scan of the chest demonstrated a number of new pulmonary nodules    Mr Ayaan So states feeling okay, getting back to baseline  Still with occasional dyspnea on exertion  No shortness of breath at rest   No chest pain or pressure  Patient states that he has abdominal discomfort, decreased appetite but no nausea or vomiting  No  use, no other GI problems  Patient is still moving his bowels relatively routinely  No fevers, chills or sweats  Activities are still limited  Review of Systems   Constitutional: Positive for fatigue  HENT: Negative  Eyes: Negative  Respiratory: Negative  Cardiovascular: Negative  Gastrointestinal: Negative  Endocrine: Negative  Genitourinary: Negative  Musculoskeletal: Negative  Skin: Negative  Allergic/Immunologic: Negative  Neurological: Negative  Hematological: Negative  Psychiatric/Behavioral: The patient is nervous/anxious  All other systems reviewed and are negative  Patient Active Problem List   Diagnosis   • Hyperlipidemia   • Meniere disease, left   • History of anesthesia complications   • Right renal mass   • Clear cell carcinoma of right kidney [facility])   • Annual physical exam   • Fungal dermatitis   • Dry skin dermatitis   • RBBB   • BMI 25 0-25 9,adult   • H/O right nephrectomy   • Malignant neoplasm metastatic to lung [facility])     Past Medical History:   Diagnosis Date   • Arthralgia     last assessed 02/10/2015   • Babesiosis     last assessed 14   • Benign paroxysmal vertigo     last assessed 05/22/15   • Meniere disease     last assessed 04/15/13   • Pneumonia of left lower lobe due to infectious organism     last assessed 16   • Vitamin D deficiency     last assessed 14     Past Surgical History:   Procedure Laterality Date   • FIBULA FRACTURE SURGERY     • IR RENAL ANGIOGRAM  2022   • [place] NEPHRECTOMY W/PRTL URETERECT OPEN RIB RESCJ RAD Right 2022    Procedure: NEPHRECTOMY ABDOMINAL APPROACH;  Surgeon: Karena Yancey MD;  [facility];  Service: Urology   • TIBIA FRACTURE SURGERY     • TONSILLECTOMY       Family History   Problem Relation Age of Onset   • Meniere's disease Mother    • Cancer Father         ?renal/bladder?    • No Known Problems Family      Social History     Socioeconomic History   • Marital status: /Civil Union     Spouse name: Not on file   • Number of children: Not on file   • Years of education: Not on file   • Highest education level: Not on file   Occupational History   • Not on file   Tobacco Use   • Smoking status: Former     Types: Cigarettes     Start date: 1984     Quit date: 2022     Years since quittin 3   • Smokeless tobacco: Not on file   • Tobacco comments:     Quit 2 mos ago   [de-identified] Use   • Vaping Use: Never used   Substance and Sexual Activity   • Alcohol use: Not Currently     Comment: social   • Drug use: No   • Sexual activity: Yes   Other Topics Concern   • Not on file   Social History Narrative    Daily coffee consumption 6 cups/day     Social Determinants of Health     Financial Resource Strain: Not on file   Food Insecurity: Not on file   Transportation Needs: Not on file   Physical Activity: Not on file   Stress: Not on file   Social Connections: Not on file   Intimate Partner Violence: Not on file   Housing Stability: Not on file       Current Outpatient Medications:   •  Ammonium Lactate 10 % LOTN, Apply topically 2 (two) times a day, Disp: 473  12 mL, Rfl: 1  •  clotrimazole-betamethasone (LOTRISONE) 1-0 05 % cream, Apply topically 2 (two) times a day, Disp: 45 g, Rfl: 1  •  polyethylene glycol (MIRALAX) 17 g packet, Take 17 g by mouth daily, Disp: , Rfl:     No Known Allergies    Vitals:    01/24/23 1432   BP: (P) 125/75   Pulse: (P) 83   Resp: 18   SpO2: (P) 98%     Physical Exam  Constitutional:       Appearance: He is well-developed  HENT:      Head: Normocephalic and atraumatic  Right Ear: External ear normal       Left Ear: External ear normal    Eyes:      Conjunctiva/sclera: Conjunctivae normal       Pupils: Pupils are equal, round, and reactive to light  Cardiovascular:      Rate and Rhythm: Normal rate and regular rhythm  Heart sounds: Normal heart sounds  Pulmonary:      Effort: Pulmonary effort is normal       Breath sounds: Normal breath sounds  Abdominal:      General: Bowel sounds are normal       Palpations: Abdomen is soft  Musculoskeletal:         General: Normal range of motion  Cervical back: Normal range of motion and neck supple  Skin:     General: Skin is warm  Neurological:      Mental Status: He is alert and oriented to person, place, and time  Deep Tendon Reflexes: Reflexes are normal and symmetric     Psychiatric: Behavior: Behavior normal          Thought Content: Thought content normal          Judgment: Judgment normal      Extremities: No lower extreme edema bilaterally, no cords, pulses are 1+    Labs    11/23/2022 WBC = 7 62 hemoglobin = 12 1 hematocrit = 37 8 platelet = 519    WPVBB/42/8242 BUN = 17 creatinine = 0 98 calcium = 9 8 AST = 29 ALT = 46 alkaline phosphatase = 95 total protein = 7 3 total bilirubin = 0 81    Imaging    1/12/2023 CAT scan of the chest without contrast    LUNGS:    Mild emphysema      Numerous, new pulmonary nodules in all lobes ranging in size from approximately 0 3 cm (3/68, peripheral right lower lobe) to 0 9 cm (3/82, medial left upper lobe)  4 0 cm ascending aorta is top normal in caliber  No intramural hematoma  Two enlarged right retrocaval/retroperitoneal lymph nodes have increased in size  1 1 and 1 3 cm short axis diameter (previously 0 6 to 0 8 cm respectively)  IMPRESSION:     Pulmonary metastases  Increased size of right retroperitoneal lymph nodes compared to 11/7/2022, suspicious for metastasis  11/9/2022 MRI abdomen    IMPRESSION:     1   Large exophytic right lower pole heterogeneously enhancing renal mass extending into the renal sinus consistent with renal cell carcinoma  There are retroperitoneal collaterals posteriorly which drain into the IVC though no evidence of right renal   vein thrombosis  No hydronephrosis      2  Borderline paracaval lymph nodes, likely early lymph node metastasis      3   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  However, given primary malignancy and atypical features, a short interval follow-up MRI in 3 months is recommended to exclude metastasis  11/7/2022 CTA chest PE study    IMPRESSION:     No pulmonary embolus  Mild patchy peripheral lower lobe groundglass opacity  Covid not excluded although not the classic appearance  Consider aspiration versus other infectious/inflammatory etiology    Partially imaged large right renal mass  See abdomen CT  Pathology    Case Report   Surgical Pathology Report                         Case: [id number]                                    Authorizing Provider: Albino Navarro MD        Collected:           11/21/2022 1213               Ordering Location:     [address]      Received:            11/21/2022 2301                                      Hospital Operating Room                                                       Pathologist:           Alem Evans MD                                                                  Specimen:    Kidney, Right                                                                              Final Diagnosis   A  Kidney, Right, nephrectomy:  - Clear cell renal cell carcinoma  See comment and synoptic report  - One lymph node positive for metastatic carcinoma (1/1)     Comment: Immunohistochemistry is diffusely positive in the tumor cells for PAX8 and carbonic anhydrase IX  CK7 and P504S have non-specific staining  The findings are consistent with the diagnosis      8th Ed AJCC Tumor Stage:  at least Stage III - pT2b, pN1, G3    Representative tumor block: A7   Electronically signed by Susy Harris MD on 12/5/2022 at 10:16 AM     Synoptic Checklist   KIDNEY: Nephrectomy  8th Edition - Protocol posted: 6/30/2021  KIDNEY: NEPHRECTOMY, PARTIAL OR RADICAL - All Specimens  SPECIMEN   Procedure  Total nephrectomy    Specimen Laterality  Right    TUMOR   Tumor Focality  Unifocal    Tumor Size  Greatest Dimension (Centimeters): 13 5 cm   Histologic Type  Clear cell renal cell carcinoma    Histologic Grade (WHO / ISUP)  G3 (nucleoli conspicuous and eosinophilic at 598G magnification)    Tumor Extent  Limited to kidney    Sarcomatoid Features  Not identified    Rhabdoid Features  Not identified    Tumor Necrosis  Present Percentage of Tumor Necrosis  30 %   Lymphovascular Invasion  Not identified    MARGINS   Margin Status  All margins negative for invasive carcinoma    REGIONAL LYMPH NODES   Regional Lymph Node Status  Tumor present in regional lymph node(s)    Number of Lymph Nodes with Tumor  1    Jabari Site(s) with Tumor  Hilar    Size of Largest Jabari Metastatic Deposit  0 3 cm   Number of Lymph Nodes Examined  1    PATHOLOGIC STAGE CLASSIFICATION (pTNM, AJCC 8th Edition)   Reporting of pT, pN, and (when applicable) pM categories is based on information available to the pathologist at the time the report is issued  As per the AJCC (Chapter 1, 8th Ed ) it is the managing physician’s responsibility to establish the final pathologic stage based upon all pertinent information, including but potentially not limited to this pathology report  Primary Tumor (pT)  pT2b    Regional Lymph Nodes (pN)  pN1    ADDITIONAL FINDINGS   Additional Findings in Nonneoplastic Kidney  Tubuloglomerular necrosis

## 2023-01-24 NOTE — TELEPHONE ENCOUNTER
Call placed to patient  He did not answer [facility] the recommendations of Dr Pablito Bartholomew at this time  Asked patient to call the office back if he would like to come to the office tomorrow for an appointment with Dr Pablito Bartholomew in the [facility] office  Office number was provided for the patient to call back and discuss

## 2023-01-24 NOTE — TELEPHONE ENCOUNTER
Please make an appointment for the patient to see me in office to discuss  I can see him tomorrow in the [facility] office at 11:45AM or early next week  If pain is severe, he can be seen on [facility] schedule later this week as I will be out of office

## 2023-01-24 NOTE — TELEPHONE ENCOUNTER
Patient seen by Dr Yaquelin Darden on [place] reviewed with patient  Will attempt to move up patient's immunotherapy appt from 2/8/2023    Patient willing to go to [facility], [address] [place], [place] [address] or [facility]

## 2023-01-25 ENCOUNTER — APPOINTMENT (OUTPATIENT)
Dept: LAB | Facility: HOSPITAL | Age: 53
End: 2023-01-25

## 2023-01-25 ENCOUNTER — OFFICE VISIT (OUTPATIENT)
Dept: UROLOGY | Facility: MEDICAL CENTER | Age: 53
End: 2023-01-25

## 2023-01-25 ENCOUNTER — HOSPITAL ENCOUNTER (OUTPATIENT)
Dept: CT IMAGING | Facility: HOSPITAL | Age: 53
Discharge: HOME | End: 2023-01-25
Attending: STUDENT IN AN ORGANIZED HEALTH CARE EDUCATION/TRAINING PROGRAM

## 2023-01-25 VITALS
HEIGHT: 72 IN | WEIGHT: 185 LBS | DIASTOLIC BLOOD PRESSURE: 84 MMHG | BODY MASS INDEX: 25.06 KG/M2 | OXYGEN SATURATION: 100 % | HEART RATE: 83 BPM | SYSTOLIC BLOOD PRESSURE: 138 MMHG

## 2023-01-25 DIAGNOSIS — C64.1 CLEAR CELL CARCINOMA OF RIGHT KIDNEY (HCC): ICD-10-CM

## 2023-01-25 DIAGNOSIS — Z00.00 ANNUAL PHYSICAL EXAM: ICD-10-CM

## 2023-01-25 DIAGNOSIS — R10.10 PAIN OF UPPER ABDOMEN: ICD-10-CM

## 2023-01-25 DIAGNOSIS — R10.10 PAIN OF UPPER ABDOMEN: Primary | ICD-10-CM

## 2023-01-25 LAB
ALBUMIN SERPL BCP-MCNC: 4.6 G/DL (ref 3.5–5)
ALP SERPL-CCNC: 98 U/L (ref 34–104)
ALT SERPL W P-5'-P-CCNC: 31 U/L (ref 7–52)
ANION GAP SERPL CALCULATED.3IONS-SCNC: 9 MMOL/L (ref 4–13)
AST SERPL W P-5'-P-CCNC: 21 U/L (ref 13–39)
BASOPHILS # BLD AUTO: 0.03 THOUSANDS/ÂΜL (ref 0–0.1)
BASOPHILS NFR BLD AUTO: 1 % (ref 0–1)
BILIRUB SERPL-MCNC: 0.91 MG/DL (ref 0.2–1)
BUN SERPL-MCNC: 17 MG/DL (ref 5–25)
CALCIUM SERPL-MCNC: 10.3 MG/DL (ref 8.4–10.2)
CHLORIDE SERPL-SCNC: 103 MMOL/L (ref 96–108)
CHOLEST SERPL-MCNC: 216 MG/DL
CO2 SERPL-SCNC: 28 MMOL/L (ref 21–32)
CREAT SERPL-MCNC: 1.33 MG/DL (ref 0.6–1.3)
EOSINOPHIL # BLD AUTO: 0.05 THOUSAND/ÂΜL (ref 0–0.61)
EOSINOPHIL NFR BLD AUTO: 1 % (ref 0–6)
ERYTHROCYTE [DISTWIDTH] IN BLOOD BY AUTOMATED COUNT: 13.2 % (ref 11.6–15.1)
GFR SERPL CREATININE-BSD FRML MDRD: 60 ML/MIN/1.73SQ M
GLUCOSE P FAST SERPL-MCNC: 87 MG/DL (ref 65–99)
HCT VFR BLD AUTO: 45.6 % (ref 36.5–49.3)
HDLC SERPL-MCNC: 38 MG/DL
HGB BLD-MCNC: 15.4 G/DL (ref 12–17)
IMM GRANULOCYTES # BLD AUTO: 0.01 THOUSAND/UL (ref 0–0.2)
IMM GRANULOCYTES NFR BLD AUTO: 0 % (ref 0–2)
LDLC SERPL CALC-MCNC: 152 MG/DL (ref 0–100)
LIPASE SERPL-CCNC: 91 U/L (ref 11–82)
LYMPHOCYTES # BLD AUTO: 0.89 THOUSANDS/ÂΜL (ref 0.6–4.47)
LYMPHOCYTES NFR BLD AUTO: 16 % (ref 14–44)
MCH RBC QN AUTO: 29.3 PG (ref 26.8–34.3)
MCHC RBC AUTO-ENTMCNC: 33.8 G/DL (ref 31.4–37.4)
MCV RBC AUTO: 87 FL (ref 82–98)
MONOCYTES # BLD AUTO: 0.52 THOUSAND/ÂΜL (ref 0.17–1.22)
MONOCYTES NFR BLD AUTO: 9 % (ref 4–12)
NEUTROPHILS # BLD AUTO: 4.12 THOUSANDS/ÂΜL (ref 1.85–7.62)
NEUTS SEG NFR BLD AUTO: 73 % (ref 43–75)
NONHDLC SERPL-MCNC: 178 MG/DL
NRBC BLD AUTO-RTO: 0 /100 WBCS
PLATELET # BLD AUTO: 259 THOUSANDS/UL (ref 149–390)
PMV BLD AUTO: 10.7 FL (ref 8.9–12.7)
POTASSIUM SERPL-SCNC: 3.8 MMOL/L (ref 3.5–5.3)
PROT SERPL-MCNC: 7.6 G/DL (ref 6.4–8.4)
RBC # BLD AUTO: 5.26 MILLION/UL (ref 3.88–5.62)
SODIUM SERPL-SCNC: 140 MMOL/L (ref 135–147)
T3FREE SERPL-MCNC: 2.82 PG/ML (ref 2.3–4.2)
TRIGL SERPL-MCNC: 129 MG/DL
TSH SERPL DL<=0.05 MIU/L-ACNC: 1.42 UIU/ML (ref 0.45–4.5)
WBC # BLD AUTO: 5.62 THOUSAND/UL (ref 4.31–10.16)

## 2023-01-25 RX ADMIN — IOHEXOL 100 ML: 350 INJECTION, SOLUTION INTRAVENOUS at 15:17

## 2023-01-25 RX ADMIN — IOHEXOL 35 ML: 300 INJECTION, SOLUTION INTRAVENOUS at 13:30

## 2023-01-25 NOTE — PROGRESS NOTES
Patient seen late morning c/o worsening abdominal pain, predominantly in the upper abdomen  He reports constipation with BMs every couple of days  When he does go not much comes out  He also reports nausea without fevers, and early satiety  He is taking miralax intermittently  Abdomen is soft, mild-moderately TTP in the upper abdomen, nondistended, incision intact without drainage  I have recommended a STAT CT abdomen and pelvis  There  isn’t anything on my exam to explain worsening pain  I have discussed with my on call team to look out for the results  I will also look out for the scan remotely

## 2023-01-25 NOTE — PROGRESS NOTES
1/25/2023    J Carlos Gomez  1970  [id number]      Chief Complaint: Follow-up for kidney cancer    History of Present Illness  J Carlos Gomez is a 45 y o  male with a history of 15 cm right renal mass  Staging imaging included CTA Chest, CT abdomen/pelvis and MRI abdomen with contrast   He was found to have liver lesions on his MRI which were reviewed in detail during our tumor board conference and was thought to be most likely benign representing hemangioma  Retroperitoneal lymph nodes were noted adjacent to the tumor measuring up to 9 mm  The patient is now status post open right radical nephrectomy on November 21, 2022 with preoperative embolization  The patient had a routine hospital course and was discharged on postop day 2  His staples were removed 12/5/2022  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, grade 3, all margins negative, 1 positive hilar lymph node (pT2bN1, cM0)  I referred him for adjuvant pembrolizumab and on restaging imaging he was found to have progression of his disease with pulmonary mets as well as enlarging retroperitoneal lymph nodes  The patient today because he has persistent abdominal pain, nausea, and constipation  He denies fevers but does have night sweats  He has upper abdominal pain that is not severe but is persistent  He is having bowel movements about every couple of days and is taking MiraLAX as needed  He has early satiety with liquid as well as solid food      Previous PSA values:  No results found for: PSA    Past Medical History  Past Medical History:   Diagnosis Date   • Arthralgia     last assessed 02/10/2015   • Babesiosis     last assessed 12/30/14   • Benign paroxysmal vertigo     last assessed 05/22/15   • Meniere disease     last assessed 04/15/13   • Pneumonia of left lower lobe due to infectious organism     last assessed 04/04/16   • Vitamin D deficiency     last assessed 12/30/14       Past Surgical History  Past Surgical History:   Procedure Laterality Date   • FIBULA FRACTURE SURGERY     • IR RENAL ANGIOGRAM  11/18/2022   • MN NEPHRECTOMY W/PRTL URETERECT OPEN RIB RESCJ RAD Right 11/21/2022    Procedure: NEPHRECTOMY ABDOMINAL APPROACH;  Surgeon: Belem Duff MD;  [facility];  Service: Urology   • TIBIA FRACTURE SURGERY     • TONSILLECTOMY         Physical Exam  /84 (BP Location: Left arm, Patient Position: Sitting, Cuff Size: Adult)   Pulse 83   Ht 6' (1 829 m)   Wt 83 9 kg (185 lb)   SpO2 100%   BMI 25 09 kg/m²     General:  Healthy appearing male in no acute distress  Head:  Normocephalic, atraumatic  Cardiovascular:  Regular rate  Respiratory:  Patient has unlabored respirations  Abdominal: Soft, mild to moderately tender to palpation in the upper abdomen, nondistended, incision is clean dry and intact without any drainage  Results  I have personally reviewed all pertinent lab results and reviewed with patient  CT CHEST WITHOUT IV CONTRAST     INDICATION:   C64 1: Malignant neoplasm of right kidney, except renal pelvis  N28 89: Other specified disorders of kidney and ureter  Right kidney clear-cell carcinoma, stage III  Status post right nephrectomy 11/21/2022  Baseline for starting chemotherapy  Former smoker      COMPARISON:  11/7/2022 CT chest and abdomen and pelvis     TECHNIQUE: CT examination of the chest was performed  Axial, sagittal, and coronal 2D reformatted images were created from the source data and submitted for interpretation      Radiation dose length product (DLP) for this visit:  297 73 mGy-cm     This examination, like all CT scans performed in the [facility], was performed utilizing techniques to minimize radiation dose exposure, including the use of iterative   reconstruction and automated exposure control      IV Contrast:  Not administered      FINDINGS:     LUNGS:    Mild emphysema      Numerous, new pulmonary nodules in all lobes ranging in size from approximately 0 3 cm (3/68, peripheral right lower lobe) 2 0 9 cm (3/82, medial left upper lobe)     Patent central airways      PLEURA:  Within normal limits      HEART/GREAT VESSELS:   Atherosclerosis  Normal heart size      4 0 cm ascending aorta is top normal in caliber  No intramural hematoma      MEDIASTINUM AND ELLIE:    No enlarged lymph nodes      Esophagus is within normal limits      CHEST WALL AND LOWER NECK:   Similar 5 3 x 2 7 cm right intercostal lipoma, between the 5th and 6th axillary ribs      VISUALIZED STRUCTURES IN THE UPPER ABDOMEN:    Interval right nephrectomy      Two enlarged right retrocaval/retroperitoneal lymph nodes have increased in size  1 1 and 1 3 cm short axis diameter (previously 0 6 to 0 8 cm respectively)      OSSEOUS STRUCTURES: No suspicious bone lesions      The study was marked in EPIC for immediate notification      IMPRESSION:     Pulmonary metastases      Increased size of right retroperitoneal lymph nodes compared to 11/7/2022, suspicious for metastasis  Assessment  26-year-old male with metastatic kidney cancer and persistent abdominal pain and constipation 2 months after surgery  Any explanation for his pain on exam from routine healing  The patient seems to have out of proportion with exam   I recommend that we get a CT abdomen and pelvis today to further evaluate  Plan  1  STAT CT abdomen and pelvis with PO and IV contrast   2  BMP Ordered stat   3  Follow-up in March  We will see patient back sooner if acute findings are found on CT scan    Albino Navarro MD  [facility]    Portions of the above record have been created with voice recognition software  Occasional wrong word or "sound alike" substitution may have occurred due to the inherent limitations of voice recognition software  Please read the chart carefully and recognize, using context, where substitution may have occurred  For further clarification, please contact me directly

## 2023-01-25 NOTE — TELEPHONE ENCOUNTER
Spoke with patient's wife  She is aware of updated schedule  She will review appt details on Cindy Will, please change date to 1/31  Thank you!

## 2023-01-26 ENCOUNTER — TELEPHONE (OUTPATIENT)
Dept: OTHER | Facility: HOSPITAL | Age: 53
End: 2023-01-26

## 2023-01-26 ENCOUNTER — PATIENT MESSAGE (OUTPATIENT)
Dept: UROLOGY | Facility: MEDICAL CENTER | Age: 53
End: 2023-01-26

## 2023-01-26 DIAGNOSIS — C64.1 CLEAR CELL CARCINOMA OF RIGHT KIDNEY (HCC): Primary | ICD-10-CM

## 2023-01-27 ENCOUNTER — TELEPHONE (OUTPATIENT)
Dept: HEMATOLOGY ONCOLOGY | Facility: CLINIC | Age: 53
End: 2023-01-27

## 2023-01-27 RX ORDER — ONDANSETRON 4 MG/1
4 TABLET, FILM COATED ORAL EVERY 6 HOURS PRN
Qty: 30 TABLET | Refills: 2 | Status: SHIPPED | OUTPATIENT
Start: 2023-01-27

## 2023-01-27 NOTE — TELEPHONE ENCOUNTER
Call Transfer   Reason for patient call? Patients spouse calling to discuss pain management options  She would like to know if patient is permitted to use marijuana for the use of management of pain, if he should decide to do so  If someone other than patient is calling, are they listed on the communication consent? Yes   Patient's primary oncologist? Dr Eliud Lake    Person/Department that the call was transferred to? Time that call was transferred? [phone]  @ 3:43PM   Informed patient that the message will be forwarded to the team and someone will get back to them as soon as possible  Did you relay this information to the patient?  Yes

## 2023-01-27 NOTE — TELEPHONE ENCOUNTER
Patient of Dr Fernanda Claros stage IV clear-cell renal cell carcinoma recently seen due to worsening abdominal pain in the office  CT obtained for further evaluation  Please call patient and let him know that CT scan was negative for any cute abdominal abnormalities but could cause his abdominal pain  No findings appear to be stable compared to other imaging

## 2023-01-31 ENCOUNTER — HOSPITAL ENCOUNTER (OUTPATIENT)
Dept: MRI IMAGING | Facility: HOSPITAL | Age: 53
Discharge: HOME | End: 2023-01-31
Attending: STUDENT IN AN ORGANIZED HEALTH CARE EDUCATION/TRAINING PROGRAM

## 2023-01-31 ENCOUNTER — HOSPITAL ENCOUNTER (OUTPATIENT)
Dept: INFUSION CENTER | Facility: HOSPITAL | Age: 53
Discharge: HOME | End: 2023-01-31
Attending: INTERNAL MEDICINE

## 2023-01-31 VITALS
WEIGHT: 179.68 LBS | RESPIRATION RATE: 18 BRPM | SYSTOLIC BLOOD PRESSURE: 139 MMHG | OXYGEN SATURATION: 99 % | HEART RATE: 79 BPM | TEMPERATURE: 96.8 F | DIASTOLIC BLOOD PRESSURE: 90 MMHG | BODY MASS INDEX: 24.37 KG/M2

## 2023-01-31 DIAGNOSIS — C64.1 CLEAR CELL CARCINOMA OF RIGHT KIDNEY (HCC): Primary | ICD-10-CM

## 2023-01-31 DIAGNOSIS — N28.89 RIGHT RENAL MASS: ICD-10-CM

## 2023-01-31 DIAGNOSIS — C64.1 CLEAR CELL CARCINOMA OF RIGHT KIDNEY (HCC): ICD-10-CM

## 2023-01-31 RX ORDER — SODIUM CHLORIDE 9 MG/ML
20 INJECTION, SOLUTION INTRAVENOUS ONCE
Status: COMPLETED | OUTPATIENT
Start: 2023-01-31 | End: 2023-01-31

## 2023-01-31 RX ADMIN — GADOBUTROL 8 ML: 604.72 INJECTION INTRAVENOUS at 09:14

## 2023-01-31 RX ADMIN — SODIUM CHLORIDE 200 MG: 9 INJECTION, SOLUTION INTRAVENOUS at 12:51

## 2023-01-31 RX ADMIN — SODIUM CHLORIDE 20 ML/HR: 0.9 INJECTION, SOLUTION INTRAVENOUS at 12:51

## 2023-02-07 ENCOUNTER — CONSULT (OUTPATIENT)
Dept: PALLIATIVE MEDICINE | Facility: CLINIC | Age: 53
End: 2023-02-07

## 2023-02-07 VITALS
WEIGHT: 183.8 LBS | TEMPERATURE: 97.4 F | SYSTOLIC BLOOD PRESSURE: 120 MMHG | DIASTOLIC BLOOD PRESSURE: 82 MMHG | HEART RATE: 73 BPM | OXYGEN SATURATION: 100 % | BODY MASS INDEX: 24.93 KG/M2

## 2023-02-07 DIAGNOSIS — Z71.89 ADVANCED CARE PLANNING/COUNSELING DISCUSSION: ICD-10-CM

## 2023-02-07 DIAGNOSIS — R10.9 ABDOMINAL PAIN: ICD-10-CM

## 2023-02-07 DIAGNOSIS — K59.00 CONSTIPATION: ICD-10-CM

## 2023-02-07 DIAGNOSIS — F41.9 ANXIOUSNESS: ICD-10-CM

## 2023-02-07 DIAGNOSIS — R59.0 RETROPERITONEAL LYMPHADENOPATHY: ICD-10-CM

## 2023-02-07 DIAGNOSIS — C78.00 MALIGNANT NEOPLASM METASTATIC TO LUNG, UNSPECIFIED LATERALITY (HCC): ICD-10-CM

## 2023-02-07 DIAGNOSIS — H81.02 MENIERE DISEASE, LEFT: ICD-10-CM

## 2023-02-07 DIAGNOSIS — Z51.5 PALLIATIVE CARE PATIENT: ICD-10-CM

## 2023-02-07 DIAGNOSIS — C64.1 CLEAR CELL CARCINOMA OF RIGHT KIDNEY (HCC): Primary | ICD-10-CM

## 2023-02-07 DIAGNOSIS — R63.0 LOSS OF APPETITE: ICD-10-CM

## 2023-02-07 DIAGNOSIS — R45.89 DYSPHORIC MOOD: ICD-10-CM

## 2023-02-07 DIAGNOSIS — Z71.89 GOALS OF CARE, COUNSELING/DISCUSSION: ICD-10-CM

## 2023-02-07 DIAGNOSIS — Z90.5 H/O RIGHT NEPHRECTOMY: ICD-10-CM

## 2023-02-07 RX ORDER — GABAPENTIN 300 MG/1
300 CAPSULE ORAL
Qty: 30 CAPSULE | Refills: 2 | Status: SHIPPED | OUTPATIENT
Start: 2023-02-07

## 2023-02-07 RX ORDER — OXYCODONE HYDROCHLORIDE 5 MG/1
5 TABLET ORAL EVERY 4 HOURS PRN
Qty: 60 TABLET | Refills: 0 | Status: SHIPPED | OUTPATIENT
Start: 2023-02-07

## 2023-02-07 NOTE — PROGRESS NOTES
Consult to Palliative and [address] [place] [address] 45 y o  male [id number]    ASSESSMENT & PLAN:  1  Clear cell carcinoma of right kidney ([facility] [address] [address] )    2  H/O right nephrectomy    3  Malignant neoplasm metastatic to lung, unspecified laterality ([facility] [address] )    4  Meniere disease, left    5  Abdominal pain    6  Constipation    7  Anxiousness    8  Dysphoric mood    9  Loss of appetite    10  Palliative care patient    6  Goals of care, counseling/discussion    12  Advanced care planning/counseling discussion          • Time spent introducing the concept of palliative care in general, and the [facility] offering in specific  Assessed patient's understanding of his palliative diagnosis and current plan of treatment  • Continue disease-directed cares w/o limit  Patient wished to pursue any treatments which might extend life or alleviate symptoms  • ACP: Patient has not completed any advanced healthcare directives  He accepts a copy of the [facility] Advanced Directive along with counseling  ACP may be reviewed in detail at next visit  • Patient endorses R-sided abdominal pain which can be severe and debilitating  Based on recent imaging this pain may not be solely s/t cancer, though the 1/31/23 MRI may tell us more (read pending)  Certainly RP lymphadenopathy might play a role, or the (likely hemangioma) liver lesions  He may also have residual post-procedure pain or nerve pain s/t surgery or malignancy, so it is appropriate to treat this as if it were cancer-related pain  o Recommended patient consider topical OTC products, local application of heat or cold, Tylenol up to 1000mg TID for chronic pain  Do not use heat on top of topical agents  o Patient getting some relief from [employer] (legally available recreationally in [place])  Counseled; may continue  Do not use systemic MJ products within 1-2 hours of opioids  o Start oxyIR 5mg q5h PRN  o Start gabapentin 300mg QHS    o Call in 1-2 weeks w/ an update on pain, as adjustments may need to be made  o Patient will sign our opioid agreement  • Patient may use Zofran PRN N/V (not needed recently)  • Patient endorses mild anxiousness and dysphoric mood but does not feel these require medical intervention at this time  He has a good support system  • Counseled on bowel regimen for constipation  • Reviewed notes (Medical Oncology, Urology), labs (1/25/23 Cr 1 33, eGFR 60, lipase 91, cCa 10 3, tChol 216, Hb 15 4, TSH 1 420), imaging + procedures (1/25/23 CTAP, 1/12/23 CT chest)  • Return in about 1 month (around 3/7/2023)  • Emotional support provided  • Medication safety issues addressed - no driving under the influence of narcotics (including opioids), watch for adverse effects including AMS or respiratory depression (slowed breathing), keep medications stored in a safe/locked environment, do not use alcohol while opioids or other narcotics are in one's system  Requested Prescriptions     Signed Prescriptions Disp Refills   • gabapentin (Neurontin) 300 mg capsule 30 capsule 2     Sig: Take 1 capsule (300 mg total) by mouth daily at bedtime   • oxyCODONE (Roxicodone) 5 immediate release tablet 60 tablet 0     Sig: Take 1 tablet (5 mg total) by mouth every 4 (four) hours as needed for moderate pain or severe pain Max Daily Amount: 30 mg       There are no discontinued medications  Representatives have queried the patient's controlled substance dispensing history in the Prescription Drug Monitoring Program in compliance with regulations before I have prescribed any controlled substances  The prescription history is consistent with prescribed therapy and our practice policies        50 minutes were spent in this ambulatory visit with greater than 50% of the time spent face to face with patient and his wife Mir Mares in counseling or coordination of care including symptom assessment and management, medication review, medication adjustment, psychosocial support, chart review, imaging review, lab review, advanced directives, goals of care, medical marijuana, supportive listening and anticipatory guidance  All of the patient's questions were answered during this discussion  SUBJECTIVE:  Chief Complaint   Patient presents with   • Cancer   • Consult   • Counseling   • Goals   • Anxiousness   • Dysphoric mood   • Constipation   • Abdominal Pain        LIVE    Eric Elaine is a 45 y o  male w/ clear cell carcinoma of the right kidney w/ RP lymph node involvement, w/ lung metastases, s/p angioembolization + R radical nephrectomy 11/21/22, on pembrolizumab + axitinib; liver lesions (likely benign hemangioma); abdominal pain (which may be cancer- or procedure-related), constipation, h/o babesiosis, h/o Meniere disease, h/o vertigo  He follows w/ Dr Yenny Herring (Medical Oncology), Dr Christian Ortiz (Urology)  Patient is new to [facility]; referred by Urology (presumably for symptom management)  Patient endorses R-sided abdominal pain which is 5/10 at time of visit, but can be up to 9-10/10 w/ movement or activity  He is currently taking nothing for pain other than MMJ products (which help but cause some unwanted euphoria)  He states Tylenol had not helped, though he only tried 2 doses  Pain has been progressing over recent months; while he had some post-op pain after his nephrectomy, that had resolved - and then this new pain developed  Pain is interfering w/ sleep, w/ appetite, and with quality of life  Patient has had some issues w/ constipation (though not recently)  He denies N/V  Some weight loss noted over the past few months  He endorses some mild anxiousness about his health issues and his future, and some dysphoric mood  Patient is well-supported by his wife Jean Gurrola, who asks good questions during today's visit  PDMP shows no concerns      The following portions of the medical history were reviewed: past medical history, surgical history, problem list, medication list, family history, and social history  Past Medical History:   Diagnosis Date   • Arthralgia     last assessed 02/10/2015   • Babesiosis     last assessed 14   • Benign paroxysmal vertigo     last assessed 05/22/15   • Meniere disease     last assessed 04/15/13   • Pneumonia of left lower lobe due to infectious organism     last assessed 16   • Vitamin D deficiency     last assessed 14     Past Surgical History:   Procedure Laterality Date   • FIBULA FRACTURE SURGERY     • IR RENAL ANGIOGRAM  2022   • ND NEPHRECTOMY W/PRTL URETERECT OPEN RIB RESCJ RAD Right 2022    Procedure: NEPHRECTOMY ABDOMINAL APPROACH;  Surgeon: Marilou Maldonado MD;  [facility];  Service: Urology   • TIBIA FRACTURE SURGERY     • TONSILLECTOMY       Social History     Socioeconomic History   • Marital status: /Civil Union     Spouse name: None   • Number of children: None   • Years of education: None   • Highest education level: None   Occupational History   • None   Tobacco Use   • Smoking status: Former     Types: Cigarettes     Start date: 1984     Quit date: 2022     Years since quittin 4   • Smokeless tobacco: Never   • Tobacco comments:     Quit 2 mos ago   Vaping Use   • Vaping Use: Never used   Substance and Sexual Activity   • Alcohol use: Not Currently     Comment: social   • Drug use: No   • Sexual activity: Yes   Other Topics Concern   • None   Social History Narrative    Daily coffee consumption 6 cups/day    Wife: Alan Keating        From 22  note:    Primary Care Provider: Wing Hussein MD    Primary Insurance: [id number] Proxies: There are no active Health Care Proxies on file  Primary Caregiver: Self    Support Systems: Self, Spouse/significant other    [place] of Residence: [address] [place] [address] do you live in?: [address] entry access options   Select all that apply : Stairs    Number of steps to enter home : 3    Type of Current Residence: [address] home    Living Arrangements: Lives w/ Spouse/significant other    Functional Status: Independent    Completes ADLs independently?: Yes    Ambulates independently?: Yes    Does patient use assisted devices?: No    Does patient currently own DME?: No     Social Determinants of Health     Financial Resource Strain: Not on file   Food Insecurity: Not on file   Transportation Needs: Not on file   Physical Activity: Not on file   Stress: Not on file   Social Connections: Not on file   Intimate Partner Violence: Not on file   Housing Stability: Not on file     Family History   Problem Relation Age of Onset   • Meniere's disease Mother    • Cancer Father         ?renal/bladder? • No Known Problems Family        Current Outpatient Medications:   •  axitinib (Inlyta) 5 MG TABS, One tablet q 12hr, Disp: 60 tablet, Rfl: 3  •  gabapentin (Neurontin) 300 mg capsule, Take 1 capsule (300 mg total) by mouth daily at bedtime, Disp: 30 capsule, Rfl: 2  •  oxyCODONE (Roxicodone) 5 immediate release tablet, Take 1 tablet (5 mg total) by mouth every 4 (four) hours as needed for moderate pain or severe pain Max Daily Amount: 30 mg, Disp: 60 tablet, Rfl: 0  •  Ammonium Lactate 10 % LOTN, Apply topically 2 (two) times a day (Patient not taking: Reported on 1/25/2023), Disp: 473  12 mL, Rfl: 1  •  clotrimazole-betamethasone (LOTRISONE) 1-0 05 % cream, Apply topically 2 (two) times a day (Patient not taking: Reported on 1/25/2023), Disp: 45 g, Rfl: 1  •  ondansetron (ZOFRAN) 4 mg tablet, Take 1 tablet (4 mg total) by mouth every 6 (six) hours as needed for nausea or vomiting, Disp: 30 tablet, Rfl: 2  •  polyethylene glycol (MIRALAX) 17 g packet, Take 17 g by mouth daily (Patient not taking: Reported on 1/25/2023), Disp: , Rfl:     Review of Systems   Constitutional: Positive for activity change, appetite change (d/t pain) and fatigue     Gastrointestinal: Positive for abdominal pain (right-sided, worsening in past months) and constipation (occasional; not recently)  Negative for diarrhea and nausea  Allergic/Immunologic: Positive for immunocompromised state  Psychiatric/Behavioral: Positive for dysphoric mood and sleep disturbance (d/t pain)  The patient is nervous/anxious  All other systems reviewed and are negative  OBJECTIVE:  /82 (BP Location: Right arm, Patient Position: Sitting, Cuff Size: Standard)   Pulse 73   Temp (!) 97 4 °F (36 3 °C) (Tympanic)   Wt 83 4 kg (183 lb 12 8 oz)   SpO2 100%   BMI 24 93 kg/m²   Physical Exam  Vitals reviewed  Constitutional:       General: He is not in acute distress  Appearance: He is well-developed, well-groomed and normal weight  He is not toxic-appearing  HENT:      Head: Normocephalic and atraumatic  Right Ear: External ear normal       Left Ear: External ear normal    Eyes:      General: No scleral icterus  Right eye: No discharge  Left eye: No discharge  Extraocular Movements: Extraocular movements intact  Conjunctiva/sclera: Conjunctivae normal       Pupils: Pupils are equal, round, and reactive to light  Cardiovascular:      Rate and Rhythm: Normal rate  Pulmonary:      Effort: Pulmonary effort is normal  No tachypnea, bradypnea, accessory muscle usage or respiratory distress  Abdominal:      General: There is no distension  Tenderness: There is no guarding  Musculoskeletal:      Cervical back: Normal range of motion  Right lower leg: No edema  Left lower leg: No edema  Skin:     General: Skin is dry  Coloration: Skin is not pale  Neurological:      Mental Status: He is alert and oriented to person, place, and time  Cranial Nerves: No dysarthria or facial asymmetry  Gait: Gait is intact  Psychiatric:         Attention and Perception: Attention normal          Mood and Affect: Mood is anxious (mildly)  Affect is not inappropriate           Speech: Speech normal          Behavior: Behavior normal  Behavior is cooperative  Thought Content: Thought content normal          Cognition and Memory: Cognition and memory normal          Judgment: Judgment normal           Kings Gross MD  [facility]      Portions of this document may have been created using dictation software and as such some "sound alike" terms may have been generated by the system  Do not hesitate to contact me with any questions or clarifications

## 2023-02-07 NOTE — PATIENT INSTRUCTIONS
It was good to see you today  Thank you for coming in  We will prescribe oxycodone, use as directed for pain, 1 tab every 4 hours as needed  Will also start gabapentin, 300 mg at bedtime  This medication is cleared by the kidneys, but at low doses it should be very safe for you to take  If it is helpful for you to help control overnight pain we may consider adding a second dose in the morning, call us in about a week to let us know how things are working for your pain  For chronic pain:  Try a heating pad or ice pack (you can alternate these about 30 minutes apart) for neck and back pain  You may consider topical lidocaine products as well (available over-the-counter; brand names include Salonpas, Biofreeze, Aspercreme, [address], etc)  These can be patches, creams or roll-on gels  Do not use topical product under a heating pad; ensure skin is clean and dry  Tylenol, 1000mg three times per day every day, can be a safe and effective way to reduce chronic pain  When we use opioids for pain control, constipation is common and patients should act to prevent it:  Drink PLENTY of water  This is important to keep the gut moving  Some people have success w/ using prunes, prune juice, certain fruits or vegetables (apples, bananas, prunes, pears, raspberries, and vegetables like string beans, broccoli, spinach, kale, squash, lentils, peas, and beans), or fiber gummies  Try a probiotic  This could be yogurt or kefir, or fermented beverages such as kombucha, but probiotics are also available in capsule form  Aim for 10-15 billion colony-forming-units, w/ bacteria such as Lactobacillus / Saccharomyces / Actinomyces  Osmotic laxatives (Miralax, magnesium citrate, Milk of Magnesia) can be very useful for opioid-induced constipation (OIC); take daily to prevent OIC    Bulk laxatives (Citrucel, Metamucil, Fibercon, Benefiber, wheat germ) are useful for constipation in patients who are not taking opioids, but are not recommended if you are taking opioids  Colace is good for softening hard stools, or preventing constipation when opioids are being used - but does not stimulate the bowel to move things along once constipation has occurred  You can use senna, 1 to 2 tabs, once or twice daily as needed for constipation  Use as directed on the box/bottle  Senna is also available in a tea ("Smooth Move")  Should that not be enough for your constipation, you can try Dulcolax  Should that not be enough, consider an enema  All of these medications are available over-the-counter  A copy of the [facility] Advanced Directive was provided; please review and discuss w/ your family  We can discuss it at our next visit  When complete, you may bring it in to any [facility]'s appointment where staff can witness your signature and scan it in to the system  Return in about 1 month  Call us for refills on medications that we supply, as needed  If something changes and you need to come in sooner, please call our office  PRESCRIPTION REFILL REMINDER:  All medication refills should be requested prior to [facility] on Friday  Any refill requests after noon on Friday would be addressed the following Monday  MEDICATION SAFETY ISSUES:  Do not drive under the influence of narcotics (including opioids), watch for adverse effects including confusion / altered mental status / respiratory depression (slowed breathing), keep medications stored in a safe/locked environment, do not use alcohol while opioids or other narcotics are in your system

## 2023-02-08 ENCOUNTER — TELEPHONE (OUTPATIENT)
Dept: HEMATOLOGY ONCOLOGY | Facility: MEDICAL CENTER | Age: 53
End: 2023-02-08

## 2023-02-08 NOTE — TELEPHONE ENCOUNTER
JESUSM to the patient in regards to his [employer] paper work  Informed patient to give me a call back at [phone] to answer a couple of questions

## 2023-02-09 ENCOUNTER — TELEPHONE (OUTPATIENT)
Dept: HEMATOLOGY ONCOLOGY | Facility: CLINIC | Age: 53
End: 2023-02-09

## 2023-02-09 ENCOUNTER — TELEPHONE (OUTPATIENT)
Dept: HEMATOLOGY ONCOLOGY | Facility: MEDICAL CENTER | Age: 53
End: 2023-02-09

## 2023-02-09 NOTE — TELEPHONE ENCOUNTER
Call Transfer   Reason for patient call? Patient is returning Meena's call regarding his paperwork     If someone other than patient is calling, are they listed on the communication consent? N/A   Patient's primary oncologist? Dr Félix Madrigal    Person/Department that the call was transferred to? Time that call was transferred? Jeffery Schilling    10:00am   Informed patient that the message will be forwarded to the team and someone will get back to them as soon as possible  Did you relay this information to the patient?   yes

## 2023-02-09 NOTE — TELEPHONE ENCOUNTER
Patient called in returning my call in regards to his Insurance paper work he turned in for the office to fill out  I asked the patient what are his limitations and restrictions  Patient states that he cant lift heavy things and he has been experiencing pain  Informed patient that I will finish paper work today and where would he like for me to send the completed form to  Patient states I can fax it to him at [phone]

## 2023-02-10 NOTE — TELEPHONE ENCOUNTER
Called and spoke with patient reports abdominal pain for weeks now  Reports getting worse, unable to eat and drink  Pt also states that she fills bloated like he fills up quickly  Reports last bowel 24 hours ago but not much  Pt states not sure what to do, had Ct scan a couple weeks ago and it was fine  No other symptoms

## 2023-02-10 NOTE — TELEPHONE ENCOUNTER
Patient called stating he is having abdominal pain and he is not able to eat or drink  He stated very little  He wants to see Dr Guru Cotton  He would like to know how to proceed       Patient can be reached at [phone]

## 2023-02-14 ENCOUNTER — TELEPHONE (OUTPATIENT)
Dept: OTHER | Facility: OTHER | Age: 53
End: 2023-02-14

## 2023-02-14 NOTE — TELEPHONE ENCOUNTER
Patient called to confirm his appointment tomorrow 02/15 at 4:00 with Dr Cassie Wu per phone message from America Bernabe

## 2023-02-15 ENCOUNTER — OFFICE VISIT (OUTPATIENT)
Dept: UROLOGY | Facility: MEDICAL CENTER | Age: 53
End: 2023-02-15

## 2023-02-15 VITALS
HEIGHT: 72 IN | OXYGEN SATURATION: 97 % | BODY MASS INDEX: 24.79 KG/M2 | SYSTOLIC BLOOD PRESSURE: 128 MMHG | HEART RATE: 74 BPM | DIASTOLIC BLOOD PRESSURE: 74 MMHG | WEIGHT: 183 LBS

## 2023-02-15 DIAGNOSIS — R68.81 EARLY SATIETY: Primary | ICD-10-CM

## 2023-02-15 RX ORDER — SODIUM CHLORIDE 9 MG/ML
20 INJECTION, SOLUTION INTRAVENOUS ONCE
Status: CANCELLED | OUTPATIENT
Start: 2023-02-21

## 2023-02-16 NOTE — PROGRESS NOTES
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, grade 3, all margins negative, 1 positive hilar lymph node  I referred him for adjuvant pembrolizumab and on restaging imaging he was found to have progression of his disease with pulmonary mets as well as enlarging retroperitoneal lymph nodes  I saw the patient in office 1/25/2023 c/o persistent abdominal pain, nausea, and constipation  He also complained of early satiety bloating and infrequent BMs  We obtained a CT abdomen and pelvis for further evaluation and there were no findings to explain his pain  I had concerned that his pain could possibly be cancer related so I referred him to palliative care for management  He was seen on February 7, 2023 and was prescribed a pain regimen but the etiology of his pain is still not clear  The patient has the same symptoms that he had when I saw him in office 3 weeks ago  He still has early satiety, bloating, nausea, and infrequent bowel movements  He feels like his abdominal muscles are weak and it hurts more when he is moving around or trying to get up from a laying down position      Past Medical History  Past Medical History:   Diagnosis Date   • Arthralgia     last assessed 02/10/2015   • Babesiosis     last assessed 12/30/14   • Benign paroxysmal vertigo     last assessed 05/22/15   • Meniere disease     last assessed 04/15/13   • Pneumonia of left lower lobe due to infectious organism     last assessed 04/04/16   • Vitamin D deficiency     last assessed 12/30/14       Past Surgical History  Past Surgical History:   Procedure Laterality Date   • FIBULA FRACTURE SURGERY     • IR RENAL ANGIOGRAM  11/18/2022   • SC NEPHRECTOMY W/PRTL URETERECT OPEN RIB RESCJ RAD Right 11/21/2022    Procedure: NEPHRECTOMY ABDOMINAL APPROACH;  Surgeon: Otis Mcpherson MD;  Location: [facility];  Service: Urology   • TIBIA FRACTURE SURGERY     • TONSILLECTOMY         Physical Exam  /74   Pulse 74   Ht 6' (1 829 m)   Wt 83 kg (183 lb)   SpO2 97%   BMI 24 82 kg/m²     General:  Healthy appearing male in no acute distress  Head:  Normocephalic, atraumatic  Cardiovascular:  Regular rate  Respiratory:  Patient has unlabored respirations  Abdomen: Soft, nontender, nondistended, partial chevron incision well-healed  Results  I have personally reviewed all pertinent lab results and reviewed with patient  CT ABDOMEN AND PELVIS WITH IV CONTRAST     INDICATION:   R10 10: Upper abdominal pain, unspecified  "Patient seen late morning c/o worsening abdominal pain, predominantly in the upper abdomen  He reports constipation with BMs every couple of days  When he does go not much comes out  He also   reports nausea without fevers, and early satiety  He is taking miralax intermittently  Abdomen is soft, mild-moderately TTP in the upper abdomen, nondistended, incision intact without drainage  I have recommended a STAT CT abdomen and pelvis  "      COMPARISON:  CT abdomen pelvis 11/7/2022  CT chest 1/12/2023  MRI abdomen 11/9/2022     TECHNIQUE:  CT examination of the abdomen and pelvis was performed  Axial, sagittal, and coronal 2D reformatted images were created from the source data and submitted for interpretation      Radiation dose length product (DLP) for this visit:  473 mGy-cm     This examination, like all CT scans performed in the [facility], was performed utilizing techniques to minimize radiation dose exposure, including the use of iterative   reconstruction and automated exposure control      IV Contrast:  35 mL of iohexol (OMNIPAQUE) 100 mL of iohexol (OMNIPAQUE)  Enteric Contrast:  Enteric contrast was not administered      FINDINGS:     ABDOMEN     LOWER CHEST:  Innumerable bibasilar subcentimeter nodules unchanged from the recent CT chest study      LIVER/BILIARY TREE: Medial right hepatic hypodensity #201/15 and inferior right hepatic hypodensity #201/40 are unchanged from and better characterized on the prior MRI      GALLBLADDER:  No calcified gallstones  No pericholecystic inflammatory change      SPLEEN:  Unremarkable      PANCREAS:  Unremarkable      ADRENAL GLANDS:  Unremarkable      KIDNEYS/URETERS:  Status post right nephrectomy      STOMACH AND BOWEL:  Unremarkable      APPENDIX:  No findings to suggest appendicitis      ABDOMINOPELVIC CAVITY:  No ascites or pneumoperitoneum      More prominent retroperitoneal adenopathy  For example a right retroperitoneal node measuring 13 mm in short axis, #201/25, measured 8 mm on 11/7/2022 study  10 mm left retroperitoneal lymph node #201/25 measured 7 mm on 11/7/2022 study      VESSELS:  Unremarkable for patient's age      PELVIS     REPRODUCTIVE ORGANS:  Unremarkable for patient's age      URINARY BLADDER:  Unremarkable      ABDOMINAL WALL/INGUINAL REGIONS:  Unremarkable      OSSEOUS STRUCTURES:  No acute fracture or destructive osseous lesion      IMPRESSION:     No acute findings in the abdomen or pelvis      Innumerable reidentified bibasilar pulmonary nodules in keeping with metastases unchanged from the recent CT chest from 1/12/2023      Retroperitoneal adenopathy also unchanged from the CT chest but progressive since 11/7/2022  Assessment  13-year-old male with metastatic kidney cancer and persistent abdominal pain approximately 3 months out since surgery  His pain is predominantly incisional and is more pronounced when he contracts his abdominal muscles  We discussed the healing process and that he may still have some discomfort but severe pain is not expected at this point    I discussed the need to work out some to strengthen these muscles and to get them used to lou again  From a pain perspective 1 option would be a local block by an interventional pain specialist or using topical patches for pain  Given that any injectable therapy would have to be administered multiple times patches are good starting point  I am not sure why he has early satiety or bloating  His abdominal exam is benign and his CT does not demonstrate any concerning findings  I will place a referral to GI in case they have any other thoughts or think endoscopy would be helpful  He has scheduled follow-up with me in March  This is too early to obtain restaging scans however we can touch base about his symptoms  Plan  1  Referral to GI  2  Return to clinic as scheduled March 20    Jeannine Goodpasture, MD  [address] [facility]    Portions of the above record have been created with voice recognition software  Occasional wrong word or "sound alike" substitution may have occurred due to the inherent limitations of voice recognition software  Please read the chart carefully and recognize, using context, where substitution may have occurred  For further clarification, please contact me directly

## 2023-02-17 ENCOUNTER — APPOINTMENT (OUTPATIENT)
Dept: LAB | Facility: CLINIC | Age: 53
End: 2023-02-17

## 2023-02-17 DIAGNOSIS — C64.1 CLEAR CELL CARCINOMA OF RIGHT KIDNEY (HCC): ICD-10-CM

## 2023-02-17 LAB
BASOPHILS # BLD AUTO: 0.04 THOUSANDS/ÂΜL (ref 0–0.1)
BASOPHILS NFR BLD AUTO: 1 % (ref 0–1)
EOSINOPHIL # BLD AUTO: 0.13 THOUSAND/ÂΜL (ref 0–0.61)
EOSINOPHIL NFR BLD AUTO: 3 % (ref 0–6)
ERYTHROCYTE [DISTWIDTH] IN BLOOD BY AUTOMATED COUNT: 13.2 % (ref 11.6–15.1)
HCT VFR BLD AUTO: 45.7 % (ref 36.5–49.3)
HGB BLD-MCNC: 15.3 G/DL (ref 12–17)
IMM GRANULOCYTES # BLD AUTO: 0.01 THOUSAND/UL (ref 0–0.2)
IMM GRANULOCYTES NFR BLD AUTO: 0 % (ref 0–2)
LYMPHOCYTES # BLD AUTO: 1.32 THOUSANDS/ÂΜL (ref 0.6–4.47)
LYMPHOCYTES NFR BLD AUTO: 31 % (ref 14–44)
MCH RBC QN AUTO: 29.1 PG (ref 26.8–34.3)
MCHC RBC AUTO-ENTMCNC: 33.5 G/DL (ref 31.4–37.4)
MCV RBC AUTO: 87 FL (ref 82–98)
MONOCYTES # BLD AUTO: 0.46 THOUSAND/ÂΜL (ref 0.17–1.22)
MONOCYTES NFR BLD AUTO: 11 % (ref 4–12)
NEUTROPHILS # BLD AUTO: 2.26 THOUSANDS/ÂΜL (ref 1.85–7.62)
NEUTS SEG NFR BLD AUTO: 54 % (ref 43–75)
NRBC BLD AUTO-RTO: 0 /100 WBCS
PLATELET # BLD AUTO: 157 THOUSANDS/UL (ref 149–390)
PMV BLD AUTO: 10.5 FL (ref 8.9–12.7)
RBC # BLD AUTO: 5.25 MILLION/UL (ref 3.88–5.62)
WBC # BLD AUTO: 4.22 THOUSAND/UL (ref 4.31–10.16)

## 2023-02-18 LAB
ALBUMIN SERPL BCP-MCNC: 3.9 G/DL (ref 3.5–5)
ALP SERPL-CCNC: 93 U/L (ref 46–116)
ALT SERPL W P-5'-P-CCNC: 49 U/L (ref 12–78)
ANION GAP SERPL CALCULATED.3IONS-SCNC: 5 MMOL/L (ref 4–13)
AST SERPL W P-5'-P-CCNC: 32 U/L (ref 5–45)
BILIRUB SERPL-MCNC: 0.61 MG/DL (ref 0.2–1)
BUN SERPL-MCNC: 18 MG/DL (ref 5–25)
CALCIUM SERPL-MCNC: 9.5 MG/DL (ref 8.3–10.1)
CHLORIDE SERPL-SCNC: 105 MMOL/L (ref 96–108)
CO2 SERPL-SCNC: 27 MMOL/L (ref 21–32)
CREAT SERPL-MCNC: 1.41 MG/DL (ref 0.6–1.3)
GFR SERPL CREATININE-BSD FRML MDRD: 56 ML/MIN/1.73SQ M
GLUCOSE P FAST SERPL-MCNC: 88 MG/DL (ref 65–99)
POTASSIUM SERPL-SCNC: 4.3 MMOL/L (ref 3.5–5.3)
PROT SERPL-MCNC: 7.2 G/DL (ref 6.4–8.4)
SODIUM SERPL-SCNC: 137 MMOL/L (ref 135–147)
T3FREE SERPL-MCNC: 2.27 PG/ML (ref 2.3–4.2)
T4 FREE SERPL-MCNC: 0.75 NG/DL (ref 0.76–1.46)
TSH SERPL DL<=0.05 MIU/L-ACNC: 6.56 UIU/ML (ref 0.45–4.5)

## 2023-02-21 ENCOUNTER — HOSPITAL ENCOUNTER (OUTPATIENT)
Dept: INFUSION CENTER | Facility: HOSPITAL | Age: 53
Discharge: HOME | End: 2023-02-21
Attending: INTERNAL MEDICINE

## 2023-02-21 VITALS
OXYGEN SATURATION: 97 % | SYSTOLIC BLOOD PRESSURE: 139 MMHG | BODY MASS INDEX: 24.85 KG/M2 | DIASTOLIC BLOOD PRESSURE: 92 MMHG | RESPIRATION RATE: 18 BRPM | WEIGHT: 183.2 LBS | HEART RATE: 73 BPM | TEMPERATURE: 96 F

## 2023-02-21 DIAGNOSIS — C64.1 CLEAR CELL CARCINOMA OF RIGHT KIDNEY (HCC): Primary | ICD-10-CM

## 2023-02-21 RX ORDER — SODIUM CHLORIDE 9 MG/ML
20 INJECTION, SOLUTION INTRAVENOUS ONCE
Status: COMPLETED | OUTPATIENT
Start: 2023-02-21 | End: 2023-02-21

## 2023-02-21 RX ADMIN — SODIUM CHLORIDE 20 ML/HR: 0.9 INJECTION, SOLUTION INTRAVENOUS at 11:05

## 2023-02-21 RX ADMIN — SODIUM CHLORIDE 200 MG: 9 INJECTION, SOLUTION INTRAVENOUS at 11:06

## 2023-02-27 ENCOUNTER — OFFICE VISIT (OUTPATIENT)
Dept: FAMILY MEDICINE CLINIC | Facility: CLINIC | Age: 53
End: 2023-02-27

## 2023-02-27 VITALS
HEIGHT: 72 IN | BODY MASS INDEX: 25.27 KG/M2 | TEMPERATURE: 97.3 F | SYSTOLIC BLOOD PRESSURE: 130 MMHG | WEIGHT: 186.6 LBS | RESPIRATION RATE: 16 BRPM | HEART RATE: 74 BPM | DIASTOLIC BLOOD PRESSURE: 88 MMHG

## 2023-02-27 DIAGNOSIS — Z90.5 H/O RIGHT NEPHRECTOMY: ICD-10-CM

## 2023-02-27 DIAGNOSIS — C64.1 CLEAR CELL CARCINOMA OF RIGHT KIDNEY (HCC): ICD-10-CM

## 2023-02-27 DIAGNOSIS — E03.9 HYPOTHYROIDISM, UNSPECIFIED TYPE: Primary | ICD-10-CM

## 2023-02-27 RX ORDER — LEVOTHYROXINE SODIUM 0.03 MG/1
25 TABLET ORAL
Qty: 30 TABLET | Refills: 3 | Status: SHIPPED | OUTPATIENT
Start: 2023-02-27

## 2023-02-27 NOTE — PROGRESS NOTES
Assessment/Plan:    1  Hypothyroidism, unspecified type  Assessment & Plan:  Axitinib-induced   Start levoxyl 25mcg daily--rpt TSH in 6-8 wks    Orders:  -     levothyroxine (Levoxyl) 25 mcg tablet; Take 1 tablet (25 mcg total) by mouth daily in the early morning    2  Clear cell carcinoma of right kidney [facility])  Assessment & Plan:  Follows w Oncologist--Dr Hilton De Oliveira and Urologist-Dr Glenis Zaragoza  3  H/O right nephrectomy    4  BMI 35 0-35 9,adult        Depression Screening and Follow-up Plan: Patient was screened for depression during today's encounter  They screened negative with a PHQ-2 score of 0  Subjective:      Patient ID: Marylene Franklin is a 45 y o  male  Chief Complaint   Patient presents with   • Results     Review labs of thyroid testing        HPI  In to discuss recent abnormal thyroid function test  Currently on axitinib for treatment renal cancer  TSH=6 560 (previosuly in range)  D/w pt 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  C/o fatigue, BP in range    The following portions of the patient's history were reviewed and updated as appropriate: allergies, current medications, past family history, past medical history, past social history, past surgical history and problem list     Review of Systems   Constitutional: Positive for activity change, appetite change and fatigue  Negative for fever  Cardiovascular: Negative for chest pain  Gastrointestinal: Positive for diarrhea  Musculoskeletal: Positive for back pain  Psychiatric/Behavioral: Positive for sleep disturbance  The patient is nervous/anxious  Current Outpatient Medications   Medication Sig Dispense Refill   • Ammonium Lactate 10 % LOTN Apply topically 2 (two) times a day (Patient taking differently: Apply topically 2 (two) times a day as needed) 473  12 mL 1   • clotrimazole-betamethasone (LOTRISONE) 1-0 05 % cream Apply topically 2 (two) times a day (Patient taking differently: Apply topically 2 (two) times a day as needed) 45 g 1   • levothyroxine (Levoxyl) 25 mcg tablet Take 1 tablet (25 mcg total) by mouth daily in the early morning 30 tablet 3   • axitinib (Inlyta) 5 MG TABS take 1 tablet (5mg) by mouth twice a day 30 tablet 10   • gabapentin (Neurontin) 300 mg capsule Take 2 capsules (600 mg total) by mouth daily at bedtime 60 capsule 2   • oxyCODONE (Roxicodone) 5 immediate release tablet Take 1 tablet (5 mg total) by mouth every 4 (four) hours as needed for moderate pain or severe pain Max Daily Amount: 30 mg 60 tablet 0   • polyethylene glycol (MIRALAX) 17 g packet Take 17 g by mouth daily       No current facility-administered medications for this visit  Objective:    /88 (BP Location: Left arm, Patient Position: Sitting, Cuff Size: Large)   Pulse 74   Temp (!) 97 3 °F (36 3 °C)   Resp 16   Ht 6' (1 829 m)   Wt 84 6 kg (186 lb 9 6 oz)   BMI 25 31 kg/m²        Physical Exam  Vitals and nursing note reviewed  Constitutional:       General: He is not in acute distress  Cardiovascular:      Rate and Rhythm: Normal rate and regular rhythm  Pulmonary:      Effort: Pulmonary effort is normal  No respiratory distress  Breath sounds: Normal breath sounds  Musculoskeletal:      Cervical back: Neck supple  No tenderness  Neurological:      Mental Status: He is alert and oriented to person, place, and time     Psychiatric:         Mood and Affect: Mood normal            Tk Desir MD

## 2023-03-07 ENCOUNTER — OFFICE VISIT (OUTPATIENT)
Dept: PALLIATIVE MEDICINE | Facility: CLINIC | Age: 53
End: 2023-03-07

## 2023-03-07 ENCOUNTER — OFFICE VISIT (OUTPATIENT)
Dept: HEMATOLOGY ONCOLOGY | Facility: CLINIC | Age: 53
End: 2023-03-07

## 2023-03-07 VITALS
OXYGEN SATURATION: 98 % | SYSTOLIC BLOOD PRESSURE: 120 MMHG | TEMPERATURE: 98.7 F | DIASTOLIC BLOOD PRESSURE: 82 MMHG | HEART RATE: 73 BPM | WEIGHT: 184.6 LBS | BODY MASS INDEX: 25.04 KG/M2

## 2023-03-07 VITALS
RESPIRATION RATE: 18 BRPM | OXYGEN SATURATION: 98 % | HEART RATE: 73 BPM | WEIGHT: 185 LBS | DIASTOLIC BLOOD PRESSURE: 82 MMHG | BODY MASS INDEX: 25.06 KG/M2 | HEIGHT: 72 IN | SYSTOLIC BLOOD PRESSURE: 120 MMHG

## 2023-03-07 DIAGNOSIS — Z90.5 H/O RIGHT NEPHRECTOMY: ICD-10-CM

## 2023-03-07 DIAGNOSIS — E03.9 HYPOTHYROIDISM, UNSPECIFIED TYPE: ICD-10-CM

## 2023-03-07 DIAGNOSIS — C78.00 MALIGNANT NEOPLASM METASTATIC TO LUNG, UNSPECIFIED LATERALITY (HCC): Primary | ICD-10-CM

## 2023-03-07 DIAGNOSIS — Z71.89 ADVANCED CARE PLANNING/COUNSELING DISCUSSION: ICD-10-CM

## 2023-03-07 DIAGNOSIS — R10.9 ABDOMINAL PAIN: ICD-10-CM

## 2023-03-07 DIAGNOSIS — R19.7 DIARRHEA: ICD-10-CM

## 2023-03-07 DIAGNOSIS — R63.0 LOSS OF APPETITE: ICD-10-CM

## 2023-03-07 DIAGNOSIS — Z51.5 PALLIATIVE CARE PATIENT: ICD-10-CM

## 2023-03-07 DIAGNOSIS — C64.1 CLEAR CELL CARCINOMA OF RIGHT KIDNEY (HCC): ICD-10-CM

## 2023-03-07 DIAGNOSIS — C78.00 MALIGNANT NEOPLASM METASTATIC TO LUNG, UNSPECIFIED LATERALITY (HCC): ICD-10-CM

## 2023-03-07 DIAGNOSIS — F41.9 ANXIOUSNESS: ICD-10-CM

## 2023-03-07 DIAGNOSIS — R45.89 DYSPHORIC MOOD: ICD-10-CM

## 2023-03-07 DIAGNOSIS — G89.28 CHRONIC POST-OPERATIVE PAIN: ICD-10-CM

## 2023-03-07 DIAGNOSIS — C64.1 CLEAR CELL CARCINOMA OF RIGHT KIDNEY (HCC): Primary | ICD-10-CM

## 2023-03-07 DIAGNOSIS — R59.0 RETROPERITONEAL LYMPHADENOPATHY: ICD-10-CM

## 2023-03-07 PROBLEM — R63.8 ALTERATION IN APPETITE: Status: ACTIVE | Noted: 2023-03-07

## 2023-03-07 RX ORDER — OXYCODONE HYDROCHLORIDE 5 MG/1
5 TABLET ORAL EVERY 4 HOURS PRN
Qty: 60 TABLET | Refills: 0 | Status: SHIPPED | OUTPATIENT
Start: 2023-03-07

## 2023-03-07 RX ORDER — SODIUM CHLORIDE 9 MG/ML
20 INJECTION, SOLUTION INTRAVENOUS ONCE
Status: CANCELLED | OUTPATIENT
Start: 2023-03-14

## 2023-03-07 RX ORDER — GABAPENTIN 300 MG/1
600 CAPSULE ORAL
Qty: 60 CAPSULE | Refills: 2 | Status: SHIPPED | OUTPATIENT
Start: 2023-03-07

## 2023-03-07 NOTE — PROGRESS NOTES
Follow-up with Palliative and Supportive Care  Rosa Dawson 45 y o  male [id number]    ASSESSMENT & PLAN:  1  Clear cell carcinoma of right kidney ([address] )    2  Malignant neoplasm metastatic to lung, unspecified laterality (HCC)    3  Retroperitoneal lymphadenopathy    4  H/O right nephrectomy    5  Abdominal pain    6  Anxiousness    7  Dysphoric mood    8  Diarrhea    9  Chronic post-operative pain    10  Loss of appetite    11  Palliative care patient    12  Advanced care planning/counseling discussion          • Continue disease-directed cares w/o limit  • ACP: Separate 20 minutes spent reviewing and completing advanced directives (the [facility] Advanced Directive)  Counseling provided  All questions answered  Document signed and witnessed and will be scanned into the chart  • Patient endorses ongoing R-sided abdominal pain which can be severe  This may be related to malignancy but is more likely s/t surgery as there seems to be a neuropathic component  RP lymphadenopathy may also play a role   o Recommended patient consider topical OTC products, local application of heat or cold, Tylenol up to 1000mg TID for chronic pain  Do not use heat on top of topical agents  o Patient is getting a few hours of relief from topical MMJ products (CBD+THC lotion), legally available recreationally in [place]  May continue  Counseled  Do not use systemic MJ products within 1-2 hours of opioids  o Continue oxyIR 5mg q4h PRN  o Increase gabapentin to 600mg QHS  Recommend once-daily dosing given kidney issues, would not escalate past 600mg daily at this time  o Patient has signed our opioid agreement  • Patient may use Zofran PRN N/V   • Patient endorses ongoing anxiousness and dysphoric mood but again declines offer of medications for this  He also declines offer of appetite stimulant  • Counseled on bowel regimen for diarrhea   Patient states he will be meeting w/ Gastroenterology soon and is hopeful they can resolve his GI issues  • Reviewed notes (Urology, PCP), labs (2/17/23 Cr 1 41, alb 3 9, WBC 4 22, Hb 15 3, eGFR 56, TSH 6 560, fT4 0 75, fT3 2 27), imaging + procedures (1/31/23 MRI abdomen showing retrocaval nodes at site of nephrectomy)  Return in about 1 month (around 4/7/2023)  • Emotional support provided  • Medication safety issues addressed - no driving under the influence of narcotics (including opioids), watch for adverse effects including AMS or respiratory depression (slowed breathing), keep medications stored in a safe/locked environment, do not use alcohol while opioids or other narcotics are in one's system  Requested Prescriptions     Signed Prescriptions Disp Refills   • gabapentin (Neurontin) 300 mg capsule 60 capsule 2     Sig: Take 2 capsules (600 mg total) by mouth daily at bedtime   • oxyCODONE (Roxicodone) 5 immediate release tablet 60 tablet 0     Sig: Take 1 tablet (5 mg total) by mouth every 4 (four) hours as needed for moderate pain or severe pain Max Daily Amount: 30 mg       Medications Discontinued During This Encounter   Medication Reason   • gabapentin (Neurontin) 300 mg capsule Reorder       Representatives have queried the patient's controlled substance dispensing history in the Prescription Drug Monitoring Program in compliance with regulations before I have prescribed any controlled substances  The prescription history is consistent with prescribed therapy and our practice policies  25+ minutes (separate from and in addition to ACP time detailed above) were spent in this ambulatory visit with greater than 50% of the time spent face to face with patient and his wife Katie Flores in counseling or coordination of care including symptom assessment and management, medication review, medication adjustment, psychosocial support, chart review, imaging review, lab review, advanced directives, goals of care, medical marijuana, supportive listening and anticipatory guidance   All of the patient's questions were answered during this discussion  SUBJECTIVE:  Chief Complaint   Patient presents with   • Cancer   • Cancer Pain   • Anxiety   • Depression   • Counseling   • Follow-up        HPI    Angelic Sheppard is a 45 y o  male w/ clear cell carcinoma of the right kidney w/ RP lymph node involvement, w/ lung metastases, s/p angioembolization + R radical nephrectomy 11/21/22, on pembrolizumab + axitinib; liver lesions (likely benign hemangioma); abdominal pain (which may be cancer- or procedure-related), constipation, h/o babesiosis, h/o Meniere disease, h/o vertigo  He follows w/ Dr Lance King (Medical Oncology), Dr Melvin Boykin (Urology)  Patient is known to [facility]; seen in consult 2/7/23 for symptom assessment and management, medication review, medication adjustment, psychosocial support, chart review, imaging review, lab review, advanced directives, goals of care, medical marijuana, supportive listening and anticipatory guidance  Patient reports his abdominal pain continues, and though it can be severe at times (6/10 at time of visit) he is hesitant to use more than 1 or 2 tablets per day  He has concerns about oxyIR's potential impact on his GI issues, impact on his ability to drive, and is generally averse to taking too many medications  While he denies recent N/V, diarrhea and fluctuations in appetite have been issues since his surgery - in addition to the abdominal pain  Gabapentin has not had much of an impact on pain, though it has been tolerated well  Patient endorses some anxiousness and dysphoric mood  He does not wish to take medications for mood at this time  Patient reports he has purchased some TD MMJ from the local dispensary  This combined THC+CBD lotion provides a few hours of relief from pain (but does not last through the night)  Cost seems prohibitive  PDMP shows no concerns      The following portions of the medical history were reviewed: past medical history, surgical history, problem list, medication list, family history, and social history  Current Outpatient Medications:   •  Ammonium Lactate 10 % LOTN, Apply topically 2 (two) times a day, Disp: 473  12 mL, Rfl: 1  •  axitinib (Inlyta) 5 MG TABS, One tablet q 12hr, Disp: 60 tablet, Rfl: 3  •  clotrimazole-betamethasone (LOTRISONE) 1-0 05 % cream, Apply topically 2 (two) times a day, Disp: 45 g, Rfl: 1  •  gabapentin (Neurontin) 300 mg capsule, Take 2 capsules (600 mg total) by mouth daily at bedtime, Disp: 60 capsule, Rfl: 2  •  levothyroxine (Levoxyl) 25 mcg tablet, Take 1 tablet (25 mcg total) by mouth daily in the early morning, Disp: 30 tablet, Rfl: 3  •  oxyCODONE (Roxicodone) 5 immediate release tablet, Take 1 tablet (5 mg total) by mouth every 4 (four) hours as needed for moderate pain or severe pain Max Daily Amount: 30 mg, Disp: 60 tablet, Rfl: 0  •  polyethylene glycol (MIRALAX) 17 g packet, Take 17 g by mouth daily (Patient not taking: Reported on 2/27/2023), Disp: , Rfl:     Review of Systems   Constitutional: Positive for activity change, appetite change (d/t pain), fatigue and unexpected weight change (but defending his weight in recent weeks)  Gastrointestinal: Positive for abdominal pain and diarrhea  Negative for constipation, nausea and vomiting  Allergic/Immunologic: Positive for immunocompromised state  Psychiatric/Behavioral: Positive for dysphoric mood and sleep disturbance (d/t pain)  The patient is nervous/anxious  All other systems reviewed and are negative  OBJECTIVE:  /82 (BP Location: Left arm, Patient Position: Sitting, Cuff Size: Standard)   Pulse 73   Temp 98 7 °F (37 1 °C)   Wt 83 7 kg (184 lb 9 6 oz)   SpO2 98%   BMI 25 04 kg/m²   Physical Exam  Vitals reviewed  Constitutional:       General: He is not in acute distress  Appearance: He is well-developed, well-groomed and normal weight  He is not toxic-appearing  HENT:      Head: Normocephalic and atraumatic  Right Ear: External ear normal       Left Ear: External ear normal    Eyes:      General: No scleral icterus  Right eye: No discharge  Left eye: No discharge  Extraocular Movements: Extraocular movements intact  Conjunctiva/sclera: Conjunctivae normal       Pupils: Pupils are equal, round, and reactive to light  Cardiovascular:      Rate and Rhythm: Normal rate  Pulmonary:      Effort: Pulmonary effort is normal  No tachypnea, bradypnea, accessory muscle usage or respiratory distress  Comments: Able to speak comfortably in complete sentences on room air at rest   Abdominal:      General: There is no distension  Tenderness: There is no guarding  Musculoskeletal:      Cervical back: Normal range of motion  Right lower leg: No edema  Left lower leg: No edema  Skin:     General: Skin is dry  Coloration: Skin is not pale  Neurological:      Mental Status: He is alert and oriented to person, place, and time  Cranial Nerves: No dysarthria or facial asymmetry  Gait: Gait is intact  Psychiatric:         Attention and Perception: Attention normal          Mood and Affect: Mood is anxious  Affect is not inappropriate  Speech: Speech normal          Behavior: Behavior normal  Behavior is cooperative  Thought Content: Thought content normal          Cognition and Memory: Cognition and memory normal          Judgment: Judgment normal           Zehra Crowley MD  [facility]  [phone]    Portions of this document may have been created using dictation software and as such some "sound alike" terms may have been generated by the system  Do not hesitate to contact me with any questions or clarifications

## 2023-03-07 NOTE — PATIENT INSTRUCTIONS
It was good to see you today  Thank you for coming in  If diarrhea is an issue:  Try Bananatrol (banana flakes)  Use as directed / as-needed for diarrhea  This is something you may safely take every day  If you become constipated you may wish to stop using it, although it can treat both constipation and diarrhea in some patients  This available over-the-counter at some pharmacies, but you may have more success looking online ([employer])  Try a probiotic  This could be yogurt or kefir, or fermented beverages such as kombucha, but probiotics are also available in capsule form  Aim for 10-15 billion colony-forming-units, w/ bacteria such as Lactobacillus / Saccharomyces / Actinomyces  Stay hydrated! If needed, it is okay to take Imodium for diarrhea  Use as directed, available over-the-counter  Continue other medications  Thank you for completing the the [facility] Advanced Directive document  After it is signed and witnessed we will scan it in to the system  Return in about 1 month  Call us for refills on medications that we supply, as needed  If something changes and you need to come in sooner, please call our office  PRESCRIPTION REFILL REMINDER:  All medication refills should be requested prior to [facility] on Friday  Any refill requests after noon on Friday would be addressed the following Monday  MEDICATION SAFETY ISSUES:  Do not drive under the influence of narcotics (including opioids), watch for adverse effects including confusion / altered mental status / respiratory depression (slowed breathing), keep medications stored in a safe/locked environment, do not use alcohol while opioids or other narcotics are in your system

## 2023-03-07 NOTE — PROGRESS NOTES
Eric Elaine  1970  [address] [facility] [place]  [address] [facility]  [place] [address]    DISCUSSION/SUMMARY:    24-year-old male initially diagnosed with pT2b pN1 (1/1) cM0 G3 R0 = stage III clear-cell renal cell carcinoma s/p resection  Mr Carrie Mijares was seen a few weeks ago in consultation  The plan was to perform a CAT scan of the chest and MRI of the liver (questionable liver lesions)  The CAT scan of the chest was recently completed and demonstrated multiple pulmonary nodules, sizes ranging from 0 3 cm to 0 9 cm  There was also concern for enlarging retroperitoneal lymph nodes  Patient/wife understand that the stage is now IV, metastatic  Options were discussed and patient is now on pembrolizumab and axitinib  Patient feels well and clinically there are no concerning findings  Recent blood work was okay/acceptable  Recent repeat MRI of the liver did not demonstrate any clear and obvious liver metastases  The plan is to continue with the above regimen  Patient will follow-up with GI as directed  Patient will also follow-up with  as directed  Patient will monitor for worsening abdominal pain  From an oncology standpoint, patient should continue to be out of work  Patient is to return in 6 weeks  Patient knows to call the hematology/oncology office if there are any other questions or concerns  Carefully review your medication list and verify that the list is accurate and up-to-date  Please call the hematology/oncology office if there are medications missing from the list, medications on the list that you are not currently taking or if there is a dosage or instruction that is different from how you're taking that medication      Patient goals and areas of care: Continue with pembrolizumab and axitinib  Barriers to care: None  Patient is able to self-care   ______________________________________________________________________________________    Chief Complaint   Patient presents with   • Follow-up   • Metastatic renal cell carcinoma     History of Present Illness: 49-year-old male with relatively good general health presenting to the emergency room recently with abrupt shortness of breath and dyspnea on exertion  CTA/chest did not demonstrate any PE but patient was found to have an incidental right renal mass  Work-up was initiated; this included a urology evaluation  Mr Tae Roe was found to have clear-cell renal cell carcinoma and underwent planned preoperative angioembolization with subsequent right radical nephrectomy via chevron incision  Patient was subsequently discharged and referred to oncology for evaluation  Recent CAT scan of the chest demonstrated a number of new pulmonary nodules distant with metastatic disease  Options were discussed and patient is now on axitinib and pembrolizumab  Mr Tae Roe is feeling okay, baseline  No nausea or vomiting, still with abdominal pain but no different than before  Still with diarrhea, also no different than before, diarrhea predates the cancer diagnosis  No GI bleeding  No  issues  No fevers or signs of infection no respiratory issues  No problems with a rash  Recent TSH was found to be elevated, patient is now on Synthroid  Review of Systems   Constitutional: Positive for fatigue  HENT: Negative  Eyes: Negative  Respiratory: Negative  Cardiovascular: Negative  Gastrointestinal: Negative  Abdominal discomfort   Endocrine: Negative  Genitourinary: Negative  Musculoskeletal: Negative  Skin: Negative  Allergic/Immunologic: Negative  Neurological: Negative  Hematological: Negative  Psychiatric/Behavioral: The patient is nervous/anxious  All other systems reviewed and are negative      Patient Active Problem List   Diagnosis   • Hyperlipidemia   • Meniere disease, left   • History of anesthesia complications   • Right renal mass   • Clear cell carcinoma of right kidney [facility])   • Annual physical exam   • Fungal dermatitis   • Dry skin dermatitis   • RBBB   • BMI 25 0-25 9,adult   • H/O right nephrectomy   • Malignant neoplasm metastatic to lung [facility])   • Palliative care patient   • Abdominal pain   • Constipation   • Anxiousness   • Dysphoric mood   • Loss of appetite   • Retroperitoneal lymphadenopathy   • Hypothyroidism   • BMI 35 0-35 9,adult   • Alteration in appetite   • Chronic post-operative pain     Past Medical History:   Diagnosis Date   • Arthralgia     last assessed 02/10/2015   • Babesiosis     last assessed 14   • Benign paroxysmal vertigo     last assessed 05/22/15   • Meniere disease     last assessed 04/15/13   • Pneumonia of left lower lobe due to infectious organism     last assessed 16   • Vitamin D deficiency     last assessed 14     Past Surgical History:   Procedure Laterality Date   • FIBULA FRACTURE SURGERY     • IR RENAL ANGIOGRAM  2022   • CA NEPHRECTOMY W/PRTL URETERECT OPEN RIB RESCJ RAD Right 2022    Procedure: NEPHRECTOMY ABDOMINAL APPROACH;  Surgeon: Cristin Thompson MD;  [facility];  Service: Urology   • TIBIA FRACTURE SURGERY     • TONSILLECTOMY       Family History   Problem Relation Age of Onset   • Meniere's disease Mother    • Cancer Father         ?renal/bladder?    • No Known Problems Family      Social History     Socioeconomic History   • Marital status: /Civil Union     Spouse name: Not on file   • Number of children: Not on file   • Years of education: Not on file   • Highest education level: Not on file   Occupational History   • Not on file   Tobacco Use   • Smoking status: Former     Types: Cigarettes     Start date: 1984     Quit date: 2022     Years since quittin 4   • Smokeless tobacco: Never   • Tobacco comments:     Quit 2 mos ago Vaping Use   • Vaping Use: Never used   Substance and Sexual Activity   • Alcohol use: Not Currently     Comment: social   • Drug use: No   • Sexual activity: Yes   Other Topics Concern   • Not on file   Social History Narrative    Daily coffee consumption 6 cups/day    Wife: Jenifer        From 11/19/22  note:    Primary Care Provider: Gómez Goldstein MD    Primary Insurance: [employer] Proxies: There are no active Health Care Proxies on file  Primary Caregiver: Self    Support Systems: Self, Spouse/significant other    [place] of Residence: [address] [employer]  do you live in?: [address] access options  Select all that apply : Stairs    Number of steps to enter home : 3    Type of Current Residence: 2 story home    Living Arrangements: Lives w/ Spouse/significant other    Functional Status: Independent    Completes ADLs independently?: Yes    Ambulates independently?: Yes    Does patient use assisted devices?: No    Does patient currently own DME?: No     Social Determinants of Health     Financial Resource Strain: Not on file   Food Insecurity: Not on file   Transportation Needs: Not on file   Physical Activity: Not on file   Stress: Not on file   Social Connections: Not on file   Intimate Partner Violence: Not on file   Housing Stability: Not on file       Current Outpatient Medications:   •  Ammonium Lactate 10 % LOTN, Apply topically 2 (two) times a day, Disp: 473  12 mL, Rfl: 1  •  axitinib (Inlyta) 5 MG TABS, One tablet q 12hr, Disp: 60 tablet, Rfl: 3  •  clotrimazole-betamethasone (LOTRISONE) 1-0 05 % cream, Apply topically 2 (two) times a day, Disp: 45 g, Rfl: 1  •  gabapentin (Neurontin) 300 mg capsule, Take 2 capsules (600 mg total) by mouth daily at bedtime, Disp: 60 capsule, Rfl: 2  •  levothyroxine (Levoxyl) 25 mcg tablet, Take 1 tablet (25 mcg total) by mouth daily in the early morning, Disp: 30 tablet, Rfl: 3  •  oxyCODONE (Roxicodone) 5 immediate release tablet, Take 1 tablet (5 mg total) by mouth every 4 (four) hours as needed for moderate pain or severe pain Max Daily Amount: 30 mg, Disp: 60 tablet, Rfl: 0  •  polyethylene glycol (MIRALAX) 17 g packet, Take 17 g by mouth daily (Patient not taking: Reported on 2/27/2023), Disp: , Rfl:     No Known Allergies    Vitals:    03/07/23 1405   BP: 120/82   Pulse: 73   Resp: 18   SpO2: 98%     Physical Exam  Constitutional:       Appearance: He is well-developed  HENT:      Head: Normocephalic and atraumatic  Right Ear: External ear normal       Left Ear: External ear normal    Eyes:      Conjunctiva/sclera: Conjunctivae normal       Pupils: Pupils are equal, round, and reactive to light  Cardiovascular:      Rate and Rhythm: Normal rate and regular rhythm  Heart sounds: Normal heart sounds  Pulmonary:      Effort: Pulmonary effort is normal       Breath sounds: Normal breath sounds  Abdominal:      General: Bowel sounds are normal       Palpations: Abdomen is soft  Musculoskeletal:         General: Normal range of motion  Cervical back: Normal range of motion and neck supple  Skin:     General: Skin is warm  Neurological:      Mental Status: He is alert and oriented to person, place, and time  Deep Tendon Reflexes: Reflexes are normal and symmetric  Psychiatric:         Behavior: Behavior normal          Thought Content:  Thought content normal          Judgment: Judgment normal      Extremities: No lower extreme edema bilaterally, no cords, pulses are 1+    Labs    2/17/2023 WBC = 4 2 hemoglobin = 15 3 hematocrit = 45 7 platelet = 033 neutrophil = 54% TSH = 6 560 BUN = 18 creatinine = 1 41 calcium = 9 5 LFTs WNL    11/23/2022 WBC = 7 62 hemoglobin = 12 1 hematocrit = 37 8 platelet = 762  99/18/8647 BUN = 17 creatinine = 0 98 calcium = 9 8 AST = 29 ALT = 46 alkaline phosphatase = 95 total protein = 7 3 total bilirubin = 0 81    Imaging    1/31/2023 MRI abdomen with and without contrast    IMPRESSION:     Mildly enlarged retrocaval nodes highly suspicious for metastatic disease, unchanged from recent CT of January 25, 2023      Bibasilar pulmonary nodules highly suspicious for pulmonary metastatic disease better evaluated on recent chest CT      Small hepatic lesions, difficult to evaluate due to respiratory motion artifact but not significantly changed in size or MR imaging characteristics when compared to November 9, 2022, most consistent with benign hemangiomata  1/12/2023 CAT scan of the chest without contrast    LUNGS:    Mild emphysema      Numerous, new pulmonary nodules in all lobes ranging in size from approximately 0 3 cm (3/68, peripheral right lower lobe) to 0 9 cm (3/82, medial left upper lobe)  4 0 cm ascending aorta is top normal in caliber  No intramural hematoma  Two enlarged right retrocaval/retroperitoneal lymph nodes have increased in size  1 1 and 1 3 cm short axis diameter (previously 0 6 to 0 8 cm respectively)  IMPRESSION:     Pulmonary metastases  Increased size of right retroperitoneal lymph nodes compared to 11/7/2022, suspicious for metastasis  11/9/2022 MRI abdomen    IMPRESSION:     1   Large exophytic right lower pole heterogeneously enhancing renal mass extending into the renal sinus consistent with renal cell carcinoma  There are retroperitoneal collaterals posteriorly which drain into the IVC though no evidence of right renal   vein thrombosis  No hydronephrosis      2  Borderline paracaval lymph nodes, likely early lymph node metastasis      3   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  However, given primary malignancy and atypical features, a short interval follow-up MRI in 3 months is recommended to exclude metastasis  11/7/2022 CTA chest PE study    IMPRESSION:     No pulmonary embolus  Mild patchy peripheral lower lobe groundglass opacity  Covid not excluded although not the classic appearance    Consider aspiration versus other infectious/inflammatory etiology  Partially imaged large right renal mass  See abdomen CT  Pathology    Case Report   Surgical Pathology Report                         Case: [id number]                                    Authorizing Provider: Joaquin Davis MD        Collected:           11/21/2022 1213               Ordering Location:     [address]      Received:            11/21/2022 2301                                      [facility] Operating Room                                                       Pathologist:           Silvia Evans MD                                                                  Specimen:    Kidney, Right                                                                              Final Diagnosis   A  Kidney, Right, nephrectomy:  - Clear cell renal cell carcinoma  See comment and synoptic report  - One lymph node positive for metastatic carcinoma (1/1)     Comment: Immunohistochemistry is diffusely positive in the tumor cells for PAX8 and carbonic anhydrase IX  CK7 and P504S have non-specific staining  The findings are consistent with the diagnosis      8th Ed AJCC Tumor Stage:  at least Stage III - pT2b, pN1, G3    Representative tumor block: A7   Electronically signed by Cynthia Navarro MD on 12/5/2022 at 10:16 AM     Synoptic Checklist   KIDNEY: Nephrectomy  8th Edition - Protocol posted: 6/30/2021  KIDNEY: NEPHRECTOMY, PARTIAL OR RADICAL - All Specimens  SPECIMEN   Procedure  Total nephrectomy    Specimen Laterality  Right    TUMOR   Tumor Focality  Unifocal    Tumor Size  Greatest Dimension (Centimeters): 13 5 cm   Histologic Type  Clear cell renal cell carcinoma    Histologic Grade (WHO / ISUP)  G3 (nucleoli conspicuous and eosinophilic at 790X magnification)    Tumor Extent  Limited to kidney    Sarcomatoid Features  Not identified    Rhabdoid Features  Not identified    Tumor Necrosis  Present    Percentage of Tumor Necrosis  30 %   Lymphovascular Invasion  Not identified    MARGINS   Margin Status  All margins negative for invasive carcinoma    REGIONAL LYMPH NODES   Regional Lymph Node Status  Tumor present in regional lymph node(s)    Number of Lymph Nodes with Tumor  1    Jabari Site(s) with Tumor  Hilar    Size of Largest Jabari Metastatic Deposit  0 3 cm   Number of Lymph Nodes Examined  1    PATHOLOGIC STAGE CLASSIFICATION (pTNM, AJCC 8th Edition)   Reporting of pT, pN, and (when applicable) pM categories is based on information available to the pathologist at the time the report is issued  As per the AJCC (Chapter 1, 8th Ed ) it is the managing physician’s responsibility to establish the final pathologic stage based upon all pertinent information, including but potentially not limited to this pathology report  Primary Tumor (pT)  pT2b    Regional Lymph Nodes (pN)  pN1    ADDITIONAL FINDINGS   Additional Findings in Nonneoplastic Kidney  Tubuloglomerular necrosis

## 2023-03-08 ENCOUNTER — OFFICE VISIT (OUTPATIENT)
Dept: GASTROENTEROLOGY | Facility: CLINIC | Age: 53
End: 2023-03-08

## 2023-03-08 VITALS
HEIGHT: 72 IN | SYSTOLIC BLOOD PRESSURE: 148 MMHG | WEIGHT: 185 LBS | DIASTOLIC BLOOD PRESSURE: 104 MMHG | BODY MASS INDEX: 25.06 KG/M2 | HEART RATE: 68 BPM

## 2023-03-08 DIAGNOSIS — Z12.12 ENCOUNTER FOR COLORECTAL CANCER SCREENING: ICD-10-CM

## 2023-03-08 DIAGNOSIS — Z12.11 ENCOUNTER FOR COLORECTAL CANCER SCREENING: ICD-10-CM

## 2023-03-08 DIAGNOSIS — R10.13 EPIGASTRIC PAIN: Primary | ICD-10-CM

## 2023-03-08 DIAGNOSIS — R68.81 EARLY SATIETY: ICD-10-CM

## 2023-03-08 NOTE — H&P (VIEW-ONLY)
[facility] - Outpatient Consultation  Max Oar 45 y o  male MRN: [id number]  Encounter: [id number]          ASSESSMENT AND PLAN:      1  Early satiety  Patient with early satiety  Suspect that this could be related to the metastatic cancer  Does have some retroperitoneal lymphadenopathy on imaging but no mention of any adenopathy with mass effect on the GI tract  Patient may have gastroparesis which could be related to vagal nerve damage after surgery  Would recommend upper endoscopy for further evaluation of any abnormality  May need to consider gastric emptying pending his course and may benefit from Reglan  In the interim can try low residue diet and small frequent meals   - EGD; Future    2  Epigastric pain  EGD as above will be scheduled for further evaluation and we will make further recommendations at that time, advised to minimize narcotic use due to worsening of motility   - EGD; Future    3  [facility]  Patient does have stage IV metastatic kidney cancer and patient has early satiety so currently prep for colonoscopy would not be feasible    We will make further recommendations once EGD has been performed     ______________________________________________________________________    HPI:   53-year-old male initially diagnosed with pT2b pN1 (1/1) cM0 G3 R0 = stage III clear-cell renal cell carcinoma s/p resection  The plan was to perform a CAT scan of the chest and MRI of the liver (questionable liver lesions)  The CAT scan of the chest was recently completed and demonstrated multiple pulmonary nodules, sizes ranging from 0 3 cm to 0 9 cm  There was also concern for enlarging retroperitoneal lymph nodes  Patient is now on pembrolizumab and axitinib  Recent repeat MRI of the liver did not demonstrate any clear and obvious liver metastases  He presents today with epigastric pain and early satiety symptoms    Patient primarily states that the symptoms started after he had nephrectomy in November 2022 where he states that he is having trouble drinking a lot of liquids or eating a full meal he also complains of some loose stools but does not move his bowels frequently  Denies ever having an EGD or colonoscopy  Denies any difficulty swallowing  Denies any regular NSAID use  Patient thinks stools are looser since he has been taking the immunotherapy and oral medicine  REVIEW OF SYSTEMS:    CONSTITUTIONAL: Denies any fever, chills, rigors, and weight loss  HEENT: No earache or tinnitus  Denies hearing loss or visual disturbances  CARDIOVASCULAR: No chest pain or palpitations  RESPIRATORY: Denies any cough, hemoptysis, shortness of breath or dyspnea on exertion  GASTROINTESTINAL: As noted in the History of Present Illness  GENITOURINARY: No problems with urination  Denies any hematuria or dysuria  NEUROLOGIC: No dizziness or vertigo, denies headaches  MUSCULOSKELETAL: Denies any muscle or joint pain  SKIN: Denies skin rashes or itching  ENDOCRINE: Denies excessive thirst  Denies intolerance to heat or cold  PSYCHOSOCIAL: Denies depression or anxiety  Denies any recent memory loss         Historical Information   Past Medical History:   Diagnosis Date   • Arthralgia     last assessed 02/10/2015   • Babesiosis     last assessed 12/30/14   • Benign paroxysmal vertigo     last assessed 05/22/15   • Meniere disease     last assessed 04/15/13   • Pneumonia of left lower lobe due to infectious organism     last assessed 04/04/16   • Vitamin D deficiency     last assessed 12/30/14     Past Surgical History:   Procedure Laterality Date   • FIBULA FRACTURE SURGERY     • IR RENAL ANGIOGRAM  11/18/2022   • UT NEPHRECTOMY W/PRTL URETERECT OPEN RIB RESCJ RAD Right 11/21/2022    Procedure: NEPHRECTOMY ABDOMINAL APPROACH;  Surgeon: Val Bullard MD;  [facility];  Service: Urology   • TIBIA FRACTURE SURGERY     • TONSILLECTOMY       Social History   Social History     Substance and Sexual Activity   Alcohol Use Not Currently    Comment: social     Social History     Substance and Sexual Activity   Drug Use No     Social History     Tobacco Use   Smoking Status Former   • Types: Cigarettes   • Start date: 1984   • Quit date: 2022   • Years since quittin 4   Smokeless Tobacco Never   Tobacco Comments    Quit 2 mos ago     Family History   Problem Relation Age of Onset   • Meniere's disease Mother    • Cancer Father         ?renal/bladder? • No Known Problems Family        Meds/Allergies       Current Outpatient Medications:   •  Ammonium Lactate 10 % LOTN  •  axitinib (Inlyta) 5 MG TABS  •  clotrimazole-betamethasone (LOTRISONE) 1-0 05 % cream  •  gabapentin (Neurontin) 300 mg capsule  •  levothyroxine (Levoxyl) 25 mcg tablet  •  oxyCODONE (Roxicodone) 5 immediate release tablet  •  polyethylene glycol (MIRALAX) 17 g packet    No Known Allergies        Objective     Blood pressure (!) 148/104, pulse 68, height 6' (1 829 m), weight 83 9 kg (185 lb)  Body mass index is 25 09 kg/m²  PHYSICAL EXAM:      General Appearance:   Alert, cooperative, no distress   HEENT:   Normocephalic, atraumatic, anicteric      Neck:  Supple, symmetrical, trachea midline   Lungs:   Clear to auscultation bilaterally; no rales, rhonchi or wheezing; respirations unlabored    Heart[de-identified]   Regular rate and rhythm; no murmur, rub, or gallop  Abdomen:   Soft, non-tender, non-distended; normal bowel sounds; no masses, no organomegaly    Genitalia:   Deferred    Rectal:   Deferred    Extremities:  No cyanosis, clubbing or edema    Pulses:  2+ and symmetric    Skin:  No jaundice, rashes, or lesions    Lymph nodes:  No palpable cervical lymphadenopathy        Lab Results:   No visits with results within 1 Day(s) from this visit     Latest known visit with results is:   Appointment on 2023   Component Date Value   • WBC 2023 4 22 (L)    • RBC 2023 5 25    • Hemoglobin 02/17/2023 15 3    • Hematocrit 02/17/2023 45 7    • MCV 02/17/2023 87    • MCH 02/17/2023 29 1    • MCHC 02/17/2023 33 5    • RDW 02/17/2023 13 2    • MPV 02/17/2023 10 5    • Platelets 30/05/7444 157    • nRBC 02/17/2023 0    • Neutrophils Relative 02/17/2023 54    • Immat GRANS % 02/17/2023 0    • Lymphocytes Relative 02/17/2023 31    • Monocytes Relative 02/17/2023 11    • Eosinophils Relative 02/17/2023 3    • Basophils Relative 02/17/2023 1    • Neutrophils Absolute 02/17/2023 2 26    • Immature Grans Absolute 02/17/2023 0 01    • Lymphocytes Absolute 02/17/2023 1 32    • Monocytes Absolute 02/17/2023 0 46    • Eosinophils Absolute 02/17/2023 0 13    • Basophils Absolute 02/17/2023 0 04    • Sodium 02/17/2023 137    • Potassium 02/17/2023 4 3    • Chloride 02/17/2023 105    • CO2 02/17/2023 27    • ANION GAP 02/17/2023 5    • BUN 02/17/2023 18    • Creatinine 02/17/2023 1 41 (H)    • Glucose, Fasting 02/17/2023 88    • Calcium 02/17/2023 9 5    • AST 02/17/2023 32    • ALT 02/17/2023 49    • Alkaline Phosphatase 02/17/2023 93    • Total Protein 02/17/2023 7 2    • Albumin 02/17/2023 3 9    • Total Bilirubin 02/17/2023 0 61    • eGFR 02/17/2023 56    • TSH 3RD GENERATON 02/17/2023 6 560 (H)    • T3, Free 02/17/2023 2 27 (L)    • Free T4 02/17/2023 0 75 (L)          Radiology Results:   No results found

## 2023-03-08 NOTE — PROGRESS NOTES
[facility] - Outpatient Consultation  Steve Pavon 45 y o  male MRN: [id number]  Encounter: [id number]          ASSESSMENT AND PLAN:      1  Early satiety  Patient with early satiety  Suspect that this could be related to the metastatic cancer  Does have some retroperitoneal lymphadenopathy on imaging but no mention of any adenopathy with mass effect on the GI tract  Patient may have gastroparesis which could be related to vagal nerve damage after surgery  Would recommend upper endoscopy for further evaluation of any abnormality  May need to consider gastric emptying pending his course and may benefit from Reglan  In the interim can try low residue diet and small frequent meals   - EGD; Future    2  Epigastric pain  EGD as above will be scheduled for further evaluation and we will make further recommendations at that time, advised to minimize narcotic use due to worsening of motility   - EGD; Future    3  [facility]  Patient does have stage IV metastatic kidney cancer and patient has early satiety so currently prep for colonoscopy would not be feasible    We will make further recommendations once EGD has been performed     ______________________________________________________________________    HPI:   47-year-old male initially diagnosed with pT2b pN1 (1/1) cM0 G3 R0 = stage III clear-cell renal cell carcinoma s/p resection  The plan was to perform a CAT scan of the chest and MRI of the liver (questionable liver lesions)  The CAT scan of the chest was recently completed and demonstrated multiple pulmonary nodules, sizes ranging from 0 3 cm to 0 9 cm  There was also concern for enlarging retroperitoneal lymph nodes  Patient is now on pembrolizumab and axitinib  Recent repeat MRI of the liver did not demonstrate any clear and obvious liver metastases  He presents today with epigastric pain and early satiety symptoms    Patient primarily states that the symptoms started after he had nephrectomy in November 2022 where he states that he is having trouble drinking a lot of liquids or eating a full meal he also complains of some loose stools but does not move his bowels frequently  Denies ever having an EGD or colonoscopy  Denies any difficulty swallowing  Denies any regular NSAID use  Patient thinks stools are looser since he has been taking the immunotherapy and oral medicine  REVIEW OF SYSTEMS:    CONSTITUTIONAL: Denies any fever, chills, rigors, and weight loss  HEENT: No earache or tinnitus  Denies hearing loss or visual disturbances  CARDIOVASCULAR: No chest pain or palpitations  RESPIRATORY: Denies any cough, hemoptysis, shortness of breath or dyspnea on exertion  GASTROINTESTINAL: As noted in the History of Present Illness  GENITOURINARY: No problems with urination  Denies any hematuria or dysuria  NEUROLOGIC: No dizziness or vertigo, denies headaches  MUSCULOSKELETAL: Denies any muscle or joint pain  SKIN: Denies skin rashes or itching  ENDOCRINE: Denies excessive thirst  Denies intolerance to heat or cold  PSYCHOSOCIAL: Denies depression or anxiety  Denies any recent memory loss         Historical Information   Past Medical History:   Diagnosis Date   • Arthralgia     last assessed 02/10/2015   • Babesiosis     last assessed 12/30/14   • Benign paroxysmal vertigo     last assessed 05/22/15   • Meniere disease     last assessed 04/15/13   • Pneumonia of left lower lobe due to infectious organism     last assessed 04/04/16   • Vitamin D deficiency     last assessed 12/30/14     Past Surgical History:   Procedure Laterality Date   • FIBULA FRACTURE SURGERY     • IR RENAL ANGIOGRAM  11/18/2022   • WI NEPHRECTOMY W/PRTL URETERECT OPEN RIB RESCJ RAD Right 11/21/2022    Procedure: NEPHRECTOMY ABDOMINAL APPROACH;  Surgeon: Shana Baig MD;  Location: [facility];  Service: Urology   • TIBIA FRACTURE SURGERY     • TONSILLECTOMY       Social History   Social History     Substance and Sexual Activity   Alcohol Use Not Currently    Comment: social     Social History     Substance and Sexual Activity   Drug Use No     Social History     Tobacco Use   Smoking Status Former   • Types: Cigarettes   • Start date: 1984   • Quit date: 2022   • Years since quittin 4   Smokeless Tobacco Never   Tobacco Comments    Quit 2 mos ago     Family History   Problem Relation Age of Onset   • Meniere's disease Mother    • Cancer Father         ?renal/bladder? • No Known Problems Family        Meds/Allergies       Current Outpatient Medications:   •  Ammonium Lactate 10 % LOTN  •  axitinib (Inlyta) 5 MG TABS  •  clotrimazole-betamethasone (LOTRISONE) 1-0 05 % cream  •  gabapentin (Neurontin) 300 mg capsule  •  levothyroxine (Levoxyl) 25 mcg tablet  •  oxyCODONE (Roxicodone) 5 immediate release tablet  •  polyethylene glycol (MIRALAX) 17 g packet    No Known Allergies        Objective     Blood pressure (!) 148/104, pulse 68, height 6' (1 829 m), weight 83 9 kg (185 lb)  Body mass index is 25 09 kg/m²  PHYSICAL EXAM:      General Appearance:   Alert, cooperative, no distress   HEENT:   Normocephalic, atraumatic, anicteric      Neck:  Supple, symmetrical, trachea midline   Lungs:   Clear to auscultation bilaterally; no rales, rhonchi or wheezing; respirations unlabored    Heart[de-identified]   Regular rate and rhythm; no murmur, rub, or gallop  Abdomen:   Soft, non-tender, non-distended; normal bowel sounds; no masses, no organomegaly    Genitalia:   Deferred    Rectal:   Deferred    Extremities:  No cyanosis, clubbing or edema    Pulses:  2+ and symmetric    Skin:  No jaundice, rashes, or lesions    Lymph nodes:  No palpable cervical lymphadenopathy        Lab Results:   No visits with results within 1 Day(s) from this visit     Latest known visit with results is:   Appointment on 2023   Component Date Value   • WBC 2023 4 22 (L)    • RBC 2023 5 25    • Hemoglobin 02/17/2023 15 3    • Hematocrit 02/17/2023 45 7    • MCV 02/17/2023 87    • MCH 02/17/2023 29 1    • MCHC 02/17/2023 33 5    • RDW 02/17/2023 13 2    • MPV 02/17/2023 10 5    • Platelets 07/52/1803 157    • nRBC 02/17/2023 0    • Neutrophils Relative 02/17/2023 54    • Immat GRANS % 02/17/2023 0    • Lymphocytes Relative 02/17/2023 31    • Monocytes Relative 02/17/2023 11    • Eosinophils Relative 02/17/2023 3    • Basophils Relative 02/17/2023 1    • Neutrophils Absolute 02/17/2023 2 26    • Immature Grans Absolute 02/17/2023 0 01    • Lymphocytes Absolute 02/17/2023 1 32    • Monocytes Absolute 02/17/2023 0 46    • Eosinophils Absolute 02/17/2023 0 13    • Basophils Absolute 02/17/2023 0 04    • Sodium 02/17/2023 137    • Potassium 02/17/2023 4 3    • Chloride 02/17/2023 105    • CO2 02/17/2023 27    • ANION GAP 02/17/2023 5    • BUN 02/17/2023 18    • Creatinine 02/17/2023 1 41 (H)    • Glucose, Fasting 02/17/2023 88    • Calcium 02/17/2023 9 5    • AST 02/17/2023 32    • ALT 02/17/2023 49    • Alkaline Phosphatase 02/17/2023 93    • Total Protein 02/17/2023 7 2    • Albumin 02/17/2023 3 9    • Total Bilirubin 02/17/2023 0 61    • eGFR 02/17/2023 56    • TSH 3RD GENERATON 02/17/2023 6 560 (H)    • T3, Free 02/17/2023 2 27 (L)    • Free T4 02/17/2023 0 75 (L)          Radiology Results:   No results found

## 2023-03-10 ENCOUNTER — APPOINTMENT (OUTPATIENT)
Dept: LAB | Facility: CLINIC | Age: 53
End: 2023-03-10

## 2023-03-10 DIAGNOSIS — C64.1 CLEAR CELL CARCINOMA OF RIGHT KIDNEY (HCC): ICD-10-CM

## 2023-03-10 LAB
BASOPHILS # BLD AUTO: 0.04 THOUSANDS/ÂΜL (ref 0–0.1)
BASOPHILS NFR BLD AUTO: 1 % (ref 0–1)
EOSINOPHIL # BLD AUTO: 0.14 THOUSAND/ÂΜL (ref 0–0.61)
EOSINOPHIL NFR BLD AUTO: 4 % (ref 0–6)
ERYTHROCYTE [DISTWIDTH] IN BLOOD BY AUTOMATED COUNT: 13.2 % (ref 11.6–15.1)
HCT VFR BLD AUTO: 45.4 % (ref 36.5–49.3)
HGB BLD-MCNC: 15 G/DL (ref 12–17)
IMM GRANULOCYTES # BLD AUTO: 0 THOUSAND/UL (ref 0–0.2)
IMM GRANULOCYTES NFR BLD AUTO: 0 % (ref 0–2)
LYMPHOCYTES # BLD AUTO: 0.89 THOUSANDS/ÂΜL (ref 0.6–4.47)
LYMPHOCYTES NFR BLD AUTO: 25 % (ref 14–44)
MCH RBC QN AUTO: 29.1 PG (ref 26.8–34.3)
MCHC RBC AUTO-ENTMCNC: 33 G/DL (ref 31.4–37.4)
MCV RBC AUTO: 88 FL (ref 82–98)
MONOCYTES # BLD AUTO: 0.42 THOUSAND/ÂΜL (ref 0.17–1.22)
MONOCYTES NFR BLD AUTO: 12 % (ref 4–12)
NEUTROPHILS # BLD AUTO: 2.15 THOUSANDS/ÂΜL (ref 1.85–7.62)
NEUTS SEG NFR BLD AUTO: 58 % (ref 43–75)
NRBC BLD AUTO-RTO: 0 /100 WBCS
PLATELET # BLD AUTO: 213 THOUSANDS/UL (ref 149–390)
PMV BLD AUTO: 10.6 FL (ref 8.9–12.7)
RBC # BLD AUTO: 5.16 MILLION/UL (ref 3.88–5.62)
WBC # BLD AUTO: 3.64 THOUSAND/UL (ref 4.31–10.16)

## 2023-03-11 LAB
ALBUMIN SERPL BCP-MCNC: 3.9 G/DL (ref 3.5–5)
ALP SERPL-CCNC: 87 U/L (ref 46–116)
ALT SERPL W P-5'-P-CCNC: 66 U/L (ref 12–78)
ANION GAP SERPL CALCULATED.3IONS-SCNC: 6 MMOL/L (ref 4–13)
AST SERPL W P-5'-P-CCNC: 49 U/L (ref 5–45)
BILIRUB SERPL-MCNC: 0.86 MG/DL (ref 0.2–1)
BUN SERPL-MCNC: 15 MG/DL (ref 5–25)
CALCIUM SERPL-MCNC: 9.6 MG/DL (ref 8.3–10.1)
CHLORIDE SERPL-SCNC: 104 MMOL/L (ref 96–108)
CO2 SERPL-SCNC: 28 MMOL/L (ref 21–32)
CREAT SERPL-MCNC: 1.41 MG/DL (ref 0.6–1.3)
GFR SERPL CREATININE-BSD FRML MDRD: 56 ML/MIN/1.73SQ M
GLUCOSE SERPL-MCNC: 111 MG/DL (ref 65–140)
POTASSIUM SERPL-SCNC: 4.2 MMOL/L (ref 3.5–5.3)
PROT SERPL-MCNC: 7.5 G/DL (ref 6.4–8.4)
SODIUM SERPL-SCNC: 138 MMOL/L (ref 135–147)
T3FREE SERPL-MCNC: 2.12 PG/ML (ref 2.3–4.2)
TSH SERPL DL<=0.05 MIU/L-ACNC: 2.79 UIU/ML (ref 0.45–4.5)

## 2023-03-13 ENCOUNTER — TELEPHONE (OUTPATIENT)
Dept: UROLOGY | Facility: CLINIC | Age: 53
End: 2023-03-13

## 2023-03-13 DIAGNOSIS — C64.1 CLEAR CELL CARCINOMA OF RIGHT KIDNEY (HCC): ICD-10-CM

## 2023-03-13 NOTE — TELEPHONE ENCOUNTER
Spoke with patients wife they will follow up with us after his scans also no change in symptoms     Appointment canceled for Kajal Benson ([id number])  Visit Type: FOLLOW UP [facility]  Date        Time      Length    Provider                  Department  3/20/2023   11:30 AM  30 mins  Sharmila Couch MD       [facility]     Reason for Cancellation: Patient     Patient Comments: Just saw Dr Katie Diaz last month and decided if nothing had really changed we would cancel

## 2023-03-14 ENCOUNTER — HOSPITAL ENCOUNTER (OUTPATIENT)
Dept: INFUSION CENTER | Facility: HOSPITAL | Age: 53
Discharge: HOME | End: 2023-03-14
Attending: INTERNAL MEDICINE

## 2023-03-14 VITALS
OXYGEN SATURATION: 99 % | HEART RATE: 82 BPM | RESPIRATION RATE: 18 BRPM | SYSTOLIC BLOOD PRESSURE: 140 MMHG | DIASTOLIC BLOOD PRESSURE: 90 MMHG | BODY MASS INDEX: 23.89 KG/M2 | WEIGHT: 176.15 LBS | TEMPERATURE: 96.9 F

## 2023-03-14 DIAGNOSIS — C64.1 CLEAR CELL CARCINOMA OF RIGHT KIDNEY (HCC): Primary | ICD-10-CM

## 2023-03-14 RX ORDER — SODIUM CHLORIDE 9 MG/ML
20 INJECTION, SOLUTION INTRAVENOUS ONCE
Status: COMPLETED | OUTPATIENT
Start: 2023-03-14 | End: 2023-03-14

## 2023-03-14 RX ADMIN — SODIUM CHLORIDE 200 MG: 9 INJECTION, SOLUTION INTRAVENOUS at 11:25

## 2023-03-14 RX ADMIN — SODIUM CHLORIDE 20 ML/HR: 9 INJECTION, SOLUTION INTRAVENOUS at 11:21

## 2023-03-14 NOTE — PLAN OF CARE
Problem: Knowledge Deficit  Goal: Patient/family/caregiver demonstrates understanding of disease process, treatment plan, medications, and discharge instructions  Description: Complete learning assessment and assess knowledge base    Interventions:  - Provide teaching at level of understanding  - Provide teaching via preferred learning methods  3/14/2023 1056 by Evelio Colindres RN  Outcome: Progressing  3/14/2023 1052 by Evelio Colindres RN  Outcome: Progressing  3/14/2023 1052 by Evelio Colindres RN  Outcome: Progressing

## 2023-03-21 ENCOUNTER — ANESTHESIA EVENT (OUTPATIENT)
Dept: ANESTHESIOLOGY | Facility: HOSPITAL | Age: 53
End: 2023-03-21

## 2023-03-21 ENCOUNTER — HOSPITAL ENCOUNTER (OUTPATIENT)
Dept: GASTROENTEROLOGY | Facility: AMBULARY SURGERY CENTER | Age: 53
Setting detail: OUTPATIENT SURGERY
Discharge: HOME | End: 2023-03-21
Attending: INTERNAL MEDICINE

## 2023-03-21 ENCOUNTER — ANESTHESIA (OUTPATIENT)
Dept: GASTROENTEROLOGY | Facility: AMBULARY SURGERY CENTER | Age: 53
End: 2023-03-21

## 2023-03-21 ENCOUNTER — ANESTHESIA (OUTPATIENT)
Dept: ANESTHESIOLOGY | Facility: HOSPITAL | Age: 53
End: 2023-03-21

## 2023-03-21 ENCOUNTER — ANESTHESIA EVENT (OUTPATIENT)
Dept: GASTROENTEROLOGY | Facility: AMBULARY SURGERY CENTER | Age: 53
End: 2023-03-21

## 2023-03-21 VITALS
WEIGHT: 185 LBS | OXYGEN SATURATION: 97 % | SYSTOLIC BLOOD PRESSURE: 165 MMHG | DIASTOLIC BLOOD PRESSURE: 110 MMHG | RESPIRATION RATE: 18 BRPM | HEART RATE: 60 BPM | BODY MASS INDEX: 25.06 KG/M2 | HEIGHT: 72 IN

## 2023-03-21 DIAGNOSIS — R10.13 EPIGASTRIC PAIN: ICD-10-CM

## 2023-03-21 DIAGNOSIS — R68.81 EARLY SATIETY: ICD-10-CM

## 2023-03-21 RX ORDER — LIDOCAINE HYDROCHLORIDE 20 MG/ML
INJECTION, SOLUTION EPIDURAL; INFILTRATION; INTRACAUDAL; PERINEURAL AS NEEDED
Status: DISCONTINUED | OUTPATIENT
Start: 2023-03-21 | End: 2023-03-21

## 2023-03-21 RX ORDER — SODIUM CHLORIDE, SODIUM LACTATE, POTASSIUM CHLORIDE, CALCIUM CHLORIDE 600; 310; 30; 20 MG/100ML; MG/100ML; MG/100ML; MG/100ML
125 INJECTION, SOLUTION INTRAVENOUS CONTINUOUS
Status: DISCONTINUED | OUTPATIENT
Start: 2023-03-21 | End: 2023-03-25 | Stop reason: HOSPADM

## 2023-03-21 RX ORDER — PROPOFOL 10 MG/ML
INJECTION, EMULSION INTRAVENOUS AS NEEDED
Status: DISCONTINUED | OUTPATIENT
Start: 2023-03-21 | End: 2023-03-21

## 2023-03-21 RX ORDER — SODIUM CHLORIDE, SODIUM LACTATE, POTASSIUM CHLORIDE, CALCIUM CHLORIDE 600; 310; 30; 20 MG/100ML; MG/100ML; MG/100ML; MG/100ML
125 INJECTION, SOLUTION INTRAVENOUS CONTINUOUS
Status: CANCELLED | OUTPATIENT
Start: 2023-03-21

## 2023-03-21 RX ADMIN — PROPOFOL 70 MG: 10 INJECTION, EMULSION INTRAVENOUS at 09:22

## 2023-03-21 RX ADMIN — PROPOFOL 30 MG: 10 INJECTION, EMULSION INTRAVENOUS at 09:25

## 2023-03-21 RX ADMIN — SODIUM CHLORIDE, SODIUM LACTATE, POTASSIUM CHLORIDE, AND CALCIUM CHLORIDE 125 ML/HR: .6; .31; .03; .02 INJECTION, SOLUTION INTRAVENOUS at 08:59

## 2023-03-21 RX ADMIN — LIDOCAINE HYDROCHLORIDE 5 ML: 20 INJECTION, SOLUTION EPIDURAL; INFILTRATION; INTRACAUDAL; PERINEURAL at 09:18

## 2023-03-21 RX ADMIN — PROPOFOL 100 MG: 10 INJECTION, EMULSION INTRAVENOUS at 09:18

## 2023-03-21 NOTE — ANESTHESIA PREPROCEDURE EVALUATION
Procedure:  PRE-OP ONLY    Relevant Problems   CARDIO   (+) Hyperlipidemia   (+) RBBB      ENDO   (+) Hypothyroidism      NEURO/PSYCH   (+) Anxiousness   (+) Chronic post-operative pain      Respiratory   (+) Malignant neoplasm metastatic to lung (HCC)      Nervous and Auditory   (+) Meniere disease, left      Other   (+) H/O right nephrectomy   (+) Right renal mass

## 2023-03-21 NOTE — INTERVAL H&P NOTE
H&P reviewed  After examining the patient I find no changes in the patients condition since the H&P had been written      Vitals:    03/21/23 0825   BP: (!) 169/112   Pulse:    Resp:    SpO2:

## 2023-03-21 NOTE — ANESTHESIA POSTPROCEDURE EVALUATION
Post-Op Assessment Note    CV Status:  Stable       Mental Status:  Sleepy   Hydration Status:  Stable   PONV Controlled:  Controlled   Airway Patency:  Patent      Post Op Vitals Reviewed: Yes      Staff: CRNA         No notable events documented      BP  164/105   Temp      Pulse  64   Resp   20   SpO2   100

## 2023-03-21 NOTE — ANESTHESIA PREPROCEDURE EVALUATION
Procedure:  EGD    Relevant Problems   ANESTHESIA   (+) History of anesthesia complications      CARDIO   (+) Hyperlipidemia   (+) RBBB      ENDO   (+) Hypothyroidism      /RENAL   (+) 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)      NEURO/PSYCH   (+) Anxiousness   (+) Chronic post-operative pain   (+) History of anesthesia complications      Other   (+) Retroperitoneal lymphadenopathy        Physical Exam    Airway    Mallampati score: III  TM Distance: >3 FB  Neck ROM: full     Dental   No notable dental hx     Cardiovascular      Pulmonary      Other Findings        Anesthesia Plan  ASA Score- 3     Anesthesia Type- IV sedation with anesthesia with ASA Monitors  Additional Monitors:   Airway Plan:           Plan Factors-    Chart reviewed  Patient summary reviewed  Patient is not a current smoker  Patient did not smoke on day of surgery  Induction- intravenous  Postoperative Plan-     Informed Consent- Anesthetic plan and risks discussed with patient  I personally reviewed this patient with the CRNA  Discussed and agreed on the Anesthesia Plan with the CRNA  Cesar Snyder

## 2023-03-23 ENCOUNTER — RA CDI HCC (OUTPATIENT)
Dept: OTHER | Facility: HOSPITAL | Age: 53
End: 2023-03-23

## 2023-03-24 NOTE — RESULT ENCOUNTER NOTE
Please call the patient regarding his abnormal result  Biopsy more consistent with acid reflux  Discussed antireflux measures and follow up in my office as needed  If any difficulty swallowing or difficult to control acid reflux and follow-up in the office

## 2023-03-28 RX ORDER — SODIUM CHLORIDE 9 MG/ML
20 INJECTION, SOLUTION INTRAVENOUS ONCE
Status: CANCELLED | OUTPATIENT
Start: 2023-04-04

## 2023-03-31 ENCOUNTER — APPOINTMENT (OUTPATIENT)
Dept: LAB | Facility: CLINIC | Age: 53
End: 2023-03-31

## 2023-03-31 DIAGNOSIS — C64.1 CLEAR CELL CARCINOMA OF RIGHT KIDNEY (HCC): ICD-10-CM

## 2023-03-31 PROBLEM — K21.9 GASTROESOPHAGEAL REFLUX DISEASE WITHOUT ESOPHAGITIS: Status: ACTIVE | Noted: 2023-03-31

## 2023-03-31 PROBLEM — F11.20 CONTINUOUS OPIOID DEPENDENCE (HCC): Status: ACTIVE | Noted: 2023-03-31

## 2023-03-31 PROBLEM — I10 HYPERTENSION: Status: ACTIVE | Noted: 2023-03-31

## 2023-03-31 LAB
ALBUMIN SERPL BCP-MCNC: 3.8 G/DL (ref 3.5–5)
ALP SERPL-CCNC: 97 U/L (ref 46–116)
ALT SERPL W P-5'-P-CCNC: 56 U/L (ref 12–78)
ANION GAP SERPL CALCULATED.3IONS-SCNC: 3 MMOL/L (ref 4–13)
AST SERPL W P-5'-P-CCNC: 42 U/L (ref 5–45)
BASOPHILS # BLD AUTO: 0.03 THOUSANDS/ÂΜL (ref 0–0.1)
BASOPHILS NFR BLD AUTO: 1 % (ref 0–1)
BILIRUB SERPL-MCNC: 0.98 MG/DL (ref 0.2–1)
BUN SERPL-MCNC: 20 MG/DL (ref 5–25)
CALCIUM SERPL-MCNC: 9.7 MG/DL (ref 8.3–10.1)
CHLORIDE SERPL-SCNC: 106 MMOL/L (ref 96–108)
CO2 SERPL-SCNC: 29 MMOL/L (ref 21–32)
CREAT SERPL-MCNC: 1.38 MG/DL (ref 0.6–1.3)
EOSINOPHIL # BLD AUTO: 0.12 THOUSAND/ÂΜL (ref 0–0.61)
EOSINOPHIL NFR BLD AUTO: 3 % (ref 0–6)
ERYTHROCYTE [DISTWIDTH] IN BLOOD BY AUTOMATED COUNT: 13.2 % (ref 11.6–15.1)
GFR SERPL CREATININE-BSD FRML MDRD: 57 ML/MIN/1.73SQ M
GLUCOSE P FAST SERPL-MCNC: 101 MG/DL (ref 65–99)
HCT VFR BLD AUTO: 43.6 % (ref 36.5–49.3)
HGB BLD-MCNC: 14.7 G/DL (ref 12–17)
IMM GRANULOCYTES # BLD AUTO: 0.02 THOUSAND/UL (ref 0–0.2)
IMM GRANULOCYTES NFR BLD AUTO: 1 % (ref 0–2)
LYMPHOCYTES # BLD AUTO: 0.76 THOUSANDS/ÂΜL (ref 0.6–4.47)
LYMPHOCYTES NFR BLD AUTO: 21 % (ref 14–44)
MCH RBC QN AUTO: 30 PG (ref 26.8–34.3)
MCHC RBC AUTO-ENTMCNC: 33.7 G/DL (ref 31.4–37.4)
MCV RBC AUTO: 89 FL (ref 82–98)
MONOCYTES # BLD AUTO: 0.51 THOUSAND/ÂΜL (ref 0.17–1.22)
MONOCYTES NFR BLD AUTO: 14 % (ref 4–12)
NEUTROPHILS # BLD AUTO: 2.11 THOUSANDS/ÂΜL (ref 1.85–7.62)
NEUTS SEG NFR BLD AUTO: 60 % (ref 43–75)
NRBC BLD AUTO-RTO: 0 /100 WBCS
PLATELET # BLD AUTO: 144 THOUSANDS/UL (ref 149–390)
PMV BLD AUTO: 10.8 FL (ref 8.9–12.7)
POTASSIUM SERPL-SCNC: 4.6 MMOL/L (ref 3.5–5.3)
PROT SERPL-MCNC: 7.4 G/DL (ref 6.4–8.4)
RBC # BLD AUTO: 4.9 MILLION/UL (ref 3.88–5.62)
SODIUM SERPL-SCNC: 138 MMOL/L (ref 135–147)
T3FREE SERPL-MCNC: 2.16 PG/ML (ref 2.3–4.2)
T4 FREE SERPL-MCNC: 0.81 NG/DL (ref 0.76–1.46)
TSH SERPL DL<=0.05 MIU/L-ACNC: 7.62 UIU/ML (ref 0.45–4.5)
WBC # BLD AUTO: 3.55 THOUSAND/UL (ref 4.31–10.16)

## 2023-04-04 ENCOUNTER — TELEPHONE (OUTPATIENT)
Dept: NUTRITION | Facility: CLINIC | Age: 53
End: 2023-04-04

## 2023-04-04 ENCOUNTER — OFFICE VISIT (OUTPATIENT)
Dept: PALLIATIVE MEDICINE | Facility: CLINIC | Age: 53
End: 2023-04-04

## 2023-04-04 ENCOUNTER — TELEPHONE (OUTPATIENT)
Dept: HEMATOLOGY ONCOLOGY | Facility: CLINIC | Age: 53
End: 2023-04-04

## 2023-04-04 ENCOUNTER — HOSPITAL ENCOUNTER (OUTPATIENT)
Dept: INFUSION CENTER | Facility: HOSPITAL | Age: 53
Discharge: HOME | End: 2023-04-04
Attending: INTERNAL MEDICINE

## 2023-04-04 VITALS
RESPIRATION RATE: 18 BRPM | TEMPERATURE: 97.8 F | BODY MASS INDEX: 24.04 KG/M2 | SYSTOLIC BLOOD PRESSURE: 131 MMHG | WEIGHT: 177.25 LBS | OXYGEN SATURATION: 98 % | HEART RATE: 70 BPM | DIASTOLIC BLOOD PRESSURE: 93 MMHG

## 2023-04-04 VITALS
OXYGEN SATURATION: 98 % | TEMPERATURE: 98.8 F | HEART RATE: 50 BPM | WEIGHT: 177.4 LBS | DIASTOLIC BLOOD PRESSURE: 78 MMHG | SYSTOLIC BLOOD PRESSURE: 102 MMHG | BODY MASS INDEX: 24.06 KG/M2

## 2023-04-04 DIAGNOSIS — R59.0 RETROPERITONEAL LYMPHADENOPATHY: ICD-10-CM

## 2023-04-04 DIAGNOSIS — C78.00 MALIGNANT NEOPLASM METASTATIC TO LUNG, UNSPECIFIED LATERALITY (HCC): ICD-10-CM

## 2023-04-04 DIAGNOSIS — R63.4 WEIGHT LOSS, UNINTENTIONAL: ICD-10-CM

## 2023-04-04 DIAGNOSIS — K12.31 ORAL MUCOSITIS DUE TO ANTINEOPLASTIC THERAPY: ICD-10-CM

## 2023-04-04 DIAGNOSIS — C64.1 CLEAR CELL CARCINOMA OF RIGHT KIDNEY (HCC): Primary | ICD-10-CM

## 2023-04-04 DIAGNOSIS — Z90.5 H/O RIGHT NEPHRECTOMY: ICD-10-CM

## 2023-04-04 DIAGNOSIS — K59.00 CONSTIPATION: ICD-10-CM

## 2023-04-04 DIAGNOSIS — G89.28 CHRONIC POST-OPERATIVE PAIN: ICD-10-CM

## 2023-04-04 DIAGNOSIS — Z51.5 PALLIATIVE CARE PATIENT: ICD-10-CM

## 2023-04-04 DIAGNOSIS — H81.02 MENIERE DISEASE, LEFT: ICD-10-CM

## 2023-04-04 DIAGNOSIS — R10.9 ABDOMINAL PAIN: ICD-10-CM

## 2023-04-04 DIAGNOSIS — K21.00 GASTROESOPHAGEAL REFLUX DISEASE WITH ESOPHAGITIS: ICD-10-CM

## 2023-04-04 RX ORDER — DEXAMETHASONE 0.5 MG/5ML
1 ELIXIR ORAL 4 TIMES DAILY
Qty: 400 ML | Refills: 0 | Status: SHIPPED | OUTPATIENT
Start: 2023-04-04 | End: 2023-04-14

## 2023-04-04 RX ORDER — SODIUM CHLORIDE 9 MG/ML
20 INJECTION, SOLUTION INTRAVENOUS ONCE
Status: COMPLETED | OUTPATIENT
Start: 2023-04-04 | End: 2023-04-04

## 2023-04-04 RX ORDER — OXYCODONE HYDROCHLORIDE 5 MG/1
5 TABLET ORAL EVERY 4 HOURS PRN
Qty: 60 TABLET | Refills: 0 | Status: SHIPPED | OUTPATIENT
Start: 2023-04-04

## 2023-04-04 RX ADMIN — SODIUM CHLORIDE 20 ML/HR: 0.9 INJECTION, SOLUTION INTRAVENOUS at 11:18

## 2023-04-04 RX ADMIN — SODIUM CHLORIDE 200 MG: 9 INJECTION, SOLUTION INTRAVENOUS at 11:18

## 2023-04-04 NOTE — PROGRESS NOTES
Follow-up with Palliative and Supportive Care  Doris Brain 45 y o  male [id number]    ASSESSMENT & PLAN:  1  Clear cell carcinoma of right kidney ([facility] )    2  H/O right nephrectomy    3  Malignant neoplasm metastatic to lung, unspecified laterality ([facility] )    4  Retroperitoneal lymphadenopathy    5  Meniere disease, left    6  Gastroesophageal reflux disease with esophagitis    7  Abdominal pain    8  Chronic post-operative pain    9  Oral mucositis due to antineoplastic therapy    10  Weight loss, unintentional    11  Constipation    12  Palliative care patient          • Continue disease-directed cares  • ACP: Patient has completed advanced directives (the [facility] Advanced Directive) naming his wife as surrogate healthcare decision-maker  Scanned into EMR, reviewed today  Advanced directive counseling given  • Patient endorses new odynophagia and oral pain / discomfort, which resembles signs/symptoms of oral mucositis  No mucositis or thrush on oral exam today  o Glossalgia, dysgeusia, mucosal swelling, stomatitis, anorexia are all listed adverse effects of axitinib  Anorexia and stomatitis are listed side effects of pembrolizumab   o Prescribing 10 day course of oral dexamethasone elixir, swish and spit QID ATC, to see if symptoms improve   o Encouraged dental follow-up to examine gums  • Patient endorses ongoing chronic pain, which can be severe  This included chronic arthritic pain, and likely cancer-related pain (abdominal pain, which likely has a post-operative neuropathic component and/or is related to RP lymphadenopathy)  o Recommend patient consider topical OTC products, local application of heat or cold, Tylenol up to 1000mg TID for chronic pain  Do not use heat on top of topical agents  o Patient may use topical MMJ products (CBD+THC lotion), legally available recreationally in [place]  Do not use systemic MJ products within 1-2 hours of opioids  o Continue oxyIR 5mg q4h PRN   Effective   o Patient wishes to taper and try a holiday from gabapentin as a family member told him it could be worsening some symptoms  Counseled on taper over next 4 days  If taper not tolerated, or pain / anxiety / depression / insomnia worsen without gabapentin, counseled on how to resume  Given patient's kidney issues, would not escalate past 600mg daily  o Patient has signed our opioid agreement  • Referring to Oncology Dietician for weight loss  • Patient may use Zofran PRN N/V  Not needed recently  • Patient has declined offer of medications for mood issues, and has declined offer of appetite stimulant  • Reviewed notes (Medical Oncology, Gastroenterology), labs (3/31/23 Cr 1 38, eGFR 57, alb 3 8, Hb 14 7, TSH 7 620, fT3 2 16; 3/21/23 pathology), imaging + procedures (3/21/23 EGD)  Return in about 4 weeks (around 5/2/2023)  • Emotional support provided  • Medication safety issues addressed - no driving under the influence of narcotics (including opioids), watch for adverse effects including AMS or respiratory depression (slowed breathing), keep medications stored in a safe/locked environment, do not use alcohol while opioids or other narcotics are in one's system  Requested Prescriptions     Signed Prescriptions Disp Refills   • dexamethasone 0 5 MG/5ML elixir 400 mL 0     Sig: Take 10 mL (1 mg total) by mouth 4 (four) times a day for 10 days - swish and spit   • oxyCODONE (Roxicodone) 5 immediate release tablet 60 tablet 0     Sig: Take 1 tablet (5 mg total) by mouth every 4 (four) hours as needed for moderate pain or severe pain Max Daily Amount: 30 mg       Medications Discontinued During This Encounter   Medication Reason   • oxyCODONE (Roxicodone) 5 immediate release tablet Reorder       Representatives have queried the patient's controlled substance dispensing history in the Prescription Drug Monitoring Program in compliance with regulations before I have prescribed any controlled substances   The prescription history is consistent with prescribed therapy and our practice policies  30+ minutes were spent in this ambulatory visit with greater than 50% of the time spent face to face with patient and his wife Simeon Espinoza in counseling or coordination of care including symptom assessment and management, medication review, medication adjustment, psychosocial support, chart review, imaging review, lab review, advanced directives, supportive listening and anticipatory guidance  All of the patient's questions were answered during this discussion  SUBJECTIVE:  Chief Complaint   Patient presents with   • Cancer   • Pain   • Heartburn   • Counseling   • Follow-up   • Weight Loss   • oral pain   • Constipation        LIVE Thorpe is a 45 y o  male w/ clear cell carcinoma of the right kidney w/ RP lymph node involvement, w/ lung metastases, s/p angioembolization + R radical nephrectomy 11/21/22, on pembrolizumab + axitinib; liver lesions (likely benign hemangioma); abdominal pain (which may be cancer- or procedure-related), acid reflux / eosinophilic esophagitis, constipation, h/o babesiosis, h/o Meniere disease, h/o vertigo  He follows w/ Dr Farhan Reed (Medical Oncology), Dr Donte La (Urology)  Patient is known to [facility] clinic; seen 3/7/23 for symptom assessment and management, medication review, medication adjustment, psychosocial support, chart review, imaging review, lab review, advanced directives, goals of care, medical marijuana, supportive listening and anticipatory guidance  Patient's primary complaint today is oral pain, worse when eating/chewing (but not with swallowing)  He endorses tongue pain, gum pain, pain on the roof of his mouth  He and his family are concerned about this being a side effect of one of his medications (particularly gabapentin; though this is not a common side effect of gabapentin there is a 2% chance of dental disease from gabapentin)   He states they had called Oncology about this (this morning) but they had deferred until he saw [facility] today  He has not been to the dentist recently  Patient has had some decreased appetite d/t this issue and has been losing some weight  He denies N/V  He has occasional constipation which improves using products such as Miralax  Patient is taking 1 to 2 tabs of oxyIR per day w/ good results  He would like to try a holiday from gabapentin as he is not sure it is helping with any symptom control  PDMP shows no concerns  The following portions of the medical history were reviewed: past medical history, surgical history, problem list, medication list, family history, and social history  Current Outpatient Medications:   •  amLODIPine (NORVASC) 5 mg tablet, Take 1 tablet (5 mg total) by mouth daily, Disp: 30 tablet, Rfl: 2  •  Ammonium Lactate 10 % LOTN, Apply topically 2 (two) times a day (Patient taking differently: Apply topically 2 (two) times a day as needed), Disp: 473  12 mL, Rfl: 1  •  axitinib (Inlyta) 5 MG TABS, take 1 tablet (5mg) by mouth twice a day, Disp: 30 tablet, Rfl: 10  •  dexamethasone 0 5 MG/5ML elixir, Take 10 mL (1 mg total) by mouth 4 (four) times a day for 10 days - swish and spit, Disp: 400 mL, Rfl: 0  •  famotidine (PEPCID) 20 mg tablet, Take 1 tablet (20 mg total) by mouth daily, Disp: 30 tablet, Rfl: 2  •  gabapentin (Neurontin) 300 mg capsule, Take 2 capsules (600 mg total) by mouth daily at bedtime, Disp: 60 capsule, Rfl: 2  •  levothyroxine (Levoxyl) 25 mcg tablet, Take 1 tablet (25 mcg total) by mouth daily in the early morning, Disp: 30 tablet, Rfl: 3  •  oxyCODONE (Roxicodone) 5 immediate release tablet, Take 1 tablet (5 mg total) by mouth every 4 (four) hours as needed for moderate pain or severe pain Max Daily Amount: 30 mg, Disp: 60 tablet, Rfl: 0  •  polyethylene glycol (MIRALAX) 17 g packet, Take 17 g by mouth daily, Disp: , Rfl:   •  clotrimazole-betamethasone (LOTRISONE) 1-0 05 % cream, Apply topically 2 (two) times a day (Patient taking differently: Apply topically 2 (two) times a day as needed), Disp: 45 g, Rfl: 1  No current facility-administered medications for this visit  Facility-Administered Medications Ordered in Other Visits:   •  alteplase (CATHFLO) injection 2 mg, 2 mg, Intracatheter, Q2H PRN, Micki Garcia MD  •  pembrolizumab [facility]) 200 mg in sodium chloride 0 9 % 50 mL IVPB, 200 mg, Intravenous, Once, Micki Garcia MD, Last Rate: 116 mL/hr at 04/04/23 1118, 200 mg at 04/04/23 1118    Review of Systems   Constitutional: Positive for activity change, appetite change, fatigue and unexpected weight change  HENT:        Oral pain and odynophagia  Gastrointestinal: Positive for abdominal pain and constipation (occasional, Miralax provides relief)  Negative for nausea and vomiting  Allergic/Immunologic: Positive for immunocompromised state  Psychiatric/Behavioral: Positive for dysphoric mood and sleep disturbance  The patient is nervous/anxious  All other systems reviewed and are negative  OBJECTIVE:  /78 (BP Location: Right arm, Patient Position: Sitting, Cuff Size: Standard)   Pulse (!) 50   Temp 98 8 °F (37 1 °C) (Temporal)   Wt 80 5 kg (177 lb 6 4 oz)   SpO2 98%   BMI 24 06 kg/m²   Physical Exam  Vitals reviewed  Constitutional:       General: He is not in acute distress  Appearance: He is well-developed, well-groomed and normal weight  He is not toxic-appearing  HENT:      Head: Normocephalic and atraumatic  Right Ear: External ear normal       Left Ear: External ear normal       Mouth/Throat:      Mouth: Mucous membranes are moist  No oral lesions  Dentition: No gum lesions  Palate: No lesions  Pharynx: Oropharynx is clear  Eyes:      General: No scleral icterus  Right eye: No discharge  Left eye: No discharge  Extraocular Movements: Extraocular movements intact        Conjunctiva/sclera: Conjunctivae normal       Pupils: Pupils are equal, "round, and reactive to light  Cardiovascular:      Rate and Rhythm: Bradycardia present  Pulmonary:      Effort: Pulmonary effort is normal  No tachypnea, bradypnea, accessory muscle usage or respiratory distress  Comments: Able to speak comfortably in complete sentences on room air at rest   Abdominal:      General: There is no distension  Musculoskeletal:      Cervical back: Normal range of motion  Right lower leg: No edema  Left lower leg: No edema  Skin:     General: Skin is dry  Coloration: Skin is not pale  Neurological:      Mental Status: He is alert and oriented to person, place, and time  Cranial Nerves: No dysarthria or facial asymmetry  Psychiatric:         Attention and Perception: Attention normal          Mood and Affect: Mood and affect normal          Speech: Speech normal          Behavior: Behavior normal  Behavior is cooperative  Thought Content: Thought content normal          Cognition and Memory: Cognition and memory normal          Judgment: Judgment normal           Marianna Navarro MD  [facility]      Portions of this document may have been created using dictation software and as such some \"sound alike\" terms may have been generated by the system  Do not hesitate to contact me with any questions or clarifications     "

## 2023-04-04 NOTE — PATIENT INSTRUCTIONS
It was good to see you today  Thank you for coming in  Will prescribe 10 days of dexamethasone elixir, swish and spit as directed, to see if oral pain is reducted  Start a taper / holiday from gabapentin as discussed  Starting tonight, take 300mg (1 capsule) at bedtime  On day four, 4/8/23, discontinue  If pain, insomnia, anxiety, depressed mood worsen, reverse the taper  Return in about 4 weeks (around 5/2/2023)  Call us for refills on medications that we supply, as needed  If something changes and you need to come in sooner, please call our office  PRESCRIPTION REFILL REMINDER:  All medication refills should be requested prior to [facility] on Friday  Any refill requests after noon on Friday would be addressed the following Monday  MEDICATION SAFETY ISSUES:  Do not drive under the influence of narcotics (including opioids), watch for adverse effects including confusion / altered mental status / respiratory depression (slowed breathing), keep medications stored in a safe/locked environment, do not use alcohol while opioids or other narcotics are in your system

## 2023-04-04 NOTE — TELEPHONE ENCOUNTER
----- Message from Sneha Delaney sent at 4/4/2023  7:53 AM EDT -----  Regarding: Mp  Contact: [phone]  Please review      ----- Message -----  From: Stuart Munoz  Sent: 4/4/2023   7:38 AM EDT  To: Hematology Oncology [place] [facility]  Subject: Inlyta                                           Good morning Dr. Campbell    I am emailing because I am having a lot of soreness in my mouth it’s hard to eat or drink. It’s my gums roof of my mouth. Had to change my toothpaste so that it did not hurt to brush my teeth. It was very mild last time I saw you but it has increasingly gotten worse. Any thoughts?    Thank you    Stuart

## 2023-04-04 NOTE — Clinical Note
Unlikely any of my meds are causing this gentleman's oral pain, and his oral exam was reassuring  Will trial some dexamethasone elixir swish + spit  Glossalgia, dysgeusia, mucosal swelling, stomatitis, anorexia are all listed adverse effects of axitinib  Anorexia and stomatitis are listed side effects of pembrolizumab

## 2023-04-04 NOTE — TELEPHONE ENCOUNTER
"Received notification by Dr Belinda Beasley on 4/4/23  that pt has triggered for oncology nutrition care (reason for referral: Ambulatory referral for Weight loss in setting of malignancy)  Contacted [facility] and spoke with wife, Mark Rebollar, today as pt was sleeping  Introduced self and explained the reason for today's call  Jenifer reports that pt would like assistance with gaining wt and managing side effects from tx (mouth burning, GERD, etc )  He is trying to eat smaller meals  She reports that Radha Bowens weighed ~205# last January (2022) and now he is ~176-179#  Wife reports pt \"is not a fruit and vegetable jake\" and likes meat and potatoes  He has recently tried making a milkshake with malt powder and is open to using oral nutrition supplements  Discussed oncology nutrition services available (options for in-person and phone consultation) and the benefits of meeting for a consultation  Initial RD consultation set up for 4/19 at 10am prior to follow up with Dr Hermelindo Nix  E-mailed to pt ([email or web address]'): [employer] Eating Hints Book, Oncology Milkshake & Smoothie Recipes, Commercial Supplements List    Provided this RD’s contact information asking that Radha Bowens and or his wife reach out prn  All questions/concerns addressed at this time    "

## 2023-04-07 NOTE — TELEPHONE ENCOUNTER
Left voicemail for patient to hold inlyta for one week   Patient instructed to call my TEAMs number to discuss

## 2023-04-13 NOTE — TELEPHONE ENCOUNTER
"Received call from patient after holding inlyta for one week  Mouth sores are gone  \"It was the best week of my life\"  D/W Dr Campbell   Patient to  reduce inlyta to 5mg daily  Patient has f/u with Dr Campbell on 4/19/2023  Voicemail left for patient with instructions  Instructed to call my TEAMs number to discuss  "

## 2023-04-24 ENCOUNTER — APPOINTMENT (OUTPATIENT)
Dept: LAB | Facility: CLINIC | Age: 53
End: 2023-04-24

## 2023-04-25 ENCOUNTER — HOSPITAL ENCOUNTER (OUTPATIENT)
Dept: INFUSION CENTER | Facility: HOSPITAL | Age: 53
Discharge: HOME | End: 2023-04-25
Attending: INTERNAL MEDICINE

## 2023-04-25 VITALS
BODY MASS INDEX: 24.22 KG/M2 | RESPIRATION RATE: 18 BRPM | DIASTOLIC BLOOD PRESSURE: 77 MMHG | OXYGEN SATURATION: 99 % | TEMPERATURE: 97.3 F | SYSTOLIC BLOOD PRESSURE: 124 MMHG | HEART RATE: 69 BPM | WEIGHT: 178.57 LBS

## 2023-04-25 DIAGNOSIS — C64.1 CLEAR CELL CARCINOMA OF RIGHT KIDNEY (HCC): Primary | ICD-10-CM

## 2023-04-25 RX ORDER — SODIUM CHLORIDE 9 MG/ML
20 INJECTION, SOLUTION INTRAVENOUS ONCE
Status: COMPLETED | OUTPATIENT
Start: 2023-04-25 | End: 2023-04-25

## 2023-04-25 RX ADMIN — SODIUM CHLORIDE 200 MG: 9 INJECTION, SOLUTION INTRAVENOUS at 10:55

## 2023-04-25 RX ADMIN — SODIUM CHLORIDE 20 ML/HR: 9 INJECTION, SOLUTION INTRAVENOUS at 10:55

## 2023-05-02 ENCOUNTER — OFFICE VISIT (OUTPATIENT)
Dept: PALLIATIVE MEDICINE | Facility: CLINIC | Age: 53
End: 2023-05-02

## 2023-05-02 VITALS
SYSTOLIC BLOOD PRESSURE: 110 MMHG | BODY MASS INDEX: 24.36 KG/M2 | OXYGEN SATURATION: 99 % | WEIGHT: 179.6 LBS | HEART RATE: 66 BPM | DIASTOLIC BLOOD PRESSURE: 78 MMHG | TEMPERATURE: 97.9 F

## 2023-05-02 DIAGNOSIS — R10.9 ABDOMINAL PAIN: ICD-10-CM

## 2023-05-02 DIAGNOSIS — R63.4 WEIGHT LOSS, UNINTENTIONAL: ICD-10-CM

## 2023-05-02 DIAGNOSIS — Z90.5 H/O RIGHT NEPHRECTOMY: ICD-10-CM

## 2023-05-02 DIAGNOSIS — R43.2 DYSGEUSIA: ICD-10-CM

## 2023-05-02 DIAGNOSIS — K21.00 GASTROESOPHAGEAL REFLUX DISEASE WITH ESOPHAGITIS: ICD-10-CM

## 2023-05-02 DIAGNOSIS — Z51.5 PALLIATIVE CARE PATIENT: ICD-10-CM

## 2023-05-02 DIAGNOSIS — R59.0 RETROPERITONEAL LYMPHADENOPATHY: ICD-10-CM

## 2023-05-02 DIAGNOSIS — C78.00 MALIGNANT NEOPLASM METASTATIC TO LUNG, UNSPECIFIED LATERALITY (HCC): ICD-10-CM

## 2023-05-02 DIAGNOSIS — C64.1 CLEAR CELL CARCINOMA OF RIGHT KIDNEY (HCC): Primary | ICD-10-CM

## 2023-05-02 DIAGNOSIS — K59.00 CONSTIPATION: ICD-10-CM

## 2023-05-02 DIAGNOSIS — K12.31 ORAL MUCOSITIS DUE TO ANTINEOPLASTIC THERAPY: ICD-10-CM

## 2023-05-02 DIAGNOSIS — K21.9 GASTROESOPHAGEAL REFLUX DISEASE WITHOUT ESOPHAGITIS: ICD-10-CM

## 2023-05-02 DIAGNOSIS — G89.28 CHRONIC POST-OPERATIVE PAIN: ICD-10-CM

## 2023-05-02 DIAGNOSIS — R63.0 LOSS OF APPETITE: ICD-10-CM

## 2023-05-02 RX ORDER — FAMOTIDINE 20 MG/1
20 TABLET, FILM COATED ORAL DAILY
Qty: 30 TABLET | Refills: 2 | Status: SHIPPED | OUTPATIENT
Start: 2023-05-02

## 2023-05-02 RX ORDER — OXYCODONE HYDROCHLORIDE 5 MG/1
5 TABLET ORAL EVERY 4 HOURS PRN
Qty: 60 TABLET | Refills: 0 | Status: SHIPPED | OUTPATIENT
Start: 2023-05-02

## 2023-05-02 RX ORDER — GABAPENTIN 300 MG/1
600 CAPSULE ORAL 2 TIMES DAILY
Qty: 120 CAPSULE | Refills: 2 | Status: SHIPPED | OUTPATIENT
Start: 2023-05-02

## 2023-05-02 NOTE — PROGRESS NOTES
Follow-up with Palliative and Supportive Care  Amena Shea 45 y o  male [id number]    ASSESSMENT & PLAN:  1  Clear cell carcinoma of right kidney ([facility] [address] )    2  H/O right nephrectomy    3  Malignant neoplasm metastatic to lung, unspecified laterality ([facility] [address] )    4  Retroperitoneal lymphadenopathy    5  Gastroesophageal reflux disease with esophagitis    6  Abdominal pain    7  Chronic post-operative pain    8  Constipation    9  Oral mucositis due to antineoplastic therapy    10  Loss of appetite    11  Weight loss, unintentional    12  Dysgeusia    13  Palliative care patient    14  Gastroesophageal reflux disease without esophagitis           Continue disease-directed cares   ACP: Patient has completed advanced directives (the [facility] Advanced Directive) naming his wife as surrogate healthcare decision-maker  Scanned into EMR, reviewed today  Advanced directive counseling given   Patient reports that on a holiday from axitinib, his oral discomfort and dysgeusia were greatly reduced  He is now on a reduced dose of axitinib and his appetite, taste sensation, weight, and oral discomfort have improved   Patient reports his chronic pain is well-managed on his current regimen  Pain includes chronic arthritic pain, likely cancer-related pain (abdominal pain, which has a post-operative neuropathic component and/or is related to RP lymphadenopathy)  o Recommend patient consider topical OTC products, local application of heat or cold, Tylenol up to 1000mg TID for chronic pain  Do not use heat on top of topical agents  o Patient may use topical MMJ products (CBD+THC lotion), legally available recreationally in [place]  Do not use systemic MJ products within 1-2 hours of opioids  o Continue oxyIR 5mg q4h PRN  Effective  Do not drink alcohol when narcotics are in the system  o He tried a holiday from gabapentin and pain was much more severe  Pain improved when he resumed gabapentin   Will trial a ramp-up from 600mg gabapentin QHS to 600mg BID over one week  If change is tolerated, asked him to call to let us know  o eGFR is 60, 1200mg gabapentin daily should be tolerable  Monitor Cr   Patient does not wish to see Oncology Dietician   Continue Zofran PRN N/V  Not needed recently   Reviewed notes (Medical Oncology - last seen 4/19/23), labs (4/24/23 Cr 1 32, eGFR 60, alb 3 5, AST 56, ALT 73, Hb 12 9, TSH 1 960, fT3 2 27), imaging + procedures (1/31/23 MRI abdomen)   Return in about 3 months (around 8/2/2023)   Emotional support provided   Medication safety issues addressed - no driving under the influence of narcotics (including opioids), watch for adverse effects including AMS or respiratory depression (slowed breathing), keep medications stored in a safe/locked environment, do not use alcohol while opioids or other narcotics are in one's system  Requested Prescriptions     Signed Prescriptions Disp Refills    oxyCODONE (Roxicodone) 5 immediate release tablet 60 tablet 0     Sig: Take 1 tablet (5 mg total) by mouth every 4 (four) hours as needed for moderate pain or severe pain Max Daily Amount: 30 mg    gabapentin (Neurontin) 300 mg capsule 120 capsule 2     Sig: Take 2 capsules (600 mg total) by mouth 2 (two) times a day    famotidine (PEPCID) 20 mg tablet 30 tablet 2     Sig: Take 1 tablet (20 mg total) by mouth daily       Medications Discontinued During This Encounter   Medication Reason    gabapentin (Neurontin) 300 mg capsule Reorder    famotidine (PEPCID) 20 mg tablet Reorder    oxyCODONE (Roxicodone) 5 immediate release tablet Reorder       Representatives have queried the patient's controlled substance dispensing history in the Prescription Drug Monitoring Program in compliance with regulations before I have prescribed any controlled substances  The prescription history is consistent with prescribed therapy and our practice policies        30 minutes were spent in this ambulatory visit with greater than "50% of the time spent face to face with patient in counseling or coordination of care including symptom assessment and management, medication review, medication adjustment, psychosocial support, chart review, imaging review, lab review, advanced directives, supportive listening and anticipatory guidance  All of the patient's questions were answered during this discussion  SUBJECTIVE:  Chief Complaint   Patient presents with    Cancer    Pain    Loss of Appetite    Counseling    Anxiety    Depression    Follow-up        HPI    Nelson Rojo is a 45 y o  male w/ clear cell carcinoma of the right kidney w/ RP lymph node involvement, w/ lung metastases, s/p angioembolization + R radical nephrectomy 11/21/22, on pembrolizumab + axitinib; liver lesions (likely benign hemangioma); abdominal pain (which may be cancer- or procedure-related), acid reflux / eosinophilic esophagitis, constipation, h/o babesiosis, h/o Meniere disease, h/o vertigo  He follows w/ Dr Sandra Mascorro (Medical Oncology), Dr Lanette Serna (Urology)  Patient is known to [facility] clinic; seen 4/4/23 for symptom assessment and management, medication review, medication adjustment, psychosocial support, chart review, imaging review, lab review, advanced directives, supportive listening and anticipatory guidance  Patient states that he tried a holiday from gabapentin as he was concerned it may have been worsening some symptoms (and would like to overall reduce his medication burden)  Without gabapentin, he states that his \"pain was ridiculous\" and he needed more oxycodone to manage  He did not notice a reduction in other symptoms  Pain improved when he resumed 600mg QHS dosing  He is taking about 2 tabs of oxyIR 5mg per day, usually at night  He is amenable to trying an increase in gabapentin      Patient states that when he was placed on a brief holiday from axitinib (working w/ Oncology) his oral discomfort / mucositis resolved and his dysgeusia " "significantly improved  He is now back on axitinib at 50% of the prior dosing, and overall his appetite and taste sensation have remained normal, and he has not had the oral discomfort  Patient reports normal bowel function w/o needing medications  He denies recent N/V  His mood is \"good\", he denies anxiousness/dysphoria  His appetite has improved and he is defending his weight, though he feels he has not reached his ideal weight (around 195lb)  PDMP shows no concerns  The following portions of the medical history were reviewed: past medical history, surgical history, problem list, medication list, family history, and social history  Current Outpatient Medications:     amLODIPine (NORVASC) 5 mg tablet, Take 1 tablet (5 mg total) by mouth daily, Disp: 30 tablet, Rfl: 2    Ammonium Lactate 10 % LOTN, Apply topically 2 (two) times a day (Patient taking differently: Apply topically 2 (two) times a day as needed), Disp: 473  12 mL, Rfl: 1    axitinib (Inlyta) 5 MG TABS, take 1 tablet (5mg) by mouth twice a day, Disp: 30 tablet, Rfl: 10    clotrimazole-betamethasone (LOTRISONE) 1-0 05 % cream, Apply topically 2 (two) times a day (Patient taking differently: Apply topically 2 (two) times a day as needed), Disp: 45 g, Rfl: 1    famotidine (PEPCID) 20 mg tablet, Take 1 tablet (20 mg total) by mouth daily, Disp: 30 tablet, Rfl: 2    gabapentin (Neurontin) 300 mg capsule, Take 2 capsules (600 mg total) by mouth 2 (two) times a day, Disp: 120 capsule, Rfl: 2    levothyroxine (Levoxyl) 25 mcg tablet, Take 1 tablet (25 mcg total) by mouth daily in the early morning, Disp: 30 tablet, Rfl: 3    oxyCODONE (Roxicodone) 5 immediate release tablet, Take 1 tablet (5 mg total) by mouth every 4 (four) hours as needed for moderate pain or severe pain Max Daily Amount: 30 mg, Disp: 60 tablet, Rfl: 0    polyethylene glycol (MIRALAX) 17 g packet, Take 17 g by mouth daily, Disp: , Rfl:     Review of Systems " Constitutional: Positive for activity change, appetite change (improving), fatigue and unexpected weight change (defending his weight recently but goal weight is about 15lb higher than current weight)  HENT:        Oral discomfort resolved  Dysgeusia improved  Gastrointestinal: Positive for abdominal pain (worse sean-prandially)  Negative for constipation, nausea and vomiting  Allergic/Immunologic: Positive for immunocompromised state  Psychiatric/Behavioral: Negative for dysphoric mood  The patient is not nervous/anxious  All other systems reviewed and are negative  OBJECTIVE:  /78 (BP Location: Right arm, Patient Position: Sitting, Cuff Size: Standard)   Pulse 66   Temp 97 9 °F (36 6 °C) (Tympanic)   Wt 81 5 kg (179 lb 9 6 oz)   SpO2 99%   BMI 24 36 kg/m²   Physical Exam  Vitals reviewed  Constitutional:       General: He is not in acute distress  Appearance: He is well-developed, well-groomed and normal weight  He is not toxic-appearing  HENT:      Head: Normocephalic and atraumatic  Right Ear: External ear normal       Left Ear: External ear normal    Eyes:      General: No scleral icterus  Right eye: No discharge  Left eye: No discharge  Extraocular Movements: Extraocular movements intact  Conjunctiva/sclera: Conjunctivae normal       Pupils: Pupils are equal, round, and reactive to light  Cardiovascular:      Rate and Rhythm: Normal rate  Pulmonary:      Effort: Pulmonary effort is normal  No tachypnea, bradypnea, accessory muscle usage or respiratory distress  Comments: Able to speak comfortably in complete sentences on room air at rest   Abdominal:      General: There is no distension  Tenderness: There is no guarding  Musculoskeletal:      Cervical back: Normal range of motion  Right lower leg: No edema  Left lower leg: No edema  Skin:     General: Skin is dry  Coloration: Skin is not pale     Neurological: "  Mental Status: He is alert and oriented to person, place, and time  Cranial Nerves: No dysarthria or facial asymmetry  Gait: Gait is intact  Psychiatric:         Attention and Perception: Attention normal          Mood and Affect: Mood and affect normal          Speech: Speech normal          Behavior: Behavior normal  Behavior is cooperative  Thought Content: Thought content normal          Cognition and Memory: Cognition and memory normal          Judgment: Judgment normal           Sid Bond MD  [facility]ive [facility]      Portions of this document may have been created using dictation software and as such some \"sound alike\" terms may have been generated by the system  Do not hesitate to contact me with any questions or clarifications     "

## 2023-05-02 NOTE — PATIENT INSTRUCTIONS
It was good to see you today  Thank you for coming in  Increase gabapentin to 600mg every 12 hours; we recommend tapering up gradually  Starting today, take 300mg every morning and 600mg in the evening; do this for a week  On Day 7, take 600mg every 12 hours hours  If you experience any adverse effects during this ramp-up please call us  I'm glad your appetite and sense of taste have improved! For altered taste sensation:  Use plastic utensils, marinate food to alter taste, try increasing herbs and spices as tolerated to make food more palatable  Choose mild flavored protein (chicken, turkey, etc)  Add sugar to decrease bitter or salty taste  Use Lemon juice/lemon drop candies  Reduce bitter tasting food like coffee, chocolates  Snack on frozen fruits like grapes, melons balls  Drink more water with meals  Eat small meals several times a day  Maintain good oral hygiene  Return in about 3 months (around 8/2/2023)  Call us for refills on medications that we supply, as needed  If something changes and you need to come in sooner, please call our office  PRESCRIPTION REFILL REMINDER:  All medication refills should be requested prior to [facility] on Friday  Any refill requests after noon on Friday would be addressed the following Monday  MEDICATION SAFETY ISSUES:  Do not drive under the influence of narcotics (including opioids), watch for adverse effects including confusion / altered mental status / respiratory depression (slowed breathing), keep medications stored in a safe/locked environment, do not use alcohol while opioids or other narcotics are in your system

## 2023-05-09 RX ORDER — SODIUM CHLORIDE 9 MG/ML
20 INJECTION, SOLUTION INTRAVENOUS ONCE
Status: CANCELLED | OUTPATIENT
Start: 2023-05-16

## 2023-05-12 ENCOUNTER — APPOINTMENT (OUTPATIENT)
Dept: LAB | Facility: CLINIC | Age: 53
End: 2023-05-12

## 2023-05-12 ENCOUNTER — TELEPHONE (OUTPATIENT)
Dept: HEMATOLOGY ONCOLOGY | Facility: MEDICAL CENTER | Age: 53
End: 2023-05-12

## 2023-05-12 DIAGNOSIS — C64.1 CLEAR CELL CARCINOMA OF RIGHT KIDNEY (HCC): Primary | ICD-10-CM

## 2023-05-12 DIAGNOSIS — C64.1 CLEAR CELL CARCINOMA OF RIGHT KIDNEY (HCC): ICD-10-CM

## 2023-05-12 LAB
ALBUMIN SERPL BCP-MCNC: 3.5 G/DL (ref 3.5–5)
ALP SERPL-CCNC: 99 U/L (ref 46–116)
ALT SERPL W P-5'-P-CCNC: 100 U/L (ref 12–78)
ANION GAP SERPL CALCULATED.3IONS-SCNC: 1 MMOL/L (ref 4–13)
AST SERPL W P-5'-P-CCNC: 79 U/L (ref 5–45)
BASOPHILS # BLD AUTO: 0.03 THOUSANDS/ÂΜL (ref 0–0.1)
BASOPHILS NFR BLD AUTO: 1 % (ref 0–1)
BILIRUB SERPL-MCNC: 0.63 MG/DL (ref 0.2–1)
BUN SERPL-MCNC: 19 MG/DL (ref 5–25)
CALCIUM SERPL-MCNC: 9 MG/DL (ref 8.3–10.1)
CHLORIDE SERPL-SCNC: 108 MMOL/L (ref 96–108)
CO2 SERPL-SCNC: 28 MMOL/L (ref 21–32)
CREAT SERPL-MCNC: 1.24 MG/DL (ref 0.6–1.3)
EOSINOPHIL # BLD AUTO: 0.28 THOUSAND/ÂΜL (ref 0–0.61)
EOSINOPHIL NFR BLD AUTO: 10 % (ref 0–6)
ERYTHROCYTE [DISTWIDTH] IN BLOOD BY AUTOMATED COUNT: 15.7 % (ref 11.6–15.1)
GFR SERPL CREATININE-BSD FRML MDRD: 65 ML/MIN/1.73SQ M
GLUCOSE P FAST SERPL-MCNC: 100 MG/DL (ref 65–99)
HCT VFR BLD AUTO: 40.2 % (ref 36.5–49.3)
HGB BLD-MCNC: 13.2 G/DL (ref 12–17)
IMM GRANULOCYTES # BLD AUTO: 0.01 THOUSAND/UL (ref 0–0.2)
IMM GRANULOCYTES NFR BLD AUTO: 0 % (ref 0–2)
LYMPHOCYTES # BLD AUTO: 0.8 THOUSANDS/ÂΜL (ref 0.6–4.47)
LYMPHOCYTES NFR BLD AUTO: 30 % (ref 14–44)
MCH RBC QN AUTO: 30.7 PG (ref 26.8–34.3)
MCHC RBC AUTO-ENTMCNC: 32.8 G/DL (ref 31.4–37.4)
MCV RBC AUTO: 94 FL (ref 82–98)
MONOCYTES # BLD AUTO: 0.43 THOUSAND/ÂΜL (ref 0.17–1.22)
MONOCYTES NFR BLD AUTO: 16 % (ref 4–12)
NEUTROPHILS # BLD AUTO: 1.16 THOUSANDS/ÂΜL (ref 1.85–7.62)
NEUTS SEG NFR BLD AUTO: 43 % (ref 43–75)
NRBC BLD AUTO-RTO: 0 /100 WBCS
PLATELET # BLD AUTO: 162 THOUSANDS/UL (ref 149–390)
PMV BLD AUTO: 10.4 FL (ref 8.9–12.7)
POTASSIUM SERPL-SCNC: 4.1 MMOL/L (ref 3.5–5.3)
PROT SERPL-MCNC: 7.1 G/DL (ref 6.4–8.4)
RBC # BLD AUTO: 4.3 MILLION/UL (ref 3.88–5.62)
SODIUM SERPL-SCNC: 137 MMOL/L (ref 135–147)
T3FREE SERPL-MCNC: 2.79 PG/ML (ref 2.3–4.2)
TSH SERPL DL<=0.05 MIU/L-ACNC: 3.89 UIU/ML (ref 0.45–4.5)
WBC # BLD AUTO: 2.71 THOUSAND/UL (ref 4.31–10.16)

## 2023-05-12 NOTE — TELEPHONE ENCOUNTER
More appt requests signed for immunotherapy  Will send to [facility] infusion team to schedule more  appts

## 2023-05-16 ENCOUNTER — HOSPITAL ENCOUNTER (OUTPATIENT)
Dept: INFUSION CENTER | Facility: HOSPITAL | Age: 53
Discharge: HOME | End: 2023-05-16
Attending: INTERNAL MEDICINE

## 2023-05-16 VITALS
HEART RATE: 62 BPM | WEIGHT: 182.32 LBS | DIASTOLIC BLOOD PRESSURE: 79 MMHG | SYSTOLIC BLOOD PRESSURE: 113 MMHG | TEMPERATURE: 97.9 F | BODY MASS INDEX: 24.73 KG/M2 | RESPIRATION RATE: 20 BRPM | OXYGEN SATURATION: 98 %

## 2023-05-16 DIAGNOSIS — C64.1 CLEAR CELL CARCINOMA OF RIGHT KIDNEY (HCC): Primary | ICD-10-CM

## 2023-05-16 RX ORDER — SODIUM CHLORIDE 9 MG/ML
20 INJECTION, SOLUTION INTRAVENOUS ONCE
Status: COMPLETED | OUTPATIENT
Start: 2023-05-16 | End: 2023-05-16

## 2023-05-16 RX ADMIN — SODIUM CHLORIDE 200 MG: 9 INJECTION, SOLUTION INTRAVENOUS at 11:01

## 2023-05-16 RX ADMIN — SODIUM CHLORIDE 20 ML/HR: 0.9 INJECTION, SOLUTION INTRAVENOUS at 11:01

## 2023-05-30 RX ORDER — SODIUM CHLORIDE 9 MG/ML
20 INJECTION, SOLUTION INTRAVENOUS ONCE
OUTPATIENT
Start: 2023-06-06

## 2023-06-01 ENCOUNTER — DOCUMENTATION (OUTPATIENT)
Dept: HEMATOLOGY ONCOLOGY | Facility: CLINIC | Age: 53
End: 2023-06-01

## 2023-06-01 ENCOUNTER — OFFICE VISIT (OUTPATIENT)
Dept: HEMATOLOGY ONCOLOGY | Facility: CLINIC | Age: 53
End: 2023-06-01

## 2023-06-01 VITALS
TEMPERATURE: 97.2 F | SYSTOLIC BLOOD PRESSURE: 112 MMHG | HEART RATE: 71 BPM | OXYGEN SATURATION: 97 % | BODY MASS INDEX: 24.65 KG/M2 | DIASTOLIC BLOOD PRESSURE: 80 MMHG | HEIGHT: 72 IN | WEIGHT: 182 LBS | RESPIRATION RATE: 16 BRPM

## 2023-06-01 DIAGNOSIS — C64.1 CLEAR CELL CARCINOMA OF RIGHT KIDNEY (HCC): ICD-10-CM

## 2023-06-01 DIAGNOSIS — R59.0 RETROPERITONEAL LYMPHADENOPATHY: ICD-10-CM

## 2023-06-01 DIAGNOSIS — C78.00 MALIGNANT NEOPLASM METASTATIC TO LUNG, UNSPECIFIED LATERALITY (HCC): Primary | ICD-10-CM

## 2023-06-01 NOTE — PROGRESS NOTES
Josiah Main  1970 [address] [facility] [place]  [address] [facility]  [place] [id number]    DISCUSSION/SUMMARY:    52-year-old with stage IV (multiple pulmonary nodules, questionable liver lesion) renal cell carcinoma  Presently Mr Oconnell states feeling okay, about the same as before  Clinically there are no concerning findings  Recent blood work was okay/acceptable - see LFTs below  No pain control issues  No problems with the pembrolizumab or axitinib once a day  The plan is to continue with the present regimen  Eventually scans will need to be repeated  LFTs have jumped up  Wife was asking about routine alcohol use  Patient understands that he needs to significantly limit his alcohol use at this time especially with the elevated AST and ALT  levels are being trended  Mr Vinny Cruz says that he needs to continue with his Synthroid; PCP is monitoring the TSH  Patient is to return in 9 weeks  Patient knows to call the hematology/oncology office if there are any other questions or concerns  Carefully review your medication list and verify that the list is accurate and up-to-date  Please call the hematology/oncology office if there are medications missing from the list, medications on the list that you are not currently taking or if there is a dosage or instruction that is different from how you're taking that medication  Patient goals and areas of care: Continue with pembrolizumab and axitinib  Barriers to care: None  Patient is able to self-care   ______________________________________________________________________________________    Chief Complaint   Patient presents with   • Follow-up   • Metastatic renal cell carcinoma     History of Present Illness: 52-year-old male with relatively good general health presenting to the emergency room recently with abrupt shortness of breath and dyspnea on exertion    CTA/chest did not demonstrate any PE but patient was found to have an incidental right renal mass  Work-up was initiated; this included a urology evaluation  Mr Sterling Westfall was found to have clear-cell renal cell carcinoma and underwent planned preoperative angioembolization with subsequent right radical nephrectomy via chevron incision  Patient was subsequently discharged and referred to oncology for evaluation  Follow-up CAT scan of the chest demonstrated a number of new pulmonary nodules distant with metastatic disease  Options were discussed and patient is now on axitinib and pembrolizumab  Mr Sterling Westfall had numbness, tingling and pain in his mouth recently  Axitinib was discontinued then eventually restarted at 5 mg once a day  Patient continues to tolerate this  Appetite is okay; patient states that his taste is good  Weight is stable  No other GI problems  No  problems  No pain control issues other than the chronic postoperative abdominal abdominal wall discomfort  Activities are baseline  Review of Systems   Constitutional: Positive for fatigue  HENT: Negative  Eyes: Negative  Respiratory: Negative  Cardiovascular: Negative  Gastrointestinal: Negative  Abdominal discomfort   Endocrine: Negative  Genitourinary: Negative  Musculoskeletal: Negative  Skin: Negative  Allergic/Immunologic: Negative  Neurological: Negative  Hematological: Negative  Psychiatric/Behavioral: The patient is nervous/anxious  All other systems reviewed and are negative      Patient Active Problem List   Diagnosis   • Hyperlipidemia   • Meniere disease, left   • History of anesthesia complications   • Right renal mass   • Clear cell carcinoma of right kidney [facility])   • Annual physical exam   • Fungal dermatitis   • Dry skin dermatitis   • RBBB   • BMI 25 0-25 9,adult   • H/O right nephrectomy   • Malignant neoplasm metastatic to lung [facility])   • Palliative care patient   • Abdominal pain   • Constipation   • Anxiousness   • Dysphoric mood   • Loss of appetite   • Retroperitoneal lymphadenopathy   • Hypothyroidism   • BMI 35 0-35 9,adult   • Alteration in appetite   • Chronic post-operative pain   • Continuous opioid dependence (HCC)   • Gastroesophageal reflux disease with esophagitis   • Hypertension   • Weight loss, unintentional   • Oral mucositis due to antineoplastic therapy   • BMI 24 0-24 9, adult   • Dysgeusia     Past Medical History:   Diagnosis Date   • Arthralgia     last assessed 02/10/2015   • Babesiosis     last assessed 14   • Benign paroxysmal vertigo     last assessed 05/22/15   • Cancer ([facility] [address] )     right kidney removed 22-oral chemotherapy and IV immunotherapy every 21 days   • Dry skin dermatitis    • Fullness of abdomen     with eating   • Meniere disease     last assessed 04/15/13   • Pneumonia of left lower lobe due to infectious organism     last assessed 16   • Vitamin D deficiency     last assessed 14     Past Surgical History:   Procedure Laterality Date   • FIBULA FRACTURE SURGERY Right    • IR RENAL ANGIOGRAM  2022   • PA NEPHRECTOMY W/PRTL URETERECT OPEN RIB RESCJ RAD Right 2022    Procedure: NEPHRECTOMY ABDOMINAL APPROACH;  Surgeon: Jenny Pedersen MD;  [facility];  Service: Urology   • TIBIA FRACTURE SURGERY Right    • TONSILLECTOMY       Family History   Problem Relation Age of Onset   • Meniere's disease Mother    • Cancer Father         ?renal/bladder?    • No Known Problems Family      Social History     Socioeconomic History   • Marital status: /Civil Union     Spouse name: Not on file   • Number of children: Not on file   • Years of education: Not on file   • Highest education level: Not on file   Occupational History   • Not on file   Tobacco Use   • Smoking status: Former     Types: Cigarettes     Start date: 1984     Quit date: 2022     Years since quittin 7   • Smokeless tobacco: Never   • Tobacco comments:     Quit 2 mos ago   Vaping Use   • Vaping Use: Never used   Substance and Sexual Activity   • Alcohol use: Not Currently     Comment: social   • Drug use: No   • Sexual activity: Yes   Other Topics Concern   • Not on file   Social History Narrative    Daily coffee consumption 6 cups/day    Wife: Jenifer        From 11/19/22  note:    Primary Care Provider: Isaiah Gilmore MD    Primary Insurance: [email or web address] Proxies: There are no active Health Care Proxies on file  Primary Caregiver: Self    Support Systems: Self, Spouse/significant other    [place] of Residence: [address] do you live in?: [address] access options  Select all that apply : Stairs    Number of steps to enter home : 3    Type of Current Residence: 2 story home    Living Arrangements: Lives w/ Spouse/significant other    Functional Status: Independent    Completes ADLs independently?: Yes    Ambulates independently?: Yes    Does patient use assisted devices?: No    Does patient currently own DME?: No     Social Determinants of Health     Financial Resource Strain: Not on file   Food Insecurity: Not on file   Transportation Needs: Not on file   Physical Activity: Not on file   Stress: Not on file   Social Connections: Not on file   Intimate Partner Violence: Not on file   Housing Stability: Not on file       Current Outpatient Medications:   •  amLODIPine (NORVASC) 5 mg tablet, Take 1 tablet (5 mg total) by mouth daily, Disp: 30 tablet, Rfl: 2  •  Ammonium Lactate 10 % LOTN, Apply topically 2 (two) times a day (Patient taking differently: Apply topically 2 (two) times a day as needed), Disp: 473  12 mL, Rfl: 1  •  axitinib (Inlyta) 5 MG TABS, take 1 tablet (5mg) by mouth twice a day, Disp: 30 tablet, Rfl: 10  •  clotrimazole-betamethasone (LOTRISONE) 1-0 05 % cream, Apply topically 2 (two) times a day (Patient taking differently: Apply topically 2 (two) times a day as needed), Disp: 45 g, Rfl: 1  • famotidine (PEPCID) 20 mg tablet, Take 1 tablet (20 mg total) by mouth daily, Disp: 30 tablet, Rfl: 2  •  gabapentin (Neurontin) 300 mg capsule, Take 2 capsules (600 mg total) by mouth 2 (two) times a day, Disp: 120 capsule, Rfl: 2  •  levothyroxine (Levoxyl) 25 mcg tablet, Take 1 tablet (25 mcg total) by mouth daily in the early morning, Disp: 30 tablet, Rfl: 3  •  oxyCODONE (Roxicodone) 5 immediate release tablet, Take 1 tablet (5 mg total) by mouth every 4 (four) hours as needed for moderate pain or severe pain Max Daily Amount: 30 mg, Disp: 60 tablet, Rfl: 0  •  polyethylene glycol (MIRALAX) 17 g packet, Take 17 g by mouth daily, Disp: , Rfl:     No Known Allergies    Vitals:    06/01/23 0905   BP: 112/80   Pulse: 71   Resp: 16   Temp: (!) 97 2 °F (36 2 °C)   SpO2: 97%     Physical Exam  Constitutional:       Appearance: He is well-developed  HENT:      Head: Normocephalic and atraumatic  Right Ear: External ear normal       Left Ear: External ear normal    Eyes:      Conjunctiva/sclera: Conjunctivae normal       Pupils: Pupils are equal, round, and reactive to light  Cardiovascular:      Rate and Rhythm: Normal rate and regular rhythm  Heart sounds: Normal heart sounds  Pulmonary:      Effort: Pulmonary effort is normal       Breath sounds: Normal breath sounds  Abdominal:      General: Bowel sounds are normal       Palpations: Abdomen is soft  Musculoskeletal:         General: Normal range of motion  Cervical back: Normal range of motion and neck supple  Skin:     General: Skin is warm  Neurological:      Mental Status: He is alert and oriented to person, place, and time  Deep Tendon Reflexes: Reflexes are normal and symmetric  Psychiatric:         Behavior: Behavior normal          Thought Content:  Thought content normal          Judgment: Judgment normal      Extremities: No lower extreme edema bilaterally, no cords, pulses are 1+    Labs    5/12/2023 WBC = 2 71 hemoglobin = 13 2 hematocrit = 40 2 platelet = 729 neutrophil = 43% TSH = 3 890 BUN = 19 creatinine = 1 24 calcium = 9 0 AST = 79 ALT = 100 alkaline phosphatase = 99 total bilirubin = 0 63    3/31/2023 WBC = 3 55 hemoglobin = 14 7 hematocrit = 44 platelet = 253 neutrophil = 60% TSH = 7 620 BUN = 20 creatinine = 1 38 calcium = 9 7 LFTs WNL    Imaging    1/31/2023 MRI abdomen with and without contrast    IMPRESSION:     Mildly enlarged retrocaval nodes highly suspicious for metastatic disease, unchanged from recent CT of January 25, 2023      Bibasilar pulmonary nodules highly suspicious for pulmonary metastatic disease better evaluated on recent chest CT      Small hepatic lesions, difficult to evaluate due to respiratory motion artifact but not significantly changed in size or MR imaging characteristics when compared to November 9, 2022, most consistent with benign hemangiomata  1/12/2023 CAT scan of the chest without contrast    LUNGS:    Mild emphysema      Numerous, new pulmonary nodules in all lobes ranging in size from approximately 0 3 cm (3/68, peripheral right lower lobe) to 0 9 cm (3/82, medial left upper lobe)  4 0 cm ascending aorta is top normal in caliber  No intramural hematoma  Two enlarged right retrocaval/retroperitoneal lymph nodes have increased in size  1 1 and 1 3 cm short axis diameter (previously 0 6 to 0 8 cm respectively)  IMPRESSION:     Pulmonary metastases  Increased size of right retroperitoneal lymph nodes compared to 11/7/2022, suspicious for metastasis  11/9/2022 MRI abdomen    IMPRESSION:     1   Large exophytic right lower pole heterogeneously enhancing renal mass extending into the renal sinus consistent with renal cell carcinoma  There are retroperitoneal collaterals posteriorly which drain into the IVC though no evidence of right renal   vein thrombosis  No hydronephrosis      2  Borderline paracaval lymph nodes, likely early lymph node metastasis      3  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  However, given primary malignancy and atypical features, a short interval follow-up MRI in 3 months is recommended to exclude metastasis  11/7/2022 CTA chest PE study    IMPRESSION:     No pulmonary embolus  Mild patchy peripheral lower lobe groundglass opacity  Covid not excluded although not the classic appearance  Consider aspiration versus other infectious/inflammatory etiology  Partially imaged large right renal mass  See abdomen CT  Pathology    Case Report   Surgical Pathology Report                         Case: [id number]                                    Authorizing Provider: Annie Alegria MD        Collected:           11/21/2022 1213               Ordering Location:     [address]      Received:            11/21/2022 2301                                      Hospital Operating Room                                                       Pathologist:           Chalino Evans MD                                                                  Specimen:    Kidney, Right                                                                              Final Diagnosis   A  Kidney, Right, nephrectomy:  - Clear cell renal cell carcinoma  See comment and synoptic report  - One lymph node positive for metastatic carcinoma (1/1)     Comment: Immunohistochemistry is diffusely positive in the tumor cells for PAX8 and carbonic anhydrase IX  CK7 and P504S have non-specific staining  The findings are consistent with the diagnosis      8th Ed AJCC Tumor Stage:  at least Stage III - pT2b, pN1, G3    Representative tumor block: A7   Electronically signed by Maik Arevalo MD on 12/5/2022 at 10:16 AM     Synoptic Checklist   KIDNEY: Nephrectomy  8th Edition - Protocol posted: 6/30/2021  KIDNEY: NEPHRECTOMY, PARTIAL OR RADICAL - All Specimens  SPECIMEN   Procedure  Total nephrectomy    Specimen Laterality  Right    TUMOR   Tumor Focality  Unifocal    Tumor Size  Greatest Dimension (Centimeters): 13 5 cm   Histologic Type  Clear cell renal cell carcinoma    Histologic Grade (WHO / ISUP)  G3 (nucleoli conspicuous and eosinophilic at 689Q magnification)    Tumor Extent  Limited to kidney    Sarcomatoid Features  Not identified    Rhabdoid Features  Not identified    Tumor Necrosis  Present    Percentage of Tumor Necrosis  30 %   Lymphovascular Invasion  Not identified    MARGINS   Margin Status  All margins negative for invasive carcinoma    REGIONAL LYMPH NODES   Regional Lymph Node Status  Tumor present in regional lymph node(s)    Number of Lymph Nodes with Tumor  1    Jabari Site(s) with Tumor  Hilar    Size of Largest Jabari Metastatic Deposit  0 3 cm   Number of Lymph Nodes Examined  1    PATHOLOGIC STAGE CLASSIFICATION (pTNM, AJCC 8th Edition)   Reporting of pT, pN, and (when applicable) pM categories is based on information available to the pathologist at the time the report is issued  As per the AJCC (Chapter 1, 8th Ed ) it is the managing physician’s responsibility to establish the final pathologic stage based upon all pertinent information, including but potentially not limited to this pathology report  Primary Tumor (pT)  pT2b    Regional Lymph Nodes (pN)  pN1    ADDITIONAL FINDINGS   Additional Findings in Nonneoplastic Kidney  Tubuloglomerular necrosis

## 2023-06-01 NOTE — PROGRESS NOTES
I left voicemail for patient letting him know that his appointment with Dr Jaleel Richardson has been scheduled at [facility] on 8/4/23 at 11:20am   I asked him to call back to confirm

## 2023-06-02 ENCOUNTER — APPOINTMENT (OUTPATIENT)
Dept: LAB | Facility: CLINIC | Age: 53
End: 2023-06-02
Payer: COMMERCIAL

## 2023-06-02 DIAGNOSIS — C64.1 CLEAR CELL CARCINOMA OF RIGHT KIDNEY (HCC): ICD-10-CM

## 2023-06-02 LAB
ALBUMIN SERPL BCP-MCNC: 4 G/DL (ref 3.5–5)
ALP SERPL-CCNC: 106 U/L (ref 46–116)
ALT SERPL W P-5'-P-CCNC: 383 U/L (ref 12–78)
ANION GAP SERPL CALCULATED.3IONS-SCNC: 2 MMOL/L (ref 4–13)
AST SERPL W P-5'-P-CCNC: 263 U/L (ref 5–45)
BASOPHILS # BLD AUTO: 0.04 THOUSANDS/ÂΜL (ref 0–0.1)
BASOPHILS NFR BLD AUTO: 1 % (ref 0–1)
BILIRUB SERPL-MCNC: 0.83 MG/DL (ref 0.2–1)
BUN SERPL-MCNC: 17 MG/DL (ref 5–25)
CALCIUM SERPL-MCNC: 9.4 MG/DL (ref 8.3–10.1)
CHLORIDE SERPL-SCNC: 109 MMOL/L (ref 96–108)
CO2 SERPL-SCNC: 25 MMOL/L (ref 21–32)
CREAT SERPL-MCNC: 1.43 MG/DL (ref 0.6–1.3)
EOSINOPHIL # BLD AUTO: 0.16 THOUSAND/ÂΜL (ref 0–0.61)
EOSINOPHIL NFR BLD AUTO: 5 % (ref 0–6)
ERYTHROCYTE [DISTWIDTH] IN BLOOD BY AUTOMATED COUNT: 15.6 % (ref 11.6–15.1)
GFR SERPL CREATININE-BSD FRML MDRD: 54 ML/MIN/1.73SQ M
GLUCOSE P FAST SERPL-MCNC: 129 MG/DL (ref 65–99)
HCT VFR BLD AUTO: 45.5 % (ref 36.5–49.3)
HGB BLD-MCNC: 15 G/DL (ref 12–17)
IMM GRANULOCYTES # BLD AUTO: 0.01 THOUSAND/UL (ref 0–0.2)
IMM GRANULOCYTES NFR BLD AUTO: 0 % (ref 0–2)
LYMPHOCYTES # BLD AUTO: 0.94 THOUSANDS/ÂΜL (ref 0.6–4.47)
LYMPHOCYTES NFR BLD AUTO: 27 % (ref 14–44)
MCH RBC QN AUTO: 30.4 PG (ref 26.8–34.3)
MCHC RBC AUTO-ENTMCNC: 33 G/DL (ref 31.4–37.4)
MCV RBC AUTO: 92 FL (ref 82–98)
MONOCYTES # BLD AUTO: 0.42 THOUSAND/ÂΜL (ref 0.17–1.22)
MONOCYTES NFR BLD AUTO: 12 % (ref 4–12)
NEUTROPHILS # BLD AUTO: 1.89 THOUSANDS/ÂΜL (ref 1.85–7.62)
NEUTS SEG NFR BLD AUTO: 55 % (ref 43–75)
NRBC BLD AUTO-RTO: 0 /100 WBCS
PLATELET # BLD AUTO: 192 THOUSANDS/UL (ref 149–390)
PMV BLD AUTO: 10.3 FL (ref 8.9–12.7)
POTASSIUM SERPL-SCNC: 4.1 MMOL/L (ref 3.5–5.3)
PROT SERPL-MCNC: 7.5 G/DL (ref 6.4–8.4)
RBC # BLD AUTO: 4.93 MILLION/UL (ref 3.88–5.62)
SODIUM SERPL-SCNC: 136 MMOL/L (ref 135–147)
T3FREE SERPL-MCNC: 3.28 PG/ML (ref 2.5–3.9)
TSH SERPL DL<=0.05 MIU/L-ACNC: 2.17 UIU/ML (ref 0.45–4.5)
WBC # BLD AUTO: 3.46 THOUSAND/UL (ref 4.31–10.16)

## 2023-06-02 PROCEDURE — 84443 ASSAY THYROID STIM HORMONE: CPT

## 2023-06-02 PROCEDURE — 80053 COMPREHEN METABOLIC PANEL: CPT

## 2023-06-02 PROCEDURE — 85025 COMPLETE CBC W/AUTO DIFF WBC: CPT

## 2023-06-02 PROCEDURE — 36415 COLL VENOUS BLD VENIPUNCTURE: CPT

## 2023-06-02 PROCEDURE — 84481 FREE ASSAY (FT-3): CPT

## 2023-06-05 ENCOUNTER — TELEPHONE (OUTPATIENT)
Dept: HEMATOLOGY ONCOLOGY | Facility: MEDICAL CENTER | Age: 53
End: 2023-06-05

## 2023-06-05 DIAGNOSIS — C64.1 CLEAR CELL CARCINOMA OF RIGHT KIDNEY (HCC): ICD-10-CM

## 2023-06-05 NOTE — TELEPHONE ENCOUNTER
LFTs elevated  Pembro to be held per Dr Isabel Long  Patient's wife aware and instructed to repeat LFTs in 3 weeks prior to next scheduled dose of pembro on 6/27/2023  Wife verbalizes understanding  FYELLY to [employer] team

## 2023-06-05 NOTE — PROGRESS NOTES
Thomas Gonzalez RN aware of elevated LFT's  Treatment to be held tomorrow  Hollywood Argue will notify pt

## 2023-06-06 ENCOUNTER — HOSPITAL ENCOUNTER (OUTPATIENT)
Dept: INFUSION CENTER | Facility: HOSPITAL | Age: 53
Discharge: HOME | End: 2023-06-06
Attending: INTERNAL MEDICINE

## 2023-06-11 DIAGNOSIS — Z90.5 H/O RIGHT NEPHRECTOMY: ICD-10-CM

## 2023-06-11 DIAGNOSIS — G89.28 CHRONIC POST-OPERATIVE PAIN: ICD-10-CM

## 2023-06-11 DIAGNOSIS — C64.1 CLEAR CELL CARCINOMA OF RIGHT KIDNEY (HCC): ICD-10-CM

## 2023-06-11 DIAGNOSIS — Z51.5 PALLIATIVE CARE PATIENT: ICD-10-CM

## 2023-06-11 DIAGNOSIS — R10.9 ABDOMINAL PAIN: ICD-10-CM

## 2023-06-11 DIAGNOSIS — C78.00 MALIGNANT NEOPLASM METASTATIC TO LUNG, UNSPECIFIED LATERALITY (HCC): ICD-10-CM

## 2023-06-11 DIAGNOSIS — R59.0 RETROPERITONEAL LYMPHADENOPATHY: ICD-10-CM

## 2023-06-12 RX ORDER — OXYCODONE HYDROCHLORIDE 5 MG/1
5 TABLET ORAL EVERY 4 HOURS PRN
Qty: 60 TABLET | Refills: 0 | Status: SHIPPED | OUTPATIENT
Start: 2023-06-12

## 2023-06-23 ENCOUNTER — APPOINTMENT (OUTPATIENT)
Dept: LAB | Facility: CLINIC | Age: 53
End: 2023-06-23
Payer: COMMERCIAL

## 2023-06-23 DIAGNOSIS — C64.1 CLEAR CELL CARCINOMA OF RIGHT KIDNEY (HCC): ICD-10-CM

## 2023-06-23 LAB
ALBUMIN SERPL BCP-MCNC: 3.9 G/DL (ref 3.5–5)
ALP SERPL-CCNC: 126 U/L (ref 46–116)
ALT SERPL W P-5'-P-CCNC: 473 U/L (ref 12–78)
ANION GAP SERPL CALCULATED.3IONS-SCNC: 2 MMOL/L
AST SERPL W P-5'-P-CCNC: 235 U/L (ref 5–45)
BASOPHILS # BLD AUTO: 0.07 THOUSANDS/ÂΜL (ref 0–0.1)
BASOPHILS NFR BLD AUTO: 1 % (ref 0–1)
BILIRUB SERPL-MCNC: 0.58 MG/DL (ref 0.2–1)
BUN SERPL-MCNC: 15 MG/DL (ref 5–25)
CALCIUM SERPL-MCNC: 9.7 MG/DL (ref 8.3–10.1)
CHLORIDE SERPL-SCNC: 108 MMOL/L (ref 96–108)
CO2 SERPL-SCNC: 29 MMOL/L (ref 21–32)
CREAT SERPL-MCNC: 1.34 MG/DL (ref 0.6–1.3)
EOSINOPHIL # BLD AUTO: 0.27 THOUSAND/ÂΜL (ref 0–0.61)
EOSINOPHIL NFR BLD AUTO: 6 % (ref 0–6)
ERYTHROCYTE [DISTWIDTH] IN BLOOD BY AUTOMATED COUNT: 14.1 % (ref 11.6–15.1)
GFR SERPL CREATININE-BSD FRML MDRD: 59 ML/MIN/1.73SQ M
GLUCOSE P FAST SERPL-MCNC: 91 MG/DL (ref 65–99)
HCT VFR BLD AUTO: 45.8 % (ref 36.5–49.3)
HGB BLD-MCNC: 15.2 G/DL (ref 12–17)
IMM GRANULOCYTES # BLD AUTO: 0.01 THOUSAND/UL (ref 0–0.2)
IMM GRANULOCYTES NFR BLD AUTO: 0 % (ref 0–2)
LYMPHOCYTES # BLD AUTO: 1.52 THOUSANDS/ÂΜL (ref 0.6–4.47)
LYMPHOCYTES NFR BLD AUTO: 31 % (ref 14–44)
MCH RBC QN AUTO: 30.5 PG (ref 26.8–34.3)
MCHC RBC AUTO-ENTMCNC: 33.2 G/DL (ref 31.4–37.4)
MCV RBC AUTO: 92 FL (ref 82–98)
MONOCYTES # BLD AUTO: 0.64 THOUSAND/ÂΜL (ref 0.17–1.22)
MONOCYTES NFR BLD AUTO: 13 % (ref 4–12)
NEUTROPHILS # BLD AUTO: 2.35 THOUSANDS/ÂΜL (ref 1.85–7.62)
NEUTS SEG NFR BLD AUTO: 49 % (ref 43–75)
NRBC BLD AUTO-RTO: 0 /100 WBCS
PLATELET # BLD AUTO: 237 THOUSANDS/UL (ref 149–390)
PMV BLD AUTO: 10.7 FL (ref 8.9–12.7)
POTASSIUM SERPL-SCNC: 4.1 MMOL/L (ref 3.5–5.3)
PROT SERPL-MCNC: 7.4 G/DL (ref 6.4–8.4)
RBC # BLD AUTO: 4.98 MILLION/UL (ref 3.88–5.62)
SODIUM SERPL-SCNC: 139 MMOL/L (ref 135–147)
T3FREE SERPL-MCNC: 3.83 PG/ML (ref 2.5–3.9)
TSH SERPL DL<=0.05 MIU/L-ACNC: 1.82 UIU/ML (ref 0.45–4.5)
WBC # BLD AUTO: 4.86 THOUSAND/UL (ref 4.31–10.16)

## 2023-06-23 PROCEDURE — 84481 FREE ASSAY (FT-3): CPT

## 2023-06-23 PROCEDURE — 80053 COMPREHEN METABOLIC PANEL: CPT

## 2023-06-23 PROCEDURE — 85025 COMPLETE CBC W/AUTO DIFF WBC: CPT

## 2023-06-23 PROCEDURE — 84443 ASSAY THYROID STIM HORMONE: CPT

## 2023-06-23 PROCEDURE — 36415 COLL VENOUS BLD VENIPUNCTURE: CPT

## 2023-06-27 ENCOUNTER — HOSPITAL ENCOUNTER (OUTPATIENT)
Dept: INFUSION CENTER | Facility: HOSPITAL | Age: 53
Discharge: HOME | End: 2023-06-27
Attending: INTERNAL MEDICINE
Payer: COMMERCIAL

## 2023-06-27 ENCOUNTER — TELEPHONE (OUTPATIENT)
Dept: HEMATOLOGY ONCOLOGY | Facility: CLINIC | Age: 53
End: 2023-06-27

## 2023-06-27 VITALS
HEART RATE: 74 BPM | BODY MASS INDEX: 24.94 KG/M2 | RESPIRATION RATE: 18 BRPM | OXYGEN SATURATION: 98 % | DIASTOLIC BLOOD PRESSURE: 78 MMHG | TEMPERATURE: 98.1 F | SYSTOLIC BLOOD PRESSURE: 125 MMHG | WEIGHT: 183.86 LBS

## 2023-06-27 DIAGNOSIS — C64.1 CLEAR CELL CARCINOMA OF RIGHT KIDNEY (HCC): Primary | ICD-10-CM

## 2023-06-27 RX ORDER — SODIUM CHLORIDE 9 MG/ML
20 INJECTION, SOLUTION INTRAVENOUS ONCE
Status: COMPLETED | OUTPATIENT
Start: 2023-06-27 | End: 2023-06-27

## 2023-06-27 RX ADMIN — SODIUM CHLORIDE 200 MG: 9 INJECTION, SOLUTION INTRAVENOUS at 11:54

## 2023-06-27 RX ADMIN — SODIUM CHLORIDE 20 ML/HR: 0.9 INJECTION, SOLUTION INTRAVENOUS at 10:50

## 2023-06-27 NOTE — TELEPHONE ENCOUNTER
LFT's reviewed with Dr Ofelia Parekh to treat   Plan taken off hold  Patient's wife  aware  Infusion RN aware

## 2023-06-27 NOTE — PROGRESS NOTES
Pt received keytruda infusion w/out any adverse effects, offers no complaints   Confirmed next apt, declined AVS

## 2023-07-03 ENCOUNTER — OFFICE VISIT (OUTPATIENT)
Dept: FAMILY MEDICINE CLINIC | Facility: CLINIC | Age: 53
End: 2023-07-03
Payer: COMMERCIAL

## 2023-07-03 ENCOUNTER — HOSPITAL ENCOUNTER (OUTPATIENT)
Dept: VASCULAR ULTRASOUND | Facility: HOSPITAL | Age: 53
Discharge: HOME | End: 2023-07-03
Payer: COMMERCIAL

## 2023-07-03 VITALS
TEMPERATURE: 97.7 F | RESPIRATION RATE: 16 BRPM | SYSTOLIC BLOOD PRESSURE: 130 MMHG | DIASTOLIC BLOOD PRESSURE: 86 MMHG | BODY MASS INDEX: 25.14 KG/M2 | WEIGHT: 185.6 LBS | HEART RATE: 66 BPM | HEIGHT: 72 IN

## 2023-07-03 DIAGNOSIS — I83.892 VARICOSE VEINS OF LEFT LEG WITH EDEMA: Primary | ICD-10-CM

## 2023-07-03 DIAGNOSIS — M79.661 BILATERAL CALF PAIN: ICD-10-CM

## 2023-07-03 DIAGNOSIS — C64.1 CLEAR CELL CARCINOMA OF RIGHT KIDNEY (HCC): ICD-10-CM

## 2023-07-03 DIAGNOSIS — I10 HYPERTENSION, UNSPECIFIED TYPE: ICD-10-CM

## 2023-07-03 DIAGNOSIS — M79.662 BILATERAL CALF PAIN: ICD-10-CM

## 2023-07-03 PROCEDURE — 99213 OFFICE O/P EST LOW 20 MIN: CPT | Performed by: FAMILY MEDICINE

## 2023-07-03 PROCEDURE — 93970 EXTREMITY STUDY: CPT | Performed by: SURGERY

## 2023-07-03 PROCEDURE — 93970 EXTREMITY STUDY: CPT

## 2023-07-03 NOTE — PROGRESS NOTES
Assessment/Plan:  Diagnoses and all orders for this visit:    Varicose veins of left leg with edema    Bilateral calf pain  -     VAS lower limb venous duplex study, complete bilateral; Future    Clear cell carcinoma of right kidney [facility])  Comments:  under care of oncol-Dr. Dylan Lerner and [address] specialist--Dr. Echo Solomon. Orders:  -     VAS lower limb venous duplex study, complete bilateral; Future    Hypertension, unspecified type  Comments:  BP in range-cont. same. BMI 25.0-25.9,adult    ADDENDUM:  Doppler studies negative for DVT. Left--+focal superficial thrombophlebitis in varicosity of great saph. vein proximal calf  Pt advised leg elevation/warm compresses/compression stockings. Pt to call if no improvement orvif any changes/concerns in condition. Subjective:      Patient ID: Iftikhar Hill is a 52 y.o. male.     Chief Complaint   Patient presents with   • Varicose Veins     Left calf area -looks like it is bruising        HPI     C/o left calf varicosities w discoloration--"looks like it is bruising"  Wearing light compression stocking on right calf due to discomfort  No trauma, rash or lesion  Former smoker  BP in range   Sig hx renal ca--currently on tx---Axitinib (Inlyta)  Denies CP , denies increased shortness of breath    The following portions of the patient's history were reviewed and updated as appropriate: allergies, current medications, past family history, past medical history, past social history, past surgical history and problem list.  Past Medical History:   Diagnosis Date   • Arthralgia     last assessed 02/10/2015   • Babesiosis     last assessed 12/30/14   • Benign paroxysmal vertigo     last assessed 05/22/15   • Cancer ([address])     right kidney removed 11/21/22-oral chemotherapy and IV immunotherapy every 21 days   • Dry skin dermatitis    • Fullness of abdomen     with eating   • Meniere disease     last assessed 04/15/13   • Pneumonia of left lower lobe due to infectious organism     last assessed 04/04/16   • Vitamin D deficiency     last assessed 12/30/14     Past Surgical History:   Procedure Laterality Date   • FIBULA FRACTURE SURGERY Right    • IR RENAL ANGIOGRAM  11/18/2022   • IA NEPHRECTOMY W/PRTL URETERECT OPEN RIB RESCJ RAD Right 11/21/2022    Procedure: NEPHRECTOMY ABDOMINAL APPROACH;  Surgeon: Thaddeus Heimlich, MD;  [facility];  Service: Urology   • TIBIA FRACTURE SURGERY Right    • TONSILLECTOMY       Review of Systems    Per hpi    Current Outpatient Medications   Medication Sig Dispense Refill   • amLODIPine (NORVASC) 5 mg tablet take 1 tablet by mouth once daily 30 tablet 3   • axitinib (Inlyta) 5 MG TABS Take 1 tablet (5mg) by mouth daily 30 tablet 11   • famotidine (PEPCID) 20 mg tablet Take 1 tablet (20 mg total) by mouth daily 30 tablet 2   • gabapentin (Neurontin) 300 mg capsule Take 2 capsules (600 mg total) by mouth 2 (two) times a day 120 capsule 2   • levothyroxine 25 mcg tablet take 1 tablet by mouth once daily IN THE EARLY MORNING 30 tablet 3   • oxyCODONE (Roxicodone) 5 immediate release tablet Take 1 tablet (5 mg total) by mouth every 4 (four) hours as needed for moderate pain or severe pain Max Daily Amount: 30 mg 60 tablet 0     No current facility-administered medications for this visit. Objective:    /86 (BP Location: Left arm, Patient Position: Sitting)   Pulse 66   Temp 97.7 °F (36.5 °C)   Resp 16   Ht 6' (1.829 m)   Wt 84.2 kg (185 lb 9.6 oz)   BMI 25.17 kg/m²        Physical Exam  Vitals and nursing note reviewed. Constitutional:       General: He is not in acute distress. Cardiovascular:      Rate and Rhythm: Normal rate and regular rhythm. Pulmonary:      Effort: Pulmonary effort is normal. No respiratory distress. Musculoskeletal:         General: Normal range of motion. Comments: B/l calf pain-L>R   Skin:     Coloration: Skin is not jaundiced.       Findings: Erythema (left proximal calf over area of prominent varicosities--mildly tender) present. Neurological:      Mental Status: He is alert and oriented to person, place, and time.    Psychiatric:         Mood and Affect: Mood normal.           Brent Davila MD

## 2023-07-07 NOTE — ASSESSMENT & PLAN NOTE
Under care of oncologist--Dr. Greg Brice and Palliative Care specialist-Dr. Jj Livingston. Oneal Ferguson

## 2023-07-13 DIAGNOSIS — Z51.5 PALLIATIVE CARE PATIENT: ICD-10-CM

## 2023-07-13 DIAGNOSIS — G89.28 CHRONIC POST-OPERATIVE PAIN: ICD-10-CM

## 2023-07-13 DIAGNOSIS — C64.1 CLEAR CELL CARCINOMA OF RIGHT KIDNEY (HCC): ICD-10-CM

## 2023-07-13 DIAGNOSIS — R59.0 RETROPERITONEAL LYMPHADENOPATHY: ICD-10-CM

## 2023-07-13 DIAGNOSIS — R10.9 ABDOMINAL PAIN: ICD-10-CM

## 2023-07-13 DIAGNOSIS — Z90.5 H/O RIGHT NEPHRECTOMY: ICD-10-CM

## 2023-07-13 DIAGNOSIS — C78.00 MALIGNANT NEOPLASM METASTATIC TO LUNG, UNSPECIFIED LATERALITY (HCC): ICD-10-CM

## 2023-07-13 RX ORDER — SODIUM CHLORIDE 9 MG/ML
20 INJECTION, SOLUTION INTRAVENOUS ONCE
Status: CANCELLED | OUTPATIENT
Start: 2023-07-18

## 2023-07-14 ENCOUNTER — TELEPHONE (OUTPATIENT)
Dept: HEMATOLOGY ONCOLOGY | Facility: CLINIC | Age: 53
End: 2023-07-14

## 2023-07-14 DIAGNOSIS — C64.1 CLEAR CELL CARCINOMA OF RIGHT KIDNEY (HCC): Primary | ICD-10-CM

## 2023-07-14 DIAGNOSIS — Z79.899 DRUG THERAPY: ICD-10-CM

## 2023-07-14 RX ORDER — OXYCODONE HYDROCHLORIDE 5 MG/1
5 TABLET ORAL EVERY 4 HOURS PRN
Qty: 60 TABLET | Refills: 0 | Status: SHIPPED | OUTPATIENT
Start: 2023-07-14

## 2023-07-17 ENCOUNTER — APPOINTMENT (OUTPATIENT)
Dept: LAB | Facility: CLINIC | Age: 53
End: 2023-07-17
Payer: COMMERCIAL

## 2023-07-17 DIAGNOSIS — C64.1 CLEAR CELL CARCINOMA OF RIGHT KIDNEY (HCC): ICD-10-CM

## 2023-07-17 DIAGNOSIS — Z79.899 DRUG THERAPY: ICD-10-CM

## 2023-07-17 LAB
ALBUMIN SERPL BCP-MCNC: 4 G/DL (ref 3.5–5)
ALP SERPL-CCNC: 95 U/L (ref 46–116)
ALT SERPL W P-5'-P-CCNC: 84 U/L (ref 12–78)
ANION GAP SERPL CALCULATED.3IONS-SCNC: 1 MMOL/L
AST SERPL W P-5'-P-CCNC: 52 U/L (ref 5–45)
BASOPHILS # BLD AUTO: 0.04 THOUSANDS/ÂΜL (ref 0–0.1)
BASOPHILS NFR BLD AUTO: 1 % (ref 0–1)
BILIRUB SERPL-MCNC: 0.82 MG/DL (ref 0.2–1)
BUN SERPL-MCNC: 14 MG/DL (ref 5–25)
CALCIUM SERPL-MCNC: 9.6 MG/DL (ref 8.3–10.1)
CHLORIDE SERPL-SCNC: 108 MMOL/L (ref 96–108)
CO2 SERPL-SCNC: 28 MMOL/L (ref 21–32)
CREAT SERPL-MCNC: 1.34 MG/DL (ref 0.6–1.3)
EOSINOPHIL # BLD AUTO: 0.21 THOUSAND/ÂΜL (ref 0–0.61)
EOSINOPHIL NFR BLD AUTO: 4 % (ref 0–6)
ERYTHROCYTE [DISTWIDTH] IN BLOOD BY AUTOMATED COUNT: 13.4 % (ref 11.6–15.1)
GFR SERPL CREATININE-BSD FRML MDRD: 59 ML/MIN/1.73SQ M
GLUCOSE P FAST SERPL-MCNC: 111 MG/DL (ref 65–99)
HCT VFR BLD AUTO: 50 % (ref 36.5–49.3)
HGB BLD-MCNC: 16.3 G/DL (ref 12–17)
IMM GRANULOCYTES # BLD AUTO: 0.01 THOUSAND/UL (ref 0–0.2)
IMM GRANULOCYTES NFR BLD AUTO: 0 % (ref 0–2)
LYMPHOCYTES # BLD AUTO: 1.4 THOUSANDS/ÂΜL (ref 0.6–4.47)
LYMPHOCYTES NFR BLD AUTO: 29 % (ref 14–44)
MCH RBC QN AUTO: 30.8 PG (ref 26.8–34.3)
MCHC RBC AUTO-ENTMCNC: 32.6 G/DL (ref 31.4–37.4)
MCV RBC AUTO: 95 FL (ref 82–98)
MONOCYTES # BLD AUTO: 0.45 THOUSAND/ÂΜL (ref 0.17–1.22)
MONOCYTES NFR BLD AUTO: 9 % (ref 4–12)
NEUTROPHILS # BLD AUTO: 2.7 THOUSANDS/ÂΜL (ref 1.85–7.62)
NEUTS SEG NFR BLD AUTO: 57 % (ref 43–75)
NRBC BLD AUTO-RTO: 0 /100 WBCS
PLATELET # BLD AUTO: 182 THOUSANDS/UL (ref 149–390)
PMV BLD AUTO: 10.3 FL (ref 8.9–12.7)
POTASSIUM SERPL-SCNC: 4 MMOL/L (ref 3.5–5.3)
PROT SERPL-MCNC: 7.9 G/DL (ref 6.4–8.4)
RBC # BLD AUTO: 5.29 MILLION/UL (ref 3.88–5.62)
SODIUM SERPL-SCNC: 137 MMOL/L (ref 135–147)
T4 FREE SERPL-MCNC: 0.74 NG/DL (ref 0.61–1.12)
TSH SERPL DL<=0.05 MIU/L-ACNC: 3.01 UIU/ML (ref 0.45–4.5)
WBC # BLD AUTO: 4.81 THOUSAND/UL (ref 4.31–10.16)

## 2023-07-17 PROCEDURE — 80053 COMPREHEN METABOLIC PANEL: CPT

## 2023-07-17 PROCEDURE — 36415 COLL VENOUS BLD VENIPUNCTURE: CPT

## 2023-07-17 PROCEDURE — 84439 ASSAY OF FREE THYROXINE: CPT

## 2023-07-17 PROCEDURE — 85025 COMPLETE CBC W/AUTO DIFF WBC: CPT

## 2023-07-17 PROCEDURE — 84443 ASSAY THYROID STIM HORMONE: CPT

## 2023-07-18 ENCOUNTER — HOSPITAL ENCOUNTER (OUTPATIENT)
Dept: INFUSION CENTER | Facility: HOSPITAL | Age: 53
Discharge: HOME | End: 2023-07-18
Attending: INTERNAL MEDICINE
Payer: COMMERCIAL

## 2023-07-18 VITALS
HEIGHT: 72 IN | SYSTOLIC BLOOD PRESSURE: 129 MMHG | WEIGHT: 186.29 LBS | TEMPERATURE: 97.8 F | HEART RATE: 61 BPM | BODY MASS INDEX: 25.23 KG/M2 | DIASTOLIC BLOOD PRESSURE: 80 MMHG | RESPIRATION RATE: 18 BRPM | OXYGEN SATURATION: 97 %

## 2023-07-18 DIAGNOSIS — C64.1 CLEAR CELL CARCINOMA OF RIGHT KIDNEY (HCC): Primary | ICD-10-CM

## 2023-07-18 RX ORDER — SODIUM CHLORIDE 9 MG/ML
20 INJECTION, SOLUTION INTRAVENOUS ONCE
Status: COMPLETED | OUTPATIENT
Start: 2023-07-18 | End: 2023-07-18

## 2023-07-18 RX ADMIN — SODIUM CHLORIDE 200 MG: 9 INJECTION, SOLUTION INTRAVENOUS at 11:47

## 2023-07-18 RX ADMIN — SODIUM CHLORIDE 20 ML/HR: 0.9 INJECTION, SOLUTION INTRAVENOUS at 11:30

## 2023-07-24 ENCOUNTER — TELEPHONE (OUTPATIENT)
Dept: HEMATOLOGY ONCOLOGY | Facility: CLINIC | Age: 53
End: 2023-07-24

## 2023-07-24 NOTE — TELEPHONE ENCOUNTER
I spoke with the patient in regards to his lab work that needs to be completed prior to his appt on 8/4/23 at 11:20am. Patient states understanding.

## 2023-07-25 DIAGNOSIS — K21.00 GASTROESOPHAGEAL REFLUX DISEASE WITH ESOPHAGITIS: ICD-10-CM

## 2023-07-25 DIAGNOSIS — Z51.5 PALLIATIVE CARE PATIENT: ICD-10-CM

## 2023-07-25 DIAGNOSIS — C78.00 MALIGNANT NEOPLASM METASTATIC TO LUNG, UNSPECIFIED LATERALITY (HCC): ICD-10-CM

## 2023-07-25 DIAGNOSIS — C64.1 CLEAR CELL CARCINOMA OF RIGHT KIDNEY (HCC): ICD-10-CM

## 2023-07-25 DIAGNOSIS — R10.9 ABDOMINAL PAIN: ICD-10-CM

## 2023-07-25 DIAGNOSIS — Z90.5 H/O RIGHT NEPHRECTOMY: ICD-10-CM

## 2023-07-25 RX ORDER — FAMOTIDINE 20 MG/1
20 TABLET, FILM COATED ORAL DAILY
Qty: 30 TABLET | Refills: 2 | Status: SHIPPED | OUTPATIENT
Start: 2023-07-25

## 2023-08-01 RX ORDER — SODIUM CHLORIDE 9 MG/ML
20 INJECTION, SOLUTION INTRAVENOUS ONCE
Status: CANCELLED | OUTPATIENT
Start: 2023-08-08

## 2023-08-03 ENCOUNTER — APPOINTMENT (OUTPATIENT)
Dept: LAB | Facility: CLINIC | Age: 53
End: 2023-08-03
Payer: COMMERCIAL

## 2023-08-03 DIAGNOSIS — C64.1 CLEAR CELL CARCINOMA OF RIGHT KIDNEY (HCC): ICD-10-CM

## 2023-08-03 LAB
ALBUMIN SERPL BCP-MCNC: 3.8 G/DL (ref 3.5–5)
ALP SERPL-CCNC: 92 U/L (ref 46–116)
ALT SERPL W P-5'-P-CCNC: 41 U/L (ref 12–78)
ANION GAP SERPL CALCULATED.3IONS-SCNC: 4 MMOL/L
AST SERPL W P-5'-P-CCNC: 34 U/L (ref 5–45)
BASOPHILS # BLD AUTO: 0.07 THOUSANDS/ÂΜL (ref 0–0.1)
BASOPHILS NFR BLD AUTO: 2 % (ref 0–1)
BILIRUB SERPL-MCNC: 1.14 MG/DL (ref 0.2–1)
BUN SERPL-MCNC: 15 MG/DL (ref 5–25)
CALCIUM SERPL-MCNC: 9.5 MG/DL (ref 8.3–10.1)
CHLORIDE SERPL-SCNC: 106 MMOL/L (ref 96–108)
CO2 SERPL-SCNC: 29 MMOL/L (ref 21–32)
CREAT SERPL-MCNC: 1.5 MG/DL (ref 0.6–1.3)
EOSINOPHIL # BLD AUTO: 0.18 THOUSAND/ÂΜL (ref 0–0.61)
EOSINOPHIL NFR BLD AUTO: 4 % (ref 0–6)
ERYTHROCYTE [DISTWIDTH] IN BLOOD BY AUTOMATED COUNT: 13 % (ref 11.6–15.1)
GFR SERPL CREATININE-BSD FRML MDRD: 51 ML/MIN/1.73SQ M
GLUCOSE P FAST SERPL-MCNC: 116 MG/DL (ref 65–99)
HCT VFR BLD AUTO: 49.8 % (ref 36.5–49.3)
HGB BLD-MCNC: 17.1 G/DL (ref 12–17)
IMM GRANULOCYTES # BLD AUTO: 0.02 THOUSAND/UL (ref 0–0.2)
IMM GRANULOCYTES NFR BLD AUTO: 0 % (ref 0–2)
LYMPHOCYTES # BLD AUTO: 1.54 THOUSANDS/ÂΜL (ref 0.6–4.47)
LYMPHOCYTES NFR BLD AUTO: 34 % (ref 14–44)
MCH RBC QN AUTO: 31.5 PG (ref 26.8–34.3)
MCHC RBC AUTO-ENTMCNC: 34.3 G/DL (ref 31.4–37.4)
MCV RBC AUTO: 92 FL (ref 82–98)
MONOCYTES # BLD AUTO: 0.48 THOUSAND/ÂΜL (ref 0.17–1.22)
MONOCYTES NFR BLD AUTO: 11 % (ref 4–12)
NEUTROPHILS # BLD AUTO: 2.29 THOUSANDS/ÂΜL (ref 1.85–7.62)
NEUTS SEG NFR BLD AUTO: 49 % (ref 43–75)
NRBC BLD AUTO-RTO: 0 /100 WBCS
PLATELET # BLD AUTO: 233 THOUSANDS/UL (ref 149–390)
PMV BLD AUTO: 10.6 FL (ref 8.9–12.7)
POTASSIUM SERPL-SCNC: 4.2 MMOL/L (ref 3.5–5.3)
PROT SERPL-MCNC: 8 G/DL (ref 6.4–8.4)
RBC # BLD AUTO: 5.43 MILLION/UL (ref 3.88–5.62)
SODIUM SERPL-SCNC: 139 MMOL/L (ref 135–147)
T3FREE SERPL-MCNC: 4.23 PG/ML (ref 2.5–3.9)
TSH SERPL DL<=0.05 MIU/L-ACNC: 4.22 UIU/ML (ref 0.45–4.5)
WBC # BLD AUTO: 4.58 THOUSAND/UL (ref 4.31–10.16)

## 2023-08-03 PROCEDURE — 84481 FREE ASSAY (FT-3): CPT

## 2023-08-03 PROCEDURE — 85025 COMPLETE CBC W/AUTO DIFF WBC: CPT

## 2023-08-03 PROCEDURE — 36415 COLL VENOUS BLD VENIPUNCTURE: CPT

## 2023-08-03 PROCEDURE — 80053 COMPREHEN METABOLIC PANEL: CPT

## 2023-08-03 PROCEDURE — 84443 ASSAY THYROID STIM HORMONE: CPT

## 2023-08-04 ENCOUNTER — OFFICE VISIT (OUTPATIENT)
Dept: HEMATOLOGY ONCOLOGY | Facility: MEDICAL CENTER | Age: 53
End: 2023-08-04
Payer: COMMERCIAL

## 2023-08-04 VITALS
WEIGHT: 183.8 LBS | SYSTOLIC BLOOD PRESSURE: 118 MMHG | TEMPERATURE: 97.5 F | DIASTOLIC BLOOD PRESSURE: 72 MMHG | OXYGEN SATURATION: 99 % | RESPIRATION RATE: 18 BRPM | HEART RATE: 79 BPM | BODY MASS INDEX: 24.89 KG/M2 | HEIGHT: 72 IN

## 2023-08-04 DIAGNOSIS — R59.0 RETROPERITONEAL LYMPHADENOPATHY: ICD-10-CM

## 2023-08-04 DIAGNOSIS — C64.1 CLEAR CELL CARCINOMA OF RIGHT KIDNEY (HCC): ICD-10-CM

## 2023-08-04 DIAGNOSIS — E86.0 DEHYDRATION: Primary | ICD-10-CM

## 2023-08-04 DIAGNOSIS — C78.00 MALIGNANT NEOPLASM METASTATIC TO LUNG, UNSPECIFIED LATERALITY (HCC): Primary | ICD-10-CM

## 2023-08-04 PROCEDURE — 99214 OFFICE O/P EST MOD 30 MIN: CPT | Performed by: INTERNAL MEDICINE

## 2023-08-04 NOTE — PROGRESS NOTES
Lionel Miller  1970  [address] [facility] [place]  [address] [facility] [address]  [place] [id number]    DISCUSSION/SUMMARY:    75-year-old with stage IV (multiple pulmonary nodules, questionable liver lesion) renal cell carcinoma. Presently Mr. Misael Marroquin states feeling okay, about the same as before. Clinically there are no concerning findings. Recent blood work was okay/acceptable - see LFTs below. No pain control issues. No problems with the pembrolizumab or axitinib once a day. The plan is to continue with the present regimen. Eventually scans will need to be repeated. LFTs jumped up recently. The thought was that this was possibly due to alcohol but patient also had been taking a sleep aid called "Midnight". Apparently this contains THC and has been recalled because of multiple instances of liver injuries from this marijuana product. LFTs are now better; total bilirubin is slightly elevated but okay. Mr. Misael Marroquin says that he needs to continue with his Synthroid; PCP is monitoring the TSH. Patient is to return in 9 weeks with repeat scans before (with IV contrast and post CAT scan hydration). Patient knows to call the hematology/oncology office if there are any other questions or concerns. Carefully review your medication list and verify that the list is accurate and up-to-date. Please call the hematology/oncology office if there are medications missing from the list, medications on the list that you are not currently taking or if there is a dosage or instruction that is different from how you're taking that medication.     Patient goals and areas of care: Continue with pembrolizumab and axitinib, post CAT scan hydration  Barriers to care: None  Patient is able to self-care  ______________________________________________________________________________________    Chief Complaint   Patient presents with   • Follow-up     Malignant neoplasm metastatic to lung, unspecified laterality    • Renal cell carcinoma     History of Present Illness: 23-year-old male with relatively good general health presenting to the emergency room recently with abrupt shortness of breath and dyspnea on exertion. CTA/chest did not demonstrate any PE but patient was found to have an incidental right renal mass. Work-up was initiated; this included a urology evaluation. Mr. Jacob Galvan was found to have clear-cell renal cell carcinoma and underwent planned preoperative angioembolization with subsequent right radical nephrectomy via chevron incision. Patient was subsequently discharged and referred to oncology for evaluation. Follow-up CAT scan of the chest demonstrated a number of new pulmonary nodules distant with metastatic disease. Options were discussed and patient is now on axitinib and pembrolizumab. Mr. Jacob Galvan had numbness, tingling and pain in his mouth recently. Axitinib was discontinued then eventually restarted at 5 mg once a day. Patient has tolerated this along with the pembrolizumab. No  problems, no GI problems, no pain control issues. Appetite is good, weight is stable. Patient continues to be very active. Review of Systems   Constitutional: Positive for fatigue. HENT: Negative. Eyes: Negative. Respiratory: Negative. Cardiovascular: Negative. Gastrointestinal: Negative. Endocrine: Negative. Genitourinary: Negative. Musculoskeletal: Negative. Skin: Negative. Allergic/Immunologic: Negative. Neurological: Negative. Hematological: Negative. Psychiatric/Behavioral: The patient is nervous/anxious. All other systems reviewed and are negative.     Patient Active Problem List   Diagnosis   • Hyperlipidemia   • Meniere disease, left   • History of anesthesia complications   • Right renal mass   • Clear cell carcinoma of right kidney [facility])   • Annual physical exam   • Fungal dermatitis   • Dry skin dermatitis   • RBBB   • BMI 25.0-25.9,adult   • H/O right nephrectomy   • Malignant neoplasm metastatic to lung [facility])   • Palliative care patient   • Abdominal pain   • Constipation   • Anxiousness   • Dysphoric mood   • Loss of appetite   • Retroperitoneal lymphadenopathy   • Hypothyroidism   • BMI 35.0-35.9,adult   • Alteration in appetite   • Chronic post-operative pain   • Continuous opioid dependence (HCC)   • Gastroesophageal reflux disease with esophagitis   • Hypertension   • Weight loss, unintentional   • Oral mucositis due to antineoplastic therapy   • BMI 24.0-24.9, adult   • Dysgeusia   • Bilateral calf pain   • Varicose veins of left leg with edema     Past Medical History:   Diagnosis Date   • Arthralgia     last assessed 02/10/2015   • Babesiosis     last assessed 12/30/14   • Benign paroxysmal vertigo     last assessed 05/22/15   • Cancer ([address])     right kidney removed 11/21/22-oral chemotherapy and IV immunotherapy every 21 days   • Dry skin dermatitis    • Fullness of abdomen     with eating   • Meniere disease     last assessed 04/15/13   • Pneumonia of left lower lobe due to infectious organism     last assessed 04/04/16   • Vitamin D deficiency     last assessed 12/30/14     Past Surgical History:   Procedure Laterality Date   • FIBULA FRACTURE SURGERY Right    • IR RENAL ANGIOGRAM  11/18/2022   • OH NEPHRECTOMY W/PRTL URETERECT OPEN RIB RESCJ RAD Right 11/21/2022    Procedure: NEPHRECTOMY ABDOMINAL APPROACH;  Surgeon: Mt Saldivar MD;  Location: [facility];  Service: Urology   • TIBIA FRACTURE SURGERY Right    • TONSILLECTOMY       Family History   Problem Relation Age of Onset   • Meniere's disease Mother    • Cancer Father         ?renal/bladder?    • No Known Problems Family      Social History     Socioeconomic History   • Marital status: /Civil Union     Spouse name: Not on file   • Number of children: Not on file   • Years of education: Not on file   • Highest education level: Not on file Occupational History   • Not on file   Tobacco Use   • Smoking status: Former     Types: Cigarettes     Start date: 1984     Quit date: 2022     Years since quittin.9   • Smokeless tobacco: Never   • Tobacco comments:     Quit 2 mos ago   Vaping Use   • Vaping Use: Never used   Substance and Sexual Activity   • Alcohol use: Not Currently     Comment: social   • Drug use: No   • Sexual activity: Yes   Other Topics Concern   • Not on file   Social History Narrative    Daily coffee consumption 6 cups/day    Wife: Jenifer        From 22  note:    Primary Care Provider: Fredi Schilling MD    Primary Insurance: [address] Proxies: There are no active Health Care Proxies on file. Primary Caregiver: Self    Support Systems: Self, Spouse/significant other    [place] of Residence: [address] [place] [address] do you live in?: [place] entry access options.  Select all that apply.: Stairs    Number of steps to enter home.: 3    Type of Current Residence: [address] [place] home    Living Arrangements: Lives w/ Spouse/significant other    Functional Status: Independent    Completes ADLs independently?: Yes    Ambulates independently?: Yes    Does patient use assisted devices?: No    Does patient currently own DME?: No     Social Determinants of Health     Financial Resource Strain: Not on file   Food Insecurity: Not on file   Transportation Needs: Not on file   Physical Activity: Not on file   Stress: Not on file   Social Connections: Not on file   Intimate Partner Violence: Not on file   Housing Stability: Not on file       Current Outpatient Medications:   •  amLODIPine (NORVASC) 5 mg tablet, take 1 tablet by mouth once daily, Disp: 30 tablet, Rfl: 3  •  axitinib (Inlyta) 5 MG TABS, Take 1 tablet (5mg) by mouth daily, Disp: 30 tablet, Rfl: 11  •  famotidine (PEPCID) 20 mg tablet, take 1 tablet by mouth once daily, Disp: 30 tablet, Rfl: 2  •  gabapentin (Neurontin) 300 mg capsule, Take 2 capsules (600 mg total) by mouth 2 (two) times a day, Disp: 120 capsule, Rfl: 2  •  levothyroxine 25 mcg tablet, take 1 tablet by mouth once daily IN THE EARLY MORNING, Disp: 30 tablet, Rfl: 3  •  oxyCODONE (Roxicodone) 5 immediate release tablet, Take 1 tablet (5 mg total) by mouth every 4 (four) hours as needed for moderate pain or severe pain Max Daily Amount: 30 mg, Disp: 60 tablet, Rfl: 0    No Known Allergies    Vitals:    08/04/23 1122   BP: 118/72   Pulse: 79   Resp: 18   Temp: 97.5 °F (36.4 °C)   SpO2: 99%     Physical Exam  Constitutional:       Appearance: He is well-developed. HENT:      Head: Normocephalic and atraumatic. Right Ear: External ear normal.      Left Ear: External ear normal.   Eyes:      Conjunctiva/sclera: Conjunctivae normal.      Pupils: Pupils are equal, round, and reactive to light. Cardiovascular:      Rate and Rhythm: Normal rate and regular rhythm. Heart sounds: Normal heart sounds. Pulmonary:      Effort: Pulmonary effort is normal.      Breath sounds: Normal breath sounds. Abdominal:      General: Bowel sounds are normal.      Palpations: Abdomen is soft. Musculoskeletal:         General: Normal range of motion. Cervical back: Normal range of motion and neck supple. Skin:     General: Skin is warm. Neurological:      Mental Status: He is alert and oriented to person, place, and time. Deep Tendon Reflexes: Reflexes are normal and symmetric. Psychiatric:         Behavior: Behavior normal.         Thought Content:  Thought content normal.         Judgment: Judgment normal.     Extremities: No lower extreme edema bilaterally, no cords, pulses are 1+    Labs    8/3/2023 WBC = 4.58 hemoglobin = 17.1 hematocrit = 49.8 platelet = 004 neutrophil = 49% TSH = 4.221 BUN = 15 creatinine = 1.50 LFTs WNL total bilirubin = 1.14    5/12/2023 WBC = 2.71 hemoglobin = 13.2 hematocrit = 40.2 platelet = 121 neutrophil = 43% TSH = 3.890 BUN = 19 creatinine = 1.24 calcium = 9.0 AST = 79 ALT = 100 alkaline phosphatase = 99 total bilirubin = 0.63    Imaging    1/31/2023 MRI abdomen with and without contrast    IMPRESSION:     Mildly enlarged retrocaval nodes highly suspicious for metastatic disease, unchanged from recent CT of January 25, 2023.     Bibasilar pulmonary nodules highly suspicious for pulmonary metastatic disease better evaluated on recent chest CT.     Small hepatic lesions, difficult to evaluate due to respiratory motion artifact but not significantly changed in size or MR imaging characteristics when compared to November 9, 2022, most consistent with benign hemangiomata. 1/12/2023 CAT scan of the chest without contrast    LUNGS:    Mild emphysema.     Numerous, new pulmonary nodules in all lobes ranging in size from approximately 0.3 cm (3/68, peripheral right lower lobe) to 0.9 cm (3/82, medial left upper lobe). 4.0 cm ascending aorta is top normal in caliber. No intramural hematoma. Two enlarged right retrocaval/retroperitoneal lymph nodes have increased in size. 1.1 and 1.3 cm short axis diameter (previously 0.6 to 0.8 cm respectively). IMPRESSION:     Pulmonary metastases. Increased size of right retroperitoneal lymph nodes compared to 11/7/2022, suspicious for metastasis. 11/9/2022 MRI abdomen    IMPRESSION:     1.  Large exophytic right lower pole heterogeneously enhancing renal mass extending into the renal sinus consistent with renal cell carcinoma. There are retroperitoneal collaterals posteriorly which drain into the IVC though no evidence of right renal   vein thrombosis. No hydronephrosis.     2. Borderline paracaval lymph nodes, likely early lymph node metastasis.     3.  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. However, given primary malignancy and atypical features, a short interval follow-up MRI in 3 months is recommended to exclude metastasis.     11/7/2022 CTA chest PE study    IMPRESSION:     No pulmonary embolus. Mild patchy peripheral lower lobe groundglass opacity. Covid not excluded although not the classic appearance. Consider aspiration versus other infectious/inflammatory etiology. Partially imaged large right renal mass. See abdomen CT. Pathology    Case Report   Surgical Pathology Report                         Case: [id number]                                    Authorizing Provider: Rosas Chaudhry MD        Collected:           11/21/2022 1213               Ordering Location:     [address]      Received:            11/21/2022 2301                                      Hospital Operating Room                                                       Pathologist:           Gita Evans MD                                                                  Specimen:    Kidney, Right                                                                              Final Diagnosis   A. Kidney, Right, nephrectomy:  - Clear cell renal cell carcinoma. See comment and synoptic report. - One lymph node positive for metastatic carcinoma (1/1).    Comment: Immunohistochemistry is diffusely positive in the tumor cells for PAX8 and carbonic anhydrase IX. CK7 and P504S have non-specific staining. The findings are consistent with the diagnosis.     8th Ed AJCC Tumor Stage:  at least Stage III - pT2b, pN1, G3.   Representative tumor block: A7   Electronically signed by Lexi Bravo MD on 12/5/2022 at 10:16 AM     Synoptic Checklist   KIDNEY: Nephrectomy  8th Edition - Protocol posted: 6/30/2021  KIDNEY: NEPHRECTOMY, PARTIAL OR RADICAL - All Specimens  SPECIMEN   Procedure  Total nephrectomy    Specimen Laterality  Right    TUMOR   Tumor Focality  Unifocal    Tumor Size  Greatest Dimension (Centimeters): 13.5 cm   Histologic Type  Clear cell renal cell carcinoma    Histologic Grade (WHO / ISUP)  G3 (nucleoli conspicuous and eosinophilic at 051Y magnification)    Tumor Extent  Limited to kidney    Sarcomatoid Features  Not identified    Rhabdoid Features  Not identified    Tumor Necrosis  Present    Percentage of Tumor Necrosis  30 %   Lymphovascular Invasion  Not identified    MARGINS   Margin Status  All margins negative for invasive carcinoma    REGIONAL LYMPH NODES   Regional Lymph Node Status  Tumor present in regional lymph node(s)    Number of Lymph Nodes with Tumor  1    Jabari Site(s) with Tumor  Hilar    Size of Largest Jabari Metastatic Deposit  0.3 cm   Number of Lymph Nodes Examined  1    PATHOLOGIC STAGE CLASSIFICATION (pTNM, AJCC 8th Edition)   Reporting of pT, pN, and (when applicable) pM categories is based on information available to the pathologist at the time the report is issued. As per the AJCC (Chapter 1, 8th Ed.) it is the managing physician’s responsibility to establish the final pathologic stage based upon all pertinent information, including but potentially not limited to this pathology report. Primary Tumor (pT)  pT2b    Regional Lymph Nodes (pN)  pN1    ADDITIONAL FINDINGS   Additional Findings in Nonneoplastic Kidney  Tubuloglomerular necrosis    .

## 2023-08-08 ENCOUNTER — HOSPITAL ENCOUNTER (OUTPATIENT)
Dept: INFUSION CENTER | Facility: HOSPITAL | Age: 53
Discharge: HOME | End: 2023-08-08
Attending: INTERNAL MEDICINE
Payer: COMMERCIAL

## 2023-08-08 VITALS
RESPIRATION RATE: 18 BRPM | SYSTOLIC BLOOD PRESSURE: 125 MMHG | TEMPERATURE: 97.9 F | HEART RATE: 65 BPM | WEIGHT: 182.54 LBS | DIASTOLIC BLOOD PRESSURE: 95 MMHG | OXYGEN SATURATION: 98 % | BODY MASS INDEX: 24.76 KG/M2

## 2023-08-08 DIAGNOSIS — C64.1 CLEAR CELL CARCINOMA OF RIGHT KIDNEY (HCC): Primary | ICD-10-CM

## 2023-08-08 RX ORDER — SODIUM CHLORIDE 9 MG/ML
20 INJECTION, SOLUTION INTRAVENOUS ONCE
Status: COMPLETED | OUTPATIENT
Start: 2023-08-08 | End: 2023-08-08

## 2023-08-08 RX ADMIN — SODIUM CHLORIDE 20 ML/HR: 0.9 INJECTION, SOLUTION INTRAVENOUS at 10:21

## 2023-08-08 RX ADMIN — SODIUM CHLORIDE 200 MG: 9 INJECTION, SOLUTION INTRAVENOUS at 11:04

## 2023-08-15 ENCOUNTER — OFFICE VISIT (OUTPATIENT)
Dept: PALLIATIVE MEDICINE | Facility: CLINIC | Age: 53
End: 2023-08-15
Payer: COMMERCIAL

## 2023-08-15 VITALS
OXYGEN SATURATION: 99 % | RESPIRATION RATE: 18 BRPM | HEART RATE: 64 BPM | DIASTOLIC BLOOD PRESSURE: 58 MMHG | SYSTOLIC BLOOD PRESSURE: 118 MMHG | WEIGHT: 186.6 LBS | BODY MASS INDEX: 25.27 KG/M2 | HEIGHT: 72 IN

## 2023-08-15 DIAGNOSIS — R19.5 LOOSE STOOLS: ICD-10-CM

## 2023-08-15 DIAGNOSIS — G47.00 INSOMNIA: ICD-10-CM

## 2023-08-15 DIAGNOSIS — R59.0 RETROPERITONEAL LYMPHADENOPATHY: ICD-10-CM

## 2023-08-15 DIAGNOSIS — K21.00 GASTROESOPHAGEAL REFLUX DISEASE WITH ESOPHAGITIS: ICD-10-CM

## 2023-08-15 DIAGNOSIS — G89.28 CHRONIC POST-OPERATIVE PAIN: ICD-10-CM

## 2023-08-15 DIAGNOSIS — F11.20 CONTINUOUS OPIOID DEPENDENCE (HCC): ICD-10-CM

## 2023-08-15 DIAGNOSIS — C78.00 MALIGNANT NEOPLASM METASTATIC TO LUNG (HCC): ICD-10-CM

## 2023-08-15 DIAGNOSIS — C64.1 CLEAR CELL CARCINOMA OF RIGHT KIDNEY (HCC): Primary | ICD-10-CM

## 2023-08-15 DIAGNOSIS — R10.9 ABDOMINAL PAIN: ICD-10-CM

## 2023-08-15 DIAGNOSIS — Z90.5 H/O RIGHT NEPHRECTOMY: ICD-10-CM

## 2023-08-15 DIAGNOSIS — Z51.5 PALLIATIVE CARE PATIENT: ICD-10-CM

## 2023-08-15 PROCEDURE — 99214 OFFICE O/P EST MOD 30 MIN: CPT | Performed by: INTERNAL MEDICINE

## 2023-08-15 RX ORDER — OXYCODONE HYDROCHLORIDE 5 MG/1
5 TABLET ORAL EVERY 4 HOURS PRN
Qty: 60 TABLET | Refills: 0 | Status: SHIPPED | OUTPATIENT
Start: 2023-08-15

## 2023-08-15 NOTE — PATIENT INSTRUCTIONS
It was good to see you today. Thank you for coming in. If soft stools diarrhea is an issue:  Some people have success w/ increasing fiber or taking fiber supplements / gummies. Try a probiotic. This could be yogurt or kefir, or fermented beverages such as kombucha, but probiotics are also available in capsule form. Aim for 10-15 billion colony-forming-units, w/ bacteria such as Lactobacillus / Saccharomyces / Actinomyces. Try Banatrol (banana flakes). Use as directed / as-needed for diarrhea. This is something you may safely take every day. If you become constipated you may wish to stop using it, although it can treat both constipation and diarrhea in some patients. This available over-the-counter at some pharmacies, but you may have more success looking online ([facility]. [employer]). Stay hydrated! If needed, it is okay to take Imodium for diarrhea. Use as directed, available over-the-counter. Continue other medications. Return in about 3 months (around 11/15/2023). Call us for refills on medications that we supply, as needed. If something changes and you need to come in sooner, please call our office. PRESCRIPTION REFILL REMINDER:  All medication refills should be requested prior to [facility] on Friday. Any refill requests after noon on Friday would be addressed the following Monday. MEDICATION SAFETY ISSUES:  Do not drive under the influence of narcotics (including opioids), watch for adverse effects including confusion / altered mental status / respiratory depression (slowed breathing), keep medications stored in a safe/locked environment, do not use alcohol while opioids or other narcotics are in your system.

## 2023-08-15 NOTE — PROGRESS NOTES
Follow-up with Palliative and Supportive Care  Marce Li 52 y.o. male [id number]    ASSESSMENT & PLAN:  1. Clear cell carcinoma of right kidney ([address])    2. H/O right nephrectomy    3. Malignant neoplasm metastatic to lung ([address])    4. Retroperitoneal lymphadenopathy    5. Gastroesophageal reflux disease with esophagitis    6. Abdominal pain    7. Chronic post-operative pain    8. Palliative care patient    9. Continuous opioid dependence ([address])    10. Loose stools    11. Insomnia          • Continue disease-directed cares. Patient tolerating treatments fairly well. • ACP: Patient has completed advanced directives (the [facility] Advanced Directive) naming his wife as surrogate healthcare decision-maker. In EMR. Advanced directive counseling given. • Patient states his chronic pain remains bothersome, but well-managed on his current regimen. Pain includes chronic arthritic pain, likely cancer-related pain (abdominal pain, which has a post-operative neuropathic component and/or is related to RP lymphadenopathy). o Recommend patient consider topical OTC products, local application of heat or cold, Tylenol up to 1000mg TID for chronic pain. Do not use heat on top of topical agents. o Patient having success w/ PO MMJ gummies for pain. MMJ is legally available recreationally in [place]. Do not use systemic MJ products within 1-2 hours of opioids. o Continue oxyIR 5mg q4h PRN. Effective. Do not drink alcohol when narcotics are in the system. o Continue 600mg gabapentin BID.  o eGFR is 51, 1200mg gabapentin daily should be tolerable. Monitor Cr. • Continue Pepcid for GERD. Effective. • Continue PO MMJ for insomnia; moderately effective. • Counseled on bowel regimen for loose stools / diarrhea. • Reviewed notes (PCP; Medical Oncology - last seen 8/4/23), labs (8/3/23 Cr 1.50, eGFR 51, alb 3.8, Hb 17.1, TSH 4.221, fT3 4.23), imaging + procedures (1/31/23 MRI abdomen).   Return in about 3 months (around 11/15/2023). • Emotional support provided. • Medication safety issues addressed - no driving under the influence of narcotics (including opioids), watch for adverse effects including AMS or respiratory depression (slowed breathing), keep medications stored in a safe/locked environment, do not use alcohol while opioids or other narcotics are in one's system. Requested Prescriptions     Signed Prescriptions Disp Refills   • oxyCODONE (Roxicodone) 5 immediate release tablet 60 tablet 0     Sig: Take 1 tablet (5 mg total) by mouth every 4 (four) hours as needed for moderate pain or severe pain Max Daily Amount: 30 mg       Medications Discontinued During This Encounter   Medication Reason   • oxyCODONE (Roxicodone) 5 immediate release tablet Reorder       Representatives have queried the patient's controlled substance dispensing history in the Prescription Drug Monitoring Program in compliance with regulations before I have prescribed any controlled substances. The prescription history is consistent with prescribed therapy and our practice policies. 30+ minutes were spent in this ambulatory visit with greater than 50% of the time spent face to face with patient in counseling or coordination of care including symptom assessment and management, medication review, psychosocial support, chart review, imaging review, lab review, goals of care, medical marijuana, supportive listening and anticipatory guidance. All of the patient's questions were answered during this discussion.     SUBJECTIVE:  Chief Complaint   Patient presents with   • Follow-up   • Medication Refill   • Cancer   • Abdominal Pain   • Counseling   • Insomnia   • Loose / soft stools        LIVE    Toby Joyce is a 52 y.o. male w/ clear cell carcinoma of the right kidney w/ RP lymph node involvement, w/ lung metastases, s/p angioembolization + R radical nephrectomy 11/21/22, on pembrolizumab + axitinib; liver lesions (likely benign hemangioma); abdominal pain (which may be cancer- or procedure-related), acid reflux / eosinophilic esophagitis, constipation, h/o babesiosis, h/o Meniere disease, h/o vertigo. He follows w/ Dr Randy Guzman (Medical Oncology), Dr Christine Smith (Urology). Patient is known to [facility] clinic; seen 5/2/23 for symptom assessment and management, medication review, medication adjustment, psychosocial support, chart review, imaging review, lab review, advanced directives, supportive listening and anticipatory guidance. Patient endorses ongoing abdominal pain which can be worse after a meal (particularly a larger meal). He tries to eat smaller amounts more frequently. This pain responds well to oxyIR, but he reports he has been depending more on PO MMJ gummies for pain (10mg per dose, mostly THC). He is also having some success using PO MMJ capsules (RSO) for insomnia. He had some liver issues s/t an MMJ product ("Midnight", which was recalled for this); liver function improved when that medication was stopped. Patient endorses some occasional loose and/or soft stools. He states he may not be getting enough fiber in his diet. PDMP shows no concerns. The following portions of the medical history were reviewed: past medical history, surgical history, problem list, medication list, family history, and social history.       Current Outpatient Medications:   •  amLODIPine (NORVASC) 5 mg tablet, take 1 tablet by mouth once daily, Disp: 30 tablet, Rfl: 3  •  axitinib (Inlyta) 5 MG TABS, Take 1 tablet (5mg) by mouth daily, Disp: 30 tablet, Rfl: 11  •  CANNABIDIOL PO, Take by mouth, Disp: , Rfl:   •  famotidine (PEPCID) 20 mg tablet, take 1 tablet by mouth once daily, Disp: 30 tablet, Rfl: 2  •  gabapentin (Neurontin) 300 mg capsule, Take 2 capsules (600 mg total) by mouth 2 (two) times a day, Disp: 120 capsule, Rfl: 2  •  levothyroxine 25 mcg tablet, take 1 tablet by mouth once daily IN THE EARLY MORNING, Disp: 30 tablet, Rfl: 3  •  oxyCODONE (Roxicodone) 5 immediate release tablet, Take 1 tablet (5 mg total) by mouth every 4 (four) hours as needed for moderate pain or severe pain Max Daily Amount: 30 mg, Disp: 60 tablet, Rfl: 0    Review of Systems   Constitutional: Positive for activity change, appetite change and fatigue. Gastrointestinal: Positive for abdominal pain (worse post-prandially). Occasional loose / soft stools. Allergic/Immunologic: Positive for immunocompromised state. Psychiatric/Behavioral: Positive for sleep disturbance. All other systems reviewed and are negative. OBJECTIVE:  /58 (BP Location: Left arm, Patient Position: Sitting, Cuff Size: Large)   Pulse 64   Resp 18   Ht 6' (1.829 m)   Wt 84.6 kg (186 lb 9.6 oz)   SpO2 99%   BMI 25.31 kg/m²   Physical Exam  Vitals reviewed. Constitutional:       General: He is not in acute distress. Appearance: He is well-developed, well-groomed and normal weight. He is not toxic-appearing. HENT:      Head: Normocephalic and atraumatic. Right Ear: External ear normal.      Left Ear: External ear normal.   Eyes:      General: No scleral icterus. Right eye: No discharge. Left eye: No discharge. Extraocular Movements: Extraocular movements intact. Conjunctiva/sclera: Conjunctivae normal.      Pupils: Pupils are equal, round, and reactive to light. Cardiovascular:      Rate and Rhythm: Normal rate. Pulmonary:      Effort: Pulmonary effort is normal. No tachypnea, bradypnea, accessory muscle usage or respiratory distress. Comments: Able to speak comfortably in complete sentences on room air at rest.  Abdominal:      General: There is no distension. Tenderness: There is no guarding. Musculoskeletal:      Cervical back: Normal range of motion. Right lower leg: No edema. Left lower leg: No edema. Skin:     General: Skin is dry. Coloration: Skin is not pale.    Neurological:      Mental Status: He is alert and oriented to person, place, and time. Cranial Nerves: No dysarthria or facial asymmetry. Gait: Gait normal.      Comments: Using no assistive devices for ambulation. Psychiatric:         Attention and Perception: Attention normal.         Mood and Affect: Mood and affect normal.         Speech: Speech normal.         Behavior: Behavior normal. Behavior is cooperative. Thought Content: Thought content normal.         Cognition and Memory: Cognition and memory normal.         Judgment: Judgment normal.          Eduardo Cool MD  [facility]  [phone]    Portions of this document may have been created using dictation software and as such some "sound alike" terms may have been generated by the system. Do not hesitate to contact me with any questions or clarifications.

## 2023-08-22 RX ORDER — SODIUM CHLORIDE 9 MG/ML
20 INJECTION, SOLUTION INTRAVENOUS ONCE
Status: CANCELLED | OUTPATIENT
Start: 2023-08-29

## 2023-08-25 ENCOUNTER — APPOINTMENT (OUTPATIENT)
Dept: LAB | Facility: CLINIC | Age: 53
End: 2023-08-25
Payer: COMMERCIAL

## 2023-08-25 DIAGNOSIS — C64.1 CLEAR CELL CARCINOMA OF RIGHT KIDNEY (HCC): ICD-10-CM

## 2023-08-25 LAB
ALBUMIN SERPL BCP-MCNC: 4.2 G/DL (ref 3.5–5)
ALP SERPL-CCNC: 77 U/L (ref 34–104)
ALT SERPL W P-5'-P-CCNC: 24 U/L (ref 7–52)
ANION GAP SERPL CALCULATED.3IONS-SCNC: 9 MMOL/L
AST SERPL W P-5'-P-CCNC: 25 U/L (ref 13–39)
BASOPHILS # BLD AUTO: 0.03 THOUSANDS/ÂΜL (ref 0–0.1)
BASOPHILS NFR BLD AUTO: 1 % (ref 0–1)
BILIRUB SERPL-MCNC: 0.83 MG/DL (ref 0.2–1)
BUN SERPL-MCNC: 18 MG/DL (ref 5–25)
CALCIUM SERPL-MCNC: 9.6 MG/DL (ref 8.4–10.2)
CHLORIDE SERPL-SCNC: 104 MMOL/L (ref 96–108)
CO2 SERPL-SCNC: 27 MMOL/L (ref 21–32)
CREAT SERPL-MCNC: 1.23 MG/DL (ref 0.6–1.3)
EOSINOPHIL # BLD AUTO: 0.11 THOUSAND/ÂΜL (ref 0–0.61)
EOSINOPHIL NFR BLD AUTO: 3 % (ref 0–6)
ERYTHROCYTE [DISTWIDTH] IN BLOOD BY AUTOMATED COUNT: 12.9 % (ref 11.6–15.1)
GFR SERPL CREATININE-BSD FRML MDRD: 65 ML/MIN/1.73SQ M
GLUCOSE P FAST SERPL-MCNC: 112 MG/DL (ref 65–99)
HCT VFR BLD AUTO: 46.9 % (ref 36.5–49.3)
HGB BLD-MCNC: 15.6 G/DL (ref 12–17)
IMM GRANULOCYTES # BLD AUTO: 0.01 THOUSAND/UL (ref 0–0.2)
IMM GRANULOCYTES NFR BLD AUTO: 0 % (ref 0–2)
LYMPHOCYTES # BLD AUTO: 0.79 THOUSANDS/ÂΜL (ref 0.6–4.47)
LYMPHOCYTES NFR BLD AUTO: 20 % (ref 14–44)
MCH RBC QN AUTO: 31.1 PG (ref 26.8–34.3)
MCHC RBC AUTO-ENTMCNC: 33.3 G/DL (ref 31.4–37.4)
MCV RBC AUTO: 94 FL (ref 82–98)
MONOCYTES # BLD AUTO: 0.47 THOUSAND/ÂΜL (ref 0.17–1.22)
MONOCYTES NFR BLD AUTO: 12 % (ref 4–12)
NEUTROPHILS # BLD AUTO: 2.54 THOUSANDS/ÂΜL (ref 1.85–7.62)
NEUTS SEG NFR BLD AUTO: 64 % (ref 43–75)
NRBC BLD AUTO-RTO: 0 /100 WBCS
PLATELET # BLD AUTO: 209 THOUSANDS/UL (ref 149–390)
PMV BLD AUTO: 10.5 FL (ref 8.9–12.7)
POTASSIUM SERPL-SCNC: 4.3 MMOL/L (ref 3.5–5.3)
PROT SERPL-MCNC: 7.2 G/DL (ref 6.4–8.4)
RBC # BLD AUTO: 5.01 MILLION/UL (ref 3.88–5.62)
SODIUM SERPL-SCNC: 140 MMOL/L (ref 135–147)
T3FREE SERPL-MCNC: 3.51 PG/ML (ref 2.5–3.9)
TSH SERPL DL<=0.05 MIU/L-ACNC: 1.06 UIU/ML (ref 0.45–4.5)
WBC # BLD AUTO: 3.95 THOUSAND/UL (ref 4.31–10.16)

## 2023-08-25 PROCEDURE — 84481 FREE ASSAY (FT-3): CPT

## 2023-08-25 PROCEDURE — 80053 COMPREHEN METABOLIC PANEL: CPT

## 2023-08-25 PROCEDURE — 84443 ASSAY THYROID STIM HORMONE: CPT

## 2023-08-25 PROCEDURE — 85025 COMPLETE CBC W/AUTO DIFF WBC: CPT

## 2023-08-25 PROCEDURE — 36415 COLL VENOUS BLD VENIPUNCTURE: CPT

## 2023-08-29 ENCOUNTER — HOSPITAL ENCOUNTER (OUTPATIENT)
Dept: INFUSION CENTER | Facility: HOSPITAL | Age: 53
Discharge: HOME | End: 2023-08-29
Attending: INTERNAL MEDICINE
Payer: COMMERCIAL

## 2023-08-29 VITALS
WEIGHT: 181.88 LBS | RESPIRATION RATE: 18 BRPM | DIASTOLIC BLOOD PRESSURE: 87 MMHG | TEMPERATURE: 96.9 F | BODY MASS INDEX: 24.67 KG/M2 | OXYGEN SATURATION: 99 % | SYSTOLIC BLOOD PRESSURE: 121 MMHG | HEART RATE: 62 BPM

## 2023-08-29 DIAGNOSIS — C64.1 CLEAR CELL CARCINOMA OF RIGHT KIDNEY (HCC): Primary | ICD-10-CM

## 2023-08-29 RX ORDER — SODIUM CHLORIDE 9 MG/ML
20 INJECTION, SOLUTION INTRAVENOUS ONCE
Status: COMPLETED | OUTPATIENT
Start: 2023-08-29 | End: 2023-08-29

## 2023-08-29 RX ADMIN — SODIUM CHLORIDE 20 ML/HR: 0.9 INJECTION, SOLUTION INTRAVENOUS at 10:36

## 2023-08-29 RX ADMIN — SODIUM CHLORIDE 200 MG: 9 INJECTION, SOLUTION INTRAVENOUS at 10:36

## 2023-08-29 NOTE — PLAN OF CARE
Problem: Potential for Falls  Goal: Patient will remain free of falls  Description: INTERVENTIONS:  - Educate patient/family on patient safety including physical limitations  - Instruct patient to call for assistance with activity   - Keep Call bell within reach  Outcome: Progressing     Problem: Knowledge Deficit  Goal: Patient/family/caregiver demonstrates understanding of disease process, treatment plan, medications, and discharge instructions  Description: Complete learning assessment and assess knowledge base.   Interventions:  - Provide teaching at level of understanding  - Provide teaching via preferred learning methods  Outcome: Progressing

## 2023-09-08 ENCOUNTER — OFFICE VISIT (OUTPATIENT)
Dept: FAMILY MEDICINE CLINIC | Facility: CLINIC | Age: 53
End: 2023-09-08
Payer: COMMERCIAL

## 2023-09-08 VITALS
DIASTOLIC BLOOD PRESSURE: 82 MMHG | TEMPERATURE: 97.3 F | WEIGHT: 181 LBS | BODY MASS INDEX: 24.52 KG/M2 | HEART RATE: 64 BPM | SYSTOLIC BLOOD PRESSURE: 102 MMHG | RESPIRATION RATE: 16 BRPM | HEIGHT: 72 IN

## 2023-09-08 DIAGNOSIS — R19.7 DIARRHEA, UNSPECIFIED TYPE: ICD-10-CM

## 2023-09-08 DIAGNOSIS — E03.9 HYPOTHYROIDISM, UNSPECIFIED TYPE: ICD-10-CM

## 2023-09-08 DIAGNOSIS — C64.1 CLEAR CELL CARCINOMA OF RIGHT KIDNEY (HCC): ICD-10-CM

## 2023-09-08 DIAGNOSIS — Z87.19 HISTORY OF GASTROESOPHAGEAL REFLUX (GERD): ICD-10-CM

## 2023-09-08 DIAGNOSIS — I10 HYPERTENSION, UNSPECIFIED TYPE: Primary | ICD-10-CM

## 2023-09-08 DIAGNOSIS — Z90.5 H/O RIGHT NEPHRECTOMY: ICD-10-CM

## 2023-09-08 DIAGNOSIS — Z23 NEED FOR TDAP VACCINATION: ICD-10-CM

## 2023-09-08 DIAGNOSIS — S61.219A CUT OF FINGER: ICD-10-CM

## 2023-09-08 PROCEDURE — 90471 IMMUNIZATION ADMIN: CPT | Performed by: FAMILY MEDICINE

## 2023-09-08 PROCEDURE — 99214 OFFICE O/P EST MOD 30 MIN: CPT | Performed by: FAMILY MEDICINE

## 2023-09-08 PROCEDURE — 90715 TDAP VACCINE 7 YRS/> IM: CPT | Performed by: FAMILY MEDICINE

## 2023-09-08 RX ORDER — AMLODIPINE BESYLATE 2.5 MG/1
2.5 TABLET ORAL DAILY
Qty: 30 TABLET | Refills: 1 | Status: SHIPPED | OUTPATIENT
Start: 2023-09-08

## 2023-09-08 NOTE — PROGRESS NOTES
Assessment/Plan:    1. Hypertension, unspecified type  Assessment & Plan:  BP in range--cont amlodipine    Orders:  -     amLODIPine (NORVASC) 2.5 mg tablet; Take 1 tablet (2.5 mg total) by mouth daily    2. Clear cell carcinoma of right kidney ([address])    3. H/O right nephrectomy    4. Diarrhea, unspecified type  Assessment & Plan:  Resolving  ?side effect of Inlyta      5. History of gastroesophageal reflux (GERD)    6. Need for Tdap vaccination  -     Tdap Vaccine greater than or equal to 6yo    7. Cut of finger    8. Hypothyroidism, unspecified type  Assessment & Plan:  Cont levothyroxine      9. BMI 24.0-24.9, adult            Subjective:      Patient ID: Arlin Taylor is a 52 y.o. male. Chief Complaint   Patient presents with   • Diarrhea     Patient has had stomach cramping and diarrhea for the past 2-3 weeks but he said it is getting better now but he still kept his appt to discuss medications and side effects    • Immunizations     Tdap-cut finger        HPI     Follow-up  Interval hx reviewed  Recent bouts of abd cramping and diarrhea x 2-3 wks--resolving  ?side effect Inlyta  No fever, rash or vomiting  BP in range on lower dose Amlodipine  Needs tdap--recetnt finger laceration    The following portions of the patient's history were reviewed and updated as appropriate: allergies, current medications, past family history, past medical history, past social history, past surgical history and problem list.    Review of Systems   Constitutional: Positive for fatigue. Negative for fever. Respiratory: Negative. Cardiovascular: Negative. Gastrointestinal: Positive for diarrhea (resolving).          Current Outpatient Medications   Medication Sig Dispense Refill   • amLODIPine (NORVASC) 2.5 mg tablet Take 1 tablet (2.5 mg total) by mouth daily 30 tablet 1   • axitinib (Inlyta) 5 MG TABS Take 1 tablet (5mg) by mouth daily 30 tablet 11   • CANNABIDIOL PO Take by mouth     • levothyroxine 25 mcg tablet take 1 tablet by mouth once daily IN THE EARLY MORNING 30 tablet 3   • oxyCODONE (Roxicodone) 5 immediate release tablet Take 1 tablet (5 mg total) by mouth every 4 (four) hours as needed for moderate pain or severe pain Max Daily Amount: 30 mg 60 tablet 0   • gabapentin (NEURONTIN) 300 mg capsule Take 2 capsules (600 mg total) by mouth 2 (two) times a day 120 capsule 0     No current facility-administered medications for this visit. Objective:    /82 (BP Location: Left arm, Patient Position: Sitting, Cuff Size: Large)   Pulse 64   Temp (!) 97.3 °F (36.3 °C)   Resp 16   Ht 6' (1.829 m)   Wt 82.1 kg (181 lb)   BMI 24.55 kg/m²        Physical Exam  Vitals and nursing note reviewed. Constitutional:       General: He is not in acute distress. Eyes:      General: No scleral icterus. Cardiovascular:      Rate and Rhythm: Normal rate and regular rhythm. Pulmonary:      Effort: Pulmonary effort is normal. No respiratory distress. Breath sounds: Normal breath sounds. Abdominal:      General: Bowel sounds are normal.      Palpations: Abdomen is soft. Tenderness: There is no abdominal tenderness. There is no guarding or rebound. Musculoskeletal:      Cervical back: Neck supple. No tenderness. Right lower leg: No edema. Left lower leg: No edema. Skin:     General: Skin is warm and dry. Coloration: Skin is not jaundiced. Findings: No rash. Neurological:      General: No focal deficit present. Mental Status: He is alert and oriented to person, place, and time. Cranial Nerves: No cranial nerve deficit.    Psychiatric:         Mood and Affect: Mood normal.             Yasmin Bullock MD

## 2023-09-09 DIAGNOSIS — R59.0 RETROPERITONEAL LYMPHADENOPATHY: ICD-10-CM

## 2023-09-09 DIAGNOSIS — C78.00 MALIGNANT NEOPLASM METASTATIC TO LUNG, UNSPECIFIED LATERALITY (HCC): ICD-10-CM

## 2023-09-09 DIAGNOSIS — Z90.5 H/O RIGHT NEPHRECTOMY: ICD-10-CM

## 2023-09-09 DIAGNOSIS — Z51.5 PALLIATIVE CARE PATIENT: ICD-10-CM

## 2023-09-09 DIAGNOSIS — G89.28 CHRONIC POST-OPERATIVE PAIN: ICD-10-CM

## 2023-09-09 DIAGNOSIS — R10.9 ABDOMINAL PAIN: ICD-10-CM

## 2023-09-09 DIAGNOSIS — C64.1 CLEAR CELL CARCINOMA OF RIGHT KIDNEY (HCC): ICD-10-CM

## 2023-09-11 DIAGNOSIS — R10.9 ABDOMINAL PAIN: ICD-10-CM

## 2023-09-11 DIAGNOSIS — G89.28 CHRONIC POST-OPERATIVE PAIN: ICD-10-CM

## 2023-09-11 DIAGNOSIS — Z90.5 H/O RIGHT NEPHRECTOMY: ICD-10-CM

## 2023-09-11 DIAGNOSIS — C64.1 CLEAR CELL CARCINOMA OF RIGHT KIDNEY (HCC): ICD-10-CM

## 2023-09-11 DIAGNOSIS — Z51.5 PALLIATIVE CARE PATIENT: ICD-10-CM

## 2023-09-11 DIAGNOSIS — R59.0 RETROPERITONEAL LYMPHADENOPATHY: ICD-10-CM

## 2023-09-11 DIAGNOSIS — C78.00 MALIGNANT NEOPLASM METASTATIC TO LUNG, UNSPECIFIED LATERALITY (HCC): ICD-10-CM

## 2023-09-11 RX ORDER — GABAPENTIN 300 MG/1
600 CAPSULE ORAL 2 TIMES DAILY
Qty: 120 CAPSULE | Refills: 0 | OUTPATIENT
Start: 2023-09-11

## 2023-09-11 RX ORDER — GABAPENTIN 300 MG/1
600 CAPSULE ORAL 2 TIMES DAILY
Qty: 120 CAPSULE | Refills: 2 | Status: SHIPPED | OUTPATIENT
Start: 2023-09-11 | End: 2023-09-11 | Stop reason: SDUPTHER

## 2023-09-12 RX ORDER — SODIUM CHLORIDE 9 MG/ML
20 INJECTION, SOLUTION INTRAVENOUS ONCE
Status: CANCELLED | OUTPATIENT
Start: 2023-09-19

## 2023-09-12 RX ORDER — GABAPENTIN 300 MG/1
600 CAPSULE ORAL 2 TIMES DAILY
Qty: 120 CAPSULE | Refills: 0 | Status: SHIPPED | OUTPATIENT
Start: 2023-09-12

## 2023-09-15 ENCOUNTER — APPOINTMENT (OUTPATIENT)
Dept: LAB | Facility: CLINIC | Age: 53
End: 2023-09-15
Payer: COMMERCIAL

## 2023-09-15 DIAGNOSIS — C64.1 CLEAR CELL CARCINOMA OF RIGHT KIDNEY (HCC): ICD-10-CM

## 2023-09-15 LAB
ALBUMIN SERPL BCP-MCNC: 4.3 G/DL (ref 3.5–5)
ALP SERPL-CCNC: 80 U/L (ref 34–104)
ALT SERPL W P-5'-P-CCNC: 24 U/L (ref 7–52)
ANION GAP SERPL CALCULATED.3IONS-SCNC: 8 MMOL/L
AST SERPL W P-5'-P-CCNC: 26 U/L (ref 13–39)
BASOPHILS # BLD AUTO: 0.03 THOUSANDS/ÂΜL (ref 0–0.1)
BASOPHILS NFR BLD AUTO: 1 % (ref 0–1)
BILIRUB SERPL-MCNC: 1 MG/DL (ref 0.2–1)
BUN SERPL-MCNC: 20 MG/DL (ref 5–25)
CALCIUM SERPL-MCNC: 9.6 MG/DL (ref 8.4–10.2)
CHLORIDE SERPL-SCNC: 105 MMOL/L (ref 96–108)
CO2 SERPL-SCNC: 27 MMOL/L (ref 21–32)
CREAT SERPL-MCNC: 1.31 MG/DL (ref 0.6–1.3)
EOSINOPHIL # BLD AUTO: 0.2 THOUSAND/ÂΜL (ref 0–0.61)
EOSINOPHIL NFR BLD AUTO: 4 % (ref 0–6)
ERYTHROCYTE [DISTWIDTH] IN BLOOD BY AUTOMATED COUNT: 13.2 % (ref 11.6–15.1)
GFR SERPL CREATININE-BSD FRML MDRD: 60 ML/MIN/1.73SQ M
GLUCOSE P FAST SERPL-MCNC: 108 MG/DL (ref 65–99)
HCT VFR BLD AUTO: 47.7 % (ref 36.5–49.3)
HGB BLD-MCNC: 15.7 G/DL (ref 12–17)
IMM GRANULOCYTES # BLD AUTO: 0.01 THOUSAND/UL (ref 0–0.2)
IMM GRANULOCYTES NFR BLD AUTO: 0 % (ref 0–2)
LYMPHOCYTES # BLD AUTO: 0.96 THOUSANDS/ÂΜL (ref 0.6–4.47)
LYMPHOCYTES NFR BLD AUTO: 21 % (ref 14–44)
MCH RBC QN AUTO: 31 PG (ref 26.8–34.3)
MCHC RBC AUTO-ENTMCNC: 32.9 G/DL (ref 31.4–37.4)
MCV RBC AUTO: 94 FL (ref 82–98)
MONOCYTES # BLD AUTO: 0.52 THOUSAND/ÂΜL (ref 0.17–1.22)
MONOCYTES NFR BLD AUTO: 12 % (ref 4–12)
NEUTROPHILS # BLD AUTO: 2.79 THOUSANDS/ÂΜL (ref 1.85–7.62)
NEUTS SEG NFR BLD AUTO: 62 % (ref 43–75)
NRBC BLD AUTO-RTO: 0 /100 WBCS
PLATELET # BLD AUTO: 237 THOUSANDS/UL (ref 149–390)
PMV BLD AUTO: 10.3 FL (ref 8.9–12.7)
POTASSIUM SERPL-SCNC: 4.6 MMOL/L (ref 3.5–5.3)
PROT SERPL-MCNC: 7.2 G/DL (ref 6.4–8.4)
RBC # BLD AUTO: 5.07 MILLION/UL (ref 3.88–5.62)
SODIUM SERPL-SCNC: 140 MMOL/L (ref 135–147)
T3FREE SERPL-MCNC: 3.11 PG/ML (ref 2.5–3.9)
TSH SERPL DL<=0.05 MIU/L-ACNC: 2.03 UIU/ML (ref 0.45–4.5)
WBC # BLD AUTO: 4.51 THOUSAND/UL (ref 4.31–10.16)

## 2023-09-15 PROCEDURE — 84481 FREE ASSAY (FT-3): CPT

## 2023-09-15 PROCEDURE — 84443 ASSAY THYROID STIM HORMONE: CPT

## 2023-09-15 PROCEDURE — 36415 COLL VENOUS BLD VENIPUNCTURE: CPT

## 2023-09-15 PROCEDURE — 85025 COMPLETE CBC W/AUTO DIFF WBC: CPT

## 2023-09-15 PROCEDURE — 80053 COMPREHEN METABOLIC PANEL: CPT

## 2023-09-19 ENCOUNTER — HOSPITAL ENCOUNTER (OUTPATIENT)
Dept: INFUSION CENTER | Facility: HOSPITAL | Age: 53
Discharge: HOME | End: 2023-09-19
Attending: INTERNAL MEDICINE
Payer: COMMERCIAL

## 2023-09-19 VITALS
OXYGEN SATURATION: 98 % | TEMPERATURE: 97 F | WEIGHT: 183.2 LBS | SYSTOLIC BLOOD PRESSURE: 137 MMHG | RESPIRATION RATE: 18 BRPM | HEART RATE: 67 BPM | BODY MASS INDEX: 24.85 KG/M2 | DIASTOLIC BLOOD PRESSURE: 92 MMHG

## 2023-09-19 DIAGNOSIS — C64.1 CLEAR CELL CARCINOMA OF RIGHT KIDNEY (HCC): Primary | ICD-10-CM

## 2023-09-19 PROCEDURE — 96413 CHEMO IV INFUSION 1 HR: CPT

## 2023-09-19 RX ORDER — SODIUM CHLORIDE 9 MG/ML
20 INJECTION, SOLUTION INTRAVENOUS ONCE
Status: COMPLETED | OUTPATIENT
Start: 2023-09-19 | End: 2023-09-19

## 2023-09-19 RX ADMIN — SODIUM CHLORIDE 20 ML/HR: 0.9 INJECTION, SOLUTION INTRAVENOUS at 11:02

## 2023-09-19 RX ADMIN — SODIUM CHLORIDE 200 MG: 9 INJECTION, SOLUTION INTRAVENOUS at 11:04

## 2023-09-19 NOTE — PLAN OF CARE
Problem: SAFETY ADULT  Goal: Patient will remain free of falls  Description: INTERVENTIONS:  - Educate patient/family on patient safety including physical limitations  - Instruct patient to call for assistance with activity   - Consult OT/PT to assist with strengthening/mobility   - Keep Call bell within reach    Outcome: Progressing     Problem: Knowledge Deficit  Goal: Patient/family/caregiver demonstrates understanding of disease process, treatment plan, medications, and discharge instructions  Description: Complete learning assessment and assess knowledge base.   Interventions:  - Provide teaching at level of understanding  - Provide teaching via preferred learning methods  Outcome: Progressing

## 2023-09-22 ENCOUNTER — TELEPHONE (OUTPATIENT)
Dept: HEMATOLOGY ONCOLOGY | Facility: MEDICAL CENTER | Age: 53
End: 2023-09-22

## 2023-09-22 DIAGNOSIS — E86.0 DEHYDRATION: Primary | ICD-10-CM

## 2023-09-25 DIAGNOSIS — F11.20 CONTINUOUS OPIOID DEPENDENCE (HCC): ICD-10-CM

## 2023-09-25 DIAGNOSIS — G89.28 CHRONIC POST-OPERATIVE PAIN: ICD-10-CM

## 2023-09-25 DIAGNOSIS — C64.1 CLEAR CELL CARCINOMA OF RIGHT KIDNEY (HCC): ICD-10-CM

## 2023-09-25 DIAGNOSIS — Z90.5 H/O RIGHT NEPHRECTOMY: ICD-10-CM

## 2023-09-25 DIAGNOSIS — R10.9 ABDOMINAL PAIN: ICD-10-CM

## 2023-09-25 DIAGNOSIS — C78.00 MALIGNANT NEOPLASM METASTATIC TO LUNG (HCC): ICD-10-CM

## 2023-09-25 DIAGNOSIS — R59.0 RETROPERITONEAL LYMPHADENOPATHY: ICD-10-CM

## 2023-09-25 DIAGNOSIS — Z51.5 PALLIATIVE CARE PATIENT: ICD-10-CM

## 2023-09-26 ENCOUNTER — HOSPITAL ENCOUNTER (OUTPATIENT)
Dept: INFUSION CENTER | Facility: HOSPITAL | Age: 53
Discharge: HOME | End: 2023-09-26
Attending: INTERNAL MEDICINE
Payer: COMMERCIAL

## 2023-09-26 ENCOUNTER — HOSPITAL ENCOUNTER (OUTPATIENT)
Dept: RADIOLOGY | Facility: HOSPITAL | Age: 53
Discharge: HOME | End: 2023-09-26
Attending: INTERNAL MEDICINE
Payer: COMMERCIAL

## 2023-09-26 VITALS
HEART RATE: 67 BPM | TEMPERATURE: 96.9 F | SYSTOLIC BLOOD PRESSURE: 151 MMHG | OXYGEN SATURATION: 97 % | RESPIRATION RATE: 18 BRPM | DIASTOLIC BLOOD PRESSURE: 92 MMHG

## 2023-09-26 DIAGNOSIS — C64.1 CLEAR CELL CARCINOMA OF RIGHT KIDNEY (HCC): ICD-10-CM

## 2023-09-26 DIAGNOSIS — E86.0 DEHYDRATION: Primary | ICD-10-CM

## 2023-09-26 PROCEDURE — 74177 CT ABD & PELVIS W/CONTRAST: CPT

## 2023-09-26 PROCEDURE — 71260 CT THORAX DX C+: CPT

## 2023-09-26 PROCEDURE — G1004 CDSM NDSC: HCPCS

## 2023-09-26 PROCEDURE — 96360 HYDRATION IV INFUSION INIT: CPT

## 2023-09-26 RX ORDER — OXYCODONE HYDROCHLORIDE 5 MG/1
5 TABLET ORAL EVERY 4 HOURS PRN
Qty: 60 TABLET | Refills: 0 | Status: SHIPPED | OUTPATIENT
Start: 2023-09-26

## 2023-09-26 RX ADMIN — IOHEXOL 100 ML: 350 INJECTION, SOLUTION INTRAVENOUS at 09:16

## 2023-09-26 RX ADMIN — SODIUM CHLORIDE 1000 ML: 0.9 INJECTION, SOLUTION INTRAVENOUS at 09:30

## 2023-09-26 NOTE — PLAN OF CARE
Problem: SAFETY ADULT  Goal: Patient will remain free of falls  Description: INTERVENTIONS:  - Educate patient/family on patient safety including physical limitations  - Instruct patient to call for assistance with activity   - Keep Call bell within reach  -Outcome: Progressing     Problem: Knowledge Deficit  Goal: Patient/family/caregiver demonstrates understanding of disease process, treatment plan, medications, and discharge instructions  Description: Complete learning assessment and assess knowledge base.   Interventions:  - Provide teaching at level of understanding  - Provide teaching via preferred learning methods  Outcome: Progressing

## 2023-09-30 PROBLEM — S61.219A CUT OF FINGER: Status: ACTIVE | Noted: 2023-09-30

## 2023-10-06 ENCOUNTER — TELEPHONE (OUTPATIENT)
Dept: OTHER | Facility: HOSPITAL | Age: 53
End: 2023-10-06

## 2023-10-06 NOTE — TELEPHONE ENCOUNTER
Yelitza nurse  with [facility] [employer] called to let provider know that patient has free care management that he gets from his employer. Patient is entitled to free service that his employer pays for. They have tried to reach out to patient with no success. They just wanted to let provider know incase they had any questions.  Contact name is Suki Sanford and number is [phone]

## 2023-10-10 ENCOUNTER — OFFICE VISIT (OUTPATIENT)
Dept: HEMATOLOGY ONCOLOGY | Facility: CLINIC | Age: 53
End: 2023-10-10
Payer: COMMERCIAL

## 2023-10-10 ENCOUNTER — DOCUMENTATION (OUTPATIENT)
Dept: HEMATOLOGY ONCOLOGY | Facility: MEDICAL CENTER | Age: 53
End: 2023-10-10

## 2023-10-10 VITALS
BODY MASS INDEX: 24.72 KG/M2 | RESPIRATION RATE: 17 BRPM | WEIGHT: 182.5 LBS | OXYGEN SATURATION: 99 % | TEMPERATURE: 97.8 F | HEART RATE: 56 BPM | SYSTOLIC BLOOD PRESSURE: 134 MMHG | HEIGHT: 72 IN | DIASTOLIC BLOOD PRESSURE: 84 MMHG

## 2023-10-10 DIAGNOSIS — C78.00 MALIGNANT NEOPLASM METASTATIC TO LUNG, UNSPECIFIED LATERALITY (HCC): Primary | ICD-10-CM

## 2023-10-10 DIAGNOSIS — C64.1 CLEAR CELL CARCINOMA OF RIGHT KIDNEY (HCC): ICD-10-CM

## 2023-10-10 PROCEDURE — 99214 OFFICE O/P EST MOD 30 MIN: CPT | Performed by: INTERNAL MEDICINE

## 2023-10-10 NOTE — PROGRESS NOTES
Russell Avila  1970  [address] [facility] [place]  [address] [facility]  [place] [id number]    DISCUSSION/SUMMARY:    49-year-old with stage IV (multiple pulmonary nodules, questionable liver lesion) renal cell carcinoma. Presently Mr. Ariana Oneill states that he is feeling very well. Clinically there are no concerning findings. Recent blood work was okay/acceptable. No pain control issues. No problems with the pembrolizumab or axitinib once a day. LFTs jumped up in May 2023. The thought was that this was possibly due to alcohol, but patient had also been taking a sleep aid called "Midnight". Apparently this contains THC and has been recalled because of multiple instances of liver injuries from this marijuana product. He no longer takes the sleep aid. LFTs are now better, total bilirubin is within range. Mr. Ariana Oneill says that he needs to continue with his Synthroid; PCP is monitoring the TSH. Patient is to follow-up with PCP and Dr. Miroslava Ames for his sleep disturbances. CT scan in September 2023 showed mixed response of metastasis in the chest and new mediastinal lymph nodes. Discussed with patient in detail about options, including axitinib BID versus continuing current regimen with close surveillance and re-evaluation after repeat imaging versus switching to a new medication. Given that the patient is feeling well and is reluctant to take axitinib twice daily due to unwanted side effects, the plan is to switch to cabozantinib. He will stop pembrolizab infusions at this time, but will continue taking axitinib until the new medication is approved. Patient is to return for follow-up in 4 weeks. Will repeat CT in 2-3 months. Patient knows to call the hematology/oncology office if there are any other questions or concerns. Carefully review your medication list and verify that the list is accurate and up-to-date.  Please call the hematology/oncology office if there are medications missing from the list, medications on the list that you are not currently taking or if there is a dosage or instruction that is different from how you're taking that medication. Patient goals and areas of care: Stop pembrolizumab. Continue axitinib. Awaiting approval for cabozatinib, at which point axitinib will be discontinued. Follow-up in 4 weeks. Repeat CT scan in 2-3 months. Barriers to care: None  Patient is able to self-care  ______________________________________________________________________________________    Chief Complaint   Patient presents with   • Follow-up   • Metastatic renal cell carcinoma     History of Present Illness: 49-year-old male with relatively good general health presenting to the emergency room in November 2022 with abrupt shortness of breath and dyspnea on exertion. CTA/chest did not demonstrate any PE but patient was found to have an incidental right renal mass. Work-up was initiated; this included a urology evaluation. Mr. Neil Kelly was found to have clear-cell renal cell carcinoma and underwent planned preoperative angioembolization with subsequent right radical nephrectomy via chevron incision. Patient was subsequently discharged and referred to oncology for evaluation. Follow-up CAT scan of the chest in January 2023 demonstrated a number of new pulmonary nodules distant with metastatic disease. Options were discussed and patient was started on axitinib and pembrolizumab. Mr. Neil Kelly had numbness, tingling and pain in his mouth recently. Axitinib was discontinued then eventually restarted at 5 mg once a day. This along with the pembrolizumab was well tolerated by the patient. CT scan in September 2023 showed mixed response of metastasis in the chest and new mediastinal lymphadenopathy. Today, patient states that he is doing very well.  He reports recent cramping abdominal pain which improves with bowel movements and occasional diarrhea, but states that it is improving. He wonders whether this is a side effect of the pembrolizumab. Additionally, patient states that he has been having difficulty falling asleep for the past few weeks. No shortness of breath, nausea, vomiting,  problems. No pain control issues. Appetite is fair, and weight is stable. Patient continues to be very active. Review of Systems   HENT: Negative. Eyes: Negative. Respiratory: Negative. Cardiovascular: Negative. Gastrointestinal: Negative. Endocrine: Negative. Genitourinary: Negative. Musculoskeletal: Negative. Skin: Negative. Allergic/Immunologic: Negative. Neurological: Negative. Hematological: Negative. Psychiatric/Behavioral: Positive for sleep disturbance (difficulty falling asleep). All other systems reviewed and are negative.     Patient Active Problem List   Diagnosis   • Hyperlipidemia   • Meniere disease, left   • History of anesthesia complications   • Right renal mass   • Clear cell carcinoma of right kidney ([address])   • Annual physical exam   • Fungal dermatitis   • Dry skin dermatitis   • RBBB   • BMI 25.0-25.9,adult   • H/O right nephrectomy   • Malignant neoplasm metastatic to lung [facility])   • Palliative care patient   • Abdominal pain   • Constipation   • Anxiousness   • Dysphoric mood   • Loss of appetite   • Retroperitoneal lymphadenopathy   • Hypothyroidism   • BMI 35.0-35.9,adult   • Alteration in appetite   • Chronic post-operative pain   • Continuous opioid dependence (HCC)   • Gastroesophageal reflux disease with esophagitis   • Hypertension   • Weight loss, unintentional   • Oral mucositis due to antineoplastic therapy   • BMI 24.0-24.9, adult   • Dysgeusia   • Bilateral calf pain   • Varicose veins of left leg with edema   • Dehydration   • Loose stools   • Insomnia   • Need for Tdap vaccination   • History of gastroesophageal reflux (GERD)   • Diarrhea   • Cut of finger     Past Medical History: Diagnosis Date   • Arthralgia     last assessed 02/10/2015   • Babesiosis     last assessed 14   • Benign paroxysmal vertigo     last assessed 05/22/15   • Cancer ([address])     right kidney removed 22-oral chemotherapy and IV immunotherapy every 21 days   • Dry skin dermatitis    • Fullness of abdomen     with eating   • Meniere disease     last assessed 04/15/13   • Pneumonia of left lower lobe due to infectious organism     last assessed 16   • Vitamin D deficiency     last assessed 14     Past Surgical History:   Procedure Laterality Date   • FIBULA FRACTURE SURGERY Right    • IR RENAL ANGIOGRAM  2022   • TN NEPHRECTOMY W/PRTL URETERECT OPEN RIB RESCJ RAD Right 2022    Procedure: NEPHRECTOMY ABDOMINAL APPROACH;  Surgeon: Fly Solomon MD;  Location: [facility];  Service: Urology   • TIBIA FRACTURE SURGERY Right    • TONSILLECTOMY       Family History   Problem Relation Age of Onset   • Meniere's disease Mother    • Cancer Father         ?renal/bladder? • No Known Problems Family      Social History     Socioeconomic History   • Marital status: /Civil Union     Spouse name: Not on file   • Number of children: Not on file   • Years of education: Not on file   • Highest education level: Not on file   Occupational History   • Not on file   Tobacco Use   • Smoking status: Former     Types: Cigarettes     Start date: 1984     Quit date: 2022     Years since quittin.0   • Smokeless tobacco: Never   • Tobacco comments:     Quit 2 mos ago   Vaping Use   • Vaping Use: Never used   Substance and Sexual Activity   • Alcohol use: Not Currently     Comment: social   • Drug use: No   • Sexual activity: Yes   Other Topics Concern   • Not on file   Social History Narrative    Daily coffee consumption 6 cups/day    Wife: Jenifer        From 22  note:    Primary Care Provider: Kingston Deal MD    Primary Insurance: [employer] [address] Proxies:  There are no active Health Care Proxies on file. Primary Caregiver: Self    Support Systems: Self, Spouse/significant other    [place] of Residence: [id number] [place] [address] do you live in?: [employer] entry access options. Select all that apply.: Stairs    Number of steps to enter home.: 3    Type of Current Residence: [address]    Living Arrangements: Lives w/ Spouse/significant other    Functional Status: Independent    Completes ADLs independently?: Yes    Ambulates independently?: Yes    Does patient use assisted devices?: No    Does patient currently own DME?: No     Social Determinants of Health     Financial Resource Strain: Not on file   Food Insecurity: Not on file   Transportation Needs: Not on file   Physical Activity: Not on file   Stress: Not on file   Social Connections: Not on file   Intimate Partner Violence: Not on file   Housing Stability: Not on file       Current Outpatient Medications:   •  amLODIPine (NORVASC) 2.5 mg tablet, Take 1 tablet (2.5 mg total) by mouth daily, Disp: 30 tablet, Rfl: 1  •  CANNABIDIOL PO, Take by mouth, Disp: , Rfl:   •  gabapentin (NEURONTIN) 300 mg capsule, Take 2 capsules (600 mg total) by mouth 2 (two) times a day, Disp: 120 capsule, Rfl: 0  •  levothyroxine 25 mcg tablet, take 1 tablet by mouth once daily IN THE EARLY MORNING, Disp: 30 tablet, Rfl: 3  •  oxyCODONE (Roxicodone) 5 immediate release tablet, Take 1 tablet (5 mg total) by mouth every 4 (four) hours as needed for moderate pain or severe pain Max Daily Amount: 30 mg, Disp: 60 tablet, Rfl: 0  •  axitinib (Inlyta) 5 MG TABS, Take 1 tablet (5mg) by mouth daily, Disp: 30 tablet, Rfl: 11    No Known Allergies    Vitals:    10/10/23 1024   BP: 134/84   Pulse: 56   Resp: 17   Temp: 97.8 °F (36.6 °C)   SpO2: 99%     Physical Exam  Constitutional:       Appearance: He is well-developed. HENT:      Head: Normocephalic and atraumatic.       Right Ear: External ear normal.      Left Ear: External ear normal.   Eyes: Conjunctiva/sclera: Conjunctivae normal.      Pupils: Pupils are equal, round, and reactive to light. Cardiovascular:      Rate and Rhythm: Normal rate and regular rhythm. Heart sounds: Normal heart sounds. Pulmonary:      Effort: Pulmonary effort is normal.      Breath sounds: Normal breath sounds. Comments: Clear to auscultation  Abdominal:      General: Bowel sounds are normal.      Palpations: Abdomen is soft. Musculoskeletal:         General: Normal range of motion. Cervical back: Normal range of motion and neck supple. Lymphadenopathy:      Cervical: No cervical adenopathy. Skin:     General: Skin is warm. Neurological:      Mental Status: He is alert and oriented to person, place, and time. Deep Tendon Reflexes: Reflexes are normal and symmetric. Psychiatric:         Behavior: Behavior normal.         Thought Content: Thought content normal.         Judgment: Judgment normal.     Extremities: No lower extremity edema bilaterally, no cords, pulses are 1+    Labs     9/15/2023 WBC = 4.51 hemoglobin = 15.7 hematocrit = 47.7 platelet 038 neutrophil = 62% TSH = 2.032 BUN = 20 creatinine = 1.31 LFTs WNL total bilirubin 1.00    8/3/2023 WBC = 4.58 hemoglobin = 17.1 hematocrit = 49.8 platelet = 199 neutrophil = 49% TSH = 4.221 BUN = 15 creatinine = 1.50 LFTs WNL total bilirubin = 1.14    5/12/2023 WBC = 2.71 hemoglobin = 13.2 hematocrit = 40.2 platelet = 834 neutrophil = 43% TSH = 3.890 BUN = 19 creatinine = 1.24 calcium = 9.0 AST = 79 ALT = 100 alkaline phosphatase = 99 total bilirubin = 0.63    Imaging    9/26/2023 CT chest abdomen pelvis    IMPRESSION:  1. Mixed response of metastasis in the chest with marked decrease in the number of pulmonary metastasis though there are new lesions in the bilateral upper lobes and an increased parenchymal metastasis in the right lower lobe. 2.  New mediastinal/left hilar lymphadenopathy.   3.  Mixed response of retroperitoneal lymphadenopathy with decreased pericaval lymph nodes and increased aortocaval lymph nodes. 4.  Increased size of right ischial lytic metastasis. 1/31/2023 MRI abdomen with and without contrast    IMPRESSION:     Mildly enlarged retrocaval nodes highly suspicious for metastatic disease, unchanged from recent CT of January 25, 2023.     Bibasilar pulmonary nodules highly suspicious for pulmonary metastatic disease better evaluated on recent chest CT.     Small hepatic lesions, difficult to evaluate due to respiratory motion artifact but not significantly changed in size or MR imaging characteristics when compared to November 9, 2022, most consistent with benign hemangiomata. 1/12/2023 CAT scan of the chest without contrast    LUNGS:    Mild emphysema.     Numerous, new pulmonary nodules in all lobes ranging in size from approximately 0.3 cm (3/68, peripheral right lower lobe) to 0.9 cm (3/82, medial left upper lobe). 4.0 cm ascending aorta is top normal in caliber. No intramural hematoma. Two enlarged right retrocaval/retroperitoneal lymph nodes have increased in size. 1.1 and 1.3 cm short axis diameter (previously 0.6 to 0.8 cm respectively). IMPRESSION:     Pulmonary metastases. Increased size of right retroperitoneal lymph nodes compared to 11/7/2022, suspicious for metastasis. 11/9/2022 MRI abdomen    IMPRESSION:     1.  Large exophytic right lower pole heterogeneously enhancing renal mass extending into the renal sinus consistent with renal cell carcinoma. There are retroperitoneal collaterals posteriorly which drain into the IVC though no evidence of right renal   vein thrombosis. No hydronephrosis.     2. Borderline paracaval lymph nodes, likely early lymph node metastasis.     3.  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. However, given primary malignancy and atypical features, a short interval follow-up MRI in 3 months is recommended to exclude metastasis.     11/7/2022 CTA chest PE study    IMPRESSION:     No pulmonary embolus. Mild patchy peripheral lower lobe groundglass opacity. Covid not excluded although not the classic appearance. Consider aspiration versus other infectious/inflammatory etiology. Partially imaged large right renal mass. See abdomen CT. Pathology    Case Report   Surgical Pathology Report                         Case: [id number]                                    Authorizing Provider: Chante Obrien MD        Collected:           11/21/2022 1213               Ordering Location:     [address]      Received:            11/21/2022 2301                                      Hospital Operating Room                                                       Pathologist:           Terry Evans MD                                                                  Specimen:    Kidney, Right                                                                              Final Diagnosis   A. Kidney, Right, nephrectomy:  - Clear cell renal cell carcinoma. See comment and synoptic report. - One lymph node positive for metastatic carcinoma (1/1).    Comment: Immunohistochemistry is diffusely positive in the tumor cells for PAX8 and carbonic anhydrase IX. CK7 and P504S have non-specific staining. The findings are consistent with the diagnosis.     8th Ed AJCC Tumor Stage:  at least Stage III - pT2b, pN1, G3.   Representative tumor block: A7   Electronically signed by Amado Henriquez MD on 12/5/2022 at 10:16 AM     Synoptic Checklist   KIDNEY: Nephrectomy  8th Edition - Protocol posted: 6/30/2021  KIDNEY: NEPHRECTOMY, PARTIAL OR RADICAL - All Specimens  SPECIMEN   Procedure  Total nephrectomy    Specimen Laterality  Right    TUMOR   Tumor Focality  Unifocal    Tumor Size  Greatest Dimension (Centimeters): 13.5 cm   Histologic Type  Clear cell renal cell carcinoma    Histologic Grade (WHO / ISUP)  G3 (nucleoli conspicuous and eosinophilic at 327S magnification)    Tumor Extent  Limited to kidney    Sarcomatoid Features  Not identified    Rhabdoid Features  Not identified    Tumor Necrosis  Present    Percentage of Tumor Necrosis  30 %   Lymphovascular Invasion  Not identified    MARGINS   Margin Status  All margins negative for invasive carcinoma    REGIONAL LYMPH NODES   Regional Lymph Node Status  Tumor present in regional lymph node(s)    Number of Lymph Nodes with Tumor  1    Jabari Site(s) with Tumor  Hilar    Size of Largest Jabari Metastatic Deposit  0.3 cm   Number of Lymph Nodes Examined  1    PATHOLOGIC STAGE CLASSIFICATION (pTNM, AJCC 8th Edition)   Reporting of pT, pN, and (when applicable) pM categories is based on information available to the pathologist at the time the report is issued. As per the AJCC (Chapter 1, 8th Ed.) it is the managing physician’s responsibility to establish the final pathologic stage based upon all pertinent information, including but potentially not limited to this pathology report. Primary Tumor (pT)  pT2b    Regional Lymph Nodes (pN)  pN1    ADDITIONAL FINDINGS   Additional Findings in Nonneoplastic Kidney  Tubuloglomerular necrosis    .

## 2023-10-11 ENCOUNTER — DOCUMENTATION (OUTPATIENT)
Dept: HEMATOLOGY ONCOLOGY | Facility: CLINIC | Age: 53
End: 2023-10-11

## 2023-10-11 DIAGNOSIS — C78.00 MALIGNANT NEOPLASM METASTATIC TO LUNG, UNSPECIFIED LATERALITY (HCC): Primary | ICD-10-CM

## 2023-10-11 NOTE — PROGRESS NOTES
Rcvd new oral chemo start-Cabometyx  Auth is required    Patient has been enrolled with [employer] Copay Savings program  RxBIN: [id number]  [address]: [facility]  RxGRP: [id number]: [id number]  ID: [id number]  Copay $0

## 2023-10-19 ENCOUNTER — TELEPHONE (OUTPATIENT)
Dept: HEMATOLOGY ONCOLOGY | Facility: MEDICAL CENTER | Age: 53
End: 2023-10-19

## 2023-10-19 ENCOUNTER — TELEPHONE (OUTPATIENT)
Dept: HEMATOLOGY ONCOLOGY | Facility: CLINIC | Age: 53
End: 2023-10-19

## 2023-10-19 DIAGNOSIS — C64.1 CLEAR CELL CARCINOMA OF RIGHT KIDNEY (HCC): Primary | ICD-10-CM

## 2023-10-19 NOTE — TELEPHONE ENCOUNTER
Spoke with pt who states only issue is severe fatigue by the end of the day. He agrees to wait a little longer and see if his body adjusts. States he's "willing to deal with it if it's working".

## 2023-10-19 NOTE — TELEPHONE ENCOUNTER
7 Day Follow Up Call   Spoke with wife who suggested I call pt on home line. Left voicemail requesting a call back.      Consent uploaded: yes    [facility]    Adherence/pill count: started Tuesday per wife    Side effect discussion: very tired per wife, otherwise she is not aware of any concerns     Pt aware of follow up appt/labs/testing: follow up appt 11/21    Pt has oral chemo nurse contact info: left on voicemail

## 2023-10-25 DIAGNOSIS — Z51.5 PALLIATIVE CARE PATIENT: ICD-10-CM

## 2023-10-25 DIAGNOSIS — C78.00 MALIGNANT NEOPLASM METASTATIC TO LUNG (HCC): ICD-10-CM

## 2023-10-25 DIAGNOSIS — F11.20 CONTINUOUS OPIOID DEPENDENCE (HCC): ICD-10-CM

## 2023-10-25 DIAGNOSIS — Z90.5 H/O RIGHT NEPHRECTOMY: ICD-10-CM

## 2023-10-25 DIAGNOSIS — R10.9 ABDOMINAL PAIN: ICD-10-CM

## 2023-10-25 DIAGNOSIS — C64.1 CLEAR CELL CARCINOMA OF RIGHT KIDNEY (HCC): ICD-10-CM

## 2023-10-25 DIAGNOSIS — R59.0 RETROPERITONEAL LYMPHADENOPATHY: ICD-10-CM

## 2023-10-25 DIAGNOSIS — G89.28 CHRONIC POST-OPERATIVE PAIN: ICD-10-CM

## 2023-10-26 RX ORDER — OXYCODONE HYDROCHLORIDE 5 MG/1
5 TABLET ORAL EVERY 4 HOURS PRN
Qty: 60 TABLET | Refills: 0 | Status: SHIPPED | OUTPATIENT
Start: 2023-10-26

## 2023-10-30 DIAGNOSIS — I10 HYPERTENSION, UNSPECIFIED TYPE: ICD-10-CM

## 2023-10-30 RX ORDER — AMLODIPINE BESYLATE 2.5 MG/1
2.5 TABLET ORAL DAILY
Qty: 90 TABLET | Refills: 1 | Status: SHIPPED | OUTPATIENT
Start: 2023-10-30

## 2023-11-14 ENCOUNTER — OFFICE VISIT (OUTPATIENT)
Dept: PALLIATIVE MEDICINE | Facility: CLINIC | Age: 53
End: 2023-11-14
Payer: COMMERCIAL

## 2023-11-14 ENCOUNTER — APPOINTMENT (OUTPATIENT)
Dept: LAB | Facility: CLINIC | Age: 53
End: 2023-11-14
Payer: COMMERCIAL

## 2023-11-14 VITALS
BODY MASS INDEX: 24.54 KG/M2 | SYSTOLIC BLOOD PRESSURE: 118 MMHG | OXYGEN SATURATION: 99 % | HEART RATE: 60 BPM | DIASTOLIC BLOOD PRESSURE: 72 MMHG | RESPIRATION RATE: 18 BRPM | HEIGHT: 72 IN | TEMPERATURE: 97.2 F | WEIGHT: 181.2 LBS

## 2023-11-14 DIAGNOSIS — K21.00 GASTROESOPHAGEAL REFLUX DISEASE WITH ESOPHAGITIS: ICD-10-CM

## 2023-11-14 DIAGNOSIS — R45.89 DYSPHORIC MOOD: ICD-10-CM

## 2023-11-14 DIAGNOSIS — Z90.5 H/O RIGHT NEPHRECTOMY: ICD-10-CM

## 2023-11-14 DIAGNOSIS — R19.8 IRREGULAR BOWEL HABITS: ICD-10-CM

## 2023-11-14 DIAGNOSIS — F52.32 INHIBITED ORGASM MALE: ICD-10-CM

## 2023-11-14 DIAGNOSIS — F41.9 ANXIOUSNESS: ICD-10-CM

## 2023-11-14 DIAGNOSIS — C64.1 CLEAR CELL CARCINOMA OF RIGHT KIDNEY (HCC): Primary | ICD-10-CM

## 2023-11-14 DIAGNOSIS — C78.00 MALIGNANT NEOPLASM METASTATIC TO LUNG (HCC): ICD-10-CM

## 2023-11-14 DIAGNOSIS — G47.00 INSOMNIA: ICD-10-CM

## 2023-11-14 DIAGNOSIS — G89.28 CHRONIC POST-OPERATIVE PAIN: ICD-10-CM

## 2023-11-14 DIAGNOSIS — K11.7 XEROSTOMIA: ICD-10-CM

## 2023-11-14 DIAGNOSIS — C78.00 MALIGNANT NEOPLASM METASTATIC TO LUNG, UNSPECIFIED LATERALITY (HCC): ICD-10-CM

## 2023-11-14 DIAGNOSIS — R43.2 DYSGEUSIA: ICD-10-CM

## 2023-11-14 DIAGNOSIS — R10.9 ABDOMINAL PAIN: ICD-10-CM

## 2023-11-14 DIAGNOSIS — R59.0 RETROPERITONEAL LYMPHADENOPATHY: ICD-10-CM

## 2023-11-14 DIAGNOSIS — C64.1 CLEAR CELL CARCINOMA OF RIGHT KIDNEY (HCC): ICD-10-CM

## 2023-11-14 DIAGNOSIS — F11.20 CONTINUOUS OPIOID DEPENDENCE (HCC): ICD-10-CM

## 2023-11-14 DIAGNOSIS — Z51.5 PALLIATIVE CARE PATIENT: ICD-10-CM

## 2023-11-14 LAB
ALBUMIN SERPL BCP-MCNC: 4.3 G/DL (ref 3.5–5)
ALP SERPL-CCNC: 71 U/L (ref 34–104)
ALT SERPL W P-5'-P-CCNC: 39 U/L (ref 7–52)
ANION GAP SERPL CALCULATED.3IONS-SCNC: 7 MMOL/L
ANISOCYTOSIS BLD QL SMEAR: PRESENT
AST SERPL W P-5'-P-CCNC: 35 U/L (ref 13–39)
BASOPHILS # BLD MANUAL: 0.07 THOUSAND/UL (ref 0–0.1)
BASOPHILS NFR MAR MANUAL: 2 % (ref 0–1)
BILIRUB SERPL-MCNC: 0.98 MG/DL (ref 0.2–1)
BUN SERPL-MCNC: 14 MG/DL (ref 5–25)
CALCIUM SERPL-MCNC: 9.7 MG/DL (ref 8.4–10.2)
CHLORIDE SERPL-SCNC: 103 MMOL/L (ref 96–108)
CO2 SERPL-SCNC: 31 MMOL/L (ref 21–32)
CREAT SERPL-MCNC: 1.21 MG/DL (ref 0.6–1.3)
EOSINOPHIL # BLD MANUAL: 0.27 THOUSAND/UL (ref 0–0.4)
EOSINOPHIL NFR BLD MANUAL: 8 % (ref 0–6)
ERYTHROCYTE [DISTWIDTH] IN BLOOD BY AUTOMATED COUNT: 13.6 % (ref 11.6–15.1)
GFR SERPL CREATININE-BSD FRML MDRD: 66 ML/MIN/1.73SQ M
GLUCOSE SERPL-MCNC: 106 MG/DL (ref 65–140)
HCT VFR BLD AUTO: 44.2 % (ref 36.5–49.3)
HGB BLD-MCNC: 15.3 G/DL (ref 12–17)
LYMPHOCYTES # BLD AUTO: 1.03 THOUSAND/UL (ref 0.6–4.47)
LYMPHOCYTES # BLD AUTO: 30 % (ref 14–44)
MCH RBC QN AUTO: 31.6 PG (ref 26.8–34.3)
MCHC RBC AUTO-ENTMCNC: 34.6 G/DL (ref 31.4–37.4)
MCV RBC AUTO: 91 FL (ref 82–98)
MICROCYTES BLD QL AUTO: PRESENT
MONOCYTES # BLD AUTO: 0.14 THOUSAND/UL (ref 0–1.22)
MONOCYTES NFR BLD: 4 % (ref 4–12)
NEUTROPHILS # BLD MANUAL: 1.92 THOUSAND/UL (ref 1.85–7.62)
NEUTS BAND NFR BLD MANUAL: 4 % (ref 0–8)
NEUTS SEG NFR BLD AUTO: 52 % (ref 43–75)
PLATELET # BLD AUTO: 125 THOUSANDS/UL (ref 149–390)
PLATELET BLD QL SMEAR: ABNORMAL
PMV BLD AUTO: 11.2 FL (ref 8.9–12.7)
POTASSIUM SERPL-SCNC: 4.7 MMOL/L (ref 3.5–5.3)
PROT SERPL-MCNC: 6.9 G/DL (ref 6.4–8.4)
RBC # BLD AUTO: 4.84 MILLION/UL (ref 3.88–5.62)
RBC MORPH BLD: PRESENT
SODIUM SERPL-SCNC: 141 MMOL/L (ref 135–147)
WBC # BLD AUTO: 3.42 THOUSAND/UL (ref 4.31–10.16)

## 2023-11-14 PROCEDURE — 36415 COLL VENOUS BLD VENIPUNCTURE: CPT

## 2023-11-14 PROCEDURE — 99214 OFFICE O/P EST MOD 30 MIN: CPT | Performed by: INTERNAL MEDICINE

## 2023-11-14 PROCEDURE — 85007 BL SMEAR W/DIFF WBC COUNT: CPT

## 2023-11-14 PROCEDURE — 80053 COMPREHEN METABOLIC PANEL: CPT

## 2023-11-14 PROCEDURE — 85027 COMPLETE CBC AUTOMATED: CPT

## 2023-11-14 RX ORDER — GABAPENTIN 300 MG/1
CAPSULE ORAL
Qty: 90 CAPSULE | Refills: 2 | Status: SHIPPED | OUTPATIENT
Start: 2023-11-14

## 2023-11-14 RX ORDER — OXYCODONE HYDROCHLORIDE 5 MG/1
5 TABLET ORAL EVERY 4 HOURS PRN
Qty: 60 TABLET | Refills: 0 | Status: SHIPPED | OUTPATIENT
Start: 2023-11-14

## 2023-11-14 NOTE — PATIENT INSTRUCTIONS
It was good to see you today. Thank you for coming in. Referring to Urology as discussed. Possible reasons for the issue include medications, medical marijuana (MMJ), cancer, cancer therapy. The medications I typically recommend for dry mouth are all over-the-counter:  Oracoat Xylimelts  Biotene dental products (they have drops and sprays)  Mouth Kote  GC dry mouth gel  Lemon drops (which stimulate saliva production)  Chewing gum (also stimulates saliva)  ACT Dry Mouth lozenges  Мария spray  Look for products containing xylitol or carboxymethylcellulose. Many of these can be found in most pharmacies, or the OTC pharmaceutical sections in stores like [employer], or online at [employer]. Try "mini-mint ice cubes". Mix filtered water with concentrated mint syrup (Teisseire® Green Mint Syrup / [employer] [facility]) in a 4-parts-water, 1-part-mint-syrup concentration. Freeze in small ice cube trays (about 1 centimeter square, if avilable) or regular ice cube trays. Use as needed. For altered taste sensation:  Use plastic utensils, marinate food to alter taste, try increasing herbs and spices as tolerated to make food more palatable. Choose mild flavored protein (chicken, turkey, etc)  Add sugar to decrease bitter or salty taste  Use Lemon juice/lemon drop candies  Reduce bitter tasting food like coffee, chocolates. Snack on frozen fruits like grapes, melons balls  Drink more water with meals  Eat small meals several times a day  Maintain good oral hygiene  If diarrhea is an issue:  Try Banatrol (banana flakes). Use as directed / as-needed for diarrhea. This is something you may safely take every day. If you become constipated you may wish to stop using it, although it can treat both constipation and diarrhea in some patients. This available over-the-counter at some pharmacies, but you may have more success looking online (fabrik). Stay hydrated! If needed, it is okay to take Imodium for diarrhea. Use as directed, available over-the-counter. If constipation is an issue:  Drink PLENTY of water. This is important to keep the gut moving. Some people have success w/ using prunes, prune juice, certain fruits or vegetables (apples, bananas, prunes, pears, raspberries, and vegetables like string beans, broccoli, spinach, kale, squash, lentils, peas, and beans), or fiber gummies. Try a probiotic. This could be yogurt or kefir, or fermented beverages such as kombucha, but probiotics are also available in capsule form. Aim for 10-15 billion colony-forming-units, w/ bacteria such as Lactobacillus / Saccharomyces / Actinomyces. Osmotic laxatives (Miralax, magnesium citrate, Milk of Magnesia) can be very useful for opioid-induced constipation (OIC); take daily to prevent OIC. Bulk laxatives (Citrucel, Metamucil, Fibercon, Benefiber, wheat germ) are useful for constipation in patients who are not taking opioids, but are not recommended if you are taking opioids. Colace is good for softening hard stools, or preventing constipation when opioids are being used - but does not stimulate the bowel to move things along once constipation has occurred. You can use senna, 1 to 2 tabs, once or twice daily as needed for constipation. Use as directed on the box/bottle. Senna is also available in a tea ("Smooth Move"). Should that not be enough for your constipation, you can try Dulcolax. Should that not be enough, consider an enema. All of these medications are available over-the-counter. Return in about 6 - 8 weeks. Call us for refills on medications that we supply, as needed. If something changes and you need to come in sooner, please call our office. PRESCRIPTION REFILL REMINDER:  All medication refills should be requested prior to [facility] on Friday. Any refill requests after noon on Friday would be addressed the following Monday.     MEDICATION SAFETY ISSUES:  Do not drive under the influence of narcotics (including opioids), watch for adverse effects including confusion / altered mental status / respiratory depression (slowed breathing), keep medications stored in a safe/locked environment, do not use alcohol while opioids or other narcotics are in your system.

## 2023-11-14 NOTE — PROGRESS NOTES
Follow-up with Palliative and Supportive Care  Alexander Wiseman 52 y.o. male [id number]    ASSESSMENT & PLAN:  1. Clear cell carcinoma of right kidney ([address])    2. H/O right nephrectomy    3. Malignant neoplasm metastatic to lung, unspecified laterality ([address])    4. Retroperitoneal lymphadenopathy    5. Gastroesophageal reflux disease with esophagitis    6. Abdominal pain    7. 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 Continuous opioid dependence ([address])    9. Chronic post-operative pain    10. Malignant neoplasm metastatic to lung ([address])    11. Dysgeusia    12. Xerostomia    13. Insomnia    14. Irregular bowel habits    15. Dysphoric mood    16. Anxiousness    17. Palliative care patient          Continue disease-directed cares. Patient will be taking a holiday from [employer] for Thanksgiving so he can better enjoy the time w/ family, better taste the meals, etc.  Patient endorses significantly delayed orgasm. Referring to Urology. Possible etiologies might be related to age, medication, malignancy, cancer therapy, MMJ. Counseled. A brief review of the literature available shows that marijuana has been associated with increased sexual function and men, not the delayed orgasm or decreased sexual function. [email or web address]   https://pubmed.ncbi.nlm.nih.gov/28679547/   https://www.ncbi.nlm.nih.gov/pmc/articles/WET9787074/   Patient reports his chronic pain is well-managed on his current regimen. Pain includes chronic arthritic pain, likely cancer-related pain (abdominal pain, which has a post-operative neuropathic component and/or is related to RP lymphadenopathy). Recommend topical OTC products, local application of heat or cold, Tylenol up to 1000mg TID for chronic pain. Do not use heat on top of topical agents. Patient reports PO MMJ gummies are useful for pain. MMJ is legally available recreationally in [place]. Do not use systemic MJ products within 1-2 hours of opioids.   Continue oxyIR 5mg q4h PRN. Effective. Do not drink alcohol when narcotics are in the system. Continue gabapentin. eGFR is 51-60. Monitor Cr. May use MMJ for other symptom management; PO MMJ has been helpful for insomnia. Counseled today on bowel regimen for loose stools / diarrhea / constipation. Patient experiencing dysgeusia. Counseled on interventions which may lessen this symptom. Counseled on OTC methods for reducing xerostomia. Patient declined offer of prescription medication for mood. ACP: Patient has completed advanced directives (the [facility] Advanced Directive) naming his wife as surrogate healthcare decision-maker. In EMR. Reviewed notes (PCP; Medical Oncology - last seen 10/10/23), labs (9/15/23 Cr 1.31, alb 4.3, Hb 15.7, TSH 2.032), imaging + procedures (9/26/23 CTCAP showing mixed response to treatment). Return in about 6 weeks (around 12/26/2023). Emotional support provided. Medication safety issues addressed - no driving under the influence of narcotics (including opioids), watch for adverse effects including AMS or respiratory depression (slowed breathing), keep medications stored in a safe/locked environment, do not use alcohol while opioids or other narcotics are in one's system.       Requested Prescriptions     Signed Prescriptions Disp Refills    oxyCODONE (Roxicodone) 5 immediate release tablet 60 tablet 0     Sig: Take 1 tablet (5 mg total) by mouth every 4 (four) hours as needed for moderate pain or severe pain Max Daily Amount: 30 mg    gabapentin (NEURONTIN) 300 mg capsule 90 capsule 2     Sig: Take 2 capsules (600mg) in the morning and 1 capsule (300mg) in the evening       Medications Discontinued During This Encounter   Medication Reason    gabapentin (NEURONTIN) 300 mg capsule Reorder    oxyCODONE (Roxicodone) 5 immediate release tablet Reorder       Representatives have queried the patient's controlled substance dispensing history in the Prescription Drug Monitoring Program in compliance with regulations before I have prescribed any controlled substances. The prescription history is consistent with prescribed therapy and our practice policies. 30+ minutes were spent in this ambulatory visit with greater than 50% of the time spent face to face with patient in counseling or coordination of care including symptom assessment and management, medication review, psychosocial support, chart review, imaging review, lab review, goals of care, medical marijuana, supportive listening, and anticipatory guidance. All of the patient's questions were answered during this discussion. SUBJECTIVE:  Chief Complaint   Patient presents with    Follow-up        HPI    Jazmyn Rivera is a 52 y.o. male w/ clear cell carcinoma of the right kidney w/ RP lymph node involvement, w/ lung metastases, s/p angioembolization + R radical nephrectomy 11/21/22, on cabozatinib; liver lesions (likely benign hemangioma); abdominal pain (which may be cancer- or procedure-related), acid reflux / eosinophilic esophagitis, constipation, h/o babesiosis, h/o Meniere disease, h/o vertigo. He follows w/ Dr Linda Mac (Medical Oncology), Dr Debby Jovel (Urology). Patient is known to [facility] clinic; seen 8/15/23 for symptom assessment and management, medication review, psychosocial support, chart review, imaging review, lab review, goals of care, medical marijuana, supportive listening and anticipatory guidance. Patient endorses ongoing chronic pain, focused in his abdomen today (6/10). He is using oxycodone 5mg, one tab about 0 to 3 times per day, in addition to gabapentin and MMJ. Pain is fairly well managed though he has wondered if he needs more than one tab per dose. He is tolerating opioid therapy. Patient does note some occasional mild constipation, and some occasional (and more severe) diarrhea. He denies recent nausea, denies recent vomiting.  He states his appetite has been low since starting the Cabometyx, and he feels this may be related to xerostomia (which is only been an issue since switching to Cabometyx, he did not have this symptom when he was on his previous regimen, despite having used medical marijuana at that time) and/or dysgeusia. Weight has been fluctuating in recent months. Patient states his mood is "overall good", "I am dealing with it" -- though he does endorse mild dysphoria and anxiousness. He feels his sleep is "getting better" and credits [employer] based MMJ gummies for helping with this. Patient states he is been bothered by delayed orgasm in recent weeks, and wonders what might be the cause and what might be done to improve the situation. PDMP shows no concerns. Review of Systems   All other systems reviewed and are negative. The following portions of the medical history were reviewed: past medical history, surgical history, problem list, medication list, family history, and social history. Current Outpatient Medications:     amLODIPine (NORVASC) 2.5 mg tablet, take 1 tablet by mouth once daily, Disp: 90 tablet, Rfl: 1    cabozantinib (Cabometyx) 40 mg TABS, Take 1 tablet (40 mg total) by mouth daily, Disp: 15 tablet, Rfl: 11    gabapentin (NEURONTIN) 300 mg capsule, Take 2 capsules (600mg) in the morning and 1 capsule (300mg) in the evening, Disp: 90 capsule, Rfl: 2    levothyroxine 25 mcg tablet, take 1 tablet by mouth once daily IN THE EARLY MORNING, Disp: 30 tablet, Rfl: 5    oxyCODONE (Roxicodone) 5 immediate release tablet, Take 1 tablet (5 mg total) by mouth every 4 (four) hours as needed for moderate pain or severe pain Max Daily Amount: 30 mg, Disp: 60 tablet, Rfl: 0    CANNABIDIOL PO, Take by mouth, Disp: , Rfl:     OBJECTIVE:  /72 (BP Location: Left arm, Patient Position: Sitting, Cuff Size: Standard)   Pulse 60   Temp (!) 97.2 °F (36.2 °C) (Tympanic)   Resp 18   Ht 6' (1.829 m)   Wt 82.2 kg (181 lb 3.2 oz)   SpO2 99%   BMI 24.58 kg/m²   Physical Exam  Vitals reviewed. Constitutional:       General: He is not in acute distress. Appearance: He is well-groomed and normal weight. He is not toxic-appearing. HENT:      Head: Normocephalic and atraumatic. Right Ear: External ear normal.      Left Ear: External ear normal.   Eyes:      General: No scleral icterus. Right eye: No discharge. Left eye: No discharge. Extraocular Movements: Extraocular movements intact. Conjunctiva/sclera: Conjunctivae normal.      Pupils: Pupils are equal, round, and reactive to light. Cardiovascular:      Rate and Rhythm: Normal rate. Pulmonary:      Effort: Pulmonary effort is normal. No tachypnea, bradypnea, accessory muscle usage or respiratory distress. Comments: Able to speak comfortably in complete sentences on room air at rest.  Abdominal:      General: There is no distension. Tenderness: There is no guarding. Musculoskeletal:      Cervical back: Normal range of motion. Right lower leg: No edema. Left lower leg: No edema. Skin:     General: Skin is dry. Coloration: Skin is not pale. Neurological:      Mental Status: He is alert and oriented to person, place, and time. Cranial Nerves: No dysarthria or facial asymmetry. Gait: Gait normal.      Comments: Using no assistive devices for ambulation. Psychiatric:         Attention and Perception: Attention normal.         Mood and Affect: Mood and affect normal.         Speech: Speech normal.         Behavior: Behavior normal. Behavior is cooperative. Thought Content: Thought content normal.         Cognition and Memory: Cognition and memory normal.         Judgment: Judgment normal.          Tj Cervantes MD  [facility]  [phone]    Portions of this document may have been created using dictation software and as such some "sound alike" terms may have been generated by the system.  Do not hesitate to contact me with any questions or clarifications.

## 2023-11-21 ENCOUNTER — OFFICE VISIT (OUTPATIENT)
Dept: HEMATOLOGY ONCOLOGY | Facility: CLINIC | Age: 53
End: 2023-11-21
Payer: COMMERCIAL

## 2023-11-21 VITALS
SYSTOLIC BLOOD PRESSURE: 124 MMHG | WEIGHT: 180 LBS | OXYGEN SATURATION: 98 % | TEMPERATURE: 97.6 F | HEART RATE: 60 BPM | BODY MASS INDEX: 24.38 KG/M2 | HEIGHT: 72 IN | RESPIRATION RATE: 17 BRPM | DIASTOLIC BLOOD PRESSURE: 68 MMHG

## 2023-11-21 DIAGNOSIS — C78.00 MALIGNANT NEOPLASM METASTATIC TO LUNG, UNSPECIFIED LATERALITY (HCC): Primary | ICD-10-CM

## 2023-11-21 PROCEDURE — 99214 OFFICE O/P EST MOD 30 MIN: CPT | Performed by: INTERNAL MEDICINE

## 2023-11-21 NOTE — PROGRESS NOTES
Alexander Ivone  1970  [address] [facility] [place]  [address] [facility] [address]  [place] [address]    DISCUSSION/SUMMARY:    33-year-old with stage IV (multiple pulmonary nodules, questionable liver lesion) clear-cell renal cell carcinoma. Mr. Mariana Urrutia was originally started on pembrolizumab and axitinib. Patient had trouble tolerating the axitinib and this was switched to once a day. Eventual follow-up scans demonstrated a mixed response but clearly new lesions and enlarging lesions. Options were discussed and patient was started on Cabozantinib, 40 mg a day. Presently Mr. Mariana Urrutia feels OK and clinically there are no concerning findings. Recent blood work is okay/acceptable. Patient is not very happy with the oral side effects from the Cabozantinib. Patient has held the medication for this week, wants to enjoy his Thanksgiving dinner. Patient does not want to go up to the standard 60 mg a day dose. The plan is for Mr. Munoz to continue with 40 mg a day. Patient is to return in 2 months with repeat blood work before. Patient will call if he has any worsening GI issues. When patient returns for the next office visit appointment, repeat CAT scans will be ordered. Prior LFTs had jumped up. Wife was concerned about the possibility of ++ alcohol use as well as a sleep aid called Midnight containing THC. Patient discontinued both of these - more recently the LFTs have returned to [facility]. Mr. Mariana Urrutia is on Synthroid; PCP is monitoring the TSH. Patient call the office if he has any other oncology questions or concerns. Carefully review your medication list and verify that the list is accurate and up-to-date.  Please call the hematology/oncology office if there are medications missing from the list, medications on the list that you are not currently taking or if there is a dosage or instruction that is different from how you're taking that medication. Patient goals and areas of care: Continue with Cabozantinib at 40 mg a day  Barriers to care: None  Patient is able to self-care  ______________________________________________________________________________________    Chief Complaint   Patient presents with    Follow-up    Metastatic renal cell carcinoma     History of Present Illness: 47-year-old male with relatively good general health presenting to the emergency room recently with abrupt shortness of breath and dyspnea on exertion. CTA/chest did not demonstrate any PE but patient was found to have an incidental right renal mass. Work-up was initiated; this included a urology evaluation. Mr. Dale Casiano was found to have clear-cell renal cell carcinoma and underwent planned preoperative angioembolization with subsequent right radical nephrectomy via chevron incision. Patient was subsequently discharged and referred to oncology for evaluation. Follow-up CAT scan of the chest demonstrated a number of new pulmonary nodules distant with metastatic disease. Options were discussed and patient was placed on axitinib and pembrolizumab. Patient could not tolerate the axitinib twice a day, this was changed to once a day. Eventual follow-up scans demonstrated disease progression and patient is now on Cabozantinib, 40 mg a day. Patient returns for follow-up. Mr. Dale Casiano states feeling okay, about the same as before. Patient's biggest complaint is the taste change from the Cabozantinib. Patient has lost approximately 20 pounds since the diagnosis. No nausea or vomiting. Bowel movements are otherwise normal.  No pain control issues, no abdominal pain. Patient still has muscle discomfort by the surgical site, same as before. No fevers or signs of infection. No respiratory issues. Is her baseline. Review of Systems   Constitutional:  Positive for fatigue. HENT: Negative. Eyes: Negative. Respiratory: Negative.      Cardiovascular: Negative. Gastrointestinal: Negative. Endocrine: Negative. Genitourinary: Negative. Musculoskeletal: Negative. Skin: Negative. Allergic/Immunologic: Negative. Neurological: Negative. Hematological: Negative. Psychiatric/Behavioral:  The patient is nervous/anxious. All other systems reviewed and are negative.     Patient Active Problem List   Diagnosis    Hyperlipidemia    Meniere disease, left    History of anesthesia complications    Right renal mass    Clear cell carcinoma of right kidney ([address])    Annual physical exam    Fungal dermatitis    Dry skin dermatitis    RBBB    BMI 25.0-25.9,adult    H/O right nephrectomy    Malignant neoplasm metastatic to lung [facility])    Palliative care patient    Abdominal pain    Constipation    Anxiousness    Dysphoric mood    Loss of appetite    Retroperitoneal lymphadenopathy    Hypothyroidism    BMI 35.0-35.9,adult    Alteration in appetite    Chronic post-operative pain    Continuous opioid dependence (HCC)    Gastroesophageal reflux disease with esophagitis    Hypertension    Weight loss, unintentional    Oral mucositis due to antineoplastic therapy    BMI 24.0-24.9, adult    Dysgeusia    Bilateral calf pain    Varicose veins of left leg with edema    Dehydration    Loose stools    Insomnia    Need for Tdap vaccination    History of gastroesophageal reflux (GERD)    Diarrhea    Cut of finger    Xerostomia    Irregular bowel habits     Past Medical History:   Diagnosis Date    Arthralgia     last assessed 02/10/2015    Babesiosis     last assessed 12/30/14    Benign paroxysmal vertigo     last assessed 05/22/15    Cancer ([address])     right kidney removed 11/21/22-oral chemotherapy and IV immunotherapy every 21 days    Dry skin dermatitis     Fullness of abdomen     with eating    Meniere disease     last assessed 04/15/13    Pneumonia of left lower lobe due to infectious organism     last assessed 04/04/16    Vitamin D deficiency     last assessed 14     Past Surgical History:   Procedure Laterality Date    FIBULA FRACTURE SURGERY Right     IR RENAL ANGIOGRAM  2022    SD NEPHRECTOMY W/PRTL URETERECT OPEN RIB RESCJ RAD Right 2022    Procedure: NEPHRECTOMY ABDOMINAL APPROACH;  Surgeon: Rodríguez Fried MD;  Location: [facility];  Service: Urology    TIBIA FRACTURE SURGERY Right     TONSILLECTOMY       Family History   Problem Relation Age of Onset    Meniere's disease Mother     Cancer Father         ?renal/bladder? No Known Problems Family      Social History     Socioeconomic History    Marital status: /Civil Union     Spouse name: Not on file    Number of children: Not on file    Years of education: Not on file    Highest education level: Not on file   Occupational History    Not on file   Tobacco Use    Smoking status: Former     Types: Cigarettes     Start date: 1984     Quit date: 2022     Years since quittin.2    Smokeless tobacco: Never    Tobacco comments:     Quit 2 mos ago   Vaping Use    Vaping Use: Never used   Substance and Sexual Activity    Alcohol use: Not Currently     Comment: social    Drug use: No    Sexual activity: Yes   Other Topics Concern    Not on file   Social History Narrative    Daily coffee consumption 6 cups/day    Wife: Jenifer        From 22  note:    Primary Care Provider: Jessi Zhou MD    Primary Insurance: [address] Proxies: There are no active Health Care Proxies on file. Primary Caregiver: Self    Support Systems: Self, Spouse/significant other    [facility]: [id number] [place] [address] do you live in?: [facility] entry access options.  Select all that apply.: Stairs    Number of steps to enter home.: 3    Type of Current Residence: [address] [place]    Living Arrangements: Lives w/ Spouse/significant other    Functional Status: Independent    Completes ADLs independently?: Yes    Ambulates independently?: Yes    Does patient use assisted devices?: No    Does patient currently own DME?: No     Social Determinants of Health     Financial Resource Strain: Not on file   Food Insecurity: Not on file   Transportation Needs: Not on file   Physical Activity: Not on file   Stress: Not on file   Social Connections: Not on file   Intimate Partner Violence: Not on file   Housing Stability: Not on file       Current Outpatient Medications:     amLODIPine (NORVASC) 2.5 mg tablet, take 1 tablet by mouth once daily, Disp: 90 tablet, Rfl: 1    cabozantinib (Cabometyx) 40 mg TABS, Take 1 tablet (40 mg total) by mouth daily, Disp: 15 tablet, Rfl: 11    CANNABIDIOL PO, Take by mouth, Disp: , Rfl:     gabapentin (NEURONTIN) 300 mg capsule, Take 2 capsules (600mg) in the morning and 1 capsule (300mg) in the evening, Disp: 90 capsule, Rfl: 2    levothyroxine 25 mcg tablet, take 1 tablet by mouth once daily IN THE EARLY MORNING, Disp: 30 tablet, Rfl: 5    oxyCODONE (Roxicodone) 5 immediate release tablet, Take 1 tablet (5 mg total) by mouth every 4 (four) hours as needed for moderate pain or severe pain Max Daily Amount: 30 mg, Disp: 60 tablet, Rfl: 0    No Known Allergies    Vitals:    11/21/23 1545   BP: 124/68   Pulse: 60   Resp: 17   Temp: 97.6 °F (36.4 °C)   SpO2: 98%     Physical Exam  Constitutional:       Appearance: He is well-developed. HENT:      Head: Normocephalic and atraumatic. Right Ear: External ear normal.      Left Ear: External ear normal.   Eyes:      Conjunctiva/sclera: Conjunctivae normal.      Pupils: Pupils are equal, round, and reactive to light. Cardiovascular:      Rate and Rhythm: Normal rate and regular rhythm. Heart sounds: Normal heart sounds. Pulmonary:      Effort: Pulmonary effort is normal.      Breath sounds: Normal breath sounds. Abdominal:      General: Bowel sounds are normal.      Palpations: Abdomen is soft. Musculoskeletal:         General: Normal range of motion.       Cervical back: Normal range of motion and neck supple. Skin:     General: Skin is warm. Neurological:      Mental Status: He is alert and oriented to person, place, and time. Deep Tendon Reflexes: Reflexes are normal and symmetric. Psychiatric:         Behavior: Behavior normal.         Thought Content: Thought content normal.         Judgment: Judgment normal.   Extremities: No lower extreme edema bilaterally, no cords, pulses are 1+    Labs    11/14/2023 WBC = 3.42 hemoglobin = 15.3 hematocrit = 44.2 platelet = 180 neutrophil = 52% bands = 4% lymphocyte = 30% monocyte = 4% eosinophil = 8% basophil = 2% BUN = 14 creatinine = 1.21 calcium = 9.7 LFTs WNL    8/3/2023 WBC = 4.58 hemoglobin = 17.1 hematocrit = 49.8 platelet = 920 neutrophil = 49% TSH = 4.221 BUN = 15 creatinine = 1.50 LFTs WNL total bilirubin = 1.14  5/12/2023 WBC = 2.71 hemoglobin = 13.2 hematocrit = 40.2 platelet = 937 neutrophil = 43% TSH = 3.890 BUN = 19 creatinine = 1.24 calcium = 9.0 AST = 79 ALT = 100 alkaline phosphatase = 99 total bilirubin = 0.63    Imaging    9/26/2023 CAT scan chest abdomen pelvis with contrast    IMPRESSION:     1. Mixed response of metastasis in the chest with marked decrease in the number of pulmonary metastasis though there are new lesions in the bilateral upper lobes and an increased parenchymal metastasis in the right lower lobe. 2.  New mediastinal/left hilar lymphadenopathy. 3.  Mixed response of retroperitoneal lymphadenopathy with decreased pericaval lymph nodes and increased aortocaval lymph nodes. 4.  Increased size of right ischial lytic metastasis. 1/31/2023 MRI abdomen with and without contrast    IMPRESSION:     Mildly enlarged retrocaval nodes highly suspicious for metastatic disease, unchanged from recent CT of January 25, 2023. Bibasilar pulmonary nodules highly suspicious for pulmonary metastatic disease better evaluated on recent chest CT.      Small hepatic lesions, difficult to evaluate due to respiratory motion artifact but not significantly changed in size or MR imaging characteristics when compared to November 9, 2022, most consistent with benign hemangiomata. 1/12/2023 CAT scan of the chest without contrast    LUNGS:    Mild emphysema. Numerous, new pulmonary nodules in all lobes ranging in size from approximately 0.3 cm (3/68, peripheral right lower lobe) to 0.9 cm (3/82, medial left upper lobe). 4.0 cm ascending aorta is top normal in caliber. No intramural hematoma. Two enlarged right retrocaval/retroperitoneal lymph nodes have increased in size. 1.1 and 1.3 cm short axis diameter (previously 0.6 to 0.8 cm respectively). IMPRESSION:     Pulmonary metastases. Increased size of right retroperitoneal lymph nodes compared to 11/7/2022, suspicious for metastasis. 11/9/2022 MRI abdomen    IMPRESSION:     1. Large exophytic right lower pole heterogeneously enhancing renal mass extending into the renal sinus consistent with renal cell carcinoma. There are retroperitoneal collaterals posteriorly which drain into the IVC though no evidence of right renal   vein thrombosis. No hydronephrosis. 2.  Borderline paracaval lymph nodes, likely early lymph node metastasis. 3.  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. However, given primary malignancy and atypical features, a short interval follow-up MRI in 3 months is recommended to exclude metastasis. 11/7/2022 CTA chest PE study    IMPRESSION:     No pulmonary embolus. Mild patchy peripheral lower lobe groundglass opacity. Covid not excluded although not the classic appearance. Consider aspiration versus other infectious/inflammatory etiology. Partially imaged large right renal mass. See abdomen CT.     Pathology    Case Report   Surgical Pathology Report                         Case: [id number]                                    Authorizing Provider:  Diamond Mendieta MD        Collected:           11/21/2022 1213               Ordering Location:     [facility]      Received:            11/21/2022 [address] [place] [address] Operating Room                                                       Pathologist:           Oksana Guerrero MD                                                                  Specimen:    Kidney, Right                                                                              Final Diagnosis   A. Kidney, Right, nephrectomy:  - Clear cell renal cell carcinoma. See comment and synoptic report. - One lymph node positive for metastatic carcinoma (1/1). Comment: Immunohistochemistry is diffusely positive in the tumor cells for PAX8 and carbonic anhydrase IX. CK7 and P504S have non-specific staining. The findings are consistent with the diagnosis. 8th Ed AJCC Tumor Stage:  at least Stage III - pT2b, pN1, G3.   Representative tumor block: A7   Electronically signed by Oksana Guerrero MD on 12/5/2022 at 10:16 AM     Synoptic Checklist   KIDNEY: Nephrectomy  8th Edition - Protocol posted: 6/30/2021  KIDNEY: NEPHRECTOMY, PARTIAL OR RADICAL - All Specimens  SPECIMEN   Procedure  Total nephrectomy    Specimen Laterality  Right    TUMOR   Tumor Focality  Unifocal    Tumor Size  Greatest Dimension (Centimeters): 13.5 cm   Histologic Type  Clear cell renal cell carcinoma    Histologic Grade (WHO / ISUP)  G3 (nucleoli conspicuous and eosinophilic at 361I magnification)    Tumor Extent  Limited to kidney    Sarcomatoid Features  Not identified    Rhabdoid Features  Not identified    Tumor Necrosis  Present    Percentage of Tumor Necrosis  30 %   Lymphovascular Invasion  Not identified    MARGINS   Margin Status  All margins negative for invasive carcinoma    REGIONAL LYMPH NODES   Regional Lymph Node Status  Tumor present in regional lymph node(s)    Number of Lymph Nodes with Tumor  1 Jabari Site(s) with Tumor  Hilar    Size of Largest Jabari Metastatic Deposit  0.3 cm   Number of Lymph Nodes Examined  1    PATHOLOGIC STAGE CLASSIFICATION (pTNM, AJCC 8th Edition)   Reporting of pT, pN, and (when applicable) pM categories is based on information available to the pathologist at the time the report is issued. As per the AJCC (Chapter 1, 8th Ed.) it is the managing physician’s responsibility to establish the final pathologic stage based upon all pertinent information, including but potentially not limited to this pathology report. Primary Tumor (pT)  pT2b    Regional Lymph Nodes (pN)  pN1    ADDITIONAL FINDINGS   Additional Findings in Nonneoplastic Kidney  Tubuloglomerular necrosis    .

## 2023-12-08 ENCOUNTER — RA CDI HCC (OUTPATIENT)
Dept: OTHER | Facility: HOSPITAL | Age: 53
End: 2023-12-08

## 2023-12-08 NOTE — PROGRESS NOTES
[address] [facility] coding opportunities       Chart reviewed, no opportunity found: CHART REVIEWED, NO OPPORTUNITY FOUND        Patients Insurance        Commercial Insurance: [address]

## 2024-01-02 DIAGNOSIS — C78.00 MALIGNANT NEOPLASM METASTATIC TO LUNG, UNSPECIFIED LATERALITY (HCC): ICD-10-CM

## 2024-01-09 ENCOUNTER — OFFICE VISIT (OUTPATIENT)
Dept: PALLIATIVE MEDICINE | Facility: CLINIC | Age: 54
End: 2024-01-09
Payer: COMMERCIAL

## 2024-01-09 VITALS
DIASTOLIC BLOOD PRESSURE: 76 MMHG | OXYGEN SATURATION: 99 % | HEIGHT: 72 IN | WEIGHT: 183.4 LBS | TEMPERATURE: 97.8 F | SYSTOLIC BLOOD PRESSURE: 132 MMHG | RESPIRATION RATE: 18 BRPM | BODY MASS INDEX: 24.84 KG/M2 | HEART RATE: 74 BPM

## 2024-01-09 DIAGNOSIS — Z51.5 PALLIATIVE CARE PATIENT: ICD-10-CM

## 2024-01-09 DIAGNOSIS — R10.9 ABDOMINAL PAIN: ICD-10-CM

## 2024-01-09 DIAGNOSIS — R19.5 LOOSE STOOLS: ICD-10-CM

## 2024-01-09 DIAGNOSIS — R59.0 RETROPERITONEAL LYMPHADENOPATHY: ICD-10-CM

## 2024-01-09 DIAGNOSIS — Z79.899 MEDICAL MARIJUANA USE: ICD-10-CM

## 2024-01-09 DIAGNOSIS — Z90.5 H/O RIGHT NEPHRECTOMY: ICD-10-CM

## 2024-01-09 DIAGNOSIS — F11.20 CONTINUOUS OPIOID DEPENDENCE (HCC): ICD-10-CM

## 2024-01-09 DIAGNOSIS — C64.1 CLEAR CELL CARCINOMA OF RIGHT KIDNEY (HCC): Primary | ICD-10-CM

## 2024-01-09 DIAGNOSIS — G47.00 INSOMNIA: ICD-10-CM

## 2024-01-09 DIAGNOSIS — R19.8 IRREGULAR BOWEL HABITS: ICD-10-CM

## 2024-01-09 DIAGNOSIS — G89.28 CHRONIC POST-OPERATIVE PAIN: ICD-10-CM

## 2024-01-09 PROCEDURE — 99214 OFFICE O/P EST MOD 30 MIN: CPT | Performed by: INTERNAL MEDICINE

## 2024-01-09 RX ORDER — TRAZODONE HYDROCHLORIDE 50 MG/1
50-100 TABLET ORAL
Qty: 30 TABLET | Refills: 2 | Status: SHIPPED | OUTPATIENT
Start: 2024-01-09

## 2024-01-09 RX ORDER — GABAPENTIN 300 MG/1
CAPSULE ORAL
Qty: 120 CAPSULE | Refills: 2 | Status: SHIPPED | OUTPATIENT
Start: 2024-01-09

## 2024-01-09 RX ORDER — OXYCODONE HYDROCHLORIDE 5 MG/1
5 TABLET ORAL EVERY 4 HOURS PRN
Qty: 60 TABLET | Refills: 0 | Status: SHIPPED | OUTPATIENT
Start: 2024-01-09

## 2024-01-09 NOTE — ASSESSMENT & PLAN NOTE
ACP: Patient has completed advanced directives (the [facility] Advanced Directive) naming his wife as surrogate healthcare decision-maker. In EMR.   Emotional support provided.  Reviewed notes (PCP; Medical Oncology - last seen 11/21/23), labs (11/14/23 Cr 1.21, eGFR 66, alb 4.3, Hb 15.3; 9/15/23 Cr 1.31, alb 4.3, Hb 15.7, TSH 2.032), imaging + procedures (9/26/23 [facility]).

## 2024-01-09 NOTE — PATIENT INSTRUCTIONS
"It was good to see you today. Thank you for coming in.    Increase gabapentin to 600mg in the morning, 600mg at bedtime.  Trial of trazodone for insomnia. Start at 50mg 30 to 60 minutes before, if not helpful after a few nights increase to 100mg.  If constipation is an issue:  Drink PLENTY of water. This is important to keep the gut moving.  Some people have success w/ using prunes, prune juice, certain fruits or vegetables (apples, bananas, prunes, pears, raspberries, and vegetables like string beans, broccoli, spinach, kale, squash, lentils, peas, and beans), or fiber gummies.  Try a probiotic. This could be yogurt or kefir, or fermented beverages such as kombucha, but probiotics are also available in capsule form. Aim for 10-15 billion colony-forming-units, w/ bacteria such as Lactobacillus / Saccharomyces / Actinomyces.  Osmotic laxatives (Miralax, magnesium citrate, Milk of Magnesia) can be very useful for opioid-induced constipation (OIC); take daily to prevent OIC.  Bulk laxatives (Citrucel, Metamucil, Fibercon, Benefiber, wheat germ) are useful for constipation in patients who are not taking opioids, but are not recommended if you are taking opioids.  Colace is good for softening hard stools, or preventing constipation when opioids are being used - but does not stimulate the bowel to move things along once constipation has occurred.  You can use senna, 1 to 2 tabs, once or twice daily as needed for constipation. Use as directed on the box/bottle. Senna is also available in a tea (\"Smooth Move\"). Should that not be enough for your constipation, you can try Dulcolax.  Should that not be enough, consider an enema.  All of these medications are available over-the-counter.  If diarrhea is an issue:  Try Banatrol (banana flakes). Use as directed / as-needed for diarrhea. This is something you may safely take every day. If you become constipated you may wish to stop using it, although it can treat both constipation " · Thorough handwashing anytime eyes are touched  · Can use a dilute mix of Baby Shampoo and water to wash eyelashes if mucous accumulates  · Instill eye drops regularly as prescribed: use them until eyes are normal for 2 consecutive awakenings in the [facility] and diarrhea in some patients. This available over-the-counter at some pharmacies, but you may have more success looking online (walmart.com, amazon.com).  Try a probiotic. This could be yogurt or kefir, or fermented beverages such as kombucha, but probiotics are also available in capsule form. Aim for 10-15 billion colony-forming-units, w/ bacteria such as Lactobacillus / Saccharomyces / Actinomyces.  Stay hydrated!  If needed, it is okay to take Imodium for diarrhea. Use as directed, available over-the-counter.  Return in about 2 months (around 3/9/2024).  Call us for refills on medications that we supply, as needed.  If something changes and you need to come in sooner, please call our office.    PRESCRIPTION REFILL REMINDER:  All medication refills should be requested prior to Noon on Friday. Any refill requests after noon on Friday would be addressed the following Monday.    MEDICATION SAFETY ISSUES:  Do not drive under the influence of narcotics (including opioids), watch for adverse effects including confusion / altered mental status / respiratory depression (slowed breathing), keep medications stored in a safe/locked environment, do not use alcohol while opioids or other narcotics are in your system.    is present. Since fewer than 5% of coughs persisting for longer than 8 weeks are infectious in etiology (whooping cough being the primary infectious cause), further investigation, testing and treatment may be needed in this subset of patients.   Here are a

## 2024-01-09 NOTE — PROGRESS NOTES
Follow-up with Palliative and Supportive Care  Stuart Munoz 53 y.o. 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         Relevant Medications    gabapentin (NEURONTIN) 300 mg capsule    oxyCODONE (Roxicodone) 5 immediate release tablet    traZODone (DESYREL) 50 mg tablet       Other    H/O right nephrectomy    Relevant Medications    gabapentin (NEURONTIN) 300 mg capsule    oxyCODONE (Roxicodone) 5 immediate release tablet    Palliative care patient     ACP: Patient has completed advanced directives (the [facility] Advanced Directive) naming his wife as surrogate healthcare decision-maker. In EMR.   Emotional support provided.  Reviewed notes (PCP; Medical Oncology - last seen 11/21/23), labs (11/14/23 Cr 1.21, eGFR 66, alb 4.3, Hb 15.3; 9/15/23 Cr 1.31, alb 4.3, Hb 15.7, TSH 2.032), imaging + procedures (9/26/23 [facility]).         Relevant Medications    gabapentin (NEURONTIN) 300 mg capsule    oxyCODONE (Roxicodone) 5 immediate release tablet    traZODone (DESYREL) 50 mg tablet    Abdominal pain     Patient reports his chronic pain responds well to his current regimen. Pain includes chronic arthritic pain, likely cancer-related pain (abdominal pain, which has a post-operative neuropathic component and/or is related to RP lymphadenopathy).  Recommend topical OTC products, local application of heat or cold, Tylenol up to 1000mg TID for chronic pain. Do not use heat on top of topical agents.  Patient reports that PO MMJ gummies remain useful for pain. He has an MMJ card in [place]. Do not use systemic MJ products within 1-2 hours of opioids.  Continue oxyIR 5mg q4h PRN. Effective. Do not drink alcohol  when narcotics are in the system.  Increase gabapentin to 1200mg/day. eGFR is > 60. Monitor Cr.         Relevant Medications    gabapentin (NEURONTIN) 300 mg capsule    oxyCODONE (Roxicodone) 5 immediate release tablet    Insomnia     Trial of trazodone.         Relevant Medications    traZODone (DESYREL) 50 mg tablet    Irregular bowel habits     Counseled today on bowel regimen for constipation and loose stools.         Medical marijuana use    Loose stools    Chronic post-operative pain    Relevant Medications    gabapentin (NEURONTIN) 300 mg capsule    oxyCODONE (Roxicodone) 5 immediate release tablet    Continuous opioid dependence (HCC)    Relevant Medications    oxyCODONE (Roxicodone) 5 immediate release tablet         Return in about 2 months (around 3/9/2024).  Emotional support provided.  Medication safety issues addressed - no driving under the influence of narcotics (including opioids), watch for adverse effects including AMS or respiratory depression (slowed breathing), keep medications stored in a safe/locked environment, do not use alcohol while opioids or other narcotics are in one's system.      Requested Prescriptions     Signed Prescriptions Disp Refills    gabapentin (NEURONTIN) 300 mg capsule 120 capsule 2     Sig: Take 2 capsules (600mg) in the morning and 2 capsule (600mg) in the evening    oxyCODONE (Roxicodone) 5 immediate release tablet 60 tablet 0     Sig: Take 1 tablet (5 mg total) by mouth every 4 (four) hours as needed for moderate pain or severe pain Max Daily Amount: 30 mg    traZODone (DESYREL) 50 mg tablet 30 tablet 2     Sig: Take 1-2 tablets ( mg total) by mouth daily at bedtime       Medications Discontinued During This Encounter   Medication Reason    oxyCODONE (Roxicodone) 5 immediate release tablet Reorder    gabapentin (NEURONTIN) 300 mg capsule Reorder       Representatives have queried the patient's controlled substance dispensing history in the Prescription Drug  "Monitoring Program in compliance with regulations before I have prescribed any controlled substances. The prescription history is consistent with prescribed therapy and our practice policies.      30 minutes were spent in this ambulatory visit with greater than 50% of the time spent face to face with patient in counseling or coordination of care including symptom assessment and management, medication review, medication adjustment, psychosocial support, chart review, imaging review, lab review, medical marijuana, supportive listening, and anticipatory guidance. All of the patient's questions were answered during this discussion.    SUBJECTIVE:  Chief Complaint   Patient presents with    Follow-up    Cancer    Abdominal Pain    Counseling    Bowel issues        HPI    Stuart Munoz is a 53 y.o. male w/ clear cell carcinoma of the right kidney w/ RP lymph node involvement, w/ lung metastases, s/p angioembolization + R radical nephrectomy 11/21/22, on cabozatinib; liver lesions (likely benign hemangioma); abdominal pain (which may be cancer- or procedure-related), acid reflux / eosinophilic esophagitis, constipation, h/o babesiosis, h/o Meniere disease, h/o vertigo. He follows w/ Dr CAMRYN Campbell (Medical Oncology), Dr Webster (Urology).    Patient is known to [facility] clinic; seen 11/14/23 for symptom assessment and management, medication review, psychosocial support, chart review, imaging review, lab review, goals of care, medical marijuana, supportive listening, and anticipatory guidance.    Patient endorses ongoing abdominal pain, 5/10 at time of visit. He states his primary concern is the possible \"nerve damage\" in the abdominal region (which also may be more of a muscular pain secondary to postoperative issues, and/or cancer related pain considering retroperitoneal lymphadenopathy), and chronic \"nerve damage\" pain which is constant in his right lower extremity. He states he is taking gabapentin as directed on most days, " "though at times he forgets his evening dose. He reports oxycodone is quite useful for his chronic pain, and PO MMJ gummies also help with pain.    Patient reports his sleep is \"horrible\", and he is not sure why. He states he tried over-the-counter melatonin without success. He states he has not had much success with MMJ products for insomnia recently. He states that when they recently he could not sleep at all, and was so fatigued the following night that he was able to get some sleep. Overall his sleep is not restorative. He had thought he would not be able to get medicines for his insomnia considering his kidney issues and other health issues. eGFR 66 on 11/14/23.    Patient endorses ongoing bowel issues, with loose stools being more of an issue recently. He has had some occasional mild nausea, but no vomiting; he declines offer of antiemetic today.    PDMP shows no concerns.    Review of Systems   All other systems reviewed and are negative.    The following portions of the medical history were reviewed: past medical history, surgical history, problem list, medication list, family history, and social history.      Current Outpatient Medications:     amLODIPine (NORVASC) 2.5 mg tablet, take 1 tablet by mouth once daily, Disp: 90 tablet, Rfl: 1    cabozantinib (Cabometyx) 40 mg TABS, Take 1 tablet (40 mg total) by mouth daily, Disp: 30 tablet, Rfl: 5    CANNABIDIOL PO, Take by mouth, Disp: , Rfl:     gabapentin (NEURONTIN) 300 mg capsule, Take 2 capsules (600mg) in the morning and 2 capsule (600mg) in the evening, Disp: 120 capsule, Rfl: 2    levothyroxine 25 mcg tablet, take 1 tablet by mouth once daily IN THE EARLY MORNING, Disp: 30 tablet, Rfl: 5    oxyCODONE (Roxicodone) 5 immediate release tablet, Take 1 tablet (5 mg total) by mouth every 4 (four) hours as needed for moderate pain or severe pain Max Daily Amount: 30 mg, Disp: 60 tablet, Rfl: 0    traZODone (DESYREL) 50 mg tablet, Take 1-2 tablets ( mg " total) by mouth daily at bedtime, Disp: 30 tablet, Rfl: 2    OBJECTIVE:  /76 (BP Location: Left arm, Patient Position: Sitting, Cuff Size: Standard)   Pulse 74   Temp 97.8 °F (36.6 °C) (Tympanic)   Resp 18   Ht 6' (1.829 m)   Wt 83.2 kg (183 lb 6.4 oz)   SpO2 99%   BMI 24.87 kg/m²   Physical Exam  Vitals reviewed.   Constitutional:       General: He is not in acute distress.     Appearance: He is well-developed, well-groomed and normal weight. He is not toxic-appearing.   HENT:      Head: Normocephalic and atraumatic.      Right Ear: External ear normal.      Left Ear: External ear normal.   Eyes:      General: No scleral icterus.        Right eye: No discharge.         Left eye: No discharge.      Extraocular Movements: Extraocular movements intact.      Conjunctiva/sclera: Conjunctivae normal.      Pupils: Pupils are equal, round, and reactive to light.   Cardiovascular:      Rate and Rhythm: Normal rate.   Pulmonary:      Effort: Pulmonary effort is normal. No tachypnea, bradypnea, accessory muscle usage or respiratory distress.      Comments: Able to speak comfortably in complete sentences on room air at rest.  Abdominal:      General: Bowel sounds are normal. There is no distension.      Tenderness: There is no guarding.   Musculoskeletal:      Cervical back: Normal range of motion.      Right lower leg: No edema.      Left lower leg: No edema.   Skin:     General: Skin is dry.      Coloration: Skin is not pale.   Neurological:      Mental Status: He is alert and oriented to person, place, and time.      Cranial Nerves: No dysarthria or facial asymmetry.      Gait: Gait normal.      Comments: Using no assistive devices for ambulation.   Psychiatric:         Attention and Perception: Attention normal.         Mood and Affect: Mood and affect normal.         Speech: Speech normal.         Behavior: Behavior normal. Behavior is cooperative.         Thought Content: Thought content normal.          "Cognition and Memory: Cognition and memory normal.         Judgment: Judgment normal.          Brando Tirado MD  [facility]  [phone]    Portions of this document may have been created using dictation software and as such some \"sound alike\" terms may have been generated by the system. Do not hesitate to contact me with any questions or clarifications.   "

## 2024-01-09 NOTE — ASSESSMENT & PLAN NOTE
Patient reports his chronic pain responds well to his current regimen. Pain includes chronic arthritic pain, likely cancer-related pain (abdominal pain, which has a post-operative neuropathic component and/or is related to RP lymphadenopathy).  Recommend topical OTC products, local application of heat or cold, Tylenol up to 1000mg TID for chronic pain. Do not use heat on top of topical agents.  Patient reports that PO MMJ gummies remain useful for pain. He has an MMJ card in [place]. Do not use systemic MJ products within 1-2 hours of opioids.  Continue oxyIR 5mg q4h PRN. Effective. Do not drink alcohol when narcotics are in the system.  Increase gabapentin to 1200mg/day. eGFR is > 60. Monitor Cr.

## 2024-01-15 ENCOUNTER — RA CDI HCC (OUTPATIENT)
Dept: OTHER | Facility: HOSPITAL | Age: 54
End: 2024-01-15

## 2024-01-15 NOTE — PROGRESS NOTES
HCC coding opportunities       Chart reviewed, no opportunity found: CHART REVIEWED, NO OPPORTUNITY FOUND        Patients Insurance        Commercial Insurance: [email or web address]

## 2024-01-19 ENCOUNTER — TELEPHONE (OUTPATIENT)
Dept: HEMATOLOGY ONCOLOGY | Facility: MEDICAL CENTER | Age: 54
End: 2024-01-19

## 2024-01-19 NOTE — TELEPHONE ENCOUNTER
Spoke with patient's wife, Jenifer, regarding labs needed to be drawn prior to appointment.  Indicated the scripts are in the system, they are non-fasting and patient can go to any [facility] facility to have the labs drawn.  Patient verbalized understanding.

## 2024-01-22 ENCOUNTER — APPOINTMENT (OUTPATIENT)
Dept: LAB | Facility: CLINIC | Age: 54
End: 2024-01-22
Payer: COMMERCIAL

## 2024-01-22 ENCOUNTER — OFFICE VISIT (OUTPATIENT)
Dept: FAMILY MEDICINE CLINIC | Facility: CLINIC | Age: 54
End: 2024-01-22
Payer: COMMERCIAL

## 2024-01-22 VITALS
BODY MASS INDEX: 25.11 KG/M2 | HEART RATE: 94 BPM | SYSTOLIC BLOOD PRESSURE: 124 MMHG | WEIGHT: 185.4 LBS | TEMPERATURE: 97.9 F | OXYGEN SATURATION: 98 % | HEIGHT: 72 IN | RESPIRATION RATE: 16 BRPM | DIASTOLIC BLOOD PRESSURE: 92 MMHG

## 2024-01-22 DIAGNOSIS — C64.1 CLEAR CELL CARCINOMA OF RIGHT KIDNEY (HCC): ICD-10-CM

## 2024-01-22 DIAGNOSIS — F32.9 REACTIVE DEPRESSION (SITUATIONAL): ICD-10-CM

## 2024-01-22 DIAGNOSIS — C78.00 MALIGNANT NEOPLASM METASTATIC TO LUNG, UNSPECIFIED LATERALITY (HCC): ICD-10-CM

## 2024-01-22 DIAGNOSIS — J01.00 ACUTE MAXILLARY SINUSITIS, RECURRENCE NOT SPECIFIED: Primary | ICD-10-CM

## 2024-01-22 DIAGNOSIS — Z90.5 H/O RIGHT NEPHRECTOMY: ICD-10-CM

## 2024-01-22 DIAGNOSIS — M54.9 BACKACHE WITH RADIATION: ICD-10-CM

## 2024-01-22 LAB
ALBUMIN SERPL BCP-MCNC: 4.2 G/DL (ref 3.5–5)
ALP SERPL-CCNC: 61 U/L (ref 34–104)
ALT SERPL W P-5'-P-CCNC: 32 U/L (ref 7–52)
ANION GAP SERPL CALCULATED.3IONS-SCNC: 6 MMOL/L
AST SERPL W P-5'-P-CCNC: 37 U/L (ref 13–39)
BASOPHILS # BLD AUTO: 0.02 THOUSANDS/ÂΜL (ref 0–0.1)
BASOPHILS NFR BLD AUTO: 0 % (ref 0–1)
BILIRUB SERPL-MCNC: 1.04 MG/DL (ref 0.2–1)
BUN SERPL-MCNC: 15 MG/DL (ref 5–25)
CALCIUM SERPL-MCNC: 9.3 MG/DL (ref 8.4–10.2)
CHLORIDE SERPL-SCNC: 104 MMOL/L (ref 96–108)
CO2 SERPL-SCNC: 31 MMOL/L (ref 21–32)
CREAT SERPL-MCNC: 1.35 MG/DL (ref 0.6–1.3)
EOSINOPHIL # BLD AUTO: 0.34 THOUSAND/ÂΜL (ref 0–0.61)
EOSINOPHIL NFR BLD AUTO: 7 % (ref 0–6)
ERYTHROCYTE [DISTWIDTH] IN BLOOD BY AUTOMATED COUNT: 16.2 % (ref 11.6–15.1)
GFR SERPL CREATININE-BSD FRML MDRD: 58 ML/MIN/1.73SQ M
GLUCOSE P FAST SERPL-MCNC: 127 MG/DL (ref 65–99)
HCT VFR BLD AUTO: 43 % (ref 36.5–49.3)
HGB BLD-MCNC: 14.6 G/DL (ref 12–17)
IMM GRANULOCYTES # BLD AUTO: 0.01 THOUSAND/UL (ref 0–0.2)
IMM GRANULOCYTES NFR BLD AUTO: 0 % (ref 0–2)
LYMPHOCYTES # BLD AUTO: 1.06 THOUSANDS/ÂΜL (ref 0.6–4.47)
LYMPHOCYTES NFR BLD AUTO: 22 % (ref 14–44)
MCH RBC QN AUTO: 32.3 PG (ref 26.8–34.3)
MCHC RBC AUTO-ENTMCNC: 34 G/DL (ref 31.4–37.4)
MCV RBC AUTO: 95 FL (ref 82–98)
MONOCYTES # BLD AUTO: 0.38 THOUSAND/ÂΜL (ref 0.17–1.22)
MONOCYTES NFR BLD AUTO: 8 % (ref 4–12)
NEUTROPHILS # BLD AUTO: 3.12 THOUSANDS/ÂΜL (ref 1.85–7.62)
NEUTS SEG NFR BLD AUTO: 63 % (ref 43–75)
NRBC BLD AUTO-RTO: 0 /100 WBCS
PLATELET # BLD AUTO: 159 THOUSANDS/UL (ref 149–390)
PMV BLD AUTO: 10.3 FL (ref 8.9–12.7)
POTASSIUM SERPL-SCNC: 4.3 MMOL/L (ref 3.5–5.3)
PROT SERPL-MCNC: 6.6 G/DL (ref 6.4–8.4)
RBC # BLD AUTO: 4.52 MILLION/UL (ref 3.88–5.62)
SODIUM SERPL-SCNC: 141 MMOL/L (ref 135–147)
WBC # BLD AUTO: 4.93 THOUSAND/UL (ref 4.31–10.16)

## 2024-01-22 PROCEDURE — 99214 OFFICE O/P EST MOD 30 MIN: CPT | Performed by: FAMILY MEDICINE

## 2024-01-22 PROCEDURE — 36415 COLL VENOUS BLD VENIPUNCTURE: CPT

## 2024-01-22 PROCEDURE — 85025 COMPLETE CBC W/AUTO DIFF WBC: CPT

## 2024-01-22 PROCEDURE — 80053 COMPREHEN METABOLIC PANEL: CPT

## 2024-01-22 RX ORDER — AMOXICILLIN 875 MG/1
875 TABLET, COATED ORAL 2 TIMES DAILY
Qty: 20 TABLET | Refills: 0 | Status: SHIPPED | OUTPATIENT
Start: 2024-01-22 | End: 2024-02-01

## 2024-01-23 PROBLEM — F32.9 REACTIVE DEPRESSION (SITUATIONAL): Status: ACTIVE | Noted: 2024-01-23

## 2024-01-23 PROBLEM — M54.9 BACKACHE WITH RADIATION: Status: ACTIVE | Noted: 2024-01-23

## 2024-01-24 ENCOUNTER — OFFICE VISIT (OUTPATIENT)
Dept: HEMATOLOGY ONCOLOGY | Facility: MEDICAL CENTER | Age: 54
End: 2024-01-24
Payer: COMMERCIAL

## 2024-01-24 VITALS
BODY MASS INDEX: 24.92 KG/M2 | HEART RATE: 79 BPM | OXYGEN SATURATION: 99 % | RESPIRATION RATE: 16 BRPM | TEMPERATURE: 98.1 F | SYSTOLIC BLOOD PRESSURE: 102 MMHG | WEIGHT: 184 LBS | HEIGHT: 72 IN | DIASTOLIC BLOOD PRESSURE: 62 MMHG

## 2024-01-24 DIAGNOSIS — C64.1 CLEAR CELL CARCINOMA OF RIGHT KIDNEY (HCC): ICD-10-CM

## 2024-01-24 DIAGNOSIS — C78.00 MALIGNANT NEOPLASM METASTATIC TO LUNG, UNSPECIFIED LATERALITY (HCC): Primary | ICD-10-CM

## 2024-01-24 PROCEDURE — 99215 OFFICE O/P EST HI 40 MIN: CPT | Performed by: INTERNAL MEDICINE

## 2024-01-24 NOTE — PROGRESS NOTES
Assessment/Plan:    1. Acute maxillary sinusitis, recurrence not specified  Assessment & Plan:  Nasal saline/vicks vapor/otc flonase prn    Orders:  -     amoxicillin (AMOXIL) 875 mg tablet; Take 1 tablet (875 mg total) by mouth 2 (two) times a day for 10 days    2. 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)  Assessment & Plan:  Following w Oncologist-Dr. Jermaine Campbell  Await further tx recommendations-next appt 1/24/24      3. H/O right nephrectomy    4. Backache with radiation  Assessment & Plan:  /musculoskeletal in nature vs ?disease progression/metastasis  Has rxs for oxycodone and gabapentin but using sparingly  Uses cannabidiol po  Add heating pad/analgesic cream/patch prn      5. BMI 25.0-25.9,adult    6. Reactive depression (situational)  Assessment & Plan:  On Trazodone      CBC, CMP results pending a time of visit    Depression Screening and Follow-up Plan: Patient was screened for depression during today's encounter. They screened negative with a PHQ-2 score of 0.    Pt screened negative by questionnaire but appeared in both emotional and physical pain during visit today.  Has appt w oncologist-Dr. Campbell 1/24/24--pt states will be addressing timeframe for next imaging and other possible treatment options at visit including ? cont follow-up with palliative care team.    Subjective:      Patient ID: Stuart Munoz is a 53 y.o. male.    Chief Complaint   Patient presents with   • Sinus Problem     Patient thinks he has a sinus infection--right facial area pain   • Back Pain       Sinus Problem  This is a new problem. The current episode started in the past 7 days. The problem has been gradually worsening since onset. There has been no fever. Associated symptoms include congestion and sinus pressure. Pertinent negatives include no shortness of breath or sore throat.   Back Pain  This is a recurrent problem. The current episode started more than 1 month ago. The problem occurs intermittently. The problem has  been waxing and waning since onset. Pain location: right thoracic w radiatio up to scapular area and down to flank/buttocks/post thigh. The quality of the pain is described as aching, shooting and stabbing. The pain is moderate. The symptoms are aggravated by stress and position. Associated symptoms include weakness. Pertinent negatives include no bladder incontinence, bowel incontinence, fever or numbness.     The following portions of the patient's history were reviewed and updated as appropriate: allergies, current medications, past family history, past medical history, past social history, past surgical history and problem list.    Review of Systems   Constitutional:  Positive for fatigue. Negative for fever.   HENT:  Positive for congestion, postnasal drip, sinus pressure and sinus pain. Negative for sore throat.    Respiratory:  Negative for shortness of breath.    Cardiovascular: Negative.    Gastrointestinal:  Negative for blood in stool, bowel incontinence, diarrhea and vomiting.   Genitourinary:  Positive for flank pain. Negative for bladder incontinence.   Musculoskeletal:  Positive for arthralgias, back pain and myalgias.   Skin:  Negative for rash.   Neurological:  Positive for weakness. Negative for numbness.   Psychiatric/Behavioral:  Positive for sleep disturbance. Negative for hallucinations, self-injury and suicidal ideas. The patient is nervous/anxious.          Current Outpatient Medications   Medication Sig Dispense Refill   • amLODIPine (NORVASC) 2.5 mg tablet take 1 tablet by mouth once daily 90 tablet 1   • amoxicillin (AMOXIL) 875 mg tablet Take 1 tablet (875 mg total) by mouth 2 (two) times a day for 10 days 20 tablet 0   • cabozantinib (Cabometyx) 40 mg TABS Take 1 tablet (40 mg total) by mouth daily 30 tablet 5   • CANNABIDIOL PO Take by mouth     • gabapentin (NEURONTIN) 300 mg capsule Take 2 capsules (600mg) in the morning and 2 capsule (600mg) in the evening 120 capsule 2   •  levothyroxine 25 mcg tablet take 1 tablet by mouth once daily IN THE EARLY MORNING 30 tablet 5   • oxyCODONE (Roxicodone) 5 immediate release tablet Take 1 tablet (5 mg total) by mouth every 4 (four) hours as needed for moderate pain or severe pain Max Daily Amount: 30 mg 60 tablet 0   • traZODone (DESYREL) 50 mg tablet Take 1-2 tablets ( mg total) by mouth daily at bedtime 30 tablet 2     No current facility-administered medications for this visit.       Objective:    /92 (BP Location: Left arm, Patient Position: Sitting, Cuff Size: Large)   Pulse 94   Temp 97.9 °F (36.6 °C)   Resp 16   Ht 6' (1.829 m)   Wt 84.1 kg (185 lb 6.4 oz)   SpO2 98%   BMI 25.14 kg/m²        Physical Exam  Vitals and nursing note reviewed.   Constitutional:       Comments: Looks tired, depressed, tearful.   HENT:      Nose: Congestion present.      Mouth/Throat:      Pharynx: No oropharyngeal exudate.   Eyes:      General: No scleral icterus.  Cardiovascular:      Rate and Rhythm: Normal rate and regular rhythm.   Pulmonary:      Effort: Pulmonary effort is normal. No respiratory distress.      Breath sounds: Normal breath sounds.   Abdominal:      General: Bowel sounds are normal.      Palpations: Abdomen is soft.      Tenderness: There is no abdominal tenderness. There is no guarding or rebound.   Musculoskeletal:         General: Tenderness (right thoracic to scapular area as well asbelow to right flank/buttocks/posterior thigh) present.      Cervical back: Neck supple. No tenderness.      Right lower leg: No edema.      Left lower leg: No edema.   Skin:     General: Skin is warm and dry.      Coloration: Skin is not jaundiced.      Findings: No rash.   Neurological:      General: No focal deficit present.      Mental Status: He is alert and oriented to person, place, and time.      Cranial Nerves: No cranial nerve deficit.   Psychiatric:      Comments: Depressed, tearful           Marycarmen Hackett MD

## 2024-01-24 NOTE — ASSESSMENT & PLAN NOTE
/musculoskeletal in nature vs ?disease progression/metastasis  Has rxs for oxycodone and gabapentin but using sparingly  Uses cannabidiol po  Add heating pad/analgesic cream/patch prn

## 2024-01-24 NOTE — PROGRESS NOTES
Stuart Munoz  1970  [address] [facility] [place]  [address]  [place] [id number]    DISCUSSION/SUMMARY:    53-year-old with stage IV (multiple pulmonary nodules, questionable liver lesion) clear-cell renal cell carcinoma.  Mr. Munoz was originally started on pembrolizumab and axitinib.  Patient had trouble tolerating the axitinib and this was switched to once a day.  Eventual follow-up scans demonstrated a mixed response but clearly new lesions and enlarging lesions.  Options were discussed and patient was started on Cabozantinib, 40 mg a day.  Because of GI side effects, patient was never able to get up to 60 mg a day.    Presently Mr. Munoz states feeling +/-, more or less as before.  Clinically there are no concerning findings.  Recent blood work was good/acceptable.  At this time, the plan is to continue with the Cabozantinib, 40 mg a day.  See below.    Repeat CAT scan of the chest abdomen pelvis requested; patient is also to go for bone pain, new back, right hip and femur pain.  Patient is to return in 2 months but this may change depending upon the scan results.  Patient is followed by Dr. Tirado, palliative care.    Prior LFTs were elevated, patient has been drinking more plus taking an over-the-counter holistic medication that contained THC.  After discontinuation of both of these, the LFTs have returned to normal.    Mr. Munoz is on Synthroid; PCP is monitoring the TSH.    Patient knows to call the office if he has any other oncology questions or concerns.    Carefully review your medication list and verify that the list is accurate and up-to-date. Please call the hematology/oncology office if there are medications missing from the list, medications on the list that you are not currently taking or if there is a dosage or instruction that is different from how you're taking that medication.    Patient goals and areas of care:  Continue with Cabozantinib at 40 mg a day for now, repeat scans  Barriers to care: None  Patient is able to self-care  ______________________________________________________________________________________    Chief Complaint   Patient presents with    Follow-up    Metastatic renal cell carcinoma     History of Present Illness: 53-year-old male with relatively good general health presenting to the emergency room recently with abrupt shortness of breath and dyspnea on exertion.  CTA/chest did not demonstrate any PE but patient was found to have an incidental right renal mass.  Work-up was initiated; this included a urology evaluation.    Mr. Munoz was found to have clear-cell renal cell carcinoma and underwent planned preoperative angioembolization with subsequent right radical nephrectomy via chevron incision.  Patient was subsequently discharged and referred to oncology for evaluation.    Follow-up CAT scan of the chest demonstrated a number of new pulmonary nodules distant with metastatic disease.  Options were discussed and patient was placed on axitinib and pembrolizumab.  Patient could not tolerate the axitinib twice a day, this was changed to once a day.  Eventual follow-up scans demonstrated disease progression and patient is now on Cabozantinib, 40 mg a day.  Patient returns for follow-up.    Mr. Munoz states feeling more or less okay.  Patient tolerates the Cabozantinib but it affects his taste.  Patient states that he may miss a day or 2 every few weeks.  This seems to help as far as bringing back the taste buds.  Appetite is otherwise okay, patient is actually gained a few pounds.  No respiratory issues.  No fevers or signs of infection.  No  problems or hematuria.  Activities are about the same as before, patient works part-time.  Pain is more or less controlled, more so recently in the right hip and right femur.    Review of Systems   Constitutional:  Positive for fatigue.   HENT: Negative.      Eyes: Negative.    Respiratory: Negative.     Cardiovascular: Negative.    Gastrointestinal: Negative.    Endocrine: Negative.    Genitourinary: Negative.    Musculoskeletal:  Positive for arthralgias and back pain.   Skin: Negative.    Allergic/Immunologic: Negative.    Neurological: Negative.    Hematological: Negative.    Psychiatric/Behavioral:  The patient is nervous/anxious.    All other systems reviewed and are negative.    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)    Annual physical exam    Fungal dermatitis    Dry skin dermatitis    RBBB    BMI 25.0-25.9,adult    H/O right nephrectomy    Malignant neoplasm metastatic to lung ([facility])    Palliative care patient    Abdominal pain    Constipation    Anxiousness    Dysphoric mood    Loss of appetite    Retroperitoneal lymphadenopathy    Hypothyroidism    BMI 35.0-35.9,adult    Alteration in appetite    Chronic post-operative pain    Continuous opioid dependence (HCC)    Gastroesophageal reflux disease with esophagitis    Hypertension    Weight loss, unintentional    Oral mucositis due to antineoplastic therapy    BMI 24.0-24.9, adult    Dysgeusia    Bilateral calf pain    Varicose veins of left leg with edema    Dehydration    Loose stools    Insomnia    Need for Tdap vaccination    History of gastroesophageal reflux (GERD)    Diarrhea    Cut of finger    Xerostomia    Irregular bowel habits    Medical marijuana use    Acute maxillary sinusitis    Backache with radiation    Reactive depression (situational)     Past Medical History:   Diagnosis Date    Arthralgia     last assessed 02/10/2015    Babesiosis     last assessed 12/30/14    Benign paroxysmal vertigo     last assessed 05/22/15    Cancer (HCC)     right kidney removed 11/21/22-oral chemotherapy and IV immunotherapy every 21 days    Dry skin dermatitis      Fullness of abdomen     with eating    Meniere disease     last assessed 04/15/13    Pneumonia of left lower lobe due to infectious organism     last assessed 16    Vitamin D deficiency     last assessed 14     Past Surgical History:   Procedure Laterality Date    FIBULA FRACTURE SURGERY Right     IR RENAL ANGIOGRAM  2022    TN NEPHRECTOMY W/PRTL URETERECT OPEN RIB RESCJ RAD Right 2022    Procedure: NEPHRECTOMY ABDOMINAL APPROACH;  Surgeon: Lisa Webster MD;  Location: [facility];  Service: Urology    TIBIA FRACTURE SURGERY Right     TONSILLECTOMY       Family History   Problem Relation Age of Onset    Meniere's disease Mother     Cancer Father         ?renal/bladder?    No Known Problems Family      Social History     Socioeconomic History    Marital status: /Civil Union     Spouse name: Not on file    Number of children: Not on file    Years of education: Not on file    Highest education level: Not on file   Occupational History    Not on file   Tobacco Use    Smoking status: Former     Current packs/day: 0.00     Types: Cigarettes     Start date: 1984     Quit date: 2022     Years since quittin.3    Smokeless tobacco: Never    Tobacco comments:     Quit 2 mos ago   Vaping Use    Vaping status: Never Used   Substance and Sexual Activity    Alcohol use: Not Currently     Comment: social    Drug use: No    Sexual activity: Yes   Other Topics Concern    Not on file   Social History Narrative    Daily coffee consumption 6 cups/day    Wife: Jenifer        From 22  note:    Primary Care Provider: Marycarmen Hackett MD    Primary Insurance: BLUE CROSS    Active Health Care Proxies: There are no active Health Care Proxies on file.    Primary Caregiver: Self    Support Systems: Self, Spouse/significant other    County of Residence: [place]    What city do you live in?: [place]    Home entry access options. Select all that apply.: Stairs    Number of steps to enter home.: 3     Type of Current Residence: [address] [place] home    Living Arrangements: Lives w/ Spouse/significant other    Functional Status: Independent    Completes ADLs independently?: Yes    Ambulates independently?: Yes    Does patient use assisted devices?: No    Does patient currently own DME?: No     Social Determinants of Health     Financial Resource Strain: Not on file   Food Insecurity: Not on file   Transportation Needs: Not on file   Physical Activity: Not on file   Stress: Not on file   Social Connections: Not on file   Intimate Partner Violence: Not on file   Housing Stability: Not on file       Current Outpatient Medications:     amLODIPine (NORVASC) 2.5 mg tablet, take 1 tablet by mouth once daily, Disp: 90 tablet, Rfl: 1    amoxicillin (AMOXIL) 875 mg tablet, Take 1 tablet (875 mg total) by mouth 2 (two) times a day for 10 days, Disp: 20 tablet, Rfl: 0    cabozantinib (Cabometyx) 40 mg TABS, Take 1 tablet (40 mg total) by mouth daily, Disp: 30 tablet, Rfl: 5    CANNABIDIOL PO, Take by mouth, Disp: , Rfl:     gabapentin (NEURONTIN) 300 mg capsule, Take 2 capsules (600mg) in the morning and 2 capsule (600mg) in the evening, Disp: 120 capsule, Rfl: 2    levothyroxine 25 mcg tablet, take 1 tablet by mouth once daily IN THE EARLY MORNING, Disp: 30 tablet, Rfl: 5    oxyCODONE (Roxicodone) 5 immediate release tablet, Take 1 tablet (5 mg total) by mouth every 4 (four) hours as needed for moderate pain or severe pain Max Daily Amount: 30 mg, Disp: 60 tablet, Rfl: 0    traZODone (DESYREL) 50 mg tablet, Take 1-2 tablets ( mg total) by mouth daily at bedtime, Disp: 30 tablet, Rfl: 2    No Known Allergies    Vitals:    01/24/24 0943   BP: 102/62   Pulse: 79   Resp: 16   Temp: 98.1 °F (36.7 °C)   SpO2: 99%     Physical Exam  Constitutional:       Appearance: He is well-developed.   HENT:      Head: Normocephalic and atraumatic.      Right Ear: External ear normal.      Left Ear: External ear normal.   Eyes:       Conjunctiva/sclera: Conjunctivae normal.      Pupils: Pupils are equal, round, and reactive to light.   Cardiovascular:      Rate and Rhythm: Normal rate and regular rhythm.      Heart sounds: Normal heart sounds.   Pulmonary:      Effort: Pulmonary effort is normal.      Breath sounds: Normal breath sounds.   Abdominal:      General: Bowel sounds are normal.      Palpations: Abdomen is soft.   Musculoskeletal:         General: Normal range of motion.      Cervical back: Normal range of motion and neck supple.   Skin:     General: Skin is warm.   Neurological:      Mental Status: He is alert and oriented to person, place, and time.      Deep Tendon Reflexes: Reflexes are normal and symmetric.   Psychiatric:         Behavior: Behavior normal.         Thought Content: Thought content normal.         Judgment: Judgment normal.     Extremities: No lower extreme edema bilaterally, no cords, pulses are 1+    Labs    1/22/2024 WBC = 4.93 hemoglobin = 14.6 hematocrit = 43 platelet = 159 neutrophil = 63% BUN = 15 creatinine = 1.35 calcium = 9.3 LFTs WNL    11/14/2023 WBC = 3.42 hemoglobin = 15.3 hematocrit = 44.2 platelet = 125 neutrophil = 52% bands = 4% lymphocyte = 30% monocyte = 4% eosinophil = 8% basophil = 2% BUN = 14 creatinine = 1.21 calcium = 9.7 LFTs WNL    Imaging    9/26/2023 CAT scan chest abdomen pelvis with contrast    IMPRESSION:     1.  Mixed response of metastasis in the chest with marked decrease in the number of pulmonary metastasis though there are new lesions in the bilateral upper lobes and an increased parenchymal metastasis in the right lower lobe.  2.  New mediastinal/left hilar lymphadenopathy.  3.  Mixed response of retroperitoneal lymphadenopathy with decreased pericaval lymph nodes and increased aortocaval lymph nodes.  4.  Increased size of right ischial lytic metastasis.    1/31/2023 MRI abdomen with and without contrast    IMPRESSION:     Mildly enlarged retrocaval nodes highly suspicious for  metastatic disease, unchanged from recent CT of January 25, 2023.     Bibasilar pulmonary nodules highly suspicious for pulmonary metastatic disease better evaluated on recent chest CT.     Small hepatic lesions, difficult to evaluate due to respiratory motion artifact but not significantly changed in size or MR imaging characteristics when compared to November 9, 2022, most consistent with benign hemangiomata.    1/12/2023 CAT scan of the chest without contrast    LUNGS:    Mild emphysema.     Numerous, new pulmonary nodules in all lobes ranging in size from approximately 0.3 cm (3/68, peripheral right lower lobe) to 0.9 cm (3/82, medial left upper lobe).    4.0 cm ascending aorta is top normal in caliber.  No intramural hematoma.    Two enlarged right retrocaval/retroperitoneal lymph nodes have increased in size.  1.1 and 1.3 cm short axis diameter (previously 0.6 to 0.8 cm respectively).    IMPRESSION:     Pulmonary metastases.  Increased size of right retroperitoneal lymph nodes compared to 11/7/2022, suspicious for metastasis.    11/9/2022 MRI abdomen    IMPRESSION:     1.  Large exophytic right lower pole heterogeneously enhancing renal mass extending into the renal sinus consistent with renal cell carcinoma. There are retroperitoneal collaterals posteriorly which drain into the IVC though no evidence of right renal   vein thrombosis. No hydronephrosis.     2.  Borderline paracaval lymph nodes, likely early lymph node metastasis.     3.  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. However, given primary malignancy and atypical features, a short interval follow-up MRI in 3 months is recommended to exclude metastasis.    11/7/2022 CTA chest PE study    IMPRESSION:     No pulmonary embolus.  Mild patchy peripheral lower lobe groundglass opacity.  Covid not excluded although not the classic appearance.  Consider aspiration versus other infectious/inflammatory etiology.  Partially imaged large right  renal mass.  See abdomen CT.    Pathology    Case Report   Surgical Pathology Report                         Case: [id number]                                    Authorizing Provider:  Lisa Webster MD        Collected:           11/21/2022 1213               Ordering Location:     [facility]      Received:            11/21/2022 2301                                      Hospital Operating Room                                                       Pathologist:           Jose Evans MD                                                                  Specimen:    Kidney, Right                                                                              Final Diagnosis   A. Kidney, Right, nephrectomy:  - Clear cell renal cell carcinoma.  See comment and synoptic report.  - One lymph node positive for metastatic carcinoma (1/1).     Comment: Immunohistochemistry is diffusely positive in the tumor cells for PAX8 and carbonic anhydrase IX.  CK7 and P504S have non-specific staining.  The findings are consistent with the diagnosis.     8th Ed AJCC Tumor Stage:  at least Stage III - pT2b, pN1, G3.  Representative tumor block: A7   Electronically signed by Jose Evans MD on 12/5/2022 at 10:16 AM     Synoptic Checklist   KIDNEY: Nephrectomy  8th Edition - Protocol posted: 6/30/2021  KIDNEY: NEPHRECTOMY, PARTIAL OR RADICAL - All Specimens  SPECIMEN   Procedure  Total nephrectomy    Specimen Laterality  Right    TUMOR   Tumor Focality  Unifocal    Tumor Size  Greatest Dimension (Centimeters): 13.5 cm   Histologic Type  Clear cell renal cell carcinoma    Histologic Grade (WHO / ISUP)  G3 (nucleoli conspicuous and eosinophilic at 100x magnification)    Tumor Extent  Limited to kidney    Sarcomatoid Features  Not identified    Rhabdoid Features  Not identified    Tumor Necrosis  Present    Percentage of  Tumor Necrosis  30 %   Lymphovascular Invasion  Not identified    MARGINS   Margin Status  All margins negative for invasive carcinoma    REGIONAL LYMPH NODES   Regional Lymph Node Status  Tumor present in regional lymph node(s)    Number of Lymph Nodes with Tumor  1    Jabari Site(s) with Tumor  Hilar    Size of Largest Jabari Metastatic Deposit  0.3 cm   Number of Lymph Nodes Examined  1    PATHOLOGIC STAGE CLASSIFICATION (pTNM, AJCC 8th Edition)   Reporting of pT, pN, and (when applicable) pM categories is based on information available to the pathologist at the time the report is issued. As per the AJCC (Chapter 1, 8th Ed.) it is the managing physician’s responsibility to establish the final pathologic stage based upon all pertinent information, including but potentially not limited to this pathology report.   Primary Tumor (pT)  pT2b    Regional Lymph Nodes (pN)  pN1    ADDITIONAL FINDINGS   Additional Findings in Nonneoplastic Kidney  Tubuloglomerular necrosis    .

## 2024-01-30 ENCOUNTER — TELEPHONE (OUTPATIENT)
Dept: HEMATOLOGY ONCOLOGY | Facility: CLINIC | Age: 54
End: 2024-01-30

## 2024-01-30 ENCOUNTER — HOSPITAL ENCOUNTER (OUTPATIENT)
Dept: RADIOLOGY | Facility: HOSPITAL | Age: 54
Discharge: HOME | End: 2024-01-30
Attending: INTERNAL MEDICINE
Payer: COMMERCIAL

## 2024-01-30 ENCOUNTER — HOSPITAL ENCOUNTER (OUTPATIENT)
Dept: INFUSION CENTER | Facility: HOSPITAL | Age: 54
Discharge: HOME | End: 2024-01-30
Payer: COMMERCIAL

## 2024-01-30 VITALS
RESPIRATION RATE: 18 BRPM | HEART RATE: 66 BPM | OXYGEN SATURATION: 99 % | TEMPERATURE: 98.5 F | DIASTOLIC BLOOD PRESSURE: 89 MMHG | SYSTOLIC BLOOD PRESSURE: 137 MMHG

## 2024-01-30 DIAGNOSIS — C78.00 MALIGNANT NEOPLASM METASTATIC TO LUNG, UNSPECIFIED LATERALITY (HCC): ICD-10-CM

## 2024-01-30 DIAGNOSIS — E86.0 DEHYDRATION: Primary | ICD-10-CM

## 2024-01-30 DIAGNOSIS — C64.1 CLEAR CELL CARCINOMA OF RIGHT KIDNEY (HCC): ICD-10-CM

## 2024-01-30 PROCEDURE — G1004 CDSM NDSC: HCPCS

## 2024-01-30 PROCEDURE — 78306 BONE IMAGING WHOLE BODY: CPT

## 2024-01-30 PROCEDURE — 74177 CT ABD & PELVIS W/CONTRAST: CPT

## 2024-01-30 PROCEDURE — 71260 CT THORAX DX C+: CPT

## 2024-01-30 PROCEDURE — 96360 HYDRATION IV INFUSION INIT: CPT

## 2024-01-30 PROCEDURE — A9503 TC99M MEDRONATE: HCPCS

## 2024-01-30 RX ADMIN — IOHEXOL 100 ML: 350 INJECTION, SOLUTION INTRAVENOUS at 11:40

## 2024-01-30 RX ADMIN — SODIUM CHLORIDE 1000 ML: 0.9 INJECTION, SOLUTION INTRAVENOUS at 11:59

## 2024-01-30 NOTE — TELEPHONE ENCOUNTER
Patient Call    Who are you speaking with? Spouse    If it is not the patient, are they listed on an active communication consent form? Yes   What is the reason for this call? She asked if pt needs to go to the infusion center for his port before his CT today   Does this require a call back? Yes   If a call back is required, please list best call back number [phone]    If a call back is required, advise that a message will be forwarded to their care team and someone will return their call as soon as possible.   Did you relay this information to the patient? Yes

## 2024-01-30 NOTE — PROGRESS NOTES
..Stuart Munoz  tolerated treatment well with no complications.      IV left in place per nuclear medicine. Discharged from infusion to lobby to await next step of nuclear medicine testing. Aware of follow up appointments. Declines AVS.

## 2024-02-08 DIAGNOSIS — G47.00 INSOMNIA: ICD-10-CM

## 2024-02-08 DIAGNOSIS — C64.1 CLEAR CELL CARCINOMA OF RIGHT KIDNEY (HCC): ICD-10-CM

## 2024-02-08 DIAGNOSIS — Z51.5 PALLIATIVE CARE PATIENT: ICD-10-CM

## 2024-02-08 RX ORDER — TRAZODONE HYDROCHLORIDE 50 MG/1
TABLET ORAL
Qty: 60 TABLET | Refills: 2 | Status: SHIPPED | OUTPATIENT
Start: 2024-02-08

## 2024-02-13 DIAGNOSIS — Z90.5 H/O RIGHT NEPHRECTOMY: ICD-10-CM

## 2024-02-13 DIAGNOSIS — R59.0 RETROPERITONEAL LYMPHADENOPATHY: ICD-10-CM

## 2024-02-13 DIAGNOSIS — Z51.5 PALLIATIVE CARE PATIENT: ICD-10-CM

## 2024-02-13 DIAGNOSIS — G89.28 CHRONIC POST-OPERATIVE PAIN: ICD-10-CM

## 2024-02-13 DIAGNOSIS — C64.1 CLEAR CELL CARCINOMA OF RIGHT KIDNEY (HCC): ICD-10-CM

## 2024-02-13 DIAGNOSIS — F11.20 CONTINUOUS OPIOID DEPENDENCE (HCC): ICD-10-CM

## 2024-02-13 DIAGNOSIS — R10.9 ABDOMINAL PAIN: ICD-10-CM

## 2024-02-13 RX ORDER — OXYCODONE HYDROCHLORIDE 5 MG/1
5 TABLET ORAL EVERY 4 HOURS PRN
Qty: 60 TABLET | Refills: 0 | Status: SHIPPED | OUTPATIENT
Start: 2024-02-13

## 2024-02-21 PROBLEM — J01.00 ACUTE MAXILLARY SINUSITIS: Status: RESOLVED | Noted: 2024-01-22 | Resolved: 2024-02-21

## 2024-03-05 ENCOUNTER — OFFICE VISIT (OUTPATIENT)
Dept: PALLIATIVE MEDICINE | Facility: CLINIC | Age: 54
End: 2024-03-05
Payer: COMMERCIAL

## 2024-03-05 VITALS
TEMPERATURE: 97.6 F | OXYGEN SATURATION: 77 % | SYSTOLIC BLOOD PRESSURE: 138 MMHG | HEART RATE: 111 BPM | WEIGHT: 178.4 LBS | HEIGHT: 72 IN | DIASTOLIC BLOOD PRESSURE: 76 MMHG | BODY MASS INDEX: 24.16 KG/M2 | RESPIRATION RATE: 16 BRPM

## 2024-03-05 DIAGNOSIS — Z79.899 MEDICAL MARIJUANA USE: ICD-10-CM

## 2024-03-05 DIAGNOSIS — G47.00 INSOMNIA: ICD-10-CM

## 2024-03-05 DIAGNOSIS — Z51.5 PALLIATIVE CARE PATIENT: ICD-10-CM

## 2024-03-05 DIAGNOSIS — C64.1 CLEAR CELL CARCINOMA OF RIGHT KIDNEY (HCC): Primary | ICD-10-CM

## 2024-03-05 DIAGNOSIS — R10.9 ABDOMINAL PAIN: ICD-10-CM

## 2024-03-05 DIAGNOSIS — R19.8 IRREGULAR BOWEL HABITS: ICD-10-CM

## 2024-03-05 DIAGNOSIS — Z90.5 H/O RIGHT NEPHRECTOMY: ICD-10-CM

## 2024-03-05 PROCEDURE — 99214 OFFICE O/P EST MOD 30 MIN: CPT | Performed by: INTERNAL MEDICINE

## 2024-03-05 RX ORDER — ACETAMINOPHEN 500 MG
1000 TABLET ORAL 3 TIMES DAILY PRN
Qty: 180 TABLET | Refills: 2 | Status: SHIPPED | OUTPATIENT
Start: 2024-03-05

## 2024-03-05 NOTE — ASSESSMENT & PLAN NOTE
Emotional / psychosocial support provided today.  ACP: Patient has completed advanced directives (the [facility] Advanced Directive) naming his wife as surrogate healthcare decision-maker. In EMR.   Reviewed notes (PCP; Medical Oncology), labs (11/14/23 Cr 1.21, eGFR 66, alb 4.3, Hb 15.3; 9/15/23 Cr 1.31, alb 4.3, Hb 15.7, TSH 2.032), imaging + procedures (1/30/24 bone scan + CTCAP, scans show mixed response to therapy w/ increase in size of lytic metastasis in R ischial tuberosity). See below for more data.  Return in about 2 months (around 5/5/2024).  Medication safety issues addressed - no driving under the influence of narcotics (including opioids), watch for adverse effects including AMS or respiratory depression (slowed breathing), keep medications stored in a safe/locked environment, do not use alcohol while opioids or other narcotics are in one's system.  I have personally queried the patient's controlled substance dispensing history in the Prescription Drug Monitoring Program in compliance with regulations before I have prescribed any controlled substances. The prescription history is consistent with prescribed therapy and our practice policies.

## 2024-03-05 NOTE — PROGRESS NOTES
Follow-up with Palliative and Supportive Care  Stuart Munoz 53 y.o. male [id number]    ASSESSMENT & PLAN:    1. 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  Continue disease-directed cares.    Orders:  -     acetaminophen (TYLENOL) 500 mg tablet; Take 2 tablets (1,000 mg total) by mouth 3 (three) times a day as needed for mild pain, moderate pain, headaches or fever    2. H/O right nephrectomy  Assessment & Plan:  Informs plan of care.      3. Abdominal pain  Assessment & Plan:  Patient endorses chronic abdominal pain as well as chronic arthritic pain. Abdominal eye pain has a post-operative neuropathic component but is otherwise likely related to metastatic malignancy including lymphadenopathy.  Continue oxycodone 5 mg q.4 hours PRN. Avoid alcohol and narcotics from the system..  Continue gabapentin at 1200 mg/day. Monitor kidney function.  Patient may use PO MMJ gummies for pain and other symptoms. He has an [place] MMJ card. Do not use systemic marijuana products within 1 or 2 hours of opioids.  Recommend topical OTC products, local application of heat or cold, Tylenol up to 1000mg TID for chronic pain. Do not use heat on top of topical agents. Do not mix topical agents.  Reinforced recommendation today for Tylenol. Patient states he is willing to try this. Also offered dicyclomine, patient states he would rather try the Tylenol first.    Orders:  -     acetaminophen (TYLENOL) 500 mg tablet; Take 2 tablets (1,000 mg total) by mouth 3 (three) times a day as needed for mild pain, moderate pain, headaches or fever    4. Insomnia  Assessment & Plan:  Continue trazodone; effective.      5. Irregular bowel habits  Assessment & Plan:  Counseled today on bowel regimen for loose stools / diarrhea and constipation.      6. Palliative care  patient  Assessment & Plan:  Emotional / psychosocial support provided today.  ACP: Patient has completed advanced directives (the [facility] Advanced Directive) naming his wife as surrogate healthcare decision-maker. In EMR.   Reviewed notes (PCP; Medical Oncology), labs (11/14/23 Cr 1.21, eGFR 66, alb 4.3, Hb 15.3; 9/15/23 Cr 1.31, alb 4.3, Hb 15.7, TSH 2.032), imaging + procedures (1/30/24 bone scan + CTCAP, scans show mixed response to therapy w/ increase in size of lytic metastasis in R ischial tuberosity). See below for more data.  Return in about 2 months (around 5/5/2024).  Medication safety issues addressed - no driving under the influence of narcotics (including opioids), watch for adverse effects including AMS or respiratory depression (slowed breathing), keep medications stored in a safe/locked environment, do not use alcohol while opioids or other narcotics are in one's system.  I have personally queried the patient's controlled substance dispensing history in the Prescription Drug Monitoring Program in compliance with regulations before I have prescribed any controlled substances. The prescription history is consistent with prescribed therapy and our practice policies.      Orders:  -     acetaminophen (TYLENOL) 500 mg tablet; Take 2 tablets (1,000 mg total) by mouth 3 (three) times a day as needed for mild pain, moderate pain, headaches or fever    7. Medical marijuana use  Assessment & Plan:  Patient has stated he is certified for MMJ in the State of [place].        30 minutes were spent in this ambulatory visit with greater than 50% of the time spent face to face with patient in counseling or coordination of care including symptom assessment and management, medication review, psychosocial support, chart review, imaging review, lab review, supportive listening, and anticipatory guidance. All of the patient's questions were answered during this discussion.    SUBJECTIVE:  Chief Complaint   Patient presents with  "   Follow-up    Cancer    Pain    Counseling    Insomnia        HPI    Stuart Munoz is a 53 y.o. male w/ clear cell carcinoma of the right kidney w/ RP lymph node involvement, w/ lung metastases, s/p angioembolization + R radical nephrectomy 11/21/22, on cabozatinib; liver lesions (likely benign hemangioma); abdominal pain (which may be cancer- or procedure-related), acid reflux / eosinophilic esophagitis, constipation, h/o babesiosis, h/o Meniere disease, h/o vertigo. He follows w/ Dr CAMRYN Campbell (Medical Oncology), Dr Webster (Urology).    Patient endorses ongoing and chronic abdominal pain, 5/10 at time of visit today. He describes his pain as \"cramping\". He states his pain is \"the usual\". His current analgesic regimen is helpful in managing his chronic pain, including cancer-related pain and postoperative pain and arthritic pain.    Patient reports that at times oxycodone can make his sleep \"worse\" when he takes it later in the day. When he does not take it later in the day, his sleep is better. Overall sleep is grossly improved with trazodone, though he feels the 100 mg dose is more efficacious than the 50 mg dose.    Patient states that he will occasionally have brief holidays from his cabozantinib, lasting several days. When he resumes he will have a large normal bowel movement. Otherwise he frequently has loose stools and diarrhea.    Review of Systems   All other systems reviewed and are negative.    The following portions of the medical history were reviewed: past medical history, surgical history, problem list, medication list, family history, and social history.      Current Outpatient Medications:     acetaminophen (TYLENOL) 500 mg tablet, Take 2 tablets (1,000 mg total) by mouth 3 (three) times a day as needed for mild pain, moderate pain, headaches or fever, Disp: 180 tablet, Rfl: 2    amLODIPine (NORVASC) 2.5 mg tablet, take 1 tablet by mouth once daily, Disp: 90 tablet, Rfl: 1    cabozantinib " (Cabometyx) 40 mg TABS, Take 1 tablet (40 mg total) by mouth daily, Disp: 30 tablet, Rfl: 5    CANNABIDIOL PO, Take by mouth, Disp: , Rfl:     gabapentin (NEURONTIN) 300 mg capsule, Take 2 capsules (600mg) in the morning and 2 capsule (600mg) in the evening, Disp: 120 capsule, Rfl: 2    levothyroxine 25 mcg tablet, take 1 tablet by mouth once daily IN THE EARLY MORNING, Disp: 30 tablet, Rfl: 5    oxyCODONE (Roxicodone) 5 immediate release tablet, Take 1 tablet (5 mg total) by mouth every 4 (four) hours as needed for moderate pain or severe pain Max Daily Amount: 30 mg, Disp: 60 tablet, Rfl: 0    traZODone (DESYREL) 50 mg tablet, take 1-2 tablets by mouth once daily at bedtime, Disp: 60 tablet, Rfl: 2    OBJECTIVE:  /76 (BP Location: Left arm, Patient Position: Sitting)   Pulse (!) 111   Temp 97.6 °F (36.4 °C) (Tympanic)   Resp 16   Ht 6' (1.829 m)   Wt 80.9 kg (178 lb 6.4 oz)   SpO2 (!) 77%   BMI 24.20 kg/m²   Physical Exam  Vitals reviewed.   Constitutional:       General: He is not in acute distress.     Appearance: He is well-groomed and normal weight. He is not toxic-appearing.   HENT:      Head: Normocephalic and atraumatic.      Right Ear: External ear normal.      Left Ear: External ear normal.   Eyes:      General: No scleral icterus.        Right eye: No discharge.         Left eye: No discharge.      Extraocular Movements: Extraocular movements intact.      Conjunctiva/sclera: Conjunctivae normal.      Pupils: Pupils are equal, round, and reactive to light.   Cardiovascular:      Rate and Rhythm: Tachycardia present.   Pulmonary:      Effort: Pulmonary effort is normal. No tachypnea, bradypnea, accessory muscle usage or respiratory distress.      Comments: Able to speak comfortably in complete sentences on room air at rest.  Abdominal:      General: There is no distension.      Tenderness: There is no guarding.   Musculoskeletal:      Cervical back: Normal range of motion.      Right lower  leg: No edema.      Left lower leg: No edema.   Skin:     General: Skin is dry.      Coloration: Skin is not pale.   Neurological:      Mental Status: He is alert and oriented to person, place, and time.      Cranial Nerves: No dysarthria or facial asymmetry.      Comments: Using no assistive devices for ambulation.   Psychiatric:         Attention and Perception: Attention normal.         Mood and Affect: Mood and affect normal.         Speech: Speech normal.         Behavior: Behavior normal. Behavior is cooperative.         Thought Content: Thought content normal.         Cognition and Memory: Cognition and memory normal.         Judgment: Judgment normal.        Recent labs:  Lab Results   Component Value Date/Time    SODIUM 141 01/22/2024 07:52 AM    K 4.3 01/22/2024 07:52 AM    BUN 15 01/22/2024 07:52 AM    CREATININE 1.35 (H) 01/22/2024 07:52 AM    GLUC 106 11/14/2023 09:41 AM    CALCIUM 9.3 01/22/2024 07:52 AM    AST 37 01/22/2024 07:52 AM    ALT 32 01/22/2024 07:52 AM    ALB 4.2 01/22/2024 07:52 AM    TP 6.6 01/22/2024 07:52 AM    EGFR 58 01/22/2024 07:52 AM     Lab Results   Component Value Date/Time    HGB 14.6 01/22/2024 07:52 AM    WBC 4.93 01/22/2024 07:52 AM     01/22/2024 07:52 AM    INR 1.05 11/18/2022 01:48 PM     Lab Results   Component Value Date/Time    FMO1DQGARVSE 2.032 09/15/2023 09:01 AM       Most Recent Imaging [last 120 days]:    Procedure: CT chest abdomen pelvis w contrast  Result Date: 2/7/2024  Impression: Previously seen left hilar and subcarinal lymphadenopathy has normalized. Interval significant improvement in the pulmonary nodular metastases, with many of the smaller pulmonary nodules resolved and many of the larger nodules decreased in size. Improved retroperitoneal adenopathy. However the lytic metastasis in the right ischial tuberosity has increased in size currently 3.6 x 2.3 cm, previously 2.5 x 2.4 cm (series 3 image 265.) Workstation performed: [id number]  "    Procedure: NM bone scan whole body  Result Date: 1/31/2024  Impression: 1. Patient's known lytic lesion on the right ischial tuberosity demonstrates subtle central photopenia surrounded by minimal increased tracer activity and is difficult to distinguish definitively on scintigraphy. There is otherwise no additional focal tracer activity characteristic of additional sites of metastatic disease. Please note that lytic lesions have variable tracer activity on bone scan and the lack of focal tracer activity does not preclude the possibility of an underlying osteolytic aggressive/malignant process. 2. Absent right kidney. Workstation performed: [id number]      Brando Tirado MD  [facility]  [phone]    Portions of this document may have been created using dictation software and as such some \"sound alike\" terms may have been generated by the system. Do not hesitate to contact me with any questions or clarifications.   "

## 2024-03-05 NOTE — ASSESSMENT & PLAN NOTE
Patient endorses chronic abdominal pain as well as chronic arthritic pain. Abdominal eye pain has a post-operative neuropathic component but is otherwise likely related to metastatic malignancy including lymphadenopathy.  Continue oxycodone 5 mg q.4 hours PRN. Avoid alcohol and narcotics from the system..  Continue gabapentin at 1200 mg/day. Monitor kidney function.  Patient may use PO MMJ gummies for pain and other symptoms. He has an [place] MMJ card. Do not use systemic marijuana products within 1 or 2 hours of opioids.  Recommend topical OTC products, local application of heat or cold, Tylenol up to 1000mg TID for chronic pain. Do not use heat on top of topical agents. Do not mix topical agents.  Reinforced recommendation today for Tylenol. Patient states he is willing to try this. Also offered dicyclomine, patient states he would rather try the Tylenol first.

## 2024-03-05 NOTE — PATIENT INSTRUCTIONS
It was good to see you today. Thank you for coming in.    Tylenol, 1000mg three times per day every day, can be a safe and effective way to reduce chronic pain.  If diarrhea is an issue:  Try Banatrol (banana flakes). Use as directed / as-needed for diarrhea. This is something you may safely take every day. If you become constipated you may wish to stop using it, although it can treat both constipation and diarrhea in some patients. This available over-the-counter at some pharmacies, but you may have more success looking online ([employer].com, amazon.com).  Try a probiotic. This could be yogurt or kefir, or fermented beverages such as kombucha, but probiotics are also available in capsule form. Aim for 10-15 billion colony-forming-units, w/ bacteria such as Lactobacillus / Saccharomyces / Actinomyces. You would need to take a probiotic daily, possibly for weeks - use as directed on the bottle/box.  Stay hydrated!  If needed, it is okay to take Imodium for diarrhea. Use as directed, available over-the-counter.  When using opioids for pain control, constipation is common and patients should act to prevent it:  Drink PLENTY of water. This is important to keep the gut moving.  Some people have success w/ using prunes, prune juice, certain fruits or vegetables (apples, bananas, prunes, pears, raspberries, and vegetables like string beans, broccoli, spinach, kale, squash, lentils, peas, and beans), or fiber gummies.  Try a probiotic. This could be yogurt or kefir, or fermented beverages such as kombucha, but probiotics are also available in capsule form. Aim for 10-15 billion colony-forming-units, w/ bacteria such as Lactobacillus / Saccharomyces / Actinomyces.  Osmotic laxatives (Miralax, magnesium citrate, Milk of Magnesia) can be very useful for opioid-induced constipation (OIC); take daily to prevent OIC.  Bulk laxatives (Citrucel, Metamucil, Fibercon, Benefiber, wheat germ) are useful for constipation in patients who  "are not taking opioids, but are not recommended if you are taking opioids.  Colace is good for softening hard stools, or preventing constipation when opioids are being used - but does not stimulate the bowel to move things along once constipation has occurred.  You can use senna, 1 to 2 tabs, once or twice daily as needed for constipation. Use as directed on the box/bottle. Senna is also available in a tea (\"Smooth Move\"). Should that not be enough for your constipation, you can try Dulcolax.  Should that not be enough, consider an enema.  All of these medications are available over-the-counter.  Continue other medications.  Return in about 2 months (around 5/5/2024).  Call us for refills on medications that we supply, as needed.   If something changes and you need to come in sooner, please call our office.    PRESCRIPTION REFILL REMINDER:  All medication refills should be requested prior to Noon on Friday. Any refill requests after noon on Friday would be addressed the following Monday.    MEDICATION SAFETY ISSUES:  Do not drive under the influence of narcotics (including opioids), watch for adverse effects including confusion / altered mental status / respiratory depression (slowed breathing), keep medications stored in a safe/locked environment, do not use alcohol while opioids or other narcotics are in your system.   "

## 2024-03-05 NOTE — ASSESSMENT & PLAN NOTE
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  Continue disease-directed cares.

## 2024-03-06 ENCOUNTER — OFFICE VISIT (OUTPATIENT)
Dept: FAMILY MEDICINE CLINIC | Facility: CLINIC | Age: 54
End: 2024-03-06
Payer: COMMERCIAL

## 2024-03-06 VITALS
RESPIRATION RATE: 16 BRPM | SYSTOLIC BLOOD PRESSURE: 110 MMHG | OXYGEN SATURATION: 99 % | HEART RATE: 70 BPM | WEIGHT: 178.2 LBS | DIASTOLIC BLOOD PRESSURE: 78 MMHG | BODY MASS INDEX: 24.14 KG/M2 | TEMPERATURE: 97.8 F | HEIGHT: 72 IN

## 2024-03-06 DIAGNOSIS — Z90.5 H/O RIGHT NEPHRECTOMY: ICD-10-CM

## 2024-03-06 DIAGNOSIS — H61.23 CERUMINOSIS, BILATERAL: ICD-10-CM

## 2024-03-06 DIAGNOSIS — Z13.220 SCREENING FOR LIPID DISORDERS: ICD-10-CM

## 2024-03-06 DIAGNOSIS — E03.9 HYPOTHYROIDISM, UNSPECIFIED TYPE: Primary | ICD-10-CM

## 2024-03-06 DIAGNOSIS — C64.1 CLEAR CELL CARCINOMA OF RIGHT KIDNEY (HCC): ICD-10-CM

## 2024-03-06 DIAGNOSIS — Z11.59 NEED FOR HEPATITIS C SCREENING TEST: ICD-10-CM

## 2024-03-06 PROCEDURE — 99213 OFFICE O/P EST LOW 20 MIN: CPT | Performed by: FAMILY MEDICINE

## 2024-03-06 RX ORDER — LEVOTHYROXINE SODIUM 0.03 MG/1
25 TABLET ORAL DAILY
Qty: 90 TABLET | Refills: 3 | Status: SHIPPED | OUTPATIENT
Start: 2024-03-06

## 2024-03-06 NOTE — PROGRESS NOTES
Assessment/Plan:    1. Hypothyroidism, unspecified type  -     TSH, 3rd generation; Future  -     Lipid Panel with Direct LDL reflex; Future  -     levothyroxine 25 mcg tablet; Take 1 tablet (25 mcg total) by mouth daily    2. 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 Jermaine Campbell and Palliative Med-Dr. Tirado      3. H/O right nephrectomy    4. Screening for lipid disorders  -     Lipid Panel with Direct LDL reflex; Future    5. Need for hepatitis C screening test  -     Hepatitis C antibody; Future    6. Ceruminosis, bilateral    7. BMI 24.0-24.9, adult          Subjective:      Patient ID: Stuart Munoz is a 53 y.o. male.    Chief Complaint   Patient presents with   • Medication Management     Discuss thyroid med    • Cerumen Impaction     Left ear , patient going for hearing aides tommorrow       HPI    Follow-up  Interval hx reviewed including oncology and palliative med consults-input appreciated  Pt ?cont need for thyroid med--due for lab check to assess current fxn  BP in range  +diff w hearing--going for hearing aide evaluation tomorrow  +Weight loss noted--decreased 7 lbs since January visit.  Energy level same.  Pain controlled at present      The following portions of the patient's history were reviewed and updated as appropriate: allergies, current medications, past family history, past medical history, past social history, past surgical history and problem list.    Review of Systems    Per hpi    Current Outpatient Medications   Medication Sig Dispense Refill   • acetaminophen (TYLENOL) 500 mg tablet Take 2 tablets (1,000 mg total) by mouth 3 (three) times a day as needed for mild pain, moderate pain, headaches or fever 180 tablet 2   • amLODIPine (NORVASC) 2.5 mg tablet take 1 tablet by mouth once daily 90 tablet 1   • cabozantinib (Cabometyx) 40 mg TABS Take 1 tablet (40 mg total) by mouth daily 30 tablet 5   • CANNABIDIOL PO Take by mouth     •  gabapentin (NEURONTIN) 300 mg capsule Take 2 capsules (600mg) in the morning and 2 capsule (600mg) in the evening 120 capsule 2   • levothyroxine 25 mcg tablet Take 1 tablet (25 mcg total) by mouth daily 90 tablet 3   • oxyCODONE (Roxicodone) 5 immediate release tablet Take 1 tablet (5 mg total) by mouth every 4 (four) hours as needed for moderate pain or severe pain Max Daily Amount: 30 mg 60 tablet 0   • traZODone (DESYREL) 50 mg tablet take 1-2 tablets by mouth once daily at bedtime 60 tablet 2     No current facility-administered medications for this visit.       Objective:    /78   Pulse 70   Temp 97.8 °F (36.6 °C)   Resp 16   Ht 6' (1.829 m)   Wt 80.8 kg (178 lb 3.2 oz)   SpO2 99%   BMI 24.17 kg/m²        Physical Exam  Vitals and nursing note reviewed.   Constitutional:       General: He is not in acute distress.  HENT:      Ears:      Comments: B/l cerumenosis  Eyes:      General: No scleral icterus.  Cardiovascular:      Rate and Rhythm: Normal rate and regular rhythm.   Pulmonary:      Effort: Pulmonary effort is normal. No respiratory distress.      Breath sounds: Normal breath sounds.   Abdominal:      General: Bowel sounds are normal.      Palpations: Abdomen is soft.      Tenderness: There is no abdominal tenderness.   Musculoskeletal:      Cervical back: Neck supple. No tenderness.      Right lower leg: No edema.      Left lower leg: No edema.   Skin:     General: Skin is warm and dry.      Coloration: Skin is not jaundiced.   Neurological:      General: No focal deficit present.      Mental Status: He is alert and oriented to person, place, and time.      Cranial Nerves: No cranial nerve deficit.   Psychiatric:         Mood and Affect: Mood normal.             Marycarmen Hackett MD

## 2024-03-18 DIAGNOSIS — F11.20 CONTINUOUS OPIOID DEPENDENCE (HCC): ICD-10-CM

## 2024-03-18 DIAGNOSIS — R10.9 ABDOMINAL PAIN: ICD-10-CM

## 2024-03-18 DIAGNOSIS — R59.0 RETROPERITONEAL LYMPHADENOPATHY: ICD-10-CM

## 2024-03-18 DIAGNOSIS — C64.1 CLEAR CELL CARCINOMA OF RIGHT KIDNEY (HCC): ICD-10-CM

## 2024-03-18 DIAGNOSIS — Z90.5 H/O RIGHT NEPHRECTOMY: ICD-10-CM

## 2024-03-18 DIAGNOSIS — G89.28 CHRONIC POST-OPERATIVE PAIN: ICD-10-CM

## 2024-03-18 DIAGNOSIS — Z51.5 PALLIATIVE CARE PATIENT: ICD-10-CM

## 2024-03-18 RX ORDER — OXYCODONE HYDROCHLORIDE 5 MG/1
5 TABLET ORAL EVERY 4 HOURS PRN
Qty: 60 TABLET | Refills: 0 | Status: SHIPPED | OUTPATIENT
Start: 2024-03-18

## 2024-03-21 ENCOUNTER — TELEPHONE (OUTPATIENT)
Dept: HEMATOLOGY ONCOLOGY | Facility: MEDICAL CENTER | Age: 54
End: 2024-03-21

## 2024-03-21 NOTE — TELEPHONE ENCOUNTER
Spoke with patient's wife, Jenifer, regarding labs needed to be drawn prior to appointment.  Indicated the scripts are in the system, they are non-fasting and patient can go to any [facility] facility to have the labs drawn.  Jenifer verbalized understanding, going tomorrow.

## 2024-03-22 ENCOUNTER — APPOINTMENT (OUTPATIENT)
Dept: LAB | Facility: CLINIC | Age: 54
End: 2024-03-22
Payer: COMMERCIAL

## 2024-03-22 DIAGNOSIS — C64.1 CLEAR CELL CARCINOMA OF RIGHT KIDNEY (HCC): ICD-10-CM

## 2024-03-22 DIAGNOSIS — Z13.220 SCREENING FOR LIPID DISORDERS: ICD-10-CM

## 2024-03-22 DIAGNOSIS — E03.9 HYPOTHYROIDISM, UNSPECIFIED TYPE: ICD-10-CM

## 2024-03-22 DIAGNOSIS — Z11.59 NEED FOR HEPATITIS C SCREENING TEST: ICD-10-CM

## 2024-03-22 LAB
ALBUMIN SERPL BCP-MCNC: 4.3 G/DL (ref 3.5–5)
ALP SERPL-CCNC: 50 U/L (ref 34–104)
ALT SERPL W P-5'-P-CCNC: 34 U/L (ref 7–52)
ANION GAP SERPL CALCULATED.3IONS-SCNC: 6 MMOL/L (ref 4–13)
AST SERPL W P-5'-P-CCNC: 32 U/L (ref 13–39)
BASOPHILS # BLD AUTO: 0.02 THOUSANDS/ÂΜL (ref 0–0.1)
BASOPHILS NFR BLD AUTO: 1 % (ref 0–1)
BILIRUB SERPL-MCNC: 0.91 MG/DL (ref 0.2–1)
BUN SERPL-MCNC: 16 MG/DL (ref 5–25)
CALCIUM SERPL-MCNC: 9 MG/DL (ref 8.4–10.2)
CHLORIDE SERPL-SCNC: 103 MMOL/L (ref 96–108)
CHOLEST SERPL-MCNC: 167 MG/DL
CO2 SERPL-SCNC: 31 MMOL/L (ref 21–32)
CREAT SERPL-MCNC: 1.19 MG/DL (ref 0.6–1.3)
EOSINOPHIL # BLD AUTO: 0.28 THOUSAND/ÂΜL (ref 0–0.61)
EOSINOPHIL NFR BLD AUTO: 8 % (ref 0–6)
ERYTHROCYTE [DISTWIDTH] IN BLOOD BY AUTOMATED COUNT: 13.9 % (ref 11.6–15.1)
GFR SERPL CREATININE-BSD FRML MDRD: 68 ML/MIN/1.73SQ M
GLUCOSE P FAST SERPL-MCNC: 106 MG/DL (ref 65–99)
HCT VFR BLD AUTO: 44.6 % (ref 36.5–49.3)
HCV AB SER QL: NORMAL
HDLC SERPL-MCNC: 42 MG/DL
HGB BLD-MCNC: 14.7 G/DL (ref 12–17)
IMM GRANULOCYTES # BLD AUTO: 0 THOUSAND/UL (ref 0–0.2)
IMM GRANULOCYTES NFR BLD AUTO: 0 % (ref 0–2)
LDLC SERPL CALC-MCNC: 94 MG/DL (ref 0–100)
LYMPHOCYTES # BLD AUTO: 0.89 THOUSANDS/ÂΜL (ref 0.6–4.47)
LYMPHOCYTES NFR BLD AUTO: 26 % (ref 14–44)
MCH RBC QN AUTO: 34 PG (ref 26.8–34.3)
MCHC RBC AUTO-ENTMCNC: 33 G/DL (ref 31.4–37.4)
MCV RBC AUTO: 103 FL (ref 82–98)
MONOCYTES # BLD AUTO: 0.23 THOUSAND/ÂΜL (ref 0.17–1.22)
MONOCYTES NFR BLD AUTO: 7 % (ref 4–12)
NEUTROPHILS # BLD AUTO: 1.96 THOUSANDS/ÂΜL (ref 1.85–7.62)
NEUTS SEG NFR BLD AUTO: 58 % (ref 43–75)
NRBC BLD AUTO-RTO: 0 /100 WBCS
PLATELET # BLD AUTO: 148 THOUSANDS/UL (ref 149–390)
PMV BLD AUTO: 10.5 FL (ref 8.9–12.7)
POTASSIUM SERPL-SCNC: 4.6 MMOL/L (ref 3.5–5.3)
PROT SERPL-MCNC: 6.3 G/DL (ref 6.4–8.4)
RBC # BLD AUTO: 4.32 MILLION/UL (ref 3.88–5.62)
SODIUM SERPL-SCNC: 140 MMOL/L (ref 135–147)
TRIGL SERPL-MCNC: 154 MG/DL
TSH SERPL DL<=0.05 MIU/L-ACNC: 4.25 UIU/ML (ref 0.45–4.5)
WBC # BLD AUTO: 3.38 THOUSAND/UL (ref 4.31–10.16)

## 2024-03-22 PROCEDURE — 80053 COMPREHEN METABOLIC PANEL: CPT

## 2024-03-22 PROCEDURE — 80061 LIPID PANEL: CPT

## 2024-03-22 PROCEDURE — 36415 COLL VENOUS BLD VENIPUNCTURE: CPT

## 2024-03-22 PROCEDURE — 84443 ASSAY THYROID STIM HORMONE: CPT

## 2024-03-22 PROCEDURE — 86803 HEPATITIS C AB TEST: CPT

## 2024-03-22 PROCEDURE — 85025 COMPLETE CBC W/AUTO DIFF WBC: CPT

## 2024-03-25 ENCOUNTER — OFFICE VISIT (OUTPATIENT)
Dept: HEMATOLOGY ONCOLOGY | Facility: MEDICAL CENTER | Age: 54
End: 2024-03-25
Payer: COMMERCIAL

## 2024-03-25 VITALS
SYSTOLIC BLOOD PRESSURE: 116 MMHG | WEIGHT: 172 LBS | HEART RATE: 68 BPM | OXYGEN SATURATION: 98 % | BODY MASS INDEX: 23.3 KG/M2 | DIASTOLIC BLOOD PRESSURE: 82 MMHG | RESPIRATION RATE: 15 BRPM | TEMPERATURE: 97.3 F | HEIGHT: 72 IN

## 2024-03-25 DIAGNOSIS — C64.1 CLEAR CELL CARCINOMA OF RIGHT KIDNEY (HCC): ICD-10-CM

## 2024-03-25 DIAGNOSIS — C78.00 MALIGNANT NEOPLASM METASTATIC TO LUNG, UNSPECIFIED LATERALITY (HCC): Primary | ICD-10-CM

## 2024-03-25 PROCEDURE — 99214 OFFICE O/P EST MOD 30 MIN: CPT | Performed by: INTERNAL MEDICINE

## 2024-03-25 NOTE — PROGRESS NOTES
Stuart Munoz  1970  [address] [facility] [place]  [address]  [place] [address]    DISCUSSION/SUMMARY:    53-year-old with stage IV (multiple pulmonary nodules, mediastinal adenopathy, questionable liver lesion) clear-cell renal cell carcinoma.  Mr. Munoz was originally started on pembrolizumab and axitinib.  Patient had trouble tolerating the axitinib and this was switched to once a day.  Eventual follow-up scans demonstrated a mixed response but clearly new lesions and enlarging lesions.  Options were discussed and patient was started on Cabozantinib, 40 mg a day.  Because of GI side effects, patient was never able to get up to 60 mg a day.    Presently Mr. Munoz states feeling +/-, more or less as before.  Clinically there are no concerning findings.  Recent blood work was good/acceptable.  At this time, the plan is to continue with the Cabozantinib, 40 mg a day.  See below.    Repeat CAT scan of the chest abdomen pelvis and recent bone scan demonstrated good response to all areas of metastatic disease except for 1 - the right ischial tuberosity.  This is where patient has some pain.  Because patient had such a good response to the Cabozantinib and this is already the second line treatment, the plan is to continue with the Cabozantinib at 40 mg a day and have the patient seen by radiation oncology (for this 1 specific growing lesion).    Patient is being followed by palliative care.  Mr. Munoz is on Synthroid; PCP is monitoring the TSH.    Mr. Munoz is to return to the office in 3 months but this may change depending upon the above.  Patient knows to call the office if he has any other oncology questions or concerns.    Carefully review your medication list and verify that the list is accurate and up-to-date. Please call the hematology/oncology office if there are medications missing from the list, medications on the list that you are  not currently taking or if there is a dosage or instruction that is different from how you're taking that medication.    Patient goals and areas of care: Continue with Cabozantinib at 40 mg a day for now, see radiation oncology  Barriers to care: None  Patient is able to self-care  ______________________________________________________________________________________    Chief Complaint   Patient presents with    Follow-up    Metastatic renal cell carcinoma     History of Present Illness: 53-year-old male with relatively good general health presenting to the emergency room recently with abrupt shortness of breath and dyspnea on exertion.  CTA/chest did not demonstrate any PE but patient was found to have an incidental right renal mass.  Work-up was initiated; this included a urology evaluation.    Mr. Munoz was found to have clear-cell renal cell carcinoma and underwent planned preoperative angioembolization with subsequent right radical nephrectomy via chevron incision.  Patient was subsequently discharged and referred to oncology for evaluation.    Follow-up CAT scan of the chest demonstrated a number of new pulmonary nodules distant with metastatic disease.  Options were discussed and patient was placed on axitinib and pembrolizumab.  Patient could not tolerate the axitinib twice a day, this was changed to once a day.  Eventual follow-up scans demonstrated disease progression and patient is now on Cabozantinib, 40 mg a day.  Patient returns for follow-up.    Mr. Munoz states feeling okay, about the same as before.  Patient with occasional right posterior hip pain but is otherwise active.  Patient tolerates the Cabozantinib at 40 mg a day.  Occasionally patient stops the Cabozantinib for a day or 2 which seems to help with his appetite significantly.  No respiratory issues.  No other pain control issues.  No fevers or signs of infection.  No bruising or bleeding issues.    Review of Systems   Constitutional:   Positive for fatigue.   HENT: Negative.     Eyes: Negative.    Respiratory: Negative.     Cardiovascular: Negative.    Gastrointestinal: Negative.    Endocrine: Negative.    Genitourinary: Negative.    Musculoskeletal:  Positive for arthralgias. Negative for back pain.   Skin: Negative.    Allergic/Immunologic: Negative.    Neurological: Negative.    Hematological: Negative.    Psychiatric/Behavioral:  The patient is nervous/anxious.    All other systems reviewed and are negative.    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)    Screening for lipid disorders    Fungal dermatitis    Dry skin dermatitis    RBBB    BMI 25.0-25.9,adult    H/O right nephrectomy    Malignant neoplasm metastatic to lung (HCC)    Palliative care patient    Abdominal pain    Constipation    Anxiousness    Dysphoric mood    Loss of appetite    Retroperitoneal lymphadenopathy    Hypothyroidism    BMI 35.0-35.9,adult    Alteration in appetite    Chronic post-operative pain    Gastroesophageal reflux disease with esophagitis    Hypertension    Weight loss, unintentional    Oral mucositis due to antineoplastic therapy    BMI 24.0-24.9, adult    Dysgeusia    Bilateral calf pain    Varicose veins of left leg with edema    Dehydration    Loose stools    Insomnia    Need for Tdap vaccination    History of gastroesophageal reflux (GERD)    Diarrhea    Cut of finger    Xerostomia    Irregular bowel habits    Medical marijuana use    Backache with radiation    Reactive depression (situational)    Need for hepatitis C screening test    Ceruminosis, bilateral     Past Medical History:   Diagnosis Date    Arthralgia     last assessed 02/10/2015    Babesiosis     last assessed 12/30/14    Benign paroxysmal vertigo     last assessed 05/22/15    Cancer (HCC)     right kidney removed 11/21/22-oral chemotherapy and IV  immunotherapy every 21 days    Dry skin dermatitis     Fullness of abdomen     with eating    Meniere disease     last assessed 04/15/13    Pneumonia of left lower lobe due to infectious organism     last assessed 16    Vitamin D deficiency     last assessed 14     Past Surgical History:   Procedure Laterality Date    FIBULA FRACTURE SURGERY Right     IR RENAL ANGIOGRAM  2022    UT NEPHRECTOMY W/PRTL URETERECT OPEN RIB RESCJ RAD Right 2022    Procedure: NEPHRECTOMY ABDOMINAL APPROACH;  Surgeon: Lisa Webster MD;  Location: [facility];  Service: Urology    TIBIA FRACTURE SURGERY Right     TONSILLECTOMY       Family History   Problem Relation Age of Onset    Meniere's disease Mother     Cancer Father         ?renal/bladder?    No Known Problems Family      Social History     Socioeconomic History    Marital status: /Civil Union     Spouse name: Not on file    Number of children: Not on file    Years of education: Not on file    Highest education level: Not on file   Occupational History    Not on file   Tobacco Use    Smoking status: Former     Current packs/day: 0.00     Types: Cigarettes     Start date: 1984     Quit date: 2022     Years since quittin.5    Smokeless tobacco: Never    Tobacco comments:     Quit 2 mos ago   Vaping Use    Vaping status: Never Used   Substance and Sexual Activity    Alcohol use: Not Currently     Comment: social    Drug use: No    Sexual activity: Yes   Other Topics Concern    Not on file   Social History Narrative    Daily coffee consumption 6 cups/day    Wife: Jenifer        From 22  note:    Primary Care Provider: Marycarmen Hcakett MD    Primary Insurance: [employer] [place]    Active Health Care Proxies: There are no active Health Care Proxies on file.    Primary Caregiver: Self    Support Systems: Self, Spouse/significant other    [place]idence: [place]    What city do you live in?: [place]    Home entry access options. Select all  that apply.: Stairs    Number of steps to enter home.: 3    Type of Current Residence: [address] [place] [address] home    Living Arrangements: Lives w/ Spouse/significant other    Functional Status: Independent    Completes ADLs independently?: Yes    Ambulates independently?: Yes    Does patient use assisted devices?: No    Does patient currently own DME?: No     Social Determinants of Health     Financial Resource Strain: Not on file   Food Insecurity: Not on file   Transportation Needs: Not on file   Physical Activity: Not on file   Stress: Not on file   Social Connections: Not on file   Intimate Partner Violence: Not on file   Housing Stability: Not on file       Current Outpatient Medications:     acetaminophen (TYLENOL) 500 mg tablet, Take 2 tablets (1,000 mg total) by mouth 3 (three) times a day as needed for mild pain, moderate pain, headaches or fever, Disp: 180 tablet, Rfl: 2    amLODIPine (NORVASC) 2.5 mg tablet, take 1 tablet by mouth once daily, Disp: 90 tablet, Rfl: 1    cabozantinib (Cabometyx) 40 mg TABS, Take 1 tablet (40 mg total) by mouth daily, Disp: 30 tablet, Rfl: 5    CANNABIDIOL PO, Take by mouth, Disp: , Rfl:     gabapentin (NEURONTIN) 300 mg capsule, Take 2 capsules (600mg) in the morning and 2 capsule (600mg) in the evening, Disp: 120 capsule, Rfl: 2    levothyroxine 25 mcg tablet, Take 1 tablet (25 mcg total) by mouth daily, Disp: 90 tablet, Rfl: 3    oxyCODONE (Roxicodone) 5 immediate release tablet, Take 1 tablet (5 mg total) by mouth every 4 (four) hours as needed for moderate pain or severe pain Max Daily Amount: 30 mg, Disp: 60 tablet, Rfl: 0    traZODone (DESYREL) 50 mg tablet, take 1-2 tablets by mouth once daily at bedtime, Disp: 60 tablet, Rfl: 2    No Known Allergies    Vitals:    03/25/24 1124   BP: 116/82   Pulse: 68   Resp: 15   Temp: (!) 97.3 °F (36.3 °C)   SpO2: 98%     Physical Exam  Constitutional:       Appearance: He is well-developed.   HENT:      Head: Normocephalic and atraumatic.       Right Ear: External ear normal.      Left Ear: External ear normal.   Eyes:      Conjunctiva/sclera: Conjunctivae normal.      Pupils: Pupils are equal, round, and reactive to light.   Cardiovascular:      Rate and Rhythm: Normal rate and regular rhythm.      Heart sounds: Normal heart sounds.   Pulmonary:      Effort: Pulmonary effort is normal.      Breath sounds: Normal breath sounds.   Abdominal:      General: Bowel sounds are normal.      Palpations: Abdomen is soft.   Musculoskeletal:         General: Normal range of motion.      Cervical back: Normal range of motion and neck supple.   Skin:     General: Skin is warm.   Neurological:      Mental Status: He is alert and oriented to person, place, and time.      Deep Tendon Reflexes: Reflexes are normal and symmetric.   Psychiatric:         Behavior: Behavior normal.         Thought Content: Thought content normal.         Judgment: Judgment normal.     Extremities: No lower extreme edema bilaterally, no cords, pulses are 1+    Labs    3/22/2024 WBC = 3.38 hemoglobin = 14.7 hematocrit = 45 platelet = 148 neutrophil = 58% BUN = 16 creatinine = 1.19 calcium = 9.0 LFTs WNL TSH = 4.249    1/22/2024 WBC = 4.93 hemoglobin = 14.6 hematocrit = 43 platelet = 159 neutrophil = 63% BUN = 15 creatinine = 1.35 calcium = 9.3 LFTs WNL    Imaging    1/30/2024 CAT scan chest abdomen pelvis with contrast    IMPRESSION:     Previously seen left hilar and subcarinal lymphadenopathy has normalized.     Interval significant improvement in the pulmonary nodular metastases, with many of the smaller pulmonary nodules resolved and many of the larger nodules decreased in size.  Improved retroperitoneal adenopathy.     However the lytic metastasis in the right ischial tuberosity has increased in size currently 3.6 x 2.3 cm, previously 2.5 x 2.4 cm (series 3 image 265.    1/30/2024 bone scan whole body    IMPRESSION:     1. Patient's known lytic lesion on the right ischial tuberosity  demonstrates subtle central photopenia surrounded by minimal increased tracer activity and is difficult to distinguish definitively on scintigraphy.     There is otherwise no additional focal tracer activity characteristic of additional sites of metastatic disease. Please note that lytic lesions have variable tracer activity on bone scan and the lack of focal tracer activity does not preclude the   possibility of an underlying osteolytic aggressive/malignant process.     2. Absent right kidney.    9/26/2023 CAT scan chest abdomen pelvis with contrast    IMPRESSION:     1.  Mixed response of metastasis in the chest with marked decrease in the number of pulmonary metastasis though there are new lesions in the bilateral upper lobes and an increased parenchymal metastasis in the right lower lobe.  2.  New mediastinal/left hilar lymphadenopathy.  3.  Mixed response of retroperitoneal lymphadenopathy with decreased pericaval lymph nodes and increased aortocaval lymph nodes.  4.  Increased size of right ischial lytic metastasis.    1/31/2023 MRI abdomen with and without contrast    IMPRESSION:     Mildly enlarged retrocaval nodes highly suspicious for metastatic disease, unchanged from recent CT of January 25, 2023.     Bibasilar pulmonary nodules highly suspicious for pulmonary metastatic disease better evaluated on recent chest CT.     Small hepatic lesions, difficult to evaluate due to respiratory motion artifact but not significantly changed in size or MR imaging characteristics when compared to November 9, 2022, most consistent with benign hemangiomata.    1/12/2023 CAT scan of the chest without contrast    LUNGS:    Mild emphysema.     Numerous, new pulmonary nodules in all lobes ranging in size from approximately 0.3 cm (3/68, peripheral right lower lobe) to 0.9 cm (3/82, medial left upper lobe).    4.0 cm ascending aorta is top normal in caliber.  No intramural hematoma.    Two enlarged right  retrocaval/retroperitoneal lymph nodes have increased in size.  1.1 and 1.3 cm short axis diameter (previously 0.6 to 0.8 cm respectively).    IMPRESSION:     Pulmonary metastases.  Increased size of right retroperitoneal lymph nodes compared to 11/7/2022, suspicious for metastasis.    11/9/2022 MRI abdomen    IMPRESSION:     1.  Large exophytic right lower pole heterogeneously enhancing renal mass extending into the renal sinus consistent with renal cell carcinoma. There are retroperitoneal collaterals posteriorly which drain into the IVC though no evidence of right renal   vein thrombosis. No hydronephrosis.     2.  Borderline paracaval lymph nodes, likely early lymph node metastasis.     3.  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. However, given primary malignancy and atypical features, a short interval follow-up MRI in 3 months is recommended to exclude metastasis.    11/7/2022 CTA chest PE study    IMPRESSION:     No pulmonary embolus.  Mild patchy peripheral lower lobe groundglass opacity.  Covid not excluded although not the classic appearance.  Consider aspiration versus other infectious/inflammatory etiology.  Partially imaged large right renal mass.  See abdomen CT.    Pathology    Case Report   Surgical Pathology Report                         Case: [id number]                                    Authorizing Provider:  Lisa Webster MD        Collected:           11/21/2022 1213               Ordering Location:     [facility]      Received:            11/21/2022 2301                                      Hospital Operating Room                                                       Pathologist:           Jose Evans MD                                                                  Specimen:    Kidney, Right                                                                               Final Diagnosis   A. Kidney, Right, nephrectomy:  - Clear cell renal cell carcinoma.  See comment and synoptic report.  - One lymph node positive for metastatic carcinoma (1/1).     Comment: Immunohistochemistry is diffusely positive in the tumor cells for PAX8 and carbonic anhydrase IX.  CK7 and P504S have non-specific staining.  The findings are consistent with the diagnosis.     8th Ed AJCC Tumor Stage:  at least Stage III - pT2b, pN1, G3.  Representative tumor block: A7   Electronically signed by Jose Evans MD on 12/5/2022 at 10:16 AM     Synoptic Checklist   KIDNEY: Nephrectomy  8th Edition - Protocol posted: 6/30/2021  KIDNEY: NEPHRECTOMY, PARTIAL OR RADICAL - All Specimens  SPECIMEN   Procedure  Total nephrectomy    Specimen Laterality  Right    TUMOR   Tumor Focality  Unifocal    Tumor Size  Greatest Dimension (Centimeters): 13.5 cm   Histologic Type  Clear cell renal cell carcinoma    Histologic Grade (WHO / ISUP)  G3 (nucleoli conspicuous and eosinophilic at 100x magnification)    Tumor Extent  Limited to kidney    Sarcomatoid Features  Not identified    Rhabdoid Features  Not identified    Tumor Necrosis  Present    Percentage of Tumor Necrosis  30 %   Lymphovascular Invasion  Not identified    MARGINS   Margin Status  All margins negative for invasive carcinoma    REGIONAL LYMPH NODES   Regional Lymph Node Status  Tumor present in regional lymph node(s)    Number of Lymph Nodes with Tumor  1    Jabari Site(s) with Tumor  Hilar    Size of Largest Jabari Metastatic Deposit  0.3 cm   Number of Lymph Nodes Examined  1    PATHOLOGIC STAGE CLASSIFICATION (pTNM, AJCC 8th Edition)   Reporting of pT, pN, and (when applicable) pM categories is based on information available to the pathologist at the time the report is issued. As per the AJCC (Chapter 1, 8th Ed.) it is the managing physician’s responsibility to establish the final pathologic stage based upon all pertinent information, including  but potentially not limited to this pathology report.   Primary Tumor (pT)  pT2b    Regional Lymph Nodes (pN)  pN1    ADDITIONAL FINDINGS   Additional Findings in Nonneoplastic Kidney  Tubuloglomerular necrosis    .

## 2024-04-04 ENCOUNTER — RADIATION ONCOLOGY CONSULT (OUTPATIENT)
Dept: RADIATION ONCOLOGY | Facility: CLINIC | Age: 54
End: 2024-04-04
Attending: STUDENT IN AN ORGANIZED HEALTH CARE EDUCATION/TRAINING PROGRAM
Payer: COMMERCIAL

## 2024-04-04 VITALS
HEIGHT: 72 IN | SYSTOLIC BLOOD PRESSURE: 110 MMHG | OXYGEN SATURATION: 99 % | DIASTOLIC BLOOD PRESSURE: 80 MMHG | RESPIRATION RATE: 18 BRPM | TEMPERATURE: 97.6 F | BODY MASS INDEX: 23.33 KG/M2 | HEART RATE: 70 BPM

## 2024-04-04 DIAGNOSIS — C64.1 CLEAR CELL CARCINOMA OF RIGHT KIDNEY (HCC): ICD-10-CM

## 2024-04-04 DIAGNOSIS — C79.51 METASTASIS TO BONE (HCC): Primary | ICD-10-CM

## 2024-04-04 DIAGNOSIS — C78.00 MALIGNANT NEOPLASM METASTATIC TO LUNG, UNSPECIFIED LATERALITY (HCC): ICD-10-CM

## 2024-04-04 PROCEDURE — 99204 OFFICE O/P NEW MOD 45 MIN: CPT | Performed by: STUDENT IN AN ORGANIZED HEALTH CARE EDUCATION/TRAINING PROGRAM

## 2024-04-04 PROCEDURE — G0463 HOSPITAL OUTPT CLINIC VISIT: HCPCS | Performed by: STUDENT IN AN ORGANIZED HEALTH CARE EDUCATION/TRAINING PROGRAM

## 2024-04-04 PROCEDURE — 99211 OFF/OP EST MAY X REQ PHY/QHP: CPT | Performed by: STUDENT IN AN ORGANIZED HEALTH CARE EDUCATION/TRAINING PROGRAM

## 2024-04-04 NOTE — PROGRESS NOTES
Stuart Munoz 1970 is a 53 y.o. male with stage IV clear-cell renal cell carcinoma. He is being referred by Dr. RAVI Campbell and presents today for consult.      He is being followed by Palliative Care.      24 CT C/A/P w contrast  Previously seen left hilar and subcarinal lymphadenopathy has normalized.   Interval significant improvement in the pulmonary nodular metastases, with many of the smaller pulmonary nodules resolved and many of the larger nodules decreased in size.  Improved retroperitoneal adenopathy.   However the lytic metastasis in the right ischial tuberosity has increased in size currently 3.6 x 2.3 cm, previously 2.5 x 2.4 cm (series 3 image 265.)      24 bone scan  1. Patient's known lytic lesion on the right ischial tuberosity demonstrates subtle central photopenia surrounded by minimal increased tracer activity and is difficult to distinguish definitively on scintigraphy.   There is otherwise no additional focal tracer activity characteristic of additional sites of metastatic disease. Please note that lytic lesions have variable tracer activity on bone scan and the lack of focal tracer activity does not preclude the   possibility of an underlying osteolytic aggressive/malignant process.   2. Absent right kidney.      3/25/24 Dr. RAVI Campbell  At this time, the plan is to continue with the Cabozantinib, 40 mg a day.   AT scan of the chest abdomen pelvis and recent bone scan demonstrated good response to all areas of metastatic disease except for 1 - the right ischial tuberosity. This is where patient has some pain.   have the patient seen by radiation oncology (for this 1 specific growing lesion).       [facility]  24 Dr. Campbell    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)   10/5/2022 -  Cancer Staged    Staging form: Kidney, AJCC 8th Edition  - Clinical stage from 10/5/2022: Stage IV (cT1b, cNX, pM1) - Signed by Jermaine Campbell MD on  1/24/2024  Stage prefix: Initial diagnosis  Histologic grade (G): G1  Histologic grading system: 4 grade system       1/4/2023 Initial Diagnosis    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)     1/31/2023 -  Chemotherapy    alteplase (CATHFLO), 2 mg, Intracatheter, Every 2 hour PRN, 11 of 11 cycles  pembrolizumab (KEYTRUDA) IVPB, 200 mg, Intravenous, Once, 11 of 11 cycles  Administration: 200 mg (1/31/2023), 200 mg (2/21/2023), 200 mg (3/14/2023), 200 mg (4/4/2023), 200 mg (4/25/2023), 200 mg (5/16/2023), 200 mg (6/27/2023), 200 mg (7/18/2023), 200 mg (8/8/2023), 200 mg (8/29/2023), 200 mg (9/19/2023)         Review of Systems:  Review of Systems   Constitutional:  Positive for appetite change, fatigue (Much improved) and unexpected weight change (Recent weight loss x 18 months).   HENT: Negative.     Eyes: Negative.    Respiratory: Negative.     Cardiovascular: Negative.    Gastrointestinal:  Positive for abdominal pain (constant), constipation (intermittent) and diarrhea (Intermittent).   Endocrine: Negative.    Genitourinary: Negative.    Musculoskeletal:  Positive for arthralgias (right hip/ right thigh minor discomfort).   Skin: Negative.    Allergic/Immunologic: Negative.    Neurological: Negative.    Hematological: Negative.    Psychiatric/Behavioral: Negative.         Clinical Trial: no    Pain assessment: 0    Prior Radiation: No    Teaching: NCI radiation packet    MST: Completed     Implantable Devices (Port, pacemaker, pain stimulator): No    Hip Replacement: No    Health Maintenance   Topic Date Due    HIV Screening  Never done    Zoster Vaccine (1 of 2) Never done    PT PLAN OF CARE  11/03/2021    Annual Physical  01/20/2024    COVID-19 Vaccine (1) 04/22/2024 (Originally 5/12/1975)    Influenza Vaccine (1) 06/30/2024 (Originally 9/1/2023)    Colorectal Cancer Screening  01/22/2025 (Originally 5/12/2013)    Pneumococcal Vaccine: Pediatrics (0 to 5 Years) and At-Risk Patients (6 to 64 Years) (1 of 2 -  PCV) 2025 (Originally 1974)    Depression Screening  2025    BMI: Adult  2025    DTaP,Tdap,and Td Vaccines (3 - Td or Tdap) 2033    Hepatitis C Screening  Completed    HIB Vaccine  Aged Out    IPV Vaccine  Aged Out    Hepatitis A Vaccine  Aged Out    Meningococcal ACWY Vaccine  Aged Out    HPV Vaccine  Aged Out       Past Medical History:   Diagnosis Date    Arthralgia     last assessed 02/10/2015    Babesiosis     last assessed 14    Benign paroxysmal vertigo     last assessed 05/22/15    Cancer (HCC)     right kidney removed 22-oral chemotherapy and IV immunotherapy every 21 days    Dry skin dermatitis     Fullness of abdomen     with eating    Meniere disease     last assessed 04/15/13    Pneumonia of left lower lobe due to infectious organism     last assessed 16    Vitamin D deficiency     last assessed 14       Past Surgical History:   Procedure Laterality Date    FIBULA FRACTURE SURGERY Right     IR RENAL ANGIOGRAM  2022    [place] NEPHRECTOMY W/PRTL URETERECT OPEN RIB RESCJ RAD Right 2022    Procedure: NEPHRECTOMY ABDOMINAL APPROACH;  Surgeon: Lisa Webster MD;  Location: [facility];  Service: Urology    TIBIA FRACTURE SURGERY Right     TONSILLECTOMY         Family History   Problem Relation Age of Onset    Meniere's disease Mother     Cancer Father         ?renal/bladder?    No Known Problems Family        Social History     Tobacco Use    Smoking status: Former     Current packs/day: 0.00     Types: Cigarettes     Start date: 1984     Quit date: 2022     Years since quittin.5    Smokeless tobacco: Never    Tobacco comments:     Quit 2 mos ago   Vaping Use    Vaping status: Never Used   Substance Use Topics    Alcohol use: Not Currently     Alcohol/week: 1.0 standard drink of alcohol     Types: 1 Cans of beer per week     Comment: 1 daily    Drug use: No          Current Outpatient Medications:     acetaminophen (TYLENOL) 500 mg  tablet, Take 2 tablets (1,000 mg total) by mouth 3 (three) times a day as needed for mild pain, moderate pain, headaches or fever, Disp: 180 tablet, Rfl: 2    amLODIPine (NORVASC) 2.5 mg tablet, take 1 tablet by mouth once daily, Disp: 90 tablet, Rfl: 1    cabozantinib (Cabometyx) 40 mg TABS, Take 1 tablet (40 mg total) by mouth daily, Disp: 30 tablet, Rfl: 5    CANNABIDIOL PO, Take by mouth, Disp: , Rfl:     gabapentin (NEURONTIN) 300 mg capsule, Take 2 capsules (600mg) in the morning and 2 capsule (600mg) in the evening, Disp: 120 capsule, Rfl: 2    levothyroxine 25 mcg tablet, Take 1 tablet (25 mcg total) by mouth daily, Disp: 90 tablet, Rfl: 3    oxyCODONE (Roxicodone) 5 immediate release tablet, Take 1 tablet (5 mg total) by mouth every 4 (four) hours as needed for moderate pain or severe pain Max Daily Amount: 30 mg, Disp: 60 tablet, Rfl: 0    traZODone (DESYREL) 50 mg tablet, take 1-2 tablets by mouth once daily at bedtime, Disp: 60 tablet, Rfl: 2    No Known Allergies     Vitals:    04/04/24 0958   BP: 110/80   BP Location: Left arm   Patient Position: Sitting   Cuff Size: Standard   Pulse: 70   Resp: 18   Temp: 97.6 °F (36.4 °C)   TempSrc: Temporal   SpO2: 99%   Height: 6' (1.829 m)       Pain Score: 0-No pain

## 2024-04-05 ENCOUNTER — DOCUMENTATION (OUTPATIENT)
Dept: NUTRITION | Facility: CLINIC | Age: 54
End: 2024-04-05

## 2024-04-05 NOTE — PROGRESS NOTES
Received notification from RadOnc RN on 4/4/24 that patient is appropriate for oncology nutrition services (reason for referral: Malnutrition Screening Tool (MST) Triggers: scored a 2 indicating 2-13# (0.9-6 kg) recent wt loss and is eating poorly due to a decreased appetite. (Date of MST: 4/4/24)). MST screen is negative, confirmed with RadOnc RN that nutrition referral is not needed. Will remain available per pt request and/or if nutrition referral is indicated.

## 2024-04-05 NOTE — PROGRESS NOTES
Consultation - Radiation Oncology      MRN:[id number] : 1970  Encounter: [id number]  Patient Information: Stuart Munoz      CHIEF COMPLAINT  Chief Complaint   Patient presents with    Consult     Radiation Oncology     Cancer Staging   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)  Staging form: Kidney, AJCC 8th Edition  - Clinical stage from 10/5/2022: Stage IV (cT1b, cNX, pM1) - Signed by Jermaine Campbell MD on 2024  Stage prefix: Initial diagnosis  Histologic grade (G): G1  Histologic grading system: 4 grade system    Assessment and Plan:   Mr. Stuart Munoz is a 53 year old man with Stage IV RCC of the right kidney s/p pembrolizumab now on cabozantinib with good response to therapy except for an oligoprogressive and symptomatic bone metastasis in the right ischium.     We discussed the patient's recent clinical history and imaging studies in detail. On my review, his msot recent CT C/A/P shows clear improvement in his pulmonary and mediastinal metastases but enlargement of his right ischial tuberosity bone metastasis. We discussed the concept of oligoprogressive disease and noted that given this mixed response RT could be offered to right ischium as an alternative to changing systemic therapy, which is working well for his other sites of disease. This will hopefully also help with his pain in this area, which appears consistent with oncologic pain from his metastasis.     Given the oligoprogressive nature of his disease and his radioresistant histology I would recommend treatment with a dose escalated SBRT technique to 2700-3500cGy in 3-5 fractions. The patient will need to hold cabozantinib during treatment. Side effects could include fatigue, flare pain, skin changes, and increased fracture risk. After consideration the patient would like to proceed with RT.     Summary  - Recommend SBRT to right ischial tuberosity oligoprogressive site (2700-3500cGy in 3-5 fractions)  - Will need to hold cabozantinib  1 week prior to SBRT, resume 1 week after completion. Patient is aware.        History of Present Illness   Mr. Stuart Munoz is a 53 year old man with a history of Stage IV RCC of the right kidney, initially diagnosed in 1/2023 after presenting with aburpt onset SOB/NEFF. The patient has followed with Dr. Jermaine Campbell and was initially treated with pembrolizumab (1/2023-9/2023), which was transitioned to cabozantinib (10/2023) due to progression of disease.     CT C/A/P (1/30/24) demonstrated interval improvement in his lung and mediastinal LN metastases with simultaneous increase in his right ischial tuberosity metastasis. Bone scan (1/30/24) demonstrated this metastasis in the right ischium without other bony metastases seen.     The patient was referred to our office for consideration of RT to this site of oligoprogression. Currently he is doing fair overall. He has having an overall difficult time tolerating cabozantinib and has baseline/continued abdominal pain related to his prior nephrectomy. He does have pain as well in the right ischium, which is manageable with opioids.     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)     1/31/2023 -  Chemotherapy    alteplase (CATHFLO), 2 mg, Intracatheter, Every 2 hour PRN, 11 of 11 cycles  pembrolizumab (KEYTRUDA) IVPB, 200 mg, Intravenous, Once, 11 of 11 cycles  Administration: 200 mg (1/31/2023), 200 mg (2/21/2023), 200 mg (3/14/2023), 200 mg (4/4/2023), 200 mg (4/25/2023), 200 mg (5/16/2023), 200 mg (6/27/2023), 200 mg (7/18/2023), 200 mg (8/8/2023), 200 mg (8/29/2023), 200 mg  (2023)           Past Medical History:   Diagnosis Date    Arthralgia     last assessed 02/10/2015    Babesiosis     last assessed 14    Benign paroxysmal vertigo     last assessed 05/22/15    Cancer (HCC)     right kidney removed 22-oral chemotherapy and IV immunotherapy every 21 days    Dry skin dermatitis     Fullness of abdomen     with eating    Meniere disease     last assessed 04/15/13    Pneumonia of left lower lobe due to infectious organism     last assessed 16    Vitamin D deficiency     last assessed 14     Past Surgical History:   Procedure Laterality Date    FIBULA FRACTURE SURGERY Right     IR RENAL ANGIOGRAM  2022    [place] NEPHRECTOMY W/PRTL URETERECT OPEN RIB RESCJ RAD Right 2022    Procedure: NEPHRECTOMY ABDOMINAL APPROACH;  Surgeon: Lisa Webster MD;  Location: [facility];  Service: Urology    TIBIA FRACTURE SURGERY Right     TONSILLECTOMY         Family History   Problem Relation Age of Onset    Meniere's disease Mother     Cancer Father         ?renal/bladder?    No Known Problems Family        Social History   Social History     Substance and Sexual Activity   Alcohol Use Not Currently    Alcohol/week: 1.0 standard drink of alcohol    Types: 1 Cans of beer per week    Comment: 1 daily     Social History     Substance and Sexual Activity   Drug Use No     Social History     Tobacco Use   Smoking Status Former    Current packs/day: 0.00    Types: Cigarettes    Start date: 1984    Quit date: 2022    Years since quittin.5   Smokeless Tobacco Never   Tobacco Comments    Quit 2 mos ago         Meds/Allergies     Current Outpatient Medications:     acetaminophen (TYLENOL) 500 mg tablet, Take 2 tablets (1,000 mg total) by mouth 3 (three) times a day as needed for mild pain, moderate pain, headaches or fever, Disp: 180 tablet, Rfl: 2    amLODIPine (NORVASC) 2.5 mg tablet, take 1 tablet by mouth once daily, Disp: 90 tablet, Rfl: 1    cabozantinib  (Cabometyx) 40 mg TABS, Take 1 tablet (40 mg total) by mouth daily, Disp: 30 tablet, Rfl: 5    CANNABIDIOL PO, Take by mouth, Disp: , Rfl:     gabapentin (NEURONTIN) 300 mg capsule, Take 2 capsules (600mg) in the morning and 2 capsule (600mg) in the evening, Disp: 120 capsule, Rfl: 2    levothyroxine 25 mcg tablet, Take 1 tablet (25 mcg total) by mouth daily, Disp: 90 tablet, Rfl: 3    oxyCODONE (Roxicodone) 5 immediate release tablet, Take 1 tablet (5 mg total) by mouth every 4 (four) hours as needed for moderate pain or severe pain Max Daily Amount: 30 mg, Disp: 60 tablet, Rfl: 0    traZODone (DESYREL) 50 mg tablet, take 1-2 tablets by mouth once daily at bedtime, Disp: 60 tablet, Rfl: 2  No Known Allergies    Review of Systems   Constitutional:  Positive for appetite change, fatigue (Much improved) and unexpected weight change (Recent weight loss x 18 months).   HENT: Negative.     Eyes: Negative.    Respiratory: Negative.     Cardiovascular: Negative.    Gastrointestinal:  Positive for abdominal pain (constant), constipation (intermittent) and diarrhea (Intermittent).   Endocrine: Negative.    Genitourinary: Negative.    Musculoskeletal:  Positive for arthralgias (right hip/ right thigh minor discomfort).   Skin: Negative.    Allergic/Immunologic: Negative.    Neurological: Negative.    Hematological: Negative.    Psychiatric/Behavioral: Negative.          OBJECTIVE:   /80 (BP Location: Left arm, Patient Position: Sitting, Cuff Size: Standard)   Pulse 70   Temp 97.6 °F (36.4 °C) (Temporal)   Resp 18   Ht 6' (1.829 m)   SpO2 99%   BMI 23.33 kg/m²   Pain Assessment:  0  Performance Status: ECOG/Zubrod/WHO: 1 - Symptomatic but completely ambulatory    Physical Exam   Well appearing. NAD.  No increased work of breathing.   Extremities warm and well perfused.   Alert and well oriented. Normal ambulation.     RESULTS  Lab Results    Chemistry        Component Value Date/Time    K 4.6 03/22/2024 1043    CL  "103 03/22/2024 1043    CO2 31 03/22/2024 1043    BUN 16 03/22/2024 1043    CREATININE 1.19 03/22/2024 1043        Component Value Date/Time    CALCIUM 9.0 03/22/2024 1043    ALKPHOS 50 03/22/2024 1043    AST 32 03/22/2024 1043    ALT 34 03/22/2024 1043            Lab Results   Component Value Date    WBC 3.38 (L) 03/22/2024    HGB 14.7 03/22/2024    HCT 44.6 03/22/2024     (H) 03/22/2024     (L) 03/22/2024         Imaging Studies  No results found.    Pathology: As above.     ASSESSMENT  1. Metastasis to bone (HCC)  Radiation Simulation Treatment      2. Malignant neoplasm metastatic to lung, unspecified laterality (HCC)  Ambulatory referral to Radiation Oncology      3. 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)  Ambulatory referral to Radiation Oncology    Radiation Simulation Treatment        Cancer Staging   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)  Staging form: Kidney, AJCC 8th Edition  - Clinical stage from 10/5/2022: Stage IV (cT1b, cNX, pM1) - Signed by Jermaine Campbell MD on 1/24/2024  Stage prefix: Initial diagnosis  Histologic grade (G): G1  Histologic grading system: 4 grade system        PLAN/DISCUSSION  Orders Placed This Encounter   Procedures    Radiation Simulation Treatment      Ricardo Campbell MD  4/5/2024,8:46 AM    Total time spent reviewing EMR, seeing patient in clinic visit, documenting visit, placing treatment orders, and communicating with other medical providers on date of visit: 49 minutes.     Portions of the record may have been created with voice recognition software.  Occasional wrong word or \"sound a like\" substitutions may have occurred due to the inherent limitations of voice recognition software.  Read the chart carefully and recognize, using context, where substitutions have occurred.          "

## 2024-04-11 ENCOUNTER — APPOINTMENT (OUTPATIENT)
Dept: RADIATION ONCOLOGY | Facility: HOSPITAL | Age: 54
End: 2024-04-11
Attending: STUDENT IN AN ORGANIZED HEALTH CARE EDUCATION/TRAINING PROGRAM
Payer: COMMERCIAL

## 2024-04-11 ENCOUNTER — APPOINTMENT (OUTPATIENT)
Dept: RADIATION ONCOLOGY | Facility: CLINIC | Age: 54
End: 2024-04-11
Attending: STUDENT IN AN ORGANIZED HEALTH CARE EDUCATION/TRAINING PROGRAM
Payer: COMMERCIAL

## 2024-04-11 PROCEDURE — 77334 RADIATION TREATMENT AID(S): CPT | Performed by: STUDENT IN AN ORGANIZED HEALTH CARE EDUCATION/TRAINING PROGRAM

## 2024-04-12 ENCOUNTER — APPOINTMENT (OUTPATIENT)
Dept: RADIATION ONCOLOGY | Facility: CLINIC | Age: 54
End: 2024-04-12
Attending: STUDENT IN AN ORGANIZED HEALTH CARE EDUCATION/TRAINING PROGRAM
Payer: COMMERCIAL

## 2024-04-14 DIAGNOSIS — R10.9 ABDOMINAL PAIN: ICD-10-CM

## 2024-04-14 DIAGNOSIS — G89.28 CHRONIC POST-OPERATIVE PAIN: ICD-10-CM

## 2024-04-14 DIAGNOSIS — R59.0 RETROPERITONEAL LYMPHADENOPATHY: ICD-10-CM

## 2024-04-14 DIAGNOSIS — C64.1 CLEAR CELL CARCINOMA OF RIGHT KIDNEY (HCC): ICD-10-CM

## 2024-04-14 DIAGNOSIS — Z90.5 H/O RIGHT NEPHRECTOMY: ICD-10-CM

## 2024-04-14 DIAGNOSIS — Z51.5 PALLIATIVE CARE PATIENT: ICD-10-CM

## 2024-04-15 RX ORDER — GABAPENTIN 300 MG/1
CAPSULE ORAL
Qty: 120 CAPSULE | Refills: 0 | Status: SHIPPED | OUTPATIENT
Start: 2024-04-15

## 2024-04-16 PROCEDURE — 77338 DESIGN MLC DEVICE FOR IMRT: CPT | Performed by: STUDENT IN AN ORGANIZED HEALTH CARE EDUCATION/TRAINING PROGRAM

## 2024-04-16 PROCEDURE — 77301 RADIOTHERAPY DOSE PLAN IMRT: CPT | Performed by: STUDENT IN AN ORGANIZED HEALTH CARE EDUCATION/TRAINING PROGRAM

## 2024-04-16 PROCEDURE — 77300 RADIATION THERAPY DOSE PLAN: CPT | Performed by: STUDENT IN AN ORGANIZED HEALTH CARE EDUCATION/TRAINING PROGRAM

## 2024-04-22 ENCOUNTER — APPOINTMENT (OUTPATIENT)
Dept: RADIATION ONCOLOGY | Facility: HOSPITAL | Age: 54
End: 2024-04-22
Attending: STUDENT IN AN ORGANIZED HEALTH CARE EDUCATION/TRAINING PROGRAM
Payer: COMMERCIAL

## 2024-04-22 PROCEDURE — 77373 STRTCTC BDY RAD THER TX DLVR: CPT | Performed by: STUDENT IN AN ORGANIZED HEALTH CARE EDUCATION/TRAINING PROGRAM

## 2024-04-23 DIAGNOSIS — I10 HYPERTENSION, UNSPECIFIED TYPE: ICD-10-CM

## 2024-04-24 ENCOUNTER — APPOINTMENT (OUTPATIENT)
Dept: RADIATION ONCOLOGY | Facility: HOSPITAL | Age: 54
End: 2024-04-24
Attending: STUDENT IN AN ORGANIZED HEALTH CARE EDUCATION/TRAINING PROGRAM
Payer: COMMERCIAL

## 2024-04-24 PROCEDURE — 77373 STRTCTC BDY RAD THER TX DLVR: CPT | Performed by: STUDENT IN AN ORGANIZED HEALTH CARE EDUCATION/TRAINING PROGRAM

## 2024-04-24 RX ORDER — AMLODIPINE BESYLATE 2.5 MG/1
2.5 TABLET ORAL DAILY
Qty: 90 TABLET | Refills: 1 | Status: SHIPPED | OUTPATIENT
Start: 2024-04-24

## 2024-04-26 ENCOUNTER — APPOINTMENT (OUTPATIENT)
Dept: RADIATION ONCOLOGY | Facility: HOSPITAL | Age: 54
End: 2024-04-26
Attending: STUDENT IN AN ORGANIZED HEALTH CARE EDUCATION/TRAINING PROGRAM
Payer: COMMERCIAL

## 2024-04-26 PROCEDURE — 77373 STRTCTC BDY RAD THER TX DLVR: CPT | Performed by: STUDENT IN AN ORGANIZED HEALTH CARE EDUCATION/TRAINING PROGRAM

## 2024-04-28 DIAGNOSIS — Z51.5 PALLIATIVE CARE PATIENT: ICD-10-CM

## 2024-04-28 DIAGNOSIS — R59.0 RETROPERITONEAL LYMPHADENOPATHY: ICD-10-CM

## 2024-04-28 DIAGNOSIS — G89.28 CHRONIC POST-OPERATIVE PAIN: ICD-10-CM

## 2024-04-28 DIAGNOSIS — F11.20 CONTINUOUS OPIOID DEPENDENCE (HCC): ICD-10-CM

## 2024-04-28 DIAGNOSIS — Z90.5 H/O RIGHT NEPHRECTOMY: ICD-10-CM

## 2024-04-28 DIAGNOSIS — C64.1 CLEAR CELL CARCINOMA OF RIGHT KIDNEY (HCC): ICD-10-CM

## 2024-04-28 DIAGNOSIS — R10.9 ABDOMINAL PAIN: ICD-10-CM

## 2024-04-29 ENCOUNTER — APPOINTMENT (OUTPATIENT)
Dept: RADIATION ONCOLOGY | Facility: HOSPITAL | Age: 54
End: 2024-04-29
Attending: STUDENT IN AN ORGANIZED HEALTH CARE EDUCATION/TRAINING PROGRAM
Payer: COMMERCIAL

## 2024-04-29 PROCEDURE — 77373 STRTCTC BDY RAD THER TX DLVR: CPT | Performed by: RADIOLOGY

## 2024-04-29 RX ORDER — OXYCODONE HYDROCHLORIDE 5 MG/1
5 TABLET ORAL EVERY 4 HOURS PRN
Qty: 60 TABLET | Refills: 0 | Status: SHIPPED | OUTPATIENT
Start: 2024-04-29 | End: 2024-05-07 | Stop reason: SDUPTHER

## 2024-05-01 ENCOUNTER — APPOINTMENT (OUTPATIENT)
Dept: RADIATION ONCOLOGY | Facility: HOSPITAL | Age: 54
End: 2024-05-01
Attending: STUDENT IN AN ORGANIZED HEALTH CARE EDUCATION/TRAINING PROGRAM
Payer: COMMERCIAL

## 2024-05-01 ENCOUNTER — APPOINTMENT (OUTPATIENT)
Dept: RADIATION ONCOLOGY | Facility: HOSPITAL | Age: 54
End: 2024-05-01
Payer: COMMERCIAL

## 2024-05-01 PROBLEM — Z11.59 NEED FOR HEPATITIS C SCREENING TEST: Status: RESOLVED | Noted: 2024-03-06 | Resolved: 2024-05-01

## 2024-05-01 PROBLEM — Z13.220 SCREENING FOR LIPID DISORDERS: Status: RESOLVED | Noted: 2023-01-20 | Resolved: 2024-05-01

## 2024-05-01 PROBLEM — H61.23 CERUMINOSIS, BILATERAL: Status: RESOLVED | Noted: 2024-03-06 | Resolved: 2024-05-01

## 2024-05-01 PROCEDURE — 77435 SBRT MANAGEMENT: CPT | Performed by: STUDENT IN AN ORGANIZED HEALTH CARE EDUCATION/TRAINING PROGRAM

## 2024-05-01 PROCEDURE — 77336 RADIATION PHYSICS CONSULT: CPT | Performed by: STUDENT IN AN ORGANIZED HEALTH CARE EDUCATION/TRAINING PROGRAM

## 2024-05-01 PROCEDURE — 77373 STRTCTC BDY RAD THER TX DLVR: CPT | Performed by: STUDENT IN AN ORGANIZED HEALTH CARE EDUCATION/TRAINING PROGRAM

## 2024-05-07 ENCOUNTER — OFFICE VISIT (OUTPATIENT)
Dept: PALLIATIVE MEDICINE | Facility: CLINIC | Age: 54
End: 2024-05-07
Payer: COMMERCIAL

## 2024-05-07 VITALS
DIASTOLIC BLOOD PRESSURE: 78 MMHG | BODY MASS INDEX: 23.7 KG/M2 | TEMPERATURE: 97.3 F | RESPIRATION RATE: 16 BRPM | WEIGHT: 175 LBS | HEIGHT: 72 IN | OXYGEN SATURATION: 98 % | SYSTOLIC BLOOD PRESSURE: 124 MMHG | HEART RATE: 68 BPM

## 2024-05-07 DIAGNOSIS — Z79.899 MEDICAL MARIJUANA USE: ICD-10-CM

## 2024-05-07 DIAGNOSIS — C64.1 CLEAR CELL CARCINOMA OF RIGHT KIDNEY (HCC): Primary | ICD-10-CM

## 2024-05-07 DIAGNOSIS — Z90.5 H/O RIGHT NEPHRECTOMY: ICD-10-CM

## 2024-05-07 DIAGNOSIS — G89.3 CANCER-RELATED PAIN: ICD-10-CM

## 2024-05-07 DIAGNOSIS — Z51.5 PALLIATIVE CARE PATIENT: ICD-10-CM

## 2024-05-07 DIAGNOSIS — G47.00 INSOMNIA: ICD-10-CM

## 2024-05-07 DIAGNOSIS — R19.8 IRREGULAR BOWEL HABITS: ICD-10-CM

## 2024-05-07 PROCEDURE — 99214 OFFICE O/P EST MOD 30 MIN: CPT | Performed by: INTERNAL MEDICINE

## 2024-05-07 RX ORDER — GABAPENTIN 300 MG/1
CAPSULE ORAL
Qty: 120 CAPSULE | Refills: 2 | Status: SHIPPED | OUTPATIENT
Start: 2024-05-07

## 2024-05-07 RX ORDER — OXYCODONE HYDROCHLORIDE 5 MG/1
5-10 TABLET ORAL EVERY 4 HOURS PRN
Qty: 90 TABLET
Start: 2024-05-07

## 2024-05-07 RX ORDER — TRAZODONE HYDROCHLORIDE 100 MG/1
100 TABLET ORAL
Qty: 30 TABLET | Refills: 2 | Status: SHIPPED | OUTPATIENT
Start: 2024-05-07

## 2024-05-07 NOTE — PROGRESS NOTES
Follow-up with Palliative and Supportive Care  Stuart Munoz 53 y.o. male [id number]    ASSESSMENT & PLAN:    1. 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  Continue disease-directed cares.    Orders:  -     oxyCODONE (Roxicodone) 5 immediate release tablet; Take 1-2 tablets (5-10 mg total) by mouth every 4 (four) hours as needed for moderate pain or severe pain Max Daily Amount: 60 mg  -     gabapentin (NEURONTIN) 300 mg capsule; Take 2 capsules (600mg) in the morning and 2 capsule (600mg) in the evening  -     traZODone (DESYREL) 100 mg tablet; Take 1 tablet (100 mg total) by mouth daily at bedtime    2. H/O right nephrectomy  Assessment & Plan:  Informs plan of care and influence this medication choices.    Orders:  -     oxyCODONE (Roxicodone) 5 immediate release tablet; Take 1-2 tablets (5-10 mg total) by mouth every 4 (four) hours as needed for moderate pain or severe pain Max Daily Amount: 60 mg  -     gabapentin (NEURONTIN) 300 mg capsule; Take 2 capsules (600mg) in the morning and 2 capsule (600mg) in the evening    3. Cancer-related pain  Assessment & Plan:  Patient endorses progressive right hip / right gluteal pain which is likely secondary to malignancy.  Continue oxycodone IR; may take up to 2 times per dose (up to 10 mg). Use as directed. For next fill will increase supply; updated Rx.  Continue gabapentin. Offered increase, but patient would prefer to continue with 1200 mg/day for now. Monitor kidney function.  Patient may use PO MMJ gummies for pain and other symptoms. Topical MMJ has been helpful for this pain as well as other pains. He has an [place] MMJ card. Do not use systemic marijuana products within 1 or 2 hours of opioids.  Recommend topical OTC products, local application of heat or cold, Tylenol up to 1000mg TID  for chronic pain. Do not use heat on top of topical agents. Do not mix topical agents.  Use Tylenol up to 3000 mg/day.    Orders:  -     oxyCODONE (Roxicodone) 5 immediate release tablet; Take 1-2 tablets (5-10 mg total) by mouth every 4 (four) hours as needed for moderate pain or severe pain Max Daily Amount: 60 mg  -     gabapentin (NEURONTIN) 300 mg capsule; Take 2 capsules (600mg) in the morning and 2 capsule (600mg) in the evening    4. Insomnia  Assessment & Plan:  Patient reports trazodone is very effective; continue.    Orders:  -     traZODone (DESYREL) 100 mg tablet; Take 1 tablet (100 mg total) by mouth daily at bedtime    5. Irregular bowel habits  Assessment & Plan:  Counseled today on bowel regimen for drug-induced diarrhea, drug-induced constipation.      6. Palliative care patient  Assessment & Plan:  Emotional / psychosocial support provided today.  ACP: Patient has completed advanced directives (the [facility] Advanced Directive) naming his wife as surrogate healthcare decision-maker. In EMR.   Reviewed notes (PCP, Medical Oncology, Radiation Oncology, RD), labs (3/22/24 Cr 1.19, alb 4.3, Hb 14.7, TSH 4.249), imaging + procedures (1/30/24 bone scan + CTCAP). See below for more data.  Return in about 3 months (around 8/7/2024).  Medication safety issues addressed - no driving under the influence of narcotics (including opioids), watch for adverse effects including AMS or respiratory depression (slowed breathing), keep medications stored in a safe/locked environment, do not use alcohol while opioids or other narcotics are in one's system, do not travel with more than the minimum number of tablets or capsules required for the trip.  I have personally queried the patient's controlled substance dispensing history in the Prescription Drug Monitoring Program in compliance with regulations before I have prescribed any controlled substances. The prescription history is consistent with prescribed therapy and our  practice policies.      Orders:  -     oxyCODONE (Roxicodone) 5 immediate release tablet; Take 1-2 tablets (5-10 mg total) by mouth every 4 (four) hours as needed for moderate pain or severe pain Max Daily Amount: 60 mg  -     gabapentin (NEURONTIN) 300 mg capsule; Take 2 capsules (600mg) in the morning and 2 capsule (600mg) in the evening  -     traZODone (DESYREL) 100 mg tablet; Take 1 tablet (100 mg total) by mouth daily at bedtime    7. Medical marijuana use  Assessment & Plan:  Patient has stated he is certified for MMJ in the [facility].          Medications Discontinued During This Encounter   Medication Reason    oxyCODONE (Roxicodone) 5 immediate release tablet Reorder    traZODone (DESYREL) 50 mg tablet Reorder    gabapentin (NEURONTIN) 300 mg capsule Reorder       30+ minutes were spent in this ambulatory visit with greater than 50% of the time spent face to face with patient in counseling or coordination of care including symptom assessment and management, medication review, medication adjustment, psychosocial support, chart review, imaging review, lab review, medical marijuana, opioid titration, supportive listening, and anticipatory guidance. All of the patient's questions were answered during this discussion.    SUBJECTIVE:  Chief Complaint   Patient presents with    Follow-up    Cancer    Pain    Counseling    Insomnia    Bowel issues        HPI    Stuart Munoz is a 53 y.o. male w/ clear cell carcinoma of the right kidney w/ RP lymph node involvement, w/ lung metastases, s/p angioembolization + R radical nephrectomy 11/21/22, on cabozatinib; liver lesions (likely benign hemangioma); abdominal pain (which may be cancer- or procedure-related), acid reflux / eosinophilic esophagitis, constipation, h/o babesiosis, h/o Meniere disease, h/o vertigo. He follows w/ Dr CMARYN Campbell (Medical Oncology), Dr Webster (Urology), Dr SAMEERA Campbell (Radiation Oncology).    Patient reports that overall he is doing well and  "tolerating cabozantinib, things are \"going okay\". Unfortunately he does note a progressive pain in his right hip and right gluteal area, which can be worse with certain movements and activities. The pain is 6/10 at time of visit today. He asks if he can take an occasional double dose of oxycodone IR 5 mg (he has not done so yet and wanted to ask first). He is not ready to increase his gabapentin yet, however.    Patient endorses some drug-induced constipation at times, some drug-induced diarrhea at times. He reports Banatrol is quite helpful for diarrhea, but he notes it is hard to mix that into a beverage. He is using some over-the-counter products to help with constipation. He denies nausea, denies vomiting. He reports that trazodone remains very effective for his insomnia, he is taking 100 mg doses QHS. Mood is stable.    Patient does use some PO MMJ products, and notes that 1 topical products \"works really good\" for joint pain and other pains.    Review of Systems   All other systems reviewed and are negative.    The following portions of the medical history were reviewed: past medical history, surgical history, problem list, medication list, family history, and social history.      Current Outpatient Medications:     acetaminophen (TYLENOL) 500 mg tablet, Take 2 tablets (1,000 mg total) by mouth 3 (three) times a day as needed for mild pain, moderate pain, headaches or fever, Disp: 180 tablet, Rfl: 2    amLODIPine (NORVASC) 2.5 mg tablet, take 1 tablet by mouth once daily, Disp: 90 tablet, Rfl: 1    cabozantinib (Cabometyx) 40 mg TABS, Take 1 tablet (40 mg total) by mouth daily, Disp: 30 tablet, Rfl: 5    CANNABIDIOL PO, Take by mouth, Disp: , Rfl:     gabapentin (NEURONTIN) 300 mg capsule, Take 2 capsules (600mg) in the morning and 2 capsule (600mg) in the evening, Disp: 120 capsule, Rfl: 2    levothyroxine 25 mcg tablet, Take 1 tablet (25 mcg total) by mouth daily, Disp: 90 tablet, Rfl: 3    oxyCODONE " (Roxicodone) 5 immediate release tablet, Take 1-2 tablets (5-10 mg total) by mouth every 4 (four) hours as needed for moderate pain or severe pain Max Daily Amount: 60 mg, Disp: 90 tablet, Rfl:     traZODone (DESYREL) 100 mg tablet, Take 1 tablet (100 mg total) by mouth daily at bedtime, Disp: 30 tablet, Rfl: 2    OBJECTIVE:  /78 (BP Location: Left arm, Patient Position: Sitting, Cuff Size: Standard)   Pulse 68   Temp (!) 97.3 °F (36.3 °C) (Tympanic)   Resp 16   Ht 6' (1.829 m)   Wt 79.4 kg (175 lb)   SpO2 98%   BMI 23.73 kg/m²   Physical Exam  Vitals reviewed.   Constitutional:       General: He is not in acute distress.     Appearance: He is well-groomed and normal weight. He is not toxic-appearing.   HENT:      Head: Normocephalic and atraumatic.      Right Ear: External ear normal.      Left Ear: External ear normal.   Eyes:      General: No scleral icterus.        Right eye: No discharge.         Left eye: No discharge.      Extraocular Movements: Extraocular movements intact.      Conjunctiva/sclera: Conjunctivae normal.      Pupils: Pupils are equal, round, and reactive to light.   Cardiovascular:      Rate and Rhythm: Normal rate.   Pulmonary:      Effort: Pulmonary effort is normal. No tachypnea, bradypnea, accessory muscle usage or respiratory distress.      Comments: Able to speak comfortably in complete sentences on room air at rest.  Abdominal:      General: There is no distension.      Tenderness: There is no guarding.   Musculoskeletal:      Cervical back: Normal range of motion.      Right lower leg: No edema.      Left lower leg: No edema.   Skin:     General: Skin is dry.      Coloration: Skin is not pale.   Neurological:      Mental Status: He is alert and oriented to person, place, and time.      Cranial Nerves: No dysarthria or facial asymmetry.      Comments: Using no assistive devices for ambulation.   Psychiatric:         Attention and Perception: Attention normal.         Mood  and Affect: Mood and affect normal.         Speech: Speech normal.         Behavior: Behavior normal. Behavior is cooperative.         Thought Content: Thought content normal.         Cognition and Memory: Cognition and memory normal.         Judgment: Judgment normal.          Recent labs:  Lab Results   Component Value Date/Time    SODIUM 140 03/22/2024 10:43 AM    K 4.6 03/22/2024 10:43 AM    BUN 16 03/22/2024 10:43 AM    CREATININE 1.19 03/22/2024 10:43 AM    GLUC 106 11/14/2023 09:41 AM    CALCIUM 9.0 03/22/2024 10:43 AM    AST 32 03/22/2024 10:43 AM    ALT 34 03/22/2024 10:43 AM    ALB 4.3 03/22/2024 10:43 AM    TP 6.3 (L) 03/22/2024 10:43 AM    EGFR 68 03/22/2024 10:43 AM     Lab Results   Component Value Date/Time    HGB 14.7 03/22/2024 10:43 AM    WBC 3.38 (L) 03/22/2024 10:43 AM     (L) 03/22/2024 10:43 AM    INR 1.05 11/18/2022 01:48 PM     Lab Results   Component Value Date/Time    VTB1UORYYLYF 4.249 03/22/2024 10:43 AM       Recent Imaging:    Procedure: CT chest abdomen pelvis w contrast  Result Date: 2/7/2024  Impression: Previously seen left hilar and subcarinal lymphadenopathy has normalized. Interval significant improvement in the pulmonary nodular metastases, with many of the smaller pulmonary nodules resolved and many of the larger nodules decreased in size. Improved retroperitoneal adenopathy. However the lytic metastasis in the right ischial tuberosity has increased in size currently 3.6 x 2.3 cm, previously 2.5 x 2.4 cm (series 3 image 265.) Workstation performed: [id number]     Procedure: NM bone scan whole body  Result Date: 1/31/2024  Impression: 1. Patient's known lytic lesion on the right ischial tuberosity demonstrates subtle central photopenia surrounded by minimal increased tracer activity and is difficult to distinguish definitively on scintigraphy. There is otherwise no additional focal tracer activity characteristic of additional sites of metastatic disease. Please note  "that lytic lesions have variable tracer activity on bone scan and the lack of focal tracer activity does not preclude the possibility of an underlying osteolytic aggressive/malignant process. 2. Absent right kidney. Workstation performed: [id number]      Brando Tirado MD  [facility] Palliative and Supportive Care  [phone]    Portions of this document may have been created using dictation software and as such some \"sound alike\" terms may have been generated by the system. Do not hesitate to contact me with any questions or clarifications.   "

## 2024-05-07 NOTE — ASSESSMENT & PLAN NOTE
Emotional / psychosocial support provided today.  ACP: Patient has completed advanced directives (the [facility] Advanced Directive) naming his wife as surrogate healthcare decision-maker. In EMR.   Reviewed notes (PCP, Medical Oncology, Radiation Oncology, RD), labs (3/22/24 Cr 1.19, alb 4.3, Hb 14.7, TSH 4.249), imaging + procedures (1/30/24 bone scan + CTCAP). See below for more data.  Return in about 3 months (around 8/7/2024).  Medication safety issues addressed - no driving under the influence of narcotics (including opioids), watch for adverse effects including AMS or respiratory depression (slowed breathing), keep medications stored in a safe/locked environment, do not use alcohol while opioids or other narcotics are in one's system, do not travel with more than the minimum number of tablets or capsules required for the trip.  I have personally queried the patient's controlled substance dispensing history in the Prescription Drug Monitoring Program in compliance with regulations before I have prescribed any controlled substances. The prescription history is consistent with prescribed therapy and our practice policies.

## 2024-05-07 NOTE — PATIENT INSTRUCTIONS
"It was good to see you today. Thank you for coming in.    It's okay to take 1 tablet of oxycodone as needed as before, and occasionally take 2 tablets (10 mg total) for more severe pain. This can cause some increase in things like constipation.  When using opioids for pain control, constipation is common and patients should act to prevent it:  Drink PLENTY of water. This is important to keep the gut moving.  Some people have success w/ using prunes, prune juice, certain fruits or vegetables (apples, bananas, prunes, pears, raspberries, and vegetables like string beans, broccoli, spinach, kale, squash, lentils, peas, and beans), or fiber gummies.  Try a probiotic. This could be yogurt or kefir, or fermented beverages such as kombucha, but probiotics are also available in capsule form. Aim for 10-15 billion colony-forming-units, w/ bacteria such as Lactobacillus / Saccharomyces / Actinomyces.  Osmotic laxatives (Miralax, magnesium citrate, Milk of Magnesia) can be very useful for opioid-induced constipation (OIC); take daily to prevent OIC.  Bulk laxatives (Citrucel, Metamucil, Fibercon, Benefiber, wheat germ) are useful for constipation in patients who are not taking opioids, but are not recommended if you are taking opioids.  Colace is good for softening hard stools, or preventing constipation when opioids are being used - but does not stimulate the bowel to move things along once constipation has occurred.  You can use senna, 1 to 2 tabs, once or twice daily as needed for constipation. Use as directed on the box/bottle. Senna is also available in a tea (\"Smooth Move\"). Should that not be enough for your constipation, you can try Dulcolax.  Should that not be enough, consider an enema.  All of these medications are available over-the-counter.  If diarrhea is an issue:  Try Banatrol (banana flakes). Use as directed / as-needed for diarrhea. This is something you may safely take every day. If you become constipated " you may wish to stop using it, although it can treat both constipation and diarrhea in some patients. This available over-the-counter at some pharmacies, but you may have more success looking online ([employer].com, amazon.com).  Try a probiotic. This could be yogurt or kefir, or fermented beverages such as kombucha, but probiotics are also available in capsule form. Aim for 10-15 billion colony-forming-units, w/ bacteria such as Lactobacillus / Saccharomyces / Actinomyces.  Stay hydrated!  If needed, it is okay to take Imodium for diarrhea. Use as directed, available over-the-counter.  Return in about 3 months (around 8/7/2024).  Call us for refills on medications that we supply, as needed.  If something changes and you need to come in sooner, please call our office.    PRESCRIPTION REFILL REMINDER:  All medication refills should be requested prior to Noon on Friday. Any refill requests after noon on Friday would be addressed the following Monday.    MEDICATION SAFETY ISSUES:   Do not drive under the influence of narcotics (including opioids), watch for adverse effects including confusion / altered mental status / respiratory depression (slowed breathing), keep medications stored in a safe/locked environment, do not use alcohol while opioids or other narcotics are in your system. Do not travel with more than the minimum number of tablets or capsules required for the trip.

## 2024-05-07 NOTE — ASSESSMENT & PLAN NOTE
Patient endorses progressive right hip / right gluteal pain which is likely secondary to malignancy.  Continue oxycodone IR; may take up to 2 times per dose (up to 10 mg). Use as directed. For next fill will increase supply; updated Rx.  Continue gabapentin. Offered increase, but patient would prefer to continue with 1200 mg/day for now. Monitor kidney function.  Patient may use PO MMJ gummies for pain and other symptoms. Topical MMJ has been helpful for this pain as well as other pains. He has an [place] MMJ card. Do not use systemic marijuana products within 1 or 2 hours of opioids.  Recommend topical OTC products, local application of heat or cold, Tylenol up to 1000mg TID for chronic pain. Do not use heat on top of topical agents. Do not mix topical agents.  Use Tylenol up to 3000 mg/day.

## 2024-05-28 DIAGNOSIS — Z90.5 H/O RIGHT NEPHRECTOMY: ICD-10-CM

## 2024-05-28 DIAGNOSIS — Z51.5 PALLIATIVE CARE PATIENT: ICD-10-CM

## 2024-05-28 DIAGNOSIS — C64.1 CLEAR CELL CARCINOMA OF RIGHT KIDNEY (HCC): ICD-10-CM

## 2024-05-28 DIAGNOSIS — G89.3 CANCER-RELATED PAIN: ICD-10-CM

## 2024-05-29 RX ORDER — OXYCODONE HYDROCHLORIDE 5 MG/1
5-10 TABLET ORAL EVERY 4 HOURS PRN
Qty: 90 TABLET | Refills: 0 | Status: SHIPPED | OUTPATIENT
Start: 2024-05-29

## 2024-05-29 NOTE — TELEPHONE ENCOUNTER
Last appointment:05/07/24    Next scheduled appointment:08/13/24    Ph[facility]      PDMP review:

## 2024-06-05 ENCOUNTER — RA CDI HCC (OUTPATIENT)
Dept: OTHER | Facility: HOSPITAL | Age: 54
End: 2024-06-05

## 2024-06-05 NOTE — PROGRESS NOTES
HCC coding opportunities       Chart reviewed, no opportunity found: CHART REVIEWED, NO OPPORTUNITY FOUND        Patients Insurance        Commercial Insurance: [employer]

## 2024-06-11 ENCOUNTER — TELEMEDICINE (OUTPATIENT)
Dept: RADIATION ONCOLOGY | Facility: CLINIC | Age: 54
End: 2024-06-11
Attending: RADIOLOGY

## 2024-06-11 DIAGNOSIS — C79.51 METASTASIS TO BONE (HCC): Primary | ICD-10-CM

## 2024-06-11 PROCEDURE — 99024 POSTOP FOLLOW-UP VISIT: CPT | Performed by: STUDENT IN AN ORGANIZED HEALTH CARE EDUCATION/TRAINING PROGRAM

## 2024-06-11 NOTE — PROGRESS NOTES
Telephone Follow-up - Radiation Oncology   Stuart Munoz 1970 54 y.o. 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 On 5/1/24 he completed a course of consolidative SBRT to the right ischial oligoprogressive disease to a dose of 3500cGy in 5 fractions. He returns today for telephone follow-up.     Interval History:  The patient was last seen in clinic at the completion of SBRT. He tolerated RT well overall without much in the way of acute side effects.     Since then he has been doing well overall. He has restarted cabozantinib. He does have some ongoing pain in the right ischial area (4-8/10 intensity), which responds well to oxycodone. He has diarrhea/loose stool with cabozantinib.     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)     1/31/2023 -  Chemotherapy    alteplase (CATHFLO), 2 mg, Intracatheter, Every 2 hour PRN, 11 of 11 cycles  pembrolizumab (KEYTRUDA) IVPB, 200 mg, Intravenous, Once, 11 of 11 cycles  Administration: 200 mg (1/31/2023), 200 mg (2/21/2023), 200 mg (3/14/2023), 200 mg (4/4/2023), 200 mg (4/25/2023),  "200 mg (5/16/2023), 200 mg (6/27/2023), 200 mg (7/18/2023), 200 mg (8/8/2023), 200 mg (8/29/2023), 200 mg (9/19/2023)     4/22/2024 - 5/1/2024 Radiation    Treatments:  Course: C1 SBRT    Plan ID Energy Fractions Dose per Fraction (cGy) Dose Correction (cGy) Total Dose Delivered (cGy) Elapsed Days   SBRT R ISHIAL 6X-FFF 5 / 5 700 0 3,500 9      Treatment Dates:  4/22/2024 - 5/1/2024.            OBJECTIVE:   There were no vitals taken for this visit.  Pain Assessment:  6      Assessment/Plan:  No orders of the defined types were placed in this encounter.     Approximately 6 weeks following completion of consolidative SBRT the patient is doing well overall. He has not suffered any notable side effects from treatment but has only had partial improvement (if any) in pain. He has follow-up scheduled with Dr. CAMRYN Campbell and will discuss continued therapy as well as timing of repeat imaging. I will remain available to see the patient back again as needed.     Ricardo Campbell MD  6/11/2024,1:54 PM    Portions of the record may have been created with voice recognition software.  Occasional wrong word or \"sound a like\" substitutions may have occurred due to the inherent limitations of voice recognition software.  Read the chart carefully and recognize, using context, where substitutions have occurred.      "

## 2024-06-21 ENCOUNTER — OFFICE VISIT (OUTPATIENT)
Dept: FAMILY MEDICINE CLINIC | Facility: CLINIC | Age: 54
End: 2024-06-21
Payer: COMMERCIAL

## 2024-06-21 ENCOUNTER — APPOINTMENT (OUTPATIENT)
Dept: LAB | Facility: CLINIC | Age: 54
End: 2024-06-21
Payer: COMMERCIAL

## 2024-06-21 VITALS
RESPIRATION RATE: 16 BRPM | SYSTOLIC BLOOD PRESSURE: 108 MMHG | TEMPERATURE: 98.4 F | BODY MASS INDEX: 21.86 KG/M2 | OXYGEN SATURATION: 99 % | WEIGHT: 161.4 LBS | DIASTOLIC BLOOD PRESSURE: 70 MMHG | HEIGHT: 72 IN | HEART RATE: 66 BPM

## 2024-06-21 DIAGNOSIS — M79.10 MYALGIA: ICD-10-CM

## 2024-06-21 DIAGNOSIS — E03.9 HYPOTHYROIDISM, UNSPECIFIED TYPE: ICD-10-CM

## 2024-06-21 DIAGNOSIS — C78.00 MALIGNANT NEOPLASM METASTATIC TO LUNG, UNSPECIFIED LATERALITY (HCC): ICD-10-CM

## 2024-06-21 DIAGNOSIS — C64.1 CLEAR CELL CARCINOMA OF RIGHT KIDNEY (HCC): Primary | ICD-10-CM

## 2024-06-21 DIAGNOSIS — M25.50 ARTHRALGIA, UNSPECIFIED JOINT: ICD-10-CM

## 2024-06-21 DIAGNOSIS — G89.4 CHRONIC PAIN SYNDROME: ICD-10-CM

## 2024-06-21 DIAGNOSIS — C64.1 CLEAR CELL CARCINOMA OF RIGHT KIDNEY (HCC): ICD-10-CM

## 2024-06-21 DIAGNOSIS — F43.23 SITUATIONAL MIXED ANXIETY AND DEPRESSIVE DISORDER: ICD-10-CM

## 2024-06-21 LAB
ALBUMIN SERPL BCG-MCNC: 4.1 G/DL (ref 3.5–5)
ALP SERPL-CCNC: 64 U/L (ref 34–104)
ALT SERPL W P-5'-P-CCNC: 31 U/L (ref 7–52)
ANION GAP SERPL CALCULATED.3IONS-SCNC: 3 MMOL/L (ref 4–13)
AST SERPL W P-5'-P-CCNC: 41 U/L (ref 13–39)
BASOPHILS # BLD AUTO: 0.01 THOUSANDS/ÂΜL (ref 0–0.1)
BASOPHILS NFR BLD AUTO: 0 % (ref 0–1)
BILIRUB SERPL-MCNC: 1.43 MG/DL (ref 0.2–1)
BUN SERPL-MCNC: 14 MG/DL (ref 5–25)
CALCIUM SERPL-MCNC: 9.2 MG/DL (ref 8.4–10.2)
CHLORIDE SERPL-SCNC: 101 MMOL/L (ref 96–108)
CK SERPL-CCNC: 114 U/L (ref 39–308)
CO2 SERPL-SCNC: 32 MMOL/L (ref 21–32)
CREAT SERPL-MCNC: 1.23 MG/DL (ref 0.6–1.3)
EOSINOPHIL # BLD AUTO: 0.02 THOUSAND/ÂΜL (ref 0–0.61)
EOSINOPHIL NFR BLD AUTO: 1 % (ref 0–6)
ERYTHROCYTE [DISTWIDTH] IN BLOOD BY AUTOMATED COUNT: 13.8 % (ref 11.6–15.1)
GFR SERPL CREATININE-BSD FRML MDRD: 65 ML/MIN/1.73SQ M
GLUCOSE P FAST SERPL-MCNC: 97 MG/DL (ref 65–99)
HCT VFR BLD AUTO: 44.6 % (ref 36.5–49.3)
HGB BLD-MCNC: 15.2 G/DL (ref 12–17)
IMM GRANULOCYTES # BLD AUTO: 0.01 THOUSAND/UL (ref 0–0.2)
IMM GRANULOCYTES NFR BLD AUTO: 0 % (ref 0–2)
LYMPHOCYTES # BLD AUTO: 0.6 THOUSANDS/ÂΜL (ref 0.6–4.47)
LYMPHOCYTES NFR BLD AUTO: 17 % (ref 14–44)
MCH RBC QN AUTO: 34.2 PG (ref 26.8–34.3)
MCHC RBC AUTO-ENTMCNC: 34.1 G/DL (ref 31.4–37.4)
MCV RBC AUTO: 100 FL (ref 82–98)
MONOCYTES # BLD AUTO: 0.32 THOUSAND/ÂΜL (ref 0.17–1.22)
MONOCYTES NFR BLD AUTO: 9 % (ref 4–12)
NEUTROPHILS # BLD AUTO: 2.66 THOUSANDS/ÂΜL (ref 1.85–7.62)
NEUTS SEG NFR BLD AUTO: 73 % (ref 43–75)
NRBC BLD AUTO-RTO: 0 /100 WBCS
PLATELET # BLD AUTO: 151 THOUSANDS/UL (ref 149–390)
PMV BLD AUTO: 11 FL (ref 8.9–12.7)
POTASSIUM SERPL-SCNC: 4.6 MMOL/L (ref 3.5–5.3)
PROT SERPL-MCNC: 6.9 G/DL (ref 6.4–8.4)
RBC # BLD AUTO: 4.45 MILLION/UL (ref 3.88–5.62)
SODIUM SERPL-SCNC: 136 MMOL/L (ref 135–147)
TSH SERPL DL<=0.05 MIU/L-ACNC: 4.33 UIU/ML (ref 0.45–4.5)
WBC # BLD AUTO: 3.62 THOUSAND/UL (ref 4.31–10.16)

## 2024-06-21 PROCEDURE — 84443 ASSAY THYROID STIM HORMONE: CPT

## 2024-06-21 PROCEDURE — 86666 EHRLICHIA ANTIBODY: CPT

## 2024-06-21 PROCEDURE — 85025 COMPLETE CBC W/AUTO DIFF WBC: CPT

## 2024-06-21 PROCEDURE — 36415 COLL VENOUS BLD VENIPUNCTURE: CPT

## 2024-06-21 PROCEDURE — 86618 LYME DISEASE ANTIBODY: CPT

## 2024-06-21 PROCEDURE — 86753 PROTOZOA ANTIBODY NOS: CPT

## 2024-06-21 PROCEDURE — 99214 OFFICE O/P EST MOD 30 MIN: CPT | Performed by: FAMILY MEDICINE

## 2024-06-21 PROCEDURE — 82550 ASSAY OF CK (CPK): CPT

## 2024-06-21 PROCEDURE — 80053 COMPREHEN METABOLIC PANEL: CPT

## 2024-06-21 RX ORDER — TRAZODONE HYDROCHLORIDE 50 MG/1
50 TABLET ORAL
COMMUNITY

## 2024-06-21 RX ORDER — DULOXETIN HYDROCHLORIDE 30 MG/1
30 CAPSULE, DELAYED RELEASE ORAL DAILY
Qty: 30 CAPSULE | Refills: 1 | Status: SHIPPED | OUTPATIENT
Start: 2024-06-21

## 2024-06-21 NOTE — PROGRESS NOTES
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)    Situational mixed anxiety and depressive disorder  -     DULoxetine (CYMBALTA) 30 mg delayed release capsule; Take 1 capsule (30 mg total) by mouth daily    Chronic pain syndrome  -     DULoxetine (CYMBALTA) 30 mg delayed release capsule; Take 1 capsule (30 mg total) by mouth daily  -     Lyme Total AB W Reflex to IGM/IGG; Future  -     Babesia microti antibody, IgG & Igm; Future  -     ANAPLASMA PHAGOCYTOPHILUM AND EHRLICHIA CHAFFEENSIS ANTIBODY PANEL; Future    Hypothyroidism, unspecified type  -     TSH, 3rd generation; Future    Myalgia  -     CK; Future  -     Lyme Total AB W Reflex to IGM/IGG; Future  -     Babesia microti antibody, IgG & Igm; Future  -     ANAPLASMA PHAGOCYTOPHILUM AND EHRLICHIA CHAFFEENSIS ANTIBODY PANEL; Future    Arthralgia, unspecified joint  -     Lyme Total AB W Reflex to IGM/IGG; Future  -     Babesia microti antibody, IgG & Igm; Future  -     ANAPLASMA PHAGOCYTOPHILUM AND EHRLICHIA CHAFFEENSIS ANTIBODY PANEL; Future    BMI 21.0-21.9, adult    Other orders  -     nystatin (MYCOSTATIN) 500,000 units/5 mL suspension  -     traZODone (DESYREL) 50 mg tablet; Take 50 mg by mouth daily at bedtime        Subjective:      Patient ID: Stuart Munoz is a 54 y.o. male.    Chief Complaint   Patient presents with   • lymes check     No recent tick bite,  leg muscle pain x 3-4 weeks. Patient has had lymes in the past and feels like the leg pain is similar.  L.Garcia/LPN       HPI    Follow-up  Interval hx reviewed  Would like to be checked for Lyme disease  Does not recall specific recent tick bite but feels muscle/joint pain similar to that he felt when he has had lyme disease in past  No rash or fever  BP low normal  Cont to struggle at times w anxiety/depressive sxs  Feels situational r/t dx renal ca along with other daily stressors    The following portions of the patient's history were  reviewed and updated as appropriate: allergies, current medications, past family history, past medical history, past social history, past surgical history and problem list.    Review of Systems    Per hpi    Current Outpatient Medications   Medication Sig Dispense Refill   • acetaminophen (TYLENOL) 500 mg tablet Take 2 tablets (1,000 mg total) by mouth 3 (three) times a day as needed for mild pain, moderate pain, headaches or fever 180 tablet 2   • amLODIPine (NORVASC) 2.5 mg tablet take 1 tablet by mouth once daily 90 tablet 1   • cabozantinib (Cabometyx) 40 mg TABS Take 1 tablet (40 mg total) by mouth daily 30 tablet 5   • CANNABIDIOL PO Take by mouth     • DULoxetine (CYMBALTA) 30 mg delayed release capsule Take 1 capsule (30 mg total) by mouth daily 30 capsule 1   • gabapentin (NEURONTIN) 300 mg capsule Take 2 capsules (600mg) in the morning and 2 capsule (600mg) in the evening 120 capsule 2   • levothyroxine 25 mcg tablet Take 1 tablet (25 mcg total) by mouth daily 90 tablet 3   • nystatin (MYCOSTATIN) 500,000 units/5 mL suspension      • oxyCODONE (Roxicodone) 5 immediate release tablet Take 1-2 tablets (5-10 mg total) by mouth every 4 (four) hours as needed for moderate pain or severe pain Max Daily Amount: 60 mg 90 tablet 0   • traZODone (DESYREL) 50 mg tablet Take 50 mg by mouth daily at bedtime       No current facility-administered medications for this visit.       Objective:    /70   Pulse 66   Temp 98.4 °F (36.9 °C) (Temporal)   Resp 16   Ht 6' (1.829 m)   Wt 73.2 kg (161 lb 6.4 oz)   SpO2 99%   BMI 21.89 kg/m²        Physical Exam  Vitals and nursing note reviewed.   Constitutional:       General: He is not in acute distress.  Eyes:      General: No scleral icterus.     Conjunctiva/sclera: Conjunctivae normal.   Cardiovascular:      Rate and Rhythm: Normal rate and regular rhythm.   Pulmonary:      Effort: Pulmonary effort is normal. No respiratory distress.      Breath sounds: Normal  breath sounds.   Abdominal:      General: Bowel sounds are normal.      Palpations: Abdomen is soft.      Tenderness: There is no abdominal tenderness.   Musculoskeletal:         General: Tenderness present.      Cervical back: Neck supple. No tenderness.   Skin:     General: Skin is warm and dry.      Coloration: Skin is not jaundiced.      Findings: No rash.   Neurological:      General: No focal deficit present.      Mental Status: He is alert and oriented to person, place, and time.      Cranial Nerves: No cranial nerve deficit.   Psychiatric:         Mood and Affect: Mood normal.           Marycarmen Hackett MD

## 2024-06-22 LAB — B BURGDOR IGG+IGM SER QL IA: NEGATIVE

## 2024-06-25 ENCOUNTER — TELEPHONE (OUTPATIENT)
Dept: FAMILY MEDICINE CLINIC | Facility: CLINIC | Age: 54
End: 2024-06-25

## 2024-06-25 LAB
A PHAGOCYTOPH IGG TITR SER IF: NEGATIVE {TITER}
A PHAGOCYTOPH IGM TITR SER IF: NEGATIVE {TITER}
E CHAFFEENSIS IGG TITR SER IF: NEGATIVE {TITER}
E CHAFFEENSIS IGM TITR SER IF: NEGATIVE {TITER}
RESULT/COMMENT: NORMAL

## 2024-06-25 NOTE — TELEPHONE ENCOUNTER
----- Message from Marycarmen Hackett MD sent at 6/25/2024  1:40 PM EDT -----  Please inform tick born panel negative, thyroid test remains in range and CK (muscle test) also in range.

## 2024-06-26 ENCOUNTER — TELEPHONE (OUTPATIENT)
Dept: HEMATOLOGY ONCOLOGY | Facility: MEDICAL CENTER | Age: 54
End: 2024-06-26

## 2024-06-26 LAB
B MICROTI IGG TITR SER: ABNORMAL {TITER}
B MICROTI IGM TITR SER: ABNORMAL {TITER}
RESULT/COMMENT: ABNORMAL

## 2024-06-26 NOTE — TELEPHONE ENCOUNTER
Spoke with patient regarding provider being out of office due to emergency.  Indicated unknown timetable of return, and that PA would be available to be seen over next several weeks.  Understanding verbalized, rescheduled to July 19th at 2:00pm.

## 2024-06-26 NOTE — TELEPHONE ENCOUNTER
Called and spoke with patient's wife who is listed on consent forms. Relayed message regarding patient's test results.

## 2024-07-05 DIAGNOSIS — Z90.5 H/O RIGHT NEPHRECTOMY: ICD-10-CM

## 2024-07-05 DIAGNOSIS — Z51.5 PALLIATIVE CARE PATIENT: ICD-10-CM

## 2024-07-05 DIAGNOSIS — G89.3 CANCER-RELATED PAIN: ICD-10-CM

## 2024-07-05 DIAGNOSIS — C64.1 CLEAR CELL CARCINOMA OF RIGHT KIDNEY (HCC): ICD-10-CM

## 2024-07-05 RX ORDER — OXYCODONE HYDROCHLORIDE 5 MG/1
5-10 TABLET ORAL EVERY 4 HOURS PRN
Qty: 90 TABLET | Refills: 0 | Status: SHIPPED | OUTPATIENT
Start: 2024-07-05

## 2024-07-13 DIAGNOSIS — G89.4 CHRONIC PAIN SYNDROME: ICD-10-CM

## 2024-07-13 DIAGNOSIS — F43.23 SITUATIONAL MIXED ANXIETY AND DEPRESSIVE DISORDER: ICD-10-CM

## 2024-07-13 RX ORDER — DULOXETIN HYDROCHLORIDE 30 MG/1
30 CAPSULE, DELAYED RELEASE ORAL DAILY
Qty: 90 CAPSULE | Refills: 1 | Status: SHIPPED | OUTPATIENT
Start: 2024-07-13

## 2024-07-19 ENCOUNTER — OFFICE VISIT (OUTPATIENT)
Dept: HEMATOLOGY ONCOLOGY | Facility: MEDICAL CENTER | Age: 54
End: 2024-07-19
Payer: COMMERCIAL

## 2024-07-19 VITALS
OXYGEN SATURATION: 96 % | TEMPERATURE: 97.2 F | DIASTOLIC BLOOD PRESSURE: 70 MMHG | SYSTOLIC BLOOD PRESSURE: 106 MMHG | BODY MASS INDEX: 22.35 KG/M2 | WEIGHT: 165 LBS | HEART RATE: 74 BPM | RESPIRATION RATE: 16 BRPM | HEIGHT: 72 IN

## 2024-07-19 DIAGNOSIS — C64.1 CLEAR CELL CARCINOMA OF RIGHT KIDNEY (HCC): ICD-10-CM

## 2024-07-19 DIAGNOSIS — C78.00 MALIGNANT NEOPLASM METASTATIC TO LUNG, UNSPECIFIED LATERALITY (HCC): Primary | ICD-10-CM

## 2024-07-19 PROCEDURE — 99214 OFFICE O/P EST MOD 30 MIN: CPT | Performed by: PHYSICIAN ASSISTANT

## 2024-07-19 NOTE — PROGRESS NOTES
Stuart Munoz  1970  [facility]     DISCUSSION/SUMMARY:    54-year-old with stage IV (multiple pulmonary nodules, mediastinal adenopathy, questionable liver lesion) clear-cell renal cell carcinoma.  Mr. Munoz was originally started on pembrolizumab and axitinib.  Patient had trouble tolerating the axitinib and this was switched to once a day.  Eventual follow-up scans demonstrated a mixed response but clearly new lesions and enlarging lesions.  Options were discussed and patient was started on Cabozantinib, 40 mg a day.  Because of GI side effects, patient was never able to get up to 60 mg a day.    Repeat CAT scan of the chest abdomen pelvis and bone scan (1/30/2024) demonstrated good response to all areas of metastatic disease except for 1 - the right ischial tuberosity.      Because patient had such a good response to the Cabozantinib (and this is already the second line treatment), Dr. Campbell decided to continue with the Cabozantinib at 40 mg a day and have the patient seen by radiation oncology (for this 1 specific growing lesion). Patient was referred to radiation oncology and received a course of consolidative SBRT to the right ischial oligo-progressive disease over 5 fractions.  He completed radiation on 5/1/2024.    Will repeat imaging studies again in the next few weeks.    Patient is being followed by palliative care.  Mr. Munoz is on Synthroid; PCP is monitoring the TSH.    Mr. Munoz is to return to the office in 3 months but this may change depending upon the above.  Patient knows to call the office if he has any other oncology questions or concerns.    Carefully review your medication list and verify that the list is accurate and up-to-date. Please call the hematology/oncology office if there are medications missing from the list, medications on the list that you are not currently taking or if there is a dosage or instruction that is different from how  you're taking that medication.    Patient goals and areas of care: Continue with Cabozantinib at 40 mg.  Follow-up with radiation oncology as scheduled.  Barriers to care: None  Patient is able to self-care  ______________________________________________________________________________________    Chief Complaint   Patient presents with    Follow-up     Metastatic renal cell carcinoma     History of Present Illness: 54-year-old male with relatively good general health presenting to the emergency room recently with abrupt shortness of breath and dyspnea on exertion.  CTA/chest did not demonstrate any PE but patient was found to have an incidental right renal mass.  Work-up was initiated; this included a urology evaluation.    Mr. Munoz was found to have clear-cell renal cell carcinoma and underwent planned preoperative angioembolization with subsequent right radical nephrectomy via chevron incision.  Patient was subsequently discharged and referred to oncology for evaluation.    Follow-up CAT scan of the chest demonstrated a number of new pulmonary nodules distant with metastatic disease.  Options were discussed and patient was placed on axitinib and pembrolizumab.  Patient could not tolerate the axitinib twice a day, this was changed to once a day.  Eventual follow-up scans demonstrated disease progression and patient is now on Cabozantinib, 40 mg a day.  Last imaging studies were completed in January 2024.  He had good response to therapy other than an isolated area of progressive disease in the right ischial tuberosity.  He received radiation therapy-completed 5/1/2024.    Interval History:  Mr. Munoz states feeling okay, about the same as before.  Patient with occasional right posterior hip pain radiating down right thigh but is otherwise active.  Patient tolerates the Cabozantinib at 40 mg a day.  Occasionally patient stops the Cabozantinib for a day or 2 which seems to help with his appetite significantly.   No respiratory issues.  No other pain control issues.  No fevers or signs of infection.  No bruising or bleeding issues.    Review of Systems   Constitutional:  Positive for fatigue.   HENT: Negative.     Eyes: Negative.    Respiratory: Negative.     Cardiovascular: Negative.    Gastrointestinal: Negative.    Endocrine: Negative.    Genitourinary: Negative.    Musculoskeletal:  Positive for arthralgias. Negative for back pain.   Skin: Negative.    Allergic/Immunologic: Negative.    Neurological: Negative.    Hematological: Negative.    Psychiatric/Behavioral:  The patient is nervous/anxious.    All other systems reviewed and are negative.    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)    Fungal dermatitis    Dry skin dermatitis    RBBB    BMI 25.0-25.9,adult    H/O right nephrectomy    Malignant neoplasm metastatic to lung (HCC)    Palliative care patient    Cancer-related pain    Constipation    Anxiousness    Dysphoric mood    Loss of appetite    Retroperitoneal lymphadenopathy    Hypothyroidism    BMI 35.0-35.9,adult    Alteration in appetite    Chronic post-operative pain    Gastroesophageal reflux disease with esophagitis    Hypertension    Weight loss, unintentional    Oral mucositis due to antineoplastic therapy    BMI 21.0-21.9, adult    Dysgeusia    Bilateral calf pain    Varicose veins of left leg with edema    Dehydration    Loose stools    Insomnia    Need for Tdap vaccination    History of gastroesophageal reflux (GERD)    Diarrhea    Cut of finger    Xerostomia    Irregular bowel habits    Medical marijuana use    Backache with radiation    Reactive depression (situational)    Situational mixed anxiety and depressive disorder    Chronic pain syndrome    Myalgia    Arthralgia     Past Medical History:   Diagnosis Date    Arthralgia     last assessed 02/10/2015     Babesiosis     last assessed 14    Benign paroxysmal vertigo     last assessed 05/22/15    Cancer (HCC)     right kidney removed 22-oral chemotherapy and IV immunotherapy every 21 days    Dry skin dermatitis     Fullness of abdomen     with eating    Meniere disease     last assessed 04/15/13    Pneumonia of left lower lobe due to infectious organism     last assessed 16    Vitamin D deficiency     last assessed 14     Past Surgical History:   Procedure Laterality Date    FIBULA FRACTURE SURGERY Right     IR RENAL ANGIOGRAM  2022    SC NEPHRECTOMY W/PRTL URETERECT OPEN RIB RESCJ RAD Right 2022    Procedure: NEPHRECTOMY ABDOMINAL APPROACH;  Surgeon: Lisa Webster MD;  Location: [facility];  Service: Urology    TIBIA FRACTURE SURGERY Right     TONSILLECTOMY       Family History   Problem Relation Age of Onset    Meniere's disease Mother     Cancer Father         ?renal/bladder?    No Known Problems Family      Social History     Socioeconomic History    Marital status: /Civil Union     Spouse name: Not on file    Number of children: Not on file    Years of education: Not on file    Highest education level: Not on file   Occupational History    Not on file   Tobacco Use    Smoking status: Former     Current packs/day: 0.00     Average packs/day: 1 pack/day for 38.6 years (38.6 ttl pk-yrs)     Types: Cigarettes     Start date: 1984     Quit date: 2022     Years since quittin.8     Passive exposure: Never    Smokeless tobacco: Never    Tobacco comments:     Quit 2 mos ago   Vaping Use    Vaping status: Never Used   Substance and Sexual Activity    Alcohol use: Not Currently     Alcohol/week: 1.0 standard drink of alcohol     Types: 1 Cans of beer per week     Comment: 1 daily    Drug use: No    Sexual activity: Yes   Other Topics Concern    Not on file   Social History Narrative    Daily coffee consumption 6 cups/day    Wife: Jenifer        From 22 SW  note:    Primary Care Provider: Marycarmen Hackett MD    Primary Insurance: BLUE CROSS    Active Health Care Proxies: There are no active Health Care Proxies on file.    Primary Caregiver: Self    Support Systems: Self, Spouse/significant other    County of Residence: [place]    What city do you live in?: [place]    Home entry access options. Select all that apply.: Stairs    Number of steps to enter home.: 3    Type of Current Residence: [address] [place] home    Living Arrangements: Lives w/ Spouse/significant other    Functional Status: Independent    Completes ADLs independently?: Yes    Ambulates independently?: Yes    Does patient use assisted devices?: No    Does patient currently own DME?: No     Social Determinants of Health     Financial Resource Strain: Not on file   Food Insecurity: Not on file   Transportation Needs: Not on file   Physical Activity: Not on file   Stress: Not on file   Social Connections: Not on file   Intimate Partner Violence: Not on file   Housing Stability: Not on file       Current Outpatient Medications:     acetaminophen (TYLENOL) 500 mg tablet, Take 2 tablets (1,000 mg total) by mouth 3 (three) times a day as needed for mild pain, moderate pain, headaches or fever, Disp: 180 tablet, Rfl: 2    amLODIPine (NORVASC) 2.5 mg tablet, take 1 tablet by mouth once daily, Disp: 90 tablet, Rfl: 1    cabozantinib (Cabometyx) 40 mg TABS, Take 1 tablet (40 mg total) by mouth daily, Disp: 30 tablet, Rfl: 5    CANNABIDIOL PO, Take by mouth, Disp: , Rfl:     DULoxetine (CYMBALTA) 30 mg delayed release capsule, TAKE 1 CAPSULE BY MOUTH EVERY DAY, Disp: 90 capsule, Rfl: 1    gabapentin (NEURONTIN) 300 mg capsule, Take 2 capsules (600mg) in the morning and 2 capsule (600mg) in the evening, Disp: 120 capsule, Rfl: 2    levothyroxine 25 mcg tablet, Take 1 tablet (25 mcg total) by mouth daily, Disp: 90 tablet, Rfl: 3    nystatin (MYCOSTATIN) 500,000 units/5 mL suspension, , Disp: , Rfl:     oxyCODONE (Roxicodone) 5  immediate release tablet, Take 1-2 tablets (5-10 mg total) by mouth every 4 (four) hours as needed for moderate pain or severe pain Max Daily Amount: 60 mg, Disp: 90 tablet, Rfl: 0    traZODone (DESYREL) 50 mg tablet, Take 50 mg by mouth daily at bedtime, Disp: , Rfl:     No Known Allergies    Vitals:    07/19/24 1354   BP: 106/70   Pulse: 74   Resp: 16   Temp: (!) 97.2 °F (36.2 °C)   SpO2: 96%     Physical Exam  Constitutional:       Appearance: He is well-developed.   HENT:      Head: Normocephalic and atraumatic.      Right Ear: External ear normal.      Left Ear: External ear normal.   Eyes:      Conjunctiva/sclera: Conjunctivae normal.      Pupils: Pupils are equal, round, and reactive to light.   Cardiovascular:      Rate and Rhythm: Normal rate and regular rhythm.      Heart sounds: Normal heart sounds.   Pulmonary:      Effort: Pulmonary effort is normal.      Breath sounds: Normal breath sounds.   Abdominal:      General: Abdomen is flat. There is no distension.      Palpations: Abdomen is soft.      Tenderness: There is no abdominal tenderness.   Musculoskeletal:         General: Normal range of motion.      Cervical back: Normal range of motion and neck supple.   Skin:     General: Skin is warm.   Neurological:      Mental Status: He is alert and oriented to person, place, and time.   Psychiatric:         Behavior: Behavior normal.         Thought Content: Thought content normal.         Judgment: Judgment normal.     Extremities: No lower extreme edema bilaterally.    Labs  6/21/2024 WBC 3.62, hemoglobin 15.2, platelets 151,000, potassium 4.6, BUN 14, creatinine 1.23, AST 41, ALT 31, total bilirubin 1.43    3/22/2024 WBC = 3.38 hemoglobin = 14.7 hematocrit = 45 platelet = 148 neutrophil = 58% BUN = 16 creatinine = 1.19 calcium = 9.0 LFTs WNL TSH = 4.249    1/22/2024 WBC = 4.93 hemoglobin = 14.6 hematocrit = 43 platelet = 159 neutrophil = 63% BUN = 15 creatinine = 1.35 calcium = 9.3 LFTs  WNL    Imaging    1/30/2024 CAT scan chest abdomen pelvis with contrast    IMPRESSION:     Previously seen left hilar and subcarinal lymphadenopathy has normalized.     Interval significant improvement in the pulmonary nodular metastases, with many of the smaller pulmonary nodules resolved and many of the larger nodules decreased in size.  Improved retroperitoneal adenopathy.     However the lytic metastasis in the right ischial tuberosity has increased in size currently 3.6 x 2.3 cm, previously 2.5 x 2.4 cm (series 3 image 265.    1/30/2024 bone scan whole body    IMPRESSION:     1. Patient's known lytic lesion on the right ischial tuberosity demonstrates subtle central photopenia surrounded by minimal increased tracer activity and is difficult to distinguish definitively on scintigraphy.     There is otherwise no additional focal tracer activity characteristic of additional sites of metastatic disease. Please note that lytic lesions have variable tracer activity on bone scan and the lack of focal tracer activity does not preclude the   possibility of an underlying osteolytic aggressive/malignant process.     2. Absent right kidney.    9/26/2023 CAT scan chest abdomen pelvis with contrast    IMPRESSION:     1.  Mixed response of metastasis in the chest with marked decrease in the number of pulmonary metastasis though there are new lesions in the bilateral upper lobes and an increased parenchymal metastasis in the right lower lobe.  2.  New mediastinal/left hilar lymphadenopathy.  3.  Mixed response of retroperitoneal lymphadenopathy with decreased pericaval lymph nodes and increased aortocaval lymph nodes.  4.  Increased size of right ischial lytic metastasis.    1/31/2023 MRI abdomen with and without contrast    IMPRESSION:     Mildly enlarged retrocaval nodes highly suspicious for metastatic disease, unchanged from recent CT of January 25, 2023.     Bibasilar pulmonary nodules highly suspicious for pulmonary  metastatic disease better evaluated on recent chest CT.     Small hepatic lesions, difficult to evaluate due to respiratory motion artifact but not significantly changed in size or MR imaging characteristics when compared to November 9, 2022, most consistent with benign hemangiomata.    1/12/2023 CAT scan of the chest without contrast    LUNGS:    Mild emphysema.     Numerous, new pulmonary nodules in all lobes ranging in size from approximately 0.3 cm (3/68, peripheral right lower lobe) to 0.9 cm (3/82, medial left upper lobe).    4.0 cm ascending aorta is top normal in caliber.  No intramural hematoma.    Two enlarged right retrocaval/retroperitoneal lymph nodes have increased in size.  1.1 and 1.3 cm short axis diameter (previously 0.6 to 0.8 cm respectively).    IMPRESSION:     Pulmonary metastases.  Increased size of right retroperitoneal lymph nodes compared to 11/7/2022, suspicious for metastasis.    11/9/2022 MRI abdomen    IMPRESSION:     1.  Large exophytic right lower pole heterogeneously enhancing renal mass extending into the renal sinus consistent with renal cell carcinoma. There are retroperitoneal collaterals posteriorly which drain into the IVC though no evidence of right renal   vein thrombosis. No hydronephrosis.     2.  Borderline paracaval lymph nodes, likely early lymph node metastasis.     3.  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. However, given primary malignancy and atypical features, a short interval follow-up MRI in 3 months is recommended to exclude metastasis.    11/7/2022 CTA chest PE study    IMPRESSION:     No pulmonary embolus.  Mild patchy peripheral lower lobe groundglass opacity.  Covid not excluded although not the classic appearance.  Consider aspiration versus other infectious/inflammatory etiology.  Partially imaged large right renal mass.  See abdomen CT.    Pathology    Case Report   Surgical Pathology Report                         Case: [id number]                                     Authorizing Provider:  Lisa Webster MD        Collected:           11/21/2022 1213               Ordering Location:     [facility]      Received:            11/21/2022 2301                                      Hospital Operating Room                                                       Pathologist:           Jose Evans MD                                                                  Specimen:    Kidney, Right                                                                              Final Diagnosis   A. Kidney, Right, nephrectomy:  - Clear cell renal cell carcinoma.  See comment and synoptic report.  - One lymph node positive for metastatic carcinoma (1/1).     Comment: Immunohistochemistry is diffusely positive in the tumor cells for PAX8 and carbonic anhydrase IX.  CK7 and P504S have non-specific staining.  The findings are consistent with the diagnosis.     8th Ed AJCC Tumor Stage:  at least Stage III - pT2b, pN1, G3.  Representative tumor block: A7   Electronically signed by Jose Evans MD on 12/5/2022 at 10:16 AM     Synoptic Checklist   KIDNEY: Nephrectomy  8th Edition - Protocol posted: 6/30/2021  KIDNEY: NEPHRECTOMY, PARTIAL OR RADICAL - All Specimens  SPECIMEN   Procedure  Total nephrectomy    Specimen Laterality  Right    TUMOR   Tumor Focality  Unifocal    Tumor Size  Greatest Dimension (Centimeters): 13.5 cm   Histologic Type  Clear cell renal cell carcinoma    Histologic Grade (WHO / ISUP)  G3 (nucleoli conspicuous and eosinophilic at 100x magnification)    Tumor Extent  Limited to kidney    Sarcomatoid Features  Not identified    Rhabdoid Features  Not identified    Tumor Necrosis  Present    Percentage of Tumor Necrosis  30 %   Lymphovascular Invasion  Not identified    MARGINS   Margin Status  All margins negative for invasive carcinoma     REGIONAL LYMPH NODES   Regional Lymph Node Status  Tumor present in regional lymph node(s)    Number of Lymph Nodes with Tumor  1    Jabari Site(s) with Tumor  Hilar    Size of Largest Jabari Metastatic Deposit  0.3 cm   Number of Lymph Nodes Examined  1    PATHOLOGIC STAGE CLASSIFICATION (pTNM, AJCC 8th Edition)   Reporting of pT, pN, and (when applicable) pM categories is based on information available to the pathologist at the time the report is issued. As per the AJCC (Chapter 1, 8th Ed.) it is the managing physician’s responsibility to establish the final pathologic stage based upon all pertinent information, including but potentially not limited to this pathology report.   Primary Tumor (pT)  pT2b    Regional Lymph Nodes (pN)  pN1    ADDITIONAL FINDINGS   Additional Findings in Nonneoplastic Kidney  Tubuloglomerular necrosis    .

## 2024-07-29 ENCOUNTER — TELEPHONE (OUTPATIENT)
Age: 54
End: 2024-07-29

## 2024-07-29 NOTE — TELEPHONE ENCOUNTER
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

## 2024-08-06 ENCOUNTER — APPOINTMENT (OUTPATIENT)
Dept: LAB | Facility: CLINIC | Age: 54
End: 2024-08-06
Payer: COMMERCIAL

## 2024-08-06 DIAGNOSIS — C78.00 MALIGNANT NEOPLASM METASTATIC TO LUNG, UNSPECIFIED LATERALITY (HCC): ICD-10-CM

## 2024-08-06 LAB
ALBUMIN SERPL BCG-MCNC: 4 G/DL (ref 3.5–5)
ALP SERPL-CCNC: 68 U/L (ref 34–104)
ALT SERPL W P-5'-P-CCNC: 31 U/L (ref 7–52)
ANION GAP SERPL CALCULATED.3IONS-SCNC: 8 MMOL/L (ref 4–13)
AST SERPL W P-5'-P-CCNC: 41 U/L (ref 13–39)
BASOPHILS # BLD AUTO: 0.03 THOUSANDS/ÂΜL (ref 0–0.1)
BASOPHILS NFR BLD AUTO: 1 % (ref 0–1)
BILIRUB SERPL-MCNC: 0.87 MG/DL (ref 0.2–1)
BUN SERPL-MCNC: 13 MG/DL (ref 5–25)
CALCIUM SERPL-MCNC: 9.3 MG/DL (ref 8.4–10.2)
CHLORIDE SERPL-SCNC: 103 MMOL/L (ref 96–108)
CO2 SERPL-SCNC: 28 MMOL/L (ref 21–32)
CREAT SERPL-MCNC: 1.09 MG/DL (ref 0.6–1.3)
EOSINOPHIL # BLD AUTO: 0.08 THOUSAND/ÂΜL (ref 0–0.61)
EOSINOPHIL NFR BLD AUTO: 2 % (ref 0–6)
ERYTHROCYTE [DISTWIDTH] IN BLOOD BY AUTOMATED COUNT: 15.5 % (ref 11.6–15.1)
GFR SERPL CREATININE-BSD FRML MDRD: 75 ML/MIN/1.73SQ M
GLUCOSE P FAST SERPL-MCNC: 101 MG/DL (ref 65–99)
HCT VFR BLD AUTO: 45.6 % (ref 36.5–49.3)
HGB BLD-MCNC: 15.1 G/DL (ref 12–17)
IMM GRANULOCYTES # BLD AUTO: 0.01 THOUSAND/UL (ref 0–0.2)
IMM GRANULOCYTES NFR BLD AUTO: 0 % (ref 0–2)
LYMPHOCYTES # BLD AUTO: 1.37 THOUSANDS/ÂΜL (ref 0.6–4.47)
LYMPHOCYTES NFR BLD AUTO: 30 % (ref 14–44)
MCH RBC QN AUTO: 34 PG (ref 26.8–34.3)
MCHC RBC AUTO-ENTMCNC: 33.1 G/DL (ref 31.4–37.4)
MCV RBC AUTO: 103 FL (ref 82–98)
MONOCYTES # BLD AUTO: 0.28 THOUSAND/ÂΜL (ref 0.17–1.22)
MONOCYTES NFR BLD AUTO: 6 % (ref 4–12)
NEUTROPHILS # BLD AUTO: 2.84 THOUSANDS/ÂΜL (ref 1.85–7.62)
NEUTS SEG NFR BLD AUTO: 61 % (ref 43–75)
NRBC BLD AUTO-RTO: 0 /100 WBCS
PLATELET # BLD AUTO: 187 THOUSANDS/UL (ref 149–390)
PMV BLD AUTO: 10 FL (ref 8.9–12.7)
POTASSIUM SERPL-SCNC: 4.1 MMOL/L (ref 3.5–5.3)
PROT SERPL-MCNC: 7 G/DL (ref 6.4–8.4)
RBC # BLD AUTO: 4.44 MILLION/UL (ref 3.88–5.62)
SODIUM SERPL-SCNC: 139 MMOL/L (ref 135–147)
WBC # BLD AUTO: 4.61 THOUSAND/UL (ref 4.31–10.16)

## 2024-08-06 PROCEDURE — 36415 COLL VENOUS BLD VENIPUNCTURE: CPT

## 2024-08-06 PROCEDURE — 80053 COMPREHEN METABOLIC PANEL: CPT

## 2024-08-06 PROCEDURE — 85025 COMPLETE CBC W/AUTO DIFF WBC: CPT

## 2024-08-12 ENCOUNTER — TELEPHONE (OUTPATIENT)
Dept: HEMATOLOGY ONCOLOGY | Facility: MEDICAL CENTER | Age: 54
End: 2024-08-12

## 2024-08-12 DIAGNOSIS — E86.0 DEHYDRATION: Primary | ICD-10-CM

## 2024-08-12 NOTE — TELEPHONE ENCOUNTER
Patient scheduled for CT scan  Patient's wife called infusion to schedule hydration before CT  Will send to PA to review and advise

## 2024-08-13 ENCOUNTER — TELEPHONE (OUTPATIENT)
Dept: PALLIATIVE MEDICINE | Facility: CLINIC | Age: 54
End: 2024-08-13

## 2024-08-13 ENCOUNTER — OFFICE VISIT (OUTPATIENT)
Dept: PALLIATIVE MEDICINE | Facility: CLINIC | Age: 54
End: 2024-08-13
Payer: COMMERCIAL

## 2024-08-13 VITALS
OXYGEN SATURATION: 99 % | BODY MASS INDEX: 22.24 KG/M2 | HEIGHT: 72 IN | DIASTOLIC BLOOD PRESSURE: 60 MMHG | SYSTOLIC BLOOD PRESSURE: 118 MMHG | TEMPERATURE: 97.5 F | HEART RATE: 66 BPM | WEIGHT: 164.2 LBS

## 2024-08-13 DIAGNOSIS — F11.90 CHRONIC, CONTINUOUS USE OF OPIOIDS: ICD-10-CM

## 2024-08-13 DIAGNOSIS — Z51.5 PALLIATIVE CARE PATIENT: ICD-10-CM

## 2024-08-13 DIAGNOSIS — R45.89 DYSPHORIC MOOD: ICD-10-CM

## 2024-08-13 DIAGNOSIS — F41.9 ANXIOUSNESS: ICD-10-CM

## 2024-08-13 DIAGNOSIS — Z90.5 H/O RIGHT NEPHRECTOMY: ICD-10-CM

## 2024-08-13 DIAGNOSIS — R19.8 IRREGULAR BOWEL HABITS: ICD-10-CM

## 2024-08-13 DIAGNOSIS — G47.00 INSOMNIA: ICD-10-CM

## 2024-08-13 DIAGNOSIS — G89.3 CANCER-RELATED PAIN: ICD-10-CM

## 2024-08-13 DIAGNOSIS — C64.1 CLEAR CELL CARCINOMA OF RIGHT KIDNEY (HCC): Primary | ICD-10-CM

## 2024-08-13 DIAGNOSIS — Z79.899 MEDICAL MARIJUANA USE: ICD-10-CM

## 2024-08-13 PROCEDURE — 99214 OFFICE O/P EST MOD 30 MIN: CPT | Performed by: INTERNAL MEDICINE

## 2024-08-13 RX ORDER — LIDOCAINE 50 MG/G
1 PATCH TOPICAL DAILY
Qty: 30 PATCH | Refills: 5 | Status: SHIPPED | OUTPATIENT
Start: 2024-08-13

## 2024-08-13 RX ORDER — OXYCODONE HYDROCHLORIDE 10 MG/1
10 TABLET ORAL EVERY 4 HOURS PRN
Qty: 120 TABLET | Refills: 0 | Status: SHIPPED | OUTPATIENT
Start: 2024-08-13

## 2024-08-13 RX ORDER — TRAZODONE HYDROCHLORIDE 100 MG/1
100 TABLET ORAL
Qty: 30 TABLET | Refills: 2 | Status: SHIPPED | OUTPATIENT
Start: 2024-08-13

## 2024-08-13 NOTE — ASSESSMENT & PLAN NOTE
Patient endorses ongoing and progressive right hip pain radiating down his right lower extremity which overshadows his chronic abdominal/flank pain. Secondary to malignancy and or osseous lesions.  Increase oxycodone IR to 10 mg doses, q.4 hours PRN.   Prescribing 5% lidocaine patch to be used on the right side of the lower spine to help with this pain. If not covered by insurance, use 4% OTC patch.  May use topical MMJ in this location (instead of lidocaine patch, or alternating with lidocaine patch).  Continue gabapentin. Monitor kidney function.  Recommend topical OTC products, local application of heat or cold, Tylenol up to 1000mg TID for chronic pain. Do not use heat on top of topical agents. Do not mix topical agents.

## 2024-08-13 NOTE — ASSESSMENT & PLAN NOTE
Informs plan of care and influences medication choices.   Pt presents to ED c/o right rib pain radiating to RUQ abd. Worse with movement. Pt has hx of back pain and had steroid injections this week. Pt has hx diabetes, not insulin dependent.

## 2024-08-13 NOTE — ASSESSMENT & PLAN NOTE
Patient has stated he is certified for MMJ in the [facility]. He may use MMJ for symptom control.

## 2024-08-13 NOTE — ASSESSMENT & PLAN NOTE
Continue current effective BH regimen (changes have been made by patient's PCP).  Emotional / psychosocial support provided today.

## 2024-08-13 NOTE — TELEPHONE ENCOUNTER
Prior Authorization [NEEDED] for [Lidocaine 5% Patches]    KEY# [id number]    [facility]   Phone: [phone]  Fax: [phone]

## 2024-08-13 NOTE — ASSESSMENT & PLAN NOTE
Patient endorses alternating constipation and diarrhea, secondary to cabozantinib. He states that these symptoms relent when he takes holidays from cabozantinib. Counseled today on bowel regimen for constipation and/or diarrhea.

## 2024-08-13 NOTE — ASSESSMENT & PLAN NOTE
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 Continue disease-directed cares.

## 2024-08-13 NOTE — ASSESSMENT & PLAN NOTE
Patient states he has been taking 100 mg tabs of trazodone (adjusted by other prescriber). Will refill at this dose. It is effective.

## 2024-08-13 NOTE — TELEPHONE ENCOUNTER
Received prior authorization APPROVAL for lidocaine 5% patch.    Faxed results to Pharmacy. Pharmacy has been asked to inform pt of outcome.

## 2024-08-13 NOTE — PATIENT INSTRUCTIONS
It was good to see you today. Thank you for coming in.    Prescribing 5% lidocaine patch, apply to painful area (right side of lower spine) for 12 hours per day. If this is not covered by insurance you may need to use the 4% lidocaine patch (available over-the-counter; brand names include Salonpas, Biofreeze, Aspercreme, Icy Hot, etc).  You could also/instead to try topical MMJ products in that same area to help reduce post magnesium signals. The MMJ products could also be used lower in the leg (on the back side of the leg).  If diarrhea is an issue:  Try Banatrol (banana flakes). Use as directed / as-needed for diarrhea. This is something you may safely take every day. If you become constipated you may wish to stop using it, although it can treat both constipation and diarrhea in some patients. This available over-the-counter at some pharmacies, but you may have more success looking online ([facility].com, amazon.com).  Try a probiotic. This could be yogurt or kefir, or fermented beverages such as kombucha, but probiotics are also available in capsule form. Aim for 10-15 billion colony-forming-units, w/ bacteria such as Lactobacillus / Saccharomyces / Actinomyces.  Stay hydrated!  If needed, it is okay to take Imodium for diarrhea. Use as directed, available over-the-counter.  If constipation is an issue:  Drink PLENTY of water. This is important to keep the gut moving.  Some people have success w/ using prunes, prune juice, certain fruits or vegetables (apples, bananas, prunes, pears, raspberries, and vegetables like string beans, broccoli, spinach, kale, squash, lentils, peas, and beans), or fiber gummies.  Try a probiotic. This could be yogurt or kefir, or fermented beverages such as kombucha, but probiotics are also available in capsule form. Aim for 10-15 billion colony-forming-units, w/ bacteria such as Lactobacillus / Saccharomyces / Actinomyces.  Osmotic laxatives (Miralax, magnesium citrate, Milk of Magnesia)  "can be very useful for opioid-induced constipation (OIC); take daily to prevent OIC.  Bulk laxatives (Citrucel, Metamucil, Fibercon, Benefiber, wheat germ) are useful for constipation in patients who are not taking opioids, but are not recommended if you are taking opioids.  Colace is good for softening hard stools, or preventing constipation when opioids are being used - but does not stimulate the bowel to move things along once constipation has occurred.  You can use senna, 1 to 2 tabs, once or twice daily as needed for constipation. Use as directed on the box/bottle. Senna is also available in a tea (\"Smooth Move\"). Should that not be enough for your constipation, you can try Dulcolax.  Should that not be enough, consider an enema.  All of these medications are available over-the-counter.  Sending prescription for intranasal naloxone; the State of [place] recommends this for safety when patients are taking opioids.  Return in about 3 months (around 11/13/2024).  Call us for refills on medications that we supply, as needed.  If something changes and you need to come in sooner, please call our office.    PRESCRIPTION REFILL REMINDER:  All medication refills should be requested prior to Noon on Friday. Any refill requests after noon on Friday would be addressed the following Monday.    MEDICATION SAFETY ISSUES:   Do not drive under the influence of narcotics (including opioids), watch for adverse effects including confusion / altered mental status / respiratory depression (slowed breathing), keep medications stored in a safe/locked environment, do not use alcohol while opioids or other narcotics are in your system. Do not travel with more than the minimum number of tablets or capsules required for the trip.   "

## 2024-08-13 NOTE — ASSESSMENT & PLAN NOTE
ACP: On file.  Reviewed notes (PCP, Medical Oncology, Radiation Oncology), labs (8/6/24 Cr 1.09, alb 4.0, Hb 15.1; 6/21/24 TSH 4.331), imaging + procedures (1/30/24 bone scan + CTCAP). See below for more data.  Return in about 3 months (around 11/13/2024).  Medication safety issues addressed - no driving under the influence of narcotics (including opioids), watch for adverse effects including AMS or respiratory depression (slowed breathing), keep medications stored in a safe/locked environment, do not use alcohol while opioids or other narcotics are in one's system, do not travel with more than the minimum number of tablets or capsules required for the trip.  I have personally queried the patient's controlled substance dispensing history in the Prescription Drug Monitoring Program in compliance with regulations before I have prescribed any controlled substances. The prescription history is consistent with prescribed therapy and our practice policies.

## 2024-08-19 DIAGNOSIS — C78.00 MALIGNANT NEOPLASM METASTATIC TO LUNG, UNSPECIFIED LATERALITY (HCC): ICD-10-CM

## 2024-08-23 ENCOUNTER — HOSPITAL ENCOUNTER (OUTPATIENT)
Dept: RADIOLOGY | Facility: HOSPITAL | Age: 54
Discharge: HOME | End: 2024-08-23
Payer: COMMERCIAL

## 2024-08-23 ENCOUNTER — HOSPITAL ENCOUNTER (OUTPATIENT)
Dept: INFUSION CENTER | Facility: HOSPITAL | Age: 54
End: 2024-08-23
Attending: INTERNAL MEDICINE
Payer: COMMERCIAL

## 2024-08-23 VITALS
SYSTOLIC BLOOD PRESSURE: 122 MMHG | OXYGEN SATURATION: 99 % | TEMPERATURE: 97.8 F | HEART RATE: 77 BPM | DIASTOLIC BLOOD PRESSURE: 82 MMHG | RESPIRATION RATE: 18 BRPM

## 2024-08-23 DIAGNOSIS — C64.1 CLEAR CELL CARCINOMA OF RIGHT KIDNEY (HCC): ICD-10-CM

## 2024-08-23 DIAGNOSIS — C78.00 MALIGNANT NEOPLASM METASTATIC TO LUNG, UNSPECIFIED LATERALITY (HCC): ICD-10-CM

## 2024-08-23 DIAGNOSIS — E86.0 DEHYDRATION: Primary | ICD-10-CM

## 2024-08-23 PROCEDURE — 74177 CT ABD & PELVIS W/CONTRAST: CPT

## 2024-08-23 PROCEDURE — 71260 CT THORAX DX C+: CPT

## 2024-08-23 PROCEDURE — 96360 HYDRATION IV INFUSION INIT: CPT

## 2024-08-23 PROCEDURE — 78306 BONE IMAGING WHOLE BODY: CPT

## 2024-08-23 PROCEDURE — A9503 TC99M MEDRONATE: HCPCS

## 2024-08-23 PROCEDURE — 96361 HYDRATE IV INFUSION ADD-ON: CPT

## 2024-08-23 RX ORDER — SODIUM CHLORIDE 9 MG/ML
500 INJECTION, SOLUTION INTRAVENOUS CONTINUOUS
Status: DISCONTINUED | OUTPATIENT
Start: 2024-08-23 | End: 2024-08-23 | Stop reason: HOSPADM

## 2024-08-23 RX ORDER — SODIUM CHLORIDE 9 MG/ML
500 INJECTION, SOLUTION INTRAVENOUS CONTINUOUS
Status: CANCELLED | OUTPATIENT
Start: 2024-08-23

## 2024-08-23 RX ADMIN — IOHEXOL 100 ML: 350 INJECTION, SOLUTION INTRAVENOUS at 09:30

## 2024-08-23 RX ADMIN — SODIUM CHLORIDE 1000 ML: 0.9 INJECTION, SOLUTION INTRAVENOUS at 07:44

## 2024-08-23 RX ADMIN — SODIUM CHLORIDE 500 ML/HR: 0.9 INJECTION, SOLUTION INTRAVENOUS at 09:42

## 2024-08-23 NOTE — PLAN OF CARE
Problem: SAFETY ADULT  Goal: Patient will remain free of falls  Description: INTERVENTIONS:  - Educate patient/family on patient safety including physical limitations  - Instruct patient to call for assistance with activity   - Keep Call bell within reach  Outcome: Progressing     Problem: Knowledge Deficit  Goal: Patient/family/caregiver demonstrates understanding of disease process, treatment plan, medications, and discharge instructions  Description: Complete learning assessment and assess knowledge base.  Interventions:  - Provide teaching at level of understanding  - Provide teaching via preferred learning methods  Outcome: Progressing

## 2024-08-23 NOTE — PROGRESS NOTES
Stuart Munoz  tolerated treatment well with no complications.        AVS printed and given to Stuart Munoz:    No (Declined by Stuart Munoz)

## 2024-08-27 ENCOUNTER — TELEPHONE (OUTPATIENT)
Dept: HEMATOLOGY ONCOLOGY | Facility: MEDICAL CENTER | Age: 54
End: 2024-08-27

## 2024-08-27 ENCOUNTER — TELEPHONE (OUTPATIENT)
Age: 54
End: 2024-08-27

## 2024-08-27 DIAGNOSIS — I81 PORTAL VEIN THROMBOSIS: Primary | ICD-10-CM

## 2024-08-27 NOTE — TELEPHONE ENCOUNTER
Pt spouse calling in because pt missed a phone call from the office.    Pt would like to be called back on his number [phone]

## 2024-08-27 NOTE — TELEPHONE ENCOUNTER
I received a message from radiologist Dr. Phelan regarding patient's recent CT scans which show portal vein thrombosis along with progression of disease.  I spoke to the patient today.  He will begin Eliquis.  This has been called to his pharmacy.  We will get him scheduled within the next few weeks to see new physician here at [place].  He is aware that he will need a treatment change.    He has ongoing abdominal pain which is chronic for the past 2 years per his recount.  Should he develop significant worsening of abdominal pain he should go to the ER.    D/w Dr. Giraldo.

## 2024-08-28 ENCOUNTER — TELEPHONE (OUTPATIENT)
Dept: HEMATOLOGY ONCOLOGY | Facility: MEDICAL CENTER | Age: 54
End: 2024-08-28

## 2024-08-28 NOTE — TELEPHONE ENCOUNTER
Spoke with patient informing them that Dr. Lester's schedule has opened up at [facility].    New appointment time of 9/05 at 8:00am scheduled.  Understanding verbalized.

## 2024-09-04 NOTE — PROGRESS NOTES
"  History of Present Illness  Cancer Staging   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)  Staging form: Kidney, AJCC 8th Edition  - Clinical stage from 10/5/2022: Stage IV (cT1b, cNX, pM1) - Signed by Jermaine Campbell MD on 1/24/2024  Stage prefix: Initial diagnosis  Histologic grade (G): G1  Histologic grading system: 4 grade system        Mr. 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 On 5/1/24 he completed a course of consolidative SBRT to the right ischial oligoprogressive disease to a dose of 3500cGy in 5 fractions. H He has restarted cabozantinib.    .8/27/2024, Virginia Abdi wrote: \" received a message from radiologist Dr. Phelan regarding patient's recent CT scans which show portal vein thrombosis along with progression of disease. I spoke to the patient today. He will begin Eliquis. This has been called to his pharmacy. We will get him scheduled within the next few weeks to see new physician here at [place]. He is aware that he will need a treatment change. \"  CT 8/23/2024 \"Progressing disease with new lymphadenopathy in the retroperitoneum, portacaval region   Multiple non measurable lung nodules in the both lungs with a new 1 cm lung nodule in the right midlung, suggest progression\".  */23/2024 - Bone scan  - Very subtle visualization of the right ischial tuberosity known metastatic lesion as a subtle area of photopenia. No scintigraphic evidence of new osseous metastasis      ADAM Campbell i8s returning in 2 weeksd, as discussed with KENA Abdi, may ne best to have him make the decision for the next treatmetn.  "

## 2024-09-04 NOTE — PROGRESS NOTES
"  History of Present Illness:P C lear cell carcinoma of the right kidney w/ RP lymph node involvement, w/ lung + osseous metastases, s/p angioembolization + R radical nephrectomy 11/21/22, s/p RT, on cabozatinib   Cancer Staging   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)  Staging form: Kidney, AJCC 8th Edition  - Clinical stage from 10/5/2022: Stage IV (cT1b, cNX, pM1) - Signed by Jermaine Campbell MD on 1/24/2024  Stage prefix: Initial diagnosis  Histologic grade (G): G1  Histologic grading system: 4 grade system       Mr. 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 On 5/1/24 he completed a course of consolidative SBRT to the right ischial oligoprogressive disease to a dose of 3500cGy in 5 fractions. H He has restarted cabozantinib.    .8/27/2024, Virginia Abdi wrote: \" received a message from radiologist Dr. Phelan regarding patient's recent CT scans which show portal vein thrombosis along with progression of disease. I spoke to the patient today. He will begin Eliquis. This has been called to his pharmacy. We will get him scheduled within the next few weeks to see new physician here at [place]. He is aware that he will need a treatment change. \"  CT 8/23/2024 \"Progressing disease with new lymphadenopathy in the retroperitoneum, portacaval region   Multiple non measurable lung nodules in the both lungs with a new 1 cm lung nodule in the right midlung, suggest progression\".  8/23/2024 - Bone scan  - Very subtle visualization of the right ischial tuberosity known metastatic lesion as a subtle area of photopenia. No scintigraphic evidence of new osseous metastasis    He is feeling better, gained 4 ;lbs and eating better    Looks well.  Neck supple, njo nodes.  Lungs: Clear  RRR  Abd benign  Skin clear  Neur intact    AP. Since Dr. Campbell s returning in 2 weeks, snf the patietnnsi " asymptomatic and improrving symptomatically, as discussed with KENA Abdi, may ne best to have him make the decision for the next treatmetn.

## 2024-09-05 ENCOUNTER — OFFICE VISIT (OUTPATIENT)
Dept: HEMATOLOGY ONCOLOGY | Facility: MEDICAL CENTER | Age: 54
End: 2024-09-05
Payer: COMMERCIAL

## 2024-09-05 VITALS
BODY MASS INDEX: 22.75 KG/M2 | HEART RATE: 68 BPM | WEIGHT: 168 LBS | TEMPERATURE: 97.3 F | RESPIRATION RATE: 16 BRPM | HEIGHT: 72 IN

## 2024-09-05 DIAGNOSIS — I81 PORTAL VEIN THROMBOSIS: ICD-10-CM

## 2024-09-05 DIAGNOSIS — C78.00 MALIGNANT NEOPLASM METASTATIC TO LUNG, UNSPECIFIED LATERALITY (HCC): Primary | ICD-10-CM

## 2024-09-05 PROCEDURE — 99213 OFFICE O/P EST LOW 20 MIN: CPT | Performed by: INTERNAL MEDICINE

## 2024-09-19 DIAGNOSIS — Z90.5 H/O RIGHT NEPHRECTOMY: ICD-10-CM

## 2024-09-19 DIAGNOSIS — C64.1 CLEAR CELL CARCINOMA OF RIGHT KIDNEY (HCC): ICD-10-CM

## 2024-09-19 DIAGNOSIS — Z51.5 PALLIATIVE CARE PATIENT: ICD-10-CM

## 2024-09-19 DIAGNOSIS — G89.3 CANCER-RELATED PAIN: ICD-10-CM

## 2024-09-20 RX ORDER — OXYCODONE HYDROCHLORIDE 10 MG/1
10 TABLET ORAL EVERY 4 HOURS PRN
Qty: 120 TABLET | Refills: 0 | Status: SHIPPED | OUTPATIENT
Start: 2024-09-20

## 2024-09-20 NOTE — TELEPHONE ENCOUNTER
Last appointment:08/13/24    Next scheduled appointment:11/15/24    [facility]      PDMP review:

## 2024-09-23 ENCOUNTER — TELEPHONE (OUTPATIENT)
Dept: HEMATOLOGY ONCOLOGY | Facility: MEDICAL CENTER | Age: 54
End: 2024-09-23

## 2024-09-26 ENCOUNTER — OFFICE VISIT (OUTPATIENT)
Dept: HEMATOLOGY ONCOLOGY | Facility: MEDICAL CENTER | Age: 54
End: 2024-09-26
Payer: COMMERCIAL

## 2024-09-26 VITALS
DIASTOLIC BLOOD PRESSURE: 64 MMHG | WEIGHT: 167 LBS | RESPIRATION RATE: 17 BRPM | SYSTOLIC BLOOD PRESSURE: 114 MMHG | HEIGHT: 72 IN | TEMPERATURE: 97.5 F | HEART RATE: 74 BPM | BODY MASS INDEX: 22.62 KG/M2 | OXYGEN SATURATION: 100 %

## 2024-09-26 DIAGNOSIS — C64.1 CLEAR CELL CARCINOMA OF RIGHT KIDNEY (HCC): Primary | ICD-10-CM

## 2024-09-26 DIAGNOSIS — I81 PORTAL VEIN THROMBOSIS: ICD-10-CM

## 2024-09-26 PROCEDURE — 99213 OFFICE O/P EST LOW 20 MIN: CPT | Performed by: INTERNAL MEDICINE

## 2024-09-26 RX ORDER — TIVOZANIB 1.34 MG/1
1.34 CAPSULE ORAL DAILY
Qty: 21 CAPSULE | Refills: 5 | Status: SHIPPED | OUTPATIENT
Start: 2024-09-26

## 2024-09-26 NOTE — PROGRESS NOTES
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 On 5/1/24 he completed a course of consolidative SBRT to the right ischial oligoprogressive disease to a dose of 3500cGy in 5 fractions. H He has restarted cabozantinib.    .8/27/2024, Virginia Abdi wrote: \" received a message from radiologist Dr. Phelan regarding patient's recent CT scans which show portal vein thrombosis along with progression of disease. I spoke to the patient today. He will begin Eliquis. This has been called to his pharmacy. We will get him scheduled within the next few weeks to see new physician here at [place]. He is aware that he will need a treatment change. \"  CT 8/23/2024 \"Progressing disease with new lymphadenopathy in the retroperitoneum, portacaval region   Multiple non measurable lung nodules in the both lungs with a new 1 cm lung nodule in the right midlung, suggest progression\".  8/23/2024 - Bone scan  - Very subtle visualization of the right ischial tuberosity known metastatic lesion as a subtle area of photopenia. No scintigraphic evidence of new osseous metastasis    He is feeling better, gained 4 lbs and eating better    Looks well.  Neck supple, njo nodes.  Lungs: Clear  RRR  Abd benign  Skin clear  Neur intact    We were planning to have Dr. Campbell make the decision, but he has " not yet returend and Mr. Munoz was called back to come in today.  He was taken Cabometyx 40 mg/day after having been started on 60 mg and having GI side effects.  We recommend tivozanib, based on the FDA approval (for adult patients with relapsed or refractory advanced renal cell carcinoma (RCC) following two or more prior systemic therapies). The recommended dose of tivozanib is 1.34 mg or 1340 microgram once daily for 21 days, followed by a 7-day rest period to comprise one complete treatment cycle of 4 weeks. He was consented and orders written.    DIONICIO Lester MD

## 2024-09-30 DIAGNOSIS — C64.1 CLEAR CELL CARCINOMA OF RIGHT KIDNEY (HCC): ICD-10-CM

## 2024-10-03 ENCOUNTER — TELEPHONE (OUTPATIENT)
Age: 54
End: 2024-10-03

## 2024-10-03 ENCOUNTER — TELEPHONE (OUTPATIENT)
Dept: HEMATOLOGY ONCOLOGY | Facility: MEDICAL CENTER | Age: 54
End: 2024-10-03

## 2024-10-03 DIAGNOSIS — Z51.5 PALLIATIVE CARE PATIENT: ICD-10-CM

## 2024-10-03 DIAGNOSIS — G89.3 CANCER-RELATED PAIN: ICD-10-CM

## 2024-10-03 DIAGNOSIS — Z90.5 H/O RIGHT NEPHRECTOMY: ICD-10-CM

## 2024-10-03 DIAGNOSIS — C64.1 CLEAR CELL CARCINOMA OF RIGHT KIDNEY (HCC): ICD-10-CM

## 2024-10-03 NOTE — TELEPHONE ENCOUNTER
Fotivda sent over to [facility] on 9/26 and [facility] not [facility] it, has to be sent to another pharmacy. Patient and wife are anxious to get medication started, as it was sent over 1 week ago and they are concerned with the spread of cancer. They would like medication to be filled as soon as possible so he can start taking.

## 2024-10-03 NOTE — TELEPHONE ENCOUNTER
Patient called regarding starting TIVOZANIB  Spoke with patient:  ALL specialty oral chemo drugs are originally sent to [facility] for benefit investigation  Medication is pended to Dr Lester to be filled at [facility]  Will attempt to facilitate signature as Dr Lester is out of office  Med called in to pharmacy by oral chemo nurse navigator    Patient aware

## 2024-10-04 RX ORDER — GABAPENTIN 300 MG/1
CAPSULE ORAL
Qty: 120 CAPSULE | Refills: 2 | Status: SHIPPED | OUTPATIENT
Start: 2024-10-04

## 2024-10-07 ENCOUNTER — TELEPHONE (OUTPATIENT)
Dept: HEMATOLOGY ONCOLOGY | Facility: MEDICAL CENTER | Age: 54
End: 2024-10-07

## 2024-10-07 RX ORDER — TIVOZANIB 1.34 MG/1
1.34 CAPSULE ORAL DAILY
Qty: 21 CAPSULE | Refills: 5 | Status: SHIPPED | OUTPATIENT
Start: 2024-10-07

## 2024-10-07 NOTE — TELEPHONE ENCOUNTER
Spoke with Stuart about changing appointment time slightly.  Realized appointment was made months prior, and that he was waiting to f/u w/ Dr. Campbell.    Indicated we would cancel appointment, and monitor status of Dr. Campbell's schedule once it re-opened. Understanding verbalized.

## 2024-10-11 ENCOUNTER — TELEPHONE (OUTPATIENT)
Dept: HEMATOLOGY ONCOLOGY | Facility: MEDICAL CENTER | Age: 54
End: 2024-10-11

## 2024-10-20 DIAGNOSIS — I10 HYPERTENSION, UNSPECIFIED TYPE: ICD-10-CM

## 2024-10-20 RX ORDER — AMLODIPINE BESYLATE 2.5 MG/1
2.5 TABLET ORAL DAILY
Qty: 90 TABLET | Refills: 1 | Status: SHIPPED | OUTPATIENT
Start: 2024-10-20

## 2024-10-22 DIAGNOSIS — C64.1 CLEAR CELL CARCINOMA OF RIGHT KIDNEY (HCC): Primary | ICD-10-CM

## 2024-10-23 DIAGNOSIS — Z51.5 PALLIATIVE CARE PATIENT: ICD-10-CM

## 2024-10-23 DIAGNOSIS — Z90.5 H/O RIGHT NEPHRECTOMY: ICD-10-CM

## 2024-10-23 DIAGNOSIS — G89.3 CANCER-RELATED PAIN: ICD-10-CM

## 2024-10-23 DIAGNOSIS — C64.1 CLEAR CELL CARCINOMA OF RIGHT KIDNEY (HCC): ICD-10-CM

## 2024-10-23 RX ORDER — OXYCODONE HYDROCHLORIDE 10 MG/1
10 TABLET ORAL EVERY 4 HOURS PRN
Qty: 120 TABLET | Refills: 0 | Status: SHIPPED | OUTPATIENT
Start: 2024-10-23

## 2024-10-23 NOTE — TELEPHONE ENCOUNTER
Refill must be reviewed and completed by the office or provider. The refill is unable to be approved or denied by the medication management team.      Unable to complete PDMP. Will forward to clinical staff to review.

## 2024-10-23 NOTE — TELEPHONE ENCOUNTER
Last appointment: 8/13/24    Next scheduled appointment: 11/15/24    [facility]      PDMP review:

## 2024-10-25 ENCOUNTER — APPOINTMENT (OUTPATIENT)
Dept: LAB | Facility: CLINIC | Age: 54
End: 2024-10-25
Payer: COMMERCIAL

## 2024-10-25 DIAGNOSIS — C64.1 CLEAR CELL CARCINOMA OF RIGHT KIDNEY (HCC): ICD-10-CM

## 2024-10-25 LAB
ALBUMIN SERPL BCG-MCNC: 4 G/DL (ref 3.5–5)
ALP SERPL-CCNC: 71 U/L (ref 34–104)
ALT SERPL W P-5'-P-CCNC: 15 U/L (ref 7–52)
ANION GAP SERPL CALCULATED.3IONS-SCNC: 6 MMOL/L (ref 4–13)
AST SERPL W P-5'-P-CCNC: 24 U/L (ref 13–39)
BASOPHILS # BLD AUTO: 0.02 THOUSANDS/ΜL (ref 0–0.1)
BASOPHILS NFR BLD AUTO: 0 % (ref 0–1)
BILIRUB SERPL-MCNC: 0.62 MG/DL (ref 0.2–1)
BUN SERPL-MCNC: 15 MG/DL (ref 5–25)
CALCIUM SERPL-MCNC: 9.2 MG/DL (ref 8.4–10.2)
CHLORIDE SERPL-SCNC: 99 MMOL/L (ref 96–108)
CO2 SERPL-SCNC: 32 MMOL/L (ref 21–32)
CREAT SERPL-MCNC: 1.01 MG/DL (ref 0.6–1.3)
EOSINOPHIL # BLD AUTO: 0.03 THOUSAND/ΜL (ref 0–0.61)
EOSINOPHIL NFR BLD AUTO: 1 % (ref 0–6)
ERYTHROCYTE [DISTWIDTH] IN BLOOD BY AUTOMATED COUNT: 12.7 % (ref 11.6–15.1)
GFR SERPL CREATININE-BSD FRML MDRD: 82 ML/MIN/1.73SQ M
GLUCOSE SERPL-MCNC: 119 MG/DL (ref 65–140)
HCT VFR BLD AUTO: 40.9 % (ref 36.5–49.3)
HGB BLD-MCNC: 13.3 G/DL (ref 12–17)
IMM GRANULOCYTES # BLD AUTO: 0.03 THOUSAND/UL (ref 0–0.2)
IMM GRANULOCYTES NFR BLD AUTO: 1 % (ref 0–2)
LYMPHOCYTES # BLD AUTO: 1.15 THOUSANDS/ΜL (ref 0.6–4.47)
LYMPHOCYTES NFR BLD AUTO: 22 % (ref 14–44)
MCH RBC QN AUTO: 31.9 PG (ref 26.8–34.3)
MCHC RBC AUTO-ENTMCNC: 32.5 G/DL (ref 31.4–37.4)
MCV RBC AUTO: 98 FL (ref 82–98)
MONOCYTES # BLD AUTO: 0.55 THOUSAND/ΜL (ref 0.17–1.22)
MONOCYTES NFR BLD AUTO: 11 % (ref 4–12)
NEUTROPHILS # BLD AUTO: 3.41 THOUSANDS/ΜL (ref 1.85–7.62)
NEUTS SEG NFR BLD AUTO: 65 % (ref 43–75)
NRBC BLD AUTO-RTO: 0 /100 WBCS
PLATELET # BLD AUTO: 373 THOUSANDS/UL (ref 149–390)
PMV BLD AUTO: 9.8 FL (ref 8.9–12.7)
POTASSIUM SERPL-SCNC: 4.2 MMOL/L (ref 3.5–5.3)
PROT SERPL-MCNC: 7.9 G/DL (ref 6.4–8.4)
RBC # BLD AUTO: 4.17 MILLION/UL (ref 3.88–5.62)
SODIUM SERPL-SCNC: 137 MMOL/L (ref 135–147)
WBC # BLD AUTO: 5.19 THOUSAND/UL (ref 4.31–10.16)

## 2024-10-25 PROCEDURE — 80053 COMPREHEN METABOLIC PANEL: CPT

## 2024-10-25 PROCEDURE — 36415 COLL VENOUS BLD VENIPUNCTURE: CPT

## 2024-10-25 PROCEDURE — 85025 COMPLETE CBC W/AUTO DIFF WBC: CPT

## 2024-10-29 ENCOUNTER — OFFICE VISIT (OUTPATIENT)
Dept: HEMATOLOGY ONCOLOGY | Facility: MEDICAL CENTER | Age: 54
End: 2024-10-29
Payer: COMMERCIAL

## 2024-10-29 VITALS
SYSTOLIC BLOOD PRESSURE: 104 MMHG | DIASTOLIC BLOOD PRESSURE: 64 MMHG | BODY MASS INDEX: 22.08 KG/M2 | WEIGHT: 163 LBS | RESPIRATION RATE: 17 BRPM | HEIGHT: 72 IN | TEMPERATURE: 98.3 F | HEART RATE: 77 BPM | OXYGEN SATURATION: 98 %

## 2024-10-29 DIAGNOSIS — C64.1 CLEAR CELL CARCINOMA OF RIGHT KIDNEY (HCC): ICD-10-CM

## 2024-10-29 DIAGNOSIS — I81 PORTAL VEIN THROMBOSIS: Primary | ICD-10-CM

## 2024-10-29 PROCEDURE — 99215 OFFICE O/P EST HI 40 MIN: CPT | Performed by: INTERNAL MEDICINE

## 2024-10-29 NOTE — PROGRESS NOTES
Stuart Munoz  1970  [address] [facility] [place]  [address]  [place] [address]    DISCUSSION/SUMMARY:    54-year-old with stage IV (multiple pulmonary nodules, mediastinal adenopathy, questionable liver lesion) clear-cell renal cell carcinoma.  Mr. Munoz was originally started on pembrolizumab and axitinib.  Patient had trouble tolerating the axitinib and this was switched to once a day.  Eventual follow-up scans demonstrated a mixed response but clearly new lesions and enlarging lesions.  Options were discussed and patient was started on Cabozantinib, 40 mg a day.  Because of GI side effects, patient was never able to get up to 60 mg a day.    Mr. Munoz was subsequently found to have disease progression and was started on tivozananib, 1.3 mg daily 21/28 (approximately 6 weeks ago).    Presently patient states feeling okay, baseline.  Patient is tolerating the new regimen well, approximately 2 months of treatment.  No respiratory issues, no pain control issues.  Patient states having some constipation.  Mr. Munoz will use the MiraLAX 3 times a week and manipulate to watch his bowel movements.      Recent CAT scan demonstrated portal vein thrombosis.  Patient will continue with the Eliquis 5 mg twice a day, no bruising or bleeding issues.  No respiratory issues.  At 6 months, we will consider changing the dose to 2.5 mg twice a day.  Patient understands that the anticoagulation may be lifelong.    Patient states that routine health maintenance and medical care is up-to-date.    Mr. Munoz is to return to the office in 2 months with repeat blood work before.  Patient knows that he will eventually need to have repeat scanning.    Carefully review your medication list and verify that the list is accurate and up-to-date. Please call the hematology/oncology office if there are medications missing from the list, medications on the list that you  are not currently taking or if there is a dosage or instruction that is different from how you're taking that medication.    Patient goals and areas of care: Continue with tivozananib  Barriers to care: None  Patient is able to self-care  ______________________________________________________________________________________    Chief Complaint   Patient presents with    Follow-up    Metastatic clear-cell, renal cell carcinoma     History of Present Illness: 54-year-old male with relatively good general health previously presenting to the emergency room recently with abrupt shortness of breath and dyspnea on exertion.  CTA/chest did not demonstrate any PE but patient was found to have an incidental right renal mass.  Work-up was initiated; this included a urology evaluation.    Mr. Munoz was found to have clear-cell renal cell carcinoma and underwent planned preoperative angioembolization with subsequent right radical nephrectomy via chevron incision.  Patient was subsequently discharged and subsequently referred to oncology for evaluation.    Follow-up CAT scan of the chest demonstrated a number of new pulmonary nodules distant with metastatic disease.  Options were discussed and patient was placed on axitinib and pembrolizumab.  Patient could not tolerate the axitinib twice a day, this was changed to once a day.  Eventual follow-up scans demonstrated disease progression and patient was placed on Cabozantinib, 40 mg a day.      While I was on medical leave, patient had additional scanning demonstrating disease progression.  Mr. Munoz is now on tivozananib.  Patient returns for follow-up.    Mr. Munoz states feeling okay, better than before.  No respiratory issues.  No pain control issues.  Patient is tolerating the new medication well.  No fevers or signs of infection.  Appetite is good, weight is stable.  Patient is having some issues with constipation.  Patient also on Eliquis 5 mg twice a day, no bruising  or bleeding issues.    Review of Systems   Constitutional:  Positive for fatigue.   HENT: Negative.     Eyes: Negative.    Respiratory: Negative.     Cardiovascular: Negative.    Gastrointestinal: Negative.    Endocrine: Negative.    Genitourinary: Negative.    Musculoskeletal:  Positive for arthralgias. Negative for back pain.   Skin: Negative.    Allergic/Immunologic: Negative.    Neurological: Negative.    Hematological: Negative.    Psychiatric/Behavioral:  The patient is nervous/anxious.    All other systems reviewed and are negative.    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)    Fungal dermatitis    Dry skin dermatitis    RBBB    BMI 25.0-25.9,adult    H/O right nephrectomy    Malignant neoplasm metastatic to lung (HCC)    Palliative care patient    Cancer-related pain    Constipation    Anxiousness    Dysphoric mood    Loss of appetite    Retroperitoneal lymphadenopathy    Hypothyroidism    BMI 35.0-35.9,adult    Alteration in appetite    Chronic post-operative pain    Gastroesophageal reflux disease with esophagitis    Hypertension    Weight loss, unintentional    Oral mucositis due to antineoplastic therapy    BMI 21.0-21.9, adult    Dysgeusia    Bilateral calf pain    Varicose veins of left leg with edema    Dehydration    Loose stools    Insomnia    Need for Tdap vaccination    History of gastroesophageal reflux (GERD)    Diarrhea    Cut of finger    Xerostomia    Irregular bowel habits    Medical marijuana use    Backache with radiation    Reactive depression (situational)    Situational mixed anxiety and depressive disorder    Chronic pain syndrome    Myalgia    Arthralgia    Chronic, continuous use of opioids    Portal vein thrombosis     Past Medical History:   Diagnosis Date    Arthralgia     last assessed 02/10/2015    Babesiosis     last assessed  14    Benign paroxysmal vertigo     last assessed 05/22/15    Cancer (HCC)     right kidney removed 22-oral chemotherapy and IV immunotherapy every 21 days    Dry skin dermatitis     Fullness of abdomen     with eating    Meniere disease     last assessed 04/15/13    Pneumonia of left lower lobe due to infectious organism     last assessed 16    Vitamin D deficiency     last assessed 14     Past Surgical History:   Procedure Laterality Date    FIBULA FRACTURE SURGERY Right     IR RENAL ANGIOGRAM  2022    NV NEPHRECTOMY W/PRTL URETERECT OPEN RIB RESCJ RAD Right 2022    Procedure: NEPHRECTOMY ABDOMINAL APPROACH;  Surgeon: Lisa Webster MD;  Location: [facility];  Service: Urology    TIBIA FRACTURE SURGERY Right     TONSILLECTOMY       Family History   Problem Relation Age of Onset    Meniere's disease Mother     Cancer Father         ?renal/bladder?    No Known Problems Family      Social History     Socioeconomic History    Marital status: /Civil Union     Spouse name: Not on file    Number of children: Not on file    Years of education: Not on file    Highest education level: Not on file   Occupational History    Not on file   Tobacco Use    Smoking status: Former     Current packs/day: 0.00     Average packs/day: 1 pack/day for 38.6 years (38.6 ttl pk-yrs)     Types: Cigarettes     Start date: 1984     Quit date: 2022     Years since quittin.1     Passive exposure: Never    Smokeless tobacco: Never    Tobacco comments:     Quit 2 mos ago   Vaping Use    Vaping status: Never Used   Substance and Sexual Activity    Alcohol use: Not Currently     Alcohol/week: 1.0 standard drink of alcohol     Types: 1 Cans of beer per week     Comment: 1 daily    Drug use: No    Sexual activity: Yes   Other Topics Concern    Not on file   Social History Narrative    Daily coffee consumption 6 cups/day    Wife: Jenifer        From 22  note:    Primary Care Provider:  Marycarmen Hackett MD    Primary Insurance: [employer] [place]    Active Health Care Proxies: There are no active Health Care Proxies on file.    Primary Caregiver: Self    Support Systems: Self, Spouse/significant other    County of Residence: [place]    What city do you live in?: [place]    Home entry access options. Select all that apply.: Stairs    Number of steps to enter home.: 3    Type of Current Residence: 2 [address] home    Living Arrangements: Lives w/ Spouse/significant other    Functional Status: Independent    Completes ADLs independently?: Yes    Ambulates independently?: Yes    Does patient use assisted devices?: No    Does patient currently own DME?: No     Social Determinants of Health     Financial Resource Strain: Not on file   Food Insecurity: Not on file   Transportation Needs: Not on file   Physical Activity: Not on file   Stress: Not on file   Social Connections: Not on file   Intimate Partner Violence: Not on file   Housing Stability: Not on file       Current Outpatient Medications:     acetaminophen (TYLENOL) 500 mg tablet, Take 2 tablets (1,000 mg total) by mouth 3 (three) times a day as needed for mild pain, moderate pain, headaches or fever, Disp: 180 tablet, Rfl: 2    amLODIPine (NORVASC) 2.5 mg tablet, take 1 tablet by mouth once daily, Disp: 90 tablet, Rfl: 1    apixaban (Eliquis) 5 mg, Take 1 tablet (5 mg total) by mouth 2 (two) times a day, Disp: 60 tablet, Rfl: 5    CANNABIDIOL PO, Take by mouth, Disp: , Rfl:     DULoxetine (CYMBALTA) 30 mg delayed release capsule, TAKE 1 CAPSULE BY MOUTH EVERY DAY, Disp: 90 capsule, Rfl: 1    gabapentin (NEURONTIN) 300 mg capsule, Take 2 capsules (600mg) in the morning and 2 capsule (600mg) in the evening, Disp: 120 capsule, Rfl: 2    levothyroxine 25 mcg tablet, Take 1 tablet (25 mcg total) by mouth daily, Disp: 90 tablet, Rfl: 3    lidocaine (Lidoderm) 5 %, Apply 1 patch topically over 12 hours daily Remove & Discard patch within 12 hours - apply to R side  of lower spine, Disp: 30 patch, Rfl: 5    naloxone (NARCAN) 4 mg/0.1 mL nasal spray, Administer 1 spray into a nostril. If no response after 2-3 minutes, give another dose in the other nostril using a new spray., Disp: 1 each, Rfl: 1    nystatin (MYCOSTATIN) 500,000 units/5 mL suspension, , Disp: , Rfl:     oxyCODONE (ROXICODONE) 10 MG TABS, Take 1 tablet (10 mg total) by mouth every 4 (four) hours as needed (cancer pain) Max Daily Amount: 60 mg, Disp: 120 tablet, Rfl: 0    Tivozanib HCl (Fotivda) 1.34 MG CAPS, Take 1.34 mg by mouth daily 21 days on, followed by 7 days off, Disp: 21 capsule, Rfl: 5    traZODone (DESYREL) 100 mg tablet, Take 1 tablet (100 mg total) by mouth daily at bedtime, Disp: 30 tablet, Rfl: 2    No Known Allergies    Vitals:    10/29/24 0940   BP: 104/64   Pulse: 77   Resp: 17   Temp: 98.3 °F (36.8 °C)   SpO2: 98%     Physical Exam  Constitutional:       Appearance: He is well-developed.   HENT:      Head: Normocephalic and atraumatic.      Right Ear: External ear normal.      Left Ear: External ear normal.   Eyes:      Conjunctiva/sclera: Conjunctivae normal.      Pupils: Pupils are equal, round, and reactive to light.   Cardiovascular:      Rate and Rhythm: Normal rate and regular rhythm.      Heart sounds: Normal heart sounds.   Pulmonary:      Effort: Pulmonary effort is normal.      Breath sounds: Normal breath sounds.   Abdominal:      General: Bowel sounds are normal.      Palpations: Abdomen is soft.   Musculoskeletal:         General: Normal range of motion.      Cervical back: Normal range of motion and neck supple.   Skin:     General: Skin is warm.   Neurological:      Mental Status: He is alert and oriented to person, place, and time.      Deep Tendon Reflexes: Reflexes are normal and symmetric.   Psychiatric:         Behavior: Behavior normal.         Thought Content: Thought content normal.         Judgment: Judgment normal.     Extremities: No lower extreme edema bilaterally, no  cords, pulses are 1+    Labs    10/25/2024 WBC = 5.19 hemoglobin = 13.3 hematocrit = 41 platelet = 373 neutrophil = 65% BUN = 15 creatinine = 1.01 calcium = 9.2 LFTs WNL    Imaging    8/23/2024 bone scan whole body.  Impression stated very subtle visualization of right ischial tuberosity known metastatic lesion as a subtle area of photopenia.  No single to graphic evidence of new osseous metastatic disease.    8/23/2024 CAT scan chest abdomen pelvis with contrast    IMPRESSION:  Progressing disease with new lymphadenopathy in the retroperitoneum, portacaval region     Multiple non measurable lung nodules in the both lungs with a new 1 cm lung nodule in the right midlung, suggest progression     Development of portal vein thrombosis in the left branch of the portal vein and its segmental branches (image 112 series 2,)  The lytic lesion which was noted in the right esophagoplasty, mildly larger      1/30/2024 CAT scan chest abdomen pelvis with contrast    IMPRESSION:     Previously seen left hilar and subcarinal lymphadenopathy has normalized.     Interval significant improvement in the pulmonary nodular metastases, with many of the smaller pulmonary nodules resolved and many of the larger nodules decreased in size.  Improved retroperitoneal adenopathy.     However the lytic metastasis in the right ischial tuberosity has increased in size currently 3.6 x 2.3 cm, previously 2.5 x 2.4 cm (series 3 image 265.    1/30/2024 bone scan whole body    IMPRESSION:     1. Patient's known lytic lesion on the right ischial tuberosity demonstrates subtle central photopenia surrounded by minimal increased tracer activity and is difficult to distinguish definitively on scintigraphy.     There is otherwise no additional focal tracer activity characteristic of additional sites of metastatic disease. Please note that lytic lesions have variable tracer activity on bone scan and the lack of focal tracer activity does not preclude the    possibility of an underlying osteolytic aggressive/malignant process.     2. Absent right kidney.    Pathology    Case Report   Surgical Pathology Report                         Case: [id number]                                    Authorizing Provider:  Lisa Webster MD        Collected:           11/21/2022 1213               Ordering Location:     [facility]      Received:            11/21/2022 2301                                      Hospital Operating Room                                                       Pathologist:           Jose Evans MD                                                                  Specimen:    Kidney, Right                                                                              Final Diagnosis   A. Kidney, Right, nephrectomy:  - Clear cell renal cell carcinoma.  See comment and synoptic report.  - One lymph node positive for metastatic carcinoma (1/1).     Comment: Immunohistochemistry is diffusely positive in the tumor cells for PAX8 and carbonic anhydrase IX.  CK7 and P504S have non-specific staining.  The findings are consistent with the diagnosis.     8th Ed AJCC Tumor Stage:  at least Stage III - pT2b, pN1, G3.  Representative tumor block: A7   Electronically signed by Jose Evans MD on 12/5/2022 at 10:16 AM     Synoptic Checklist   KIDNEY: Nephrectomy  8th Edition - Protocol posted: 6/30/2021  KIDNEY: NEPHRECTOMY, PARTIAL OR RADICAL - All Specimens  SPECIMEN   Procedure  Total nephrectomy    Specimen Laterality  Right    TUMOR   Tumor Focality  Unifocal    Tumor Size  Greatest Dimension (Centimeters): 13.5 cm   Histologic Type  Clear cell renal cell carcinoma    Histologic Grade (WHO / ISUP)  G3 (nucleoli conspicuous and eosinophilic at 100x magnification)    Tumor Extent  Limited to kidney    Sarcomatoid Features  Not identified    Rhabdoid  Features  Not identified    Tumor Necrosis  Present    Percentage of Tumor Necrosis  30 %   Lymphovascular Invasion  Not identified    MARGINS   Margin Status  All margins negative for invasive carcinoma    REGIONAL LYMPH NODES   Regional Lymph Node Status  Tumor present in regional lymph node(s)    Number of Lymph Nodes with Tumor  1    Jabari Site(s) with Tumor  Hilar    Size of Largest Jaabri Metastatic Deposit  0.3 cm   Number of Lymph Nodes Examined  1    PATHOLOGIC STAGE CLASSIFICATION (pTNM, AJCC 8th Edition)   Reporting of pT, pN, and (when applicable) pM categories is based on information available to the pathologist at the time the report is issued. As per the AJCC (Chapter 1, 8th Ed.) it is the managing physician’s responsibility to establish the final pathologic stage based upon all pertinent information, including but potentially not limited to this pathology report.   Primary Tumor (pT)  pT2b    Regional Lymph Nodes (pN)  pN1    ADDITIONAL FINDINGS   Additional Findings in Nonneoplastic Kidney  Tubuloglomerular necrosis    .

## 2024-11-15 ENCOUNTER — OFFICE VISIT (OUTPATIENT)
Dept: PALLIATIVE MEDICINE | Facility: CLINIC | Age: 54
End: 2024-11-15

## 2024-11-15 VITALS
HEIGHT: 72 IN | OXYGEN SATURATION: 96 % | HEART RATE: 84 BPM | SYSTOLIC BLOOD PRESSURE: 112 MMHG | DIASTOLIC BLOOD PRESSURE: 78 MMHG | WEIGHT: 161 LBS | TEMPERATURE: 97.7 F | BODY MASS INDEX: 21.81 KG/M2

## 2024-11-15 DIAGNOSIS — K59.03 THERAPEUTIC OPIOID-INDUCED CONSTIPATION (OIC): ICD-10-CM

## 2024-11-15 DIAGNOSIS — Z79.899 MEDICAL MARIJUANA USE: ICD-10-CM

## 2024-11-15 DIAGNOSIS — F41.9 ANXIOUSNESS: ICD-10-CM

## 2024-11-15 DIAGNOSIS — R63.0 LOSS OF APPETITE: ICD-10-CM

## 2024-11-15 DIAGNOSIS — G47.00 INSOMNIA: ICD-10-CM

## 2024-11-15 DIAGNOSIS — G89.3 CANCER-RELATED PAIN: ICD-10-CM

## 2024-11-15 DIAGNOSIS — Z90.5 H/O RIGHT NEPHRECTOMY: ICD-10-CM

## 2024-11-15 DIAGNOSIS — T40.2X5A THERAPEUTIC OPIOID-INDUCED CONSTIPATION (OIC): ICD-10-CM

## 2024-11-15 DIAGNOSIS — Z51.5 PALLIATIVE CARE PATIENT: ICD-10-CM

## 2024-11-15 DIAGNOSIS — C64.1 CLEAR CELL CARCINOMA OF RIGHT KIDNEY (HCC): Primary | ICD-10-CM

## 2024-11-15 DIAGNOSIS — R45.89 DYSPHORIC MOOD: ICD-10-CM

## 2024-11-15 RX ORDER — TRAZODONE HYDROCHLORIDE 100 MG/1
100 TABLET ORAL
Qty: 30 TABLET | Refills: 2 | Status: SHIPPED | OUTPATIENT
Start: 2024-11-15

## 2024-11-15 RX ORDER — SENNOSIDES 8.6 MG
8.6-17.2 TABLET ORAL DAILY PRN
Qty: 60 TABLET | Refills: 2 | Status: SHIPPED | OUTPATIENT
Start: 2024-11-15

## 2024-11-15 RX ORDER — POLYETHYLENE GLYCOL 3350 17 G/17G
17 POWDER, FOR SOLUTION ORAL DAILY
Qty: 30 EACH | Refills: 2 | Status: SHIPPED | OUTPATIENT
Start: 2024-11-15

## 2024-11-15 RX ORDER — OXYCODONE HYDROCHLORIDE 10 MG/1
10 TABLET ORAL EVERY 4 HOURS PRN
Qty: 120 TABLET | Refills: 0 | Status: SHIPPED | OUTPATIENT
Start: 2024-11-18

## 2024-11-15 NOTE — ASSESSMENT & PLAN NOTE
ACP: On file.  Reviewed notes (PCP, Medical Oncology, Radiation Oncology), labs (10/25/24 Cr 1.01, alb 4.0, Hb 13.3), imaging + procedures (8/23/24 bone scan + CTCAP). See below for more data.  Return in about 3 months (around 2/15/2025).  Medication safety issues addressed - no driving under the influence of narcotics (including opioids), watch for adverse effects including AMS or respiratory depression (slowed breathing), keep medications stored in a safe/locked environment, do not use alcohol while opioids or other narcotics are in one's system, do not travel with more than the minimum number of tablets or capsules required for the trip.  I have personally queried the patient's controlled substance dispensing history in the Prescription Drug Monitoring Program in compliance with regulations before I have prescribed any controlled substances. The prescription history is consistent with prescribed therapy and our practice policies.

## 2024-11-15 NOTE — PROGRESS NOTES
Ambulatory Visit  Name: Stuart Munoz      : 1970      MRN: [id number]  Encounter Provider: Brando Tirado MD  Encounter Date: 11/15/2024   Encounter department: [facility] PALLIATIVE [facility]    Assessment & Plan   1. 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 Receiving tivozanib. Tolerating therapy. Continue disease-directed cares.  Orders:  -     oxyCODONE (ROXICODONE) 10 MG TABS; Take 1 tablet (10 mg total) by mouth every 4 (four) hours as needed (cancer pain) Max Daily Amount: 60 mg Do not start before 2024.  -     traZODone (DESYREL) 100 mg tablet; Take 1 tablet (100 mg total) by mouth daily at bedtime  -     senna (SENOKOT) 8.6 mg; Take 1-2 tablets (8.6-17.2 mg total) by mouth daily as needed for constipation (constipation)  -     polyethylene glycol (MIRALAX) 17 g packet; Take 17 g by mouth daily  2. H/O right nephrectomy  Assessment & Plan:  Informs plan of care and medication selection.  Orders:  -     oxyCODONE (ROXICODONE) 10 MG TABS; Take 1 tablet (10 mg total) by mouth every 4 (four) hours as needed (cancer pain) Max Daily Amount: 60 mg Do not start before 2024.  3. Cancer-related pain  Assessment & Plan:  Patient reports that in the past month he has had some minor injuries which is caused him to use some more oxycodone, but he still is using his appropriate number pills in a month timeframe, on average. A significant portion of patient's chronic pain is secondary to malignancy and/or osseous metastasis.  Continue oxycodone IR 10mg q.4 hours PRN.   May use 5% lidocaine patch on the right side of the lower spine to help with pain. If not covered by insurance, use 4% OTC patch.  May use topical MMJ for pain.  Continue gabapentin. Monitor kidney function.  Recommend topical OTC products, local application of heat or cold,  Tylenol up to 1000mg TID for chronic pain. Do not use heat on top of topical agents. Do not mix topical agents.  Orders:  -     oxyCODONE (ROXICODONE) 10 MG TABS; Take 1 tablet (10 mg total) by mouth every 4 (four) hours as needed (cancer pain) Max Daily Amount: 60 mg Do not start before November 18, 2024.  4. Anxiousness  Assessment & Plan:  Emotional / psychosocial support provided today. Mood stable. Continue BH regimen.  Orders:  -     traZODone (DESYREL) 100 mg tablet; Take 1 tablet (100 mg total) by mouth daily at bedtime  5. Dysphoric mood  -     traZODone (DESYREL) 100 mg tablet; Take 1 tablet (100 mg total) by mouth daily at bedtime  6. Insomnia  Assessment & Plan:  Continue trazodone; effective.  Orders:  -     traZODone (DESYREL) 100 mg tablet; Take 1 tablet (100 mg total) by mouth daily at bedtime  7. Therapeutic opioid-induced constipation (OIC)  Assessment & Plan:  Counseled today on bowel regimen for opioid-induced constipation.  Orders:  -     senna (SENOKOT) 8.6 mg; Take 1-2 tablets (8.6-17.2 mg total) by mouth daily as needed for constipation (constipation)  -     polyethylene glycol (MIRALAX) 17 g packet; Take 17 g by mouth daily  8. Loss of appetite  Assessment & Plan:  While MMJ does help stimulate appetite, patient feels that may not be stimulating his appetite enough. He feels that the majority of his appetite issues may be related to constipation. He is considering speaking with a Registered Dietitian, but is not yet ready for referral.  9. Medical marijuana use  Assessment & Plan:  Patient has stated he is certified for MMJ in the State [facility].   10. Palliative care patient  Assessment & Plan:  ACP: On file.  Reviewed notes (PCP, Medical Oncology, Radiation Oncology), labs (10/25/24 Cr 1.01, alb 4.0, Hb 13.3), imaging + procedures (8/23/24 bone scan + CTCAP). See below for more data.  Return in about 3 months (around 2/15/2025).  Medication safety issues addressed - no driving under the  influence of narcotics (including opioids), watch for adverse effects including AMS or respiratory depression (slowed breathing), keep medications stored in a safe/locked environment, do not use alcohol while opioids or other narcotics are in one's system, do not travel with more than the minimum number of tablets or capsules required for the trip.  I have personally queried the patient's controlled substance dispensing history in the Prescription Drug Monitoring Program in compliance with regulations before I have prescribed any controlled substances. The prescription history is consistent with prescribed therapy and our practice policies.    Orders:  -     oxyCODONE (ROXICODONE) 10 MG TABS; Take 1 tablet (10 mg total) by mouth every 4 (four) hours as needed (cancer pain) Max Daily Amount: 60 mg Do not start before November 18, 2024.  -     traZODone (DESYREL) 100 mg tablet; Take 1 tablet (100 mg total) by mouth daily at bedtime  -     senna (SENOKOT) 8.6 mg; Take 1-2 tablets (8.6-17.2 mg total) by mouth daily as needed for constipation (constipation)  -     polyethylene glycol (MIRALAX) 17 g packet; Take 17 g by mouth daily      Subjective   Chief Complaint   Patient presents with    Follow-up    Cancer    Pain    Insomnia    Counseling        History of Present Illness     Stuart Munoz is a 54 y.o. male w/ clear cell carcinoma of the right kidney w/ RP lymph node involvement, w/ lung + osseous metastases, s/p angioembolization + R radical nephrectomy 11/21/22, s/p RT, on tivozanib; liver lesions (likely benign hemangioma); portal vein thrombosis, abdominal pain (which may be cancer- or procedure-related), acid reflux / eosinophilic esophagitis, constipation, h/o babesiosis, h/o Meniere disease, h/o vertigo.    Patient's cancer therapy has recently been changed to tivozanib. He is tolerating therapy fairly well, and feels that there is less dysgeusia than there had been on prior treatments, less issues with  "stomatitis. \"No complaints\" in terms of that therapy. He feels that overall he has been doing \"good\" recently. He feels his pain is managed well on his current regimen, though he notes a recent hamstring issue and back pain he feels that his current analgesic regimen helps those issues as well as his chronic and cancer-related pain.    Patient reports his mood is stable. His sleep is \"good\" with assistance for trazodone. He denies nausea, denies vomiting. He has not had diarrhea recently. He is using MiraLAX to help with constipation, though he is only taking it every other day. He feels that he does need to increase his medications to help reduce constipation. He feels his appetite is lower than it should be, and he feels constipation may be impacting this.      Current Outpatient Medications:     acetaminophen (TYLENOL) 500 mg tablet, Take 2 tablets (1,000 mg total) by mouth 3 (three) times a day as needed for mild pain, moderate pain, headaches or fever, Disp: 180 tablet, Rfl: 2    amLODIPine (NORVASC) 2.5 mg tablet, take 1 tablet by mouth once daily, Disp: 90 tablet, Rfl: 1    apixaban (Eliquis) 5 mg, Take 1 tablet (5 mg total) by mouth 2 (two) times a day, Disp: 60 tablet, Rfl: 5    CANNABIDIOL PO, Take by mouth, Disp: , Rfl:     DULoxetine (CYMBALTA) 30 mg delayed release capsule, TAKE 1 CAPSULE BY MOUTH EVERY DAY, Disp: 90 capsule, Rfl: 1    gabapentin (NEURONTIN) 300 mg capsule, Take 2 capsules (600mg) in the morning and 2 capsule (600mg) in the evening, Disp: 120 capsule, Rfl: 2    levothyroxine 25 mcg tablet, Take 1 tablet (25 mcg total) by mouth daily, Disp: 90 tablet, Rfl: 3    lidocaine (Lidoderm) 5 %, Apply 1 patch topically over 12 hours daily Remove & Discard patch within 12 hours - apply to R side of lower spine, Disp: 30 patch, Rfl: 5    naloxone (NARCAN) 4 mg/0.1 mL nasal spray, Administer 1 spray into a nostril. If no response after 2-3 minutes, give another dose in the other nostril using a new " spray., Disp: 1 each, Rfl: 1    nystatin (MYCOSTATIN) 500,000 units/5 mL suspension, , Disp: , Rfl:     [START ON 11/18/2024] oxyCODONE (ROXICODONE) 10 MG TABS, Take 1 tablet (10 mg total) by mouth every 4 (four) hours as needed (cancer pain) Max Daily Amount: 60 mg Do not start before November 18, 2024., Disp: 120 tablet, Rfl: 0    polyethylene glycol (MIRALAX) 17 g packet, Take 17 g by mouth daily, Disp: 30 each, Rfl: 2    senna (SENOKOT) 8.6 mg, Take 1-2 tablets (8.6-17.2 mg total) by mouth daily as needed for constipation (constipation), Disp: 60 tablet, Rfl: 2    Tivozanib HCl (Fotivda) 1.34 MG CAPS, Take 1.34 mg by mouth daily 21 days on, followed by 7 days off, Disp: 21 capsule, Rfl: 5    traZODone (DESYREL) 100 mg tablet, Take 1 tablet (100 mg total) by mouth daily at bedtime, Disp: 30 tablet, Rfl: 2    Objective   /78 (BP Location: Left arm, Patient Position: Sitting, Cuff Size: Standard)   Pulse 84   Temp 97.7 °F (36.5 °C) (Temporal)   Ht 6' (1.829 m)   Wt 73 kg (161 lb)   SpO2 96%   BMI 21.84 kg/m²   Physical Exam  Vitals reviewed.   Constitutional:       General: He is awake. He is not in acute distress.     Appearance: He is well-groomed and normal weight. He is not toxic-appearing.   HENT:      Head: Normocephalic and atraumatic.      Right Ear: External ear normal.      Left Ear: External ear normal.   Eyes:      General: No scleral icterus.        Right eye: No discharge.         Left eye: No discharge.      Extraocular Movements: Extraocular movements intact.      Conjunctiva/sclera: Conjunctivae normal.      Pupils: Pupils are equal, round, and reactive to light.   Cardiovascular:      Rate and Rhythm: Normal rate.   Pulmonary:      Effort: Pulmonary effort is normal. No tachypnea, bradypnea, accessory muscle usage or respiratory distress.      Comments: Able to speak comfortably in complete sentences on room air at rest.  Abdominal:      General: There is no distension.       Tenderness: There is no guarding.   Musculoskeletal:      Cervical back: Normal range of motion.      Right lower leg: No edema.      Left lower leg: No edema.   Skin:     General: Skin is dry.      Coloration: Skin is not pale.   Neurological:      Mental Status: He is alert and oriented to person, place, and time.      Cranial Nerves: No dysarthria or facial asymmetry.   Psychiatric:         Attention and Perception: Attention normal.         Mood and Affect: Mood and affect normal.         Speech: Speech normal.         Behavior: Behavior normal. Behavior is cooperative.         Thought Content: Thought content normal.         Cognition and Memory: Cognition and memory normal.         Judgment: Judgment normal.          Recent labs:  Lab Results   Component Value Date/Time    SODIUM 137 10/25/2024 11:09 AM    K 4.2 10/25/2024 11:09 AM    BUN 15 10/25/2024 11:09 AM    CREATININE 1.01 10/25/2024 11:09 AM    GLUC 119 10/25/2024 11:09 AM    CALCIUM 9.2 10/25/2024 11:09 AM    AST 24 10/25/2024 11:09 AM    ALT 15 10/25/2024 11:09 AM    ALB 4.0 10/25/2024 11:09 AM    TP 7.9 10/25/2024 11:09 AM    EGFR 82 10/25/2024 11:09 AM     Lab Results   Component Value Date/Time    HGB 13.3 10/25/2024 11:09 AM    WBC 5.19 10/25/2024 11:09 AM     10/25/2024 11:09 AM    INR 1.05 11/18/2022 01:48 PM     Lab Results   Component Value Date/Time    JHD1VPDAVJDV 4.331 06/21/2024 10:12 AM       Recent Imaging:  Procedure: CT chest abdomen pelvis w contrast  Result Date: 8/27/2024  Narrative: CT CHEST, ABDOMEN AND PELVIS WITH IV CONTRAST INDICATION: C78.00: Secondary malignant neoplasm of unspecified lung. COMPARISON: January 30, 2024 TECHNIQUE: CT examination of the chest, abdomen and pelvis was performed. Multiplanar 2D reformatted images were created from the source data. This examination, like all CT scans performed in the [facility], was performed utilizing techniques to minimize radiation dose exposure,  including the use of iterative reconstruction and automated exposure control. Radiation dose length product (DLP) for this visit: 398.6 mGy-cm IV Contrast: 100 mL of iohexol (OMNIPAQUE) Enteric Contrast: Not administered. FINDINGS: CHEST LUNGS: Right midlung nodule seen measuring about 6 mm x 0.8 cm, larger from the previous study Cavitary nodule seen right apex measuring 1.5 cm, new additional small nonmeasurable nodule seen right upper lung measuring about 4 to 5 mm, new Left lung demonstrates multiple small 3 to 4 mm lung nodules, new On the previous study the left upper lobe lung nodule was seen which measured 7 mm. This is smaller now measures about 5 mm and is seen in image 22 series 2 PLEURA: No pleural effusion seen No pneumothorax seen HEART/GREAT VESSELS: Heart is unremarkable for patient's age. Ascending aorta measures 4.2 cm MEDIASTINUM AND ELLIE: Right hilar lymph nodes are seen measuring about 1.2 cm, new CHEST WALL AND LOWER NECK: Fat-containing lesion seen in the right lateral chest with some internal density, measuring 5.7 x 4.0 cm in the left fifth intercostal space, stable ABDOMEN LIVER/BILIARY TREE: There is development of a thrombus in the left branch of the portal vein. There is associated increasing enhancement of the hepatic parenchyma GALLBLADDER: No calcified gallstones. No pericholecystic inflammatory change. SPLEEN: Unremarkable. PANCREAS: Unremarkable. ADRENAL GLANDS: Unremarkable. KIDNEYS/URETERS: Left kidney appears unremarkable Right kidney surgically absent STOMACH AND BOWEL: No abnormal dilatation of the small bowel loops seen APPENDIX: No findings to suggest appendicitis. ABDOMINOPELVIC CAVITY: There is a lymphadenopathy in the upper abdominal with multiple lymph nodes in the para-aortic region, with largest lymph node measuring 2.5 cm. There are a aortocaval lymph node measuring about 1.7 cm Retrocaval lymph node measuring about 2 cm Portacaval lymph node measuring 1.3 cm VESSELS:  Celiac trunk patent SMA patent FANNY patent Iliac vessels patent Main portal vein is patent There is thrombus within the left branch of the portal vein and segmental branches of the left portal vein PELVIS REPRODUCTIVE ORGANS: Unremarkable for patient's age. URINARY BLADDER: Unremarkable. ABDOMINAL WALL/INGUINAL REGIONS: Unremarkable. BONES: No acute compression collapse of the vertebra. Three 3.9 cm lucent/lytic lesion in the right ischial tuberosity, larger, previously measuring 3 cm     Impression: Progressing disease with new lymphadenopathy in the retroperitoneum, portacaval region Multiple non measurable lung nodules in the both lungs with a new 1 cm lung nodule in the right midlung, suggest progression Development of portal vein thrombosis in the left branch of the portal vein and its segmental branches (image 112 series 2,) The lytic lesion which was noted in the right esophagoplasty, mildly larger I personally discussed this study with EL KIM on 8/27/2024 1:02 PM. Workstation performed: [id number]     Procedure: NM bone scan whole body  Result Date: 8/26/2024  Narrative: BONE SCAN  WHOLE BODY INDICATION: C78.00: Secondary malignant neoplasm of unspecified lung Restaging of renal cancer PREVIOUS FILM CORRELATION:    1/30/2024 TECHNIQUE:   This study was performed following the intravenous administration of 25.7 mCi Tc-99m labeled MDP.  Delayed, anterior and posterior whole body images were acquired, 2-3 hours after radiopharmaceutical administration. Additional spot views obtained FINDINGS: Patient with known history of right ischial tuberosity metastatic lesion which is very difficult to visualize on this examination. Subtle photopenia is noted. No scintigraphic signs of progression since earlier study. No new osseous abnormalities concerning for progression of disease. Absent right kidney.     Impression: Very subtle visualization of the right ischial tuberosity known metastatic lesion as a subtle  "area of photopenia. No scintigraphic evidence of new osseous metastasis Workstation performed: [id number]       34 minutes total time spent on 11/15/2024 in caring for this patient including obtaining/reviewing history, symptom assessment and management, medication review / adjustment, psychosocial support, reviewing / ordering tests, medicines, imaging, procedures, supportive listening, anticipatory guidance, patient/family education, instructions for management, risks/benefits of treatment(s), risk factor reduction, and documenting in the medical record. All patient/family questions were answered during this discussion.    Brando Tirado MD  [facility] Support[facility]  [phone]    Portions of this document may have been created using dictation software and as such some \"sound alike\" terms may have been generated by the system. Do not hesitate to contact me with any questions or clarifications.   "

## 2024-11-15 NOTE — ASSESSMENT & PLAN NOTE
While MMJ does help stimulate appetite, patient feels that may not be stimulating his appetite enough. He feels that the majority of his appetite issues may be related to constipation. He is considering speaking with a Registered Dietitian, but is not yet ready for referral.

## 2024-11-15 NOTE — ASSESSMENT & PLAN NOTE
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 Receiving tivozanib. Tolerating therapy. Continue disease-directed cares.

## 2024-11-15 NOTE — ASSESSMENT & PLAN NOTE
Patient reports that in the past month he has had some minor injuries which is caused him to use some more oxycodone, but he still is using his appropriate number pills in a month timeframe, on average. A significant portion of patient's chronic pain is secondary to malignancy and/or osseous metastasis.  Continue oxycodone IR 10mg q.4 hours PRN.   May use 5% lidocaine patch on the right side of the lower spine to help with pain. If not covered by insurance, use 4% OTC patch.  May use topical MMJ for pain.  Continue gabapentin. Monitor kidney function.  Recommend topical OTC products, local application of heat or cold, Tylenol up to 1000mg TID for chronic pain. Do not use heat on top of topical agents. Do not mix topical agents.

## 2024-12-02 ENCOUNTER — OFFICE VISIT (OUTPATIENT)
Dept: FAMILY MEDICINE CLINIC | Facility: CLINIC | Age: 54
End: 2024-12-02
Payer: COMMERCIAL

## 2024-12-02 VITALS
OXYGEN SATURATION: 97 % | TEMPERATURE: 98.6 F | HEIGHT: 72 IN | BODY MASS INDEX: 20.72 KG/M2 | RESPIRATION RATE: 12 BRPM | SYSTOLIC BLOOD PRESSURE: 100 MMHG | WEIGHT: 153 LBS | HEART RATE: 86 BPM | DIASTOLIC BLOOD PRESSURE: 86 MMHG

## 2024-12-02 DIAGNOSIS — Z00.00 ANNUAL PHYSICAL EXAM: Primary | ICD-10-CM

## 2024-12-02 DIAGNOSIS — Z13.220 SCREENING FOR LIPID DISORDERS: ICD-10-CM

## 2024-12-02 DIAGNOSIS — E03.9 HYPOTHYROIDISM, UNSPECIFIED TYPE: ICD-10-CM

## 2024-12-02 DIAGNOSIS — Z13.0 SCREENING FOR DEFICIENCY ANEMIA: ICD-10-CM

## 2024-12-02 DIAGNOSIS — Z90.5 H/O RIGHT NEPHRECTOMY: ICD-10-CM

## 2024-12-02 DIAGNOSIS — R10.9 ABDOMINAL CRAMPING: ICD-10-CM

## 2024-12-02 DIAGNOSIS — R10.9 ABDOMINAL PAIN, UNSPECIFIED ABDOMINAL LOCATION: ICD-10-CM

## 2024-12-02 DIAGNOSIS — C64.1 CLEAR CELL CARCINOMA OF RIGHT KIDNEY (HCC): ICD-10-CM

## 2024-12-02 PROCEDURE — 99396 PREV VISIT EST AGE 40-64: CPT | Performed by: FAMILY MEDICINE

## 2024-12-02 NOTE — ASSESSMENT & PLAN NOTE
Orders:    TSH, 3rd generation; Future    Lipase; Future    Lipid Panel with Direct LDL reflex; Future    Magnesium; Future

## 2024-12-02 NOTE — ASSESSMENT & PLAN NOTE
Monitor TSH  Orders:    TSH, 3rd generation; Future    Lipase; Future    Lipid Panel with Direct LDL reflex; Future    Magnesium; Future

## 2024-12-02 NOTE — PROGRESS NOTES
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)  Follows w oncology & palliative care team  Orders:    TSH, 3rd generation; Future    Lipase; Future    Lipid Panel with Direct LDL reflex; Future    Magnesium; Future    H/O right nephrectomy    Orders:    TSH, 3rd generation; Future    Lipase; Future    Lipid Panel with Direct LDL reflex; Future    Magnesium; Future    Hypothyroidism, unspecified type  Monitor TSH  Orders:    TSH, 3rd generation; Future    Lipase; Future    Lipid Panel with Direct LDL reflex; Future    Magnesium; Future    Screening for deficiency anemia         Screening for lipid disorders    Orders:    TSH, 3rd generation; Future    Lipase; Future    Lipid Panel with Direct LDL reflex; Future    Magnesium; Future    Abdominal pain, unspecified abdominal location  Rec holding pain med to see if helps w gastric motility  maintain hydration, heating pad  If pain increases advise ED for lab/imaging       Abdominal cramping  As above       BMI 20.0-20.9, adult         Immunizations and preventive care screenings were discussed with patient today. Appropriate education was printed on patient's after visit summary.        Counseling:  Alcohol/drug use: discussed moderation in alcohol intake, the recommendations for healthy alcohol use, and avoidance of illicit drug use.  Dental Health: discussed importance of regular tooth brushing, flossing, and dental visits.  Injury prevention: discussed safety/seat belts, safety helmets, smoke detectors, carbon monoxide detectors, and smoking near bedding or upholstery.  Exercise: the importance of regular exercise/physical activity was discussed. Recommend exercise 3-5 times per week for at least 30 minutes.           History of Present Illness     Adult Annual Physical  Review of Systems   Constitutional:  Positive for fatigue. Negative for fever.   HENT:  Negative for sore throat.    Respiratory:  Negative for shortness of breath.    Cardiovascular: Negative.    Gastrointestinal:  Negative for blood in stool, diarrhea and vomiting.   Genitourinary:  Positive for flank pain.   Musculoskeletal:  Positive for arthralgias, back pain and myalgias.   Skin:  Negative for rash.   Neurological:  Positive for weakness. Negative for numbness.   Psychiatric/Behavioral:  Positive for sleep disturbance. Negative for hallucinations, self-injury and suicidal ideas. The patient is nervous/anxious.      Past Medical History   Past Medical History:   Diagnosis Date    Arthralgia     last assessed 02/10/2015    Babesiosis     last assessed 12/30/14    Benign paroxysmal vertigo     last assessed 05/22/15    Cancer (HCC)     right kidney removed 11/21/22-oral chemotherapy and IV immunotherapy every 21 days    Dry skin dermatitis     Fullness of abdomen     with eating    Meniere disease     last assessed 04/15/13    Pneumonia of left lower lobe due to infectious organism     last assessed 04/04/16    Vitamin D deficiency     last assessed 12/30/14     Past Surgical History:   Procedure Laterality Date    FIBULA FRACTURE SURGERY Right     IR RENAL ANGIOGRAM  11/18/2022    MN NEPHRECTOMY W/PRTL URETERECT OPEN RIB RESCJ RAD Right 11/21/2022    Procedure: NEPHRECTOMY ABDOMINAL APPROACH;  Surgeon: Lisa Webster MD;  Location: [facility];  Service: Urology    TIBIA FRACTURE SURGERY Right     TONSILLECTOMY       Family History   Problem Relation Age of Onset    Meniere's disease Mother     Hearing loss Mother     Cancer Father         ?renal/bladder?    No Known Problems Family     Colon cancer Maternal Grandfather       reports that he quit smoking about 2 years ago. His smoking use included cigarettes. He started smoking about 40 years ago. He has  a 38.6 pack-year smoking history. He has never been exposed to tobacco smoke. He has quit using smokeless tobacco.  His smokeless tobacco use included chew. He reports that he does not currently use alcohol after a past usage of about 1.0 standard drink of alcohol per week. He reports that he does not use drugs.  Current Outpatient Medications on File Prior to Visit   Medication Sig Dispense Refill    acetaminophen (TYLENOL) 500 mg tablet Take 2 tablets (1,000 mg total) by mouth 3 (three) times a day as needed for mild pain, moderate pain, headaches or fever 180 tablet 2    apixaban (Eliquis) 5 mg Take 1 tablet (5 mg total) by mouth 2 (two) times a day 60 tablet 5    CANNABIDIOL PO Take by mouth      DULoxetine (CYMBALTA) 30 mg delayed release capsule TAKE 1 CAPSULE BY MOUTH EVERY DAY 90 capsule 1    gabapentin (NEURONTIN) 300 mg capsule Take 2 capsules (600mg) in the morning and 2 capsule (600mg) in the evening 120 capsule 2    levothyroxine 25 mcg tablet Take 1 tablet (25 mcg total) by mouth daily 90 tablet 3    lidocaine (Lidoderm) 5 % Apply 1 patch topically over 12 hours daily Remove & Discard patch within 12 hours - apply to R side of lower spine 30 patch 5    naloxone (NARCAN) 4 mg/0.1 mL nasal spray Administer 1 spray into a nostril. If no response after 2-3 minutes, give another dose in the other nostril using a new spray. 1 each 1    nystatin (MYCOSTATIN) 500,000 units/5 mL suspension       oxyCODONE (ROXICODONE) 10 MG TABS Take 1 tablet (10 mg total) by mouth every 4 (four) hours as needed (cancer pain) Max Daily Amount: 60 mg Do not start before November 18, 2024. 120 tablet 0    polyethylene glycol (MIRALAX) 17 g packet Take 17 g by mouth daily 30 each 2    senna (SENOKOT) 8.6 mg Take 1-2 tablets (8.6-17.2 mg total) by mouth daily as needed for constipation (constipation) 60 tablet 2    Tivozanib HCl (Fotivda) 1.34 MG CAPS Take 1.34 mg by mouth daily 21 days on, followed by 7 days off 21 capsule 5     traZODone (DESYREL) 100 mg tablet Take 1 tablet (100 mg total) by mouth daily at bedtime 30 tablet 2     No current facility-administered medications on file prior to visit.   No Known Allergies   Current Outpatient Medications on File Prior to Visit   Medication Sig Dispense Refill    acetaminophen (TYLENOL) 500 mg tablet Take 2 tablets (1,000 mg total) by mouth 3 (three) times a day as needed for mild pain, moderate pain, headaches or fever 180 tablet 2    apixaban (Eliquis) 5 mg Take 1 tablet (5 mg total) by mouth 2 (two) times a day 60 tablet 5    CANNABIDIOL PO Take by mouth      DULoxetine (CYMBALTA) 30 mg delayed release capsule TAKE 1 CAPSULE BY MOUTH EVERY DAY 90 capsule 1    gabapentin (NEURONTIN) 300 mg capsule Take 2 capsules (600mg) in the morning and 2 capsule (600mg) in the evening 120 capsule 2    levothyroxine 25 mcg tablet Take 1 tablet (25 mcg total) by mouth daily 90 tablet 3    lidocaine (Lidoderm) 5 % Apply 1 patch topically over 12 hours daily Remove & Discard patch within 12 hours - apply to R side of lower spine 30 patch 5    naloxone (NARCAN) 4 mg/0.1 mL nasal spray Administer 1 spray into a nostril. If no response after 2-3 minutes, give another dose in the other nostril using a new spray. 1 each 1    nystatin (MYCOSTATIN) 500,000 units/5 mL suspension       oxyCODONE (ROXICODONE) 10 MG TABS Take 1 tablet (10 mg total) by mouth every 4 (four) hours as needed (cancer pain) Max Daily Amount: 60 mg Do not start before November 18, 2024. 120 tablet 0    polyethylene glycol (MIRALAX) 17 g packet Take 17 g by mouth daily 30 each 2    senna (SENOKOT) 8.6 mg Take 1-2 tablets (8.6-17.2 mg total) by mouth daily as needed for constipation (constipation) 60 tablet 2    Tivozanib HCl (Fotivda) 1.34 MG CAPS Take 1.34 mg by mouth daily 21 days on, followed by 7 days off 21 capsule 5    traZODone (DESYREL) 100 mg tablet Take 1 tablet (100 mg total) by mouth daily at bedtime 30 tablet 2     No current  facility-administered medications on file prior to visit.      Social History     Tobacco Use    Smoking status: Former     Current packs/day: 0.00     Average packs/day: 1 pack/day for 38.6 years (38.6 ttl pk-yrs)     Types: Cigarettes     Start date: 1984     Quit date: 2022     Years since quittin.2     Passive exposure: Never    Smokeless tobacco: Former     Types: Chew    Tobacco comments:     Quit 2 mos ago   Vaping Use    Vaping status: Never Used   Substance and Sexual Activity    Alcohol use: Not Currently     Alcohol/week: 1.0 standard drink of alcohol     Comment: 1 daily    Drug use: No    Sexual activity: Yes     Partners: Female     Birth control/protection: Female Sterilization       Objective   /86 (BP Location: Left arm, Patient Position: Sitting, Cuff Size: Standard)   Pulse 86   Temp 98.6 °F (37 °C) (Temporal)   Resp 12   Ht 6' (1.829 m)   Wt 69.4 kg (153 lb)   SpO2 97%   BMI 20.75 kg/m²     Physical Exam  Vitals and nursing note reviewed.   Constitutional:       General: He is awake. He is not in acute distress.     Appearance: He is well-groomed.   Eyes:      General: No scleral icterus.     Conjunctiva/sclera: Conjunctivae normal.   Cardiovascular:      Rate and Rhythm: Normal rate.   Pulmonary:      Effort: Pulmonary effort is normal. No tachypnea, bradypnea, accessory muscle usage or respiratory distress.   Abdominal:      General: Bowel sounds are normal.      Palpations: Abdomen is soft.      Tenderness: There is no guarding or rebound.   Musculoskeletal:      Cervical back: Neck supple. No tenderness. Normal range of motion.      Right lower leg: No edema.      Left lower leg: No edema.   Skin:     General: Skin is warm and dry.      Coloration: Skin is not jaundiced.   Neurological:      General: No focal deficit present.      Mental Status: He is alert and oriented to person, place, and time.      Cranial Nerves: No cranial nerve deficit, dysarthria or facial  asymmetry.   Psychiatric:         Attention and Perception: Attention normal.         Mood and Affect: Mood and affect normal.         Speech: Speech normal.         Behavior: Behavior is cooperative.         Cognition and Memory: Cognition and memory normal.

## 2024-12-02 NOTE — ASSESSMENT & PLAN NOTE
Follows w oncology & palliative care team  Orders:    TSH, 3rd generation; Future    Lipase; Future    Lipid Panel with Direct LDL reflex; Future    Magnesium; Future

## 2024-12-04 ENCOUNTER — APPOINTMENT (OUTPATIENT)
Dept: LAB | Facility: CLINIC | Age: 54
End: 2024-12-04
Payer: COMMERCIAL

## 2024-12-04 DIAGNOSIS — E03.9 HYPOTHYROIDISM, UNSPECIFIED TYPE: ICD-10-CM

## 2024-12-04 DIAGNOSIS — Z13.220 SCREENING FOR LIPID DISORDERS: ICD-10-CM

## 2024-12-04 DIAGNOSIS — C64.1 CLEAR CELL CARCINOMA OF RIGHT KIDNEY (HCC): ICD-10-CM

## 2024-12-04 DIAGNOSIS — I81 PORTAL VEIN THROMBOSIS: ICD-10-CM

## 2024-12-04 DIAGNOSIS — Z90.5 H/O RIGHT NEPHRECTOMY: ICD-10-CM

## 2024-12-04 LAB
ALBUMIN SERPL BCG-MCNC: 3.7 G/DL (ref 3.5–5)
ALP SERPL-CCNC: 65 U/L (ref 34–104)
ALT SERPL W P-5'-P-CCNC: 12 U/L (ref 7–52)
ANION GAP SERPL CALCULATED.3IONS-SCNC: 6 MMOL/L (ref 4–13)
AST SERPL W P-5'-P-CCNC: 25 U/L (ref 13–39)
BASOPHILS # BLD AUTO: 0.03 THOUSANDS/ÂΜL (ref 0–0.1)
BASOPHILS NFR BLD AUTO: 1 % (ref 0–1)
BILIRUB SERPL-MCNC: 0.43 MG/DL (ref 0.2–1)
BUN SERPL-MCNC: 18 MG/DL (ref 5–25)
CALCIUM SERPL-MCNC: 9.4 MG/DL (ref 8.4–10.2)
CHLORIDE SERPL-SCNC: 101 MMOL/L (ref 96–108)
CHOLEST SERPL-MCNC: 182 MG/DL (ref ?–200)
CO2 SERPL-SCNC: 31 MMOL/L (ref 21–32)
CREAT SERPL-MCNC: 1.04 MG/DL (ref 0.6–1.3)
EOSINOPHIL # BLD AUTO: 0.06 THOUSAND/ÂΜL (ref 0–0.61)
EOSINOPHIL NFR BLD AUTO: 1 % (ref 0–6)
ERYTHROCYTE [DISTWIDTH] IN BLOOD BY AUTOMATED COUNT: 13.9 % (ref 11.6–15.1)
GFR SERPL CREATININE-BSD FRML MDRD: 79 ML/MIN/1.73SQ M
GLUCOSE SERPL-MCNC: 124 MG/DL (ref 65–140)
HCT VFR BLD AUTO: 43.4 % (ref 36.5–49.3)
HDLC SERPL-MCNC: 38 MG/DL
HGB BLD-MCNC: 13.4 G/DL (ref 12–17)
IMM GRANULOCYTES # BLD AUTO: 0.02 THOUSAND/UL (ref 0–0.2)
IMM GRANULOCYTES NFR BLD AUTO: 0 % (ref 0–2)
LDLC SERPL CALC-MCNC: 117 MG/DL (ref 0–100)
LIPASE SERPL-CCNC: 27 U/L (ref 11–82)
LYMPHOCYTES # BLD AUTO: 1.11 THOUSANDS/ÂΜL (ref 0.6–4.47)
LYMPHOCYTES NFR BLD AUTO: 18 % (ref 14–44)
MAGNESIUM SERPL-MCNC: 2.2 MG/DL (ref 1.9–2.7)
MCH RBC QN AUTO: 28.3 PG (ref 26.8–34.3)
MCHC RBC AUTO-ENTMCNC: 30.9 G/DL (ref 31.4–37.4)
MCV RBC AUTO: 92 FL (ref 82–98)
MONOCYTES # BLD AUTO: 0.54 THOUSAND/ÂΜL (ref 0.17–1.22)
MONOCYTES NFR BLD AUTO: 9 % (ref 4–12)
NEUTROPHILS # BLD AUTO: 4.55 THOUSANDS/ÂΜL (ref 1.85–7.62)
NEUTS SEG NFR BLD AUTO: 71 % (ref 43–75)
NRBC BLD AUTO-RTO: 0 /100 WBCS
PLATELET # BLD AUTO: 331 THOUSANDS/UL (ref 149–390)
PMV BLD AUTO: 10.6 FL (ref 8.9–12.7)
POTASSIUM SERPL-SCNC: 4.3 MMOL/L (ref 3.5–5.3)
PROT SERPL-MCNC: 7.7 G/DL (ref 6.4–8.4)
RBC # BLD AUTO: 4.74 MILLION/UL (ref 3.88–5.62)
SODIUM SERPL-SCNC: 138 MMOL/L (ref 135–147)
TRIGL SERPL-MCNC: 136 MG/DL (ref ?–150)
TSH SERPL DL<=0.05 MIU/L-ACNC: 7.8 UIU/ML (ref 0.45–4.5)
WBC # BLD AUTO: 6.31 THOUSAND/UL (ref 4.31–10.16)

## 2024-12-04 PROCEDURE — 80061 LIPID PANEL: CPT

## 2024-12-04 PROCEDURE — 80053 COMPREHEN METABOLIC PANEL: CPT

## 2024-12-04 PROCEDURE — 36415 COLL VENOUS BLD VENIPUNCTURE: CPT

## 2024-12-04 PROCEDURE — 84443 ASSAY THYROID STIM HORMONE: CPT

## 2024-12-04 PROCEDURE — 83690 ASSAY OF LIPASE: CPT

## 2024-12-04 PROCEDURE — 85025 COMPLETE CBC W/AUTO DIFF WBC: CPT

## 2024-12-04 PROCEDURE — 83735 ASSAY OF MAGNESIUM: CPT

## 2024-12-05 ENCOUNTER — APPOINTMENT (EMERGENCY)
Dept: RADIOLOGY | Facility: HOSPITAL | Age: 54
End: 2024-12-05
Payer: COMMERCIAL

## 2024-12-05 ENCOUNTER — TELEPHONE (OUTPATIENT)
Dept: FAMILY MEDICINE CLINIC | Facility: CLINIC | Age: 54
End: 2024-12-05

## 2024-12-05 ENCOUNTER — PATIENT MESSAGE (OUTPATIENT)
Dept: PALLIATIVE MEDICINE | Facility: CLINIC | Age: 54
End: 2024-12-05

## 2024-12-05 ENCOUNTER — HOSPITAL ENCOUNTER (EMERGENCY)
Facility: HOSPITAL | Age: 54
Discharge: HOME | End: 2024-12-05
Attending: EMERGENCY MEDICINE
Payer: COMMERCIAL

## 2024-12-05 VITALS
HEART RATE: 79 BPM | RESPIRATION RATE: 18 BRPM | DIASTOLIC BLOOD PRESSURE: 69 MMHG | OXYGEN SATURATION: 96 % | SYSTOLIC BLOOD PRESSURE: 135 MMHG | TEMPERATURE: 98 F

## 2024-12-05 DIAGNOSIS — R10.9 ABDOMINAL PAIN: ICD-10-CM

## 2024-12-05 DIAGNOSIS — R59.1 LYMPHADENOPATHY: ICD-10-CM

## 2024-12-05 DIAGNOSIS — C79.9 METASTATIC DISEASE (HCC): ICD-10-CM

## 2024-12-05 DIAGNOSIS — K59.00 CONSTIPATION: ICD-10-CM

## 2024-12-05 DIAGNOSIS — M54.9 BACK PAIN: Primary | ICD-10-CM

## 2024-12-05 LAB
ALBUMIN SERPL BCG-MCNC: 3.6 G/DL (ref 3.5–5)
ALP SERPL-CCNC: 63 U/L (ref 34–104)
ALT SERPL W P-5'-P-CCNC: 11 U/L (ref 7–52)
ANION GAP SERPL CALCULATED.3IONS-SCNC: 7 MMOL/L (ref 4–13)
AST SERPL W P-5'-P-CCNC: 24 U/L (ref 13–39)
BASOPHILS # BLD AUTO: 0.03 THOUSANDS/ÂΜL (ref 0–0.1)
BASOPHILS NFR BLD AUTO: 1 % (ref 0–1)
BILIRUB SERPL-MCNC: 0.57 MG/DL (ref 0.2–1)
BUN SERPL-MCNC: 15 MG/DL (ref 5–25)
CALCIUM SERPL-MCNC: 9.6 MG/DL (ref 8.4–10.2)
CHLORIDE SERPL-SCNC: 103 MMOL/L (ref 96–108)
CO2 SERPL-SCNC: 28 MMOL/L (ref 21–32)
CREAT SERPL-MCNC: 0.9 MG/DL (ref 0.6–1.3)
EOSINOPHIL # BLD AUTO: 0.04 THOUSAND/ÂΜL (ref 0–0.61)
EOSINOPHIL NFR BLD AUTO: 1 % (ref 0–6)
ERYTHROCYTE [DISTWIDTH] IN BLOOD BY AUTOMATED COUNT: 14 % (ref 11.6–15.1)
GFR SERPL CREATININE-BSD FRML MDRD: 95 ML/MIN/1.73SQ M
GLUCOSE SERPL-MCNC: 106 MG/DL (ref 65–140)
HCT VFR BLD AUTO: 38.8 % (ref 36.5–49.3)
HGB BLD-MCNC: 12.4 G/DL (ref 12–17)
IMM GRANULOCYTES # BLD AUTO: 0.01 THOUSAND/UL (ref 0–0.2)
IMM GRANULOCYTES NFR BLD AUTO: 0 % (ref 0–2)
LIPASE SERPL-CCNC: 37 U/L (ref 11–82)
LYMPHOCYTES # BLD AUTO: 0.93 THOUSANDS/ÂΜL (ref 0.6–4.47)
LYMPHOCYTES NFR BLD AUTO: 17 % (ref 14–44)
MCH RBC QN AUTO: 28.4 PG (ref 26.8–34.3)
MCHC RBC AUTO-ENTMCNC: 32 G/DL (ref 31.4–37.4)
MCV RBC AUTO: 89 FL (ref 82–98)
MONOCYTES # BLD AUTO: 0.64 THOUSAND/ÂΜL (ref 0.17–1.22)
MONOCYTES NFR BLD AUTO: 12 % (ref 4–12)
NEUTROPHILS # BLD AUTO: 3.89 THOUSANDS/ÂΜL (ref 1.85–7.62)
NEUTS SEG NFR BLD AUTO: 69 % (ref 43–75)
NRBC BLD AUTO-RTO: 0 /100 WBCS
PLATELET # BLD AUTO: 275 THOUSANDS/UL (ref 149–390)
PMV BLD AUTO: 9.6 FL (ref 8.9–12.7)
POTASSIUM SERPL-SCNC: 4.4 MMOL/L (ref 3.5–5.3)
PROT SERPL-MCNC: 6.9 G/DL (ref 6.4–8.4)
RBC # BLD AUTO: 4.36 MILLION/UL (ref 3.88–5.62)
SODIUM SERPL-SCNC: 138 MMOL/L (ref 135–147)
WBC # BLD AUTO: 5.54 THOUSAND/UL (ref 4.31–10.16)

## 2024-12-05 PROCEDURE — 99284 EMERGENCY DEPT VISIT MOD MDM: CPT

## 2024-12-05 PROCEDURE — 85025 COMPLETE CBC W/AUTO DIFF WBC: CPT | Performed by: EMERGENCY MEDICINE

## 2024-12-05 PROCEDURE — 36415 COLL VENOUS BLD VENIPUNCTURE: CPT | Performed by: EMERGENCY MEDICINE

## 2024-12-05 PROCEDURE — 80053 COMPREHEN METABOLIC PANEL: CPT | Performed by: EMERGENCY MEDICINE

## 2024-12-05 PROCEDURE — 96375 TX/PRO/DX INJ NEW DRUG ADDON: CPT

## 2024-12-05 PROCEDURE — 74177 CT ABD & PELVIS W/CONTRAST: CPT

## 2024-12-05 PROCEDURE — 96374 THER/PROPH/DIAG INJ IV PUSH: CPT

## 2024-12-05 PROCEDURE — 83690 ASSAY OF LIPASE: CPT | Performed by: EMERGENCY MEDICINE

## 2024-12-05 PROCEDURE — 99285 EMERGENCY DEPT VISIT HI MDM: CPT | Performed by: EMERGENCY MEDICINE

## 2024-12-05 PROCEDURE — 96372 THER/PROPH/DIAG INJ SC/IM: CPT

## 2024-12-05 PROCEDURE — 96361 HYDRATE IV INFUSION ADD-ON: CPT

## 2024-12-05 RX ORDER — POLYETHYLENE GLYCOL 3350 17 G/17G
17 POWDER, FOR SOLUTION ORAL DAILY
Qty: 510 G | Refills: 0 | Status: SHIPPED | OUTPATIENT
Start: 2024-12-05

## 2024-12-05 RX ORDER — BISACODYL 10 MG
10 SUPPOSITORY, RECTAL RECTAL ONCE
Status: COMPLETED | OUTPATIENT
Start: 2024-12-05 | End: 2024-12-05

## 2024-12-05 RX ORDER — POLYETHYLENE GLYCOL 3350 17 G/17G
17 POWDER, FOR SOLUTION ORAL ONCE
Status: COMPLETED | OUTPATIENT
Start: 2024-12-05 | End: 2024-12-05

## 2024-12-05 RX ORDER — ACETAMINOPHEN 10 MG/ML
1000 INJECTION, SOLUTION INTRAVENOUS ONCE
Status: COMPLETED | OUTPATIENT
Start: 2024-12-05 | End: 2024-12-05

## 2024-12-05 RX ORDER — METHOCARBAMOL 750 MG/1
750 TABLET, FILM COATED ORAL 3 TIMES DAILY
Qty: 30 TABLET | Refills: 0 | Status: SHIPPED | OUTPATIENT
Start: 2024-12-05 | End: 2024-12-15

## 2024-12-05 RX ORDER — BISACODYL 10 MG
10 SUPPOSITORY, RECTAL RECTAL DAILY
Qty: 12 SUPPOSITORY | Refills: 0 | Status: SHIPPED | OUTPATIENT
Start: 2024-12-05

## 2024-12-05 RX ORDER — KETOROLAC TROMETHAMINE 30 MG/ML
15 INJECTION, SOLUTION INTRAMUSCULAR; INTRAVENOUS ONCE
Status: COMPLETED | OUTPATIENT
Start: 2024-12-05 | End: 2024-12-05

## 2024-12-05 RX ORDER — AMOXICILLIN 250 MG
1 CAPSULE ORAL DAILY
Qty: 20 TABLET | Refills: 0 | Status: SHIPPED | OUTPATIENT
Start: 2024-12-05

## 2024-12-05 RX ORDER — ACETAMINOPHEN 500 MG
1000 TABLET ORAL EVERY 8 HOURS
Qty: 66 TABLET | Refills: 0 | Status: SHIPPED | OUTPATIENT
Start: 2024-12-05

## 2024-12-05 RX ORDER — AMOXICILLIN 250 MG
1 CAPSULE ORAL ONCE
Status: COMPLETED | OUTPATIENT
Start: 2024-12-05 | End: 2024-12-05

## 2024-12-05 RX ORDER — DOXYCYCLINE 100 MG/1
100 CAPSULE ORAL 2 TIMES DAILY
Qty: 10 CAPSULE | Refills: 0 | Status: SHIPPED | OUTPATIENT
Start: 2024-12-05 | End: 2024-12-10

## 2024-12-05 RX ORDER — CYCLOBENZAPRINE HCL 10 MG
10 TABLET ORAL ONCE
Status: DISCONTINUED | OUTPATIENT
Start: 2024-12-05 | End: 2024-12-05

## 2024-12-05 RX ORDER — LACTULOSE 10 G/15ML
20 SOLUTION ORAL ONCE
Status: COMPLETED | OUTPATIENT
Start: 2024-12-05 | End: 2024-12-05

## 2024-12-05 RX ORDER — DOXYCYCLINE 100 MG/1
100 CAPSULE ORAL ONCE
Status: COMPLETED | OUTPATIENT
Start: 2024-12-05 | End: 2024-12-05

## 2024-12-05 RX ORDER — METHOCARBAMOL 500 MG/1
1000 TABLET, FILM COATED ORAL ONCE
Status: COMPLETED | OUTPATIENT
Start: 2024-12-05 | End: 2024-12-05

## 2024-12-05 RX ADMIN — KETOROLAC TROMETHAMINE 15 MG: 30 INJECTION, SOLUTION INTRAMUSCULAR; INTRAVENOUS at 05:52

## 2024-12-05 RX ADMIN — METHOCARBAMOL TABLETS 1000 MG: 500 TABLET, COATED ORAL at 07:05

## 2024-12-05 RX ADMIN — ACETAMINOPHEN 1000 MG: 10 INJECTION INTRAVENOUS at 05:52

## 2024-12-05 RX ADMIN — LACTULOSE 20 G: 20 SOLUTION ORAL at 05:49

## 2024-12-05 RX ADMIN — AMOXICILLIN AND CLAVULANATE POTASSIUM 1 TABLET: 875; 125 TABLET, FILM COATED ORAL at 08:01

## 2024-12-05 RX ADMIN — METHYLNALTREXONE BROMIDE 12 MG: 12 INJECTION, SOLUTION SUBCUTANEOUS at 06:41

## 2024-12-05 RX ADMIN — SODIUM CHLORIDE 1000 ML: 0.9 INJECTION, SOLUTION INTRAVENOUS at 05:51

## 2024-12-05 RX ADMIN — IOHEXOL 100 ML: 350 INJECTION, SOLUTION INTRAVENOUS at 06:46

## 2024-12-05 RX ADMIN — DOXYCYCLINE 100 MG: 100 CAPSULE ORAL at 08:01

## 2024-12-05 RX ADMIN — BISACODYL 10 MG: 10 SUPPOSITORY RECTAL at 05:49

## 2024-12-05 RX ADMIN — POLYETHYLENE GLYCOL 3350 17 G: 17 POWDER, FOR SOLUTION ORAL at 05:49

## 2024-12-05 RX ADMIN — SENNOSIDES AND DOCUSATE SODIUM 1 TABLET: 50; 8.6 TABLET ORAL at 05:49

## 2024-12-05 NOTE — ED PROVIDER NOTES
Final Diagnosis:  1. Back pain    2. Abdominal pain    3. Constipation        Chief Complaint   Patient presents with    Abdominal Pain     Patient c/o severe abdominal pain, last bowel movement was Monday, patient reports small not a regular bowel movement, recently off of oxycodone and gabapentin, has taken miralax without relief       HPI  Pt has renal cancer w/ mets  Has prior nephrectomy    Had labs yest, good renal fxn    Has been constipated.  Is on chronic narcotic pain management  Tried to stop oxy 2 d ago for BM regularity  Tried miralax    Presents moaning in pain frm back and abd pain, right mid abdomen.   Says this isn't his normal back pain.     No vomiting. Tolerating PO  No chest pain  No sob  No fever chills  No urinary changes.       EMS additionally reports:     - Previous charting underwent limited review with attention to last ED visits, labs, ekgs, and prior imaging.  Chart review reveals :     Appointment on 12/04/2024   Component Date Value Ref Range Status    WBC 12/04/2024 6.31  4.31 - 10.16 Thousand/uL Final    RBC 12/04/2024 4.74  3.88 - 5.62 Million/uL Final    Hemoglobin 12/04/2024 13.4  12.0 - 17.0 g/dL Final    Hematocrit 12/04/2024 43.4  36.5 - 49.3 % Final    MCV 12/04/2024 92  82 - 98 fL Final    MCH 12/04/2024 28.3  26.8 - 34.3 pg Final    MCHC 12/04/2024 30.9 (L)  31.4 - 37.4 g/dL Final    RDW 12/04/2024 13.9  11.6 - 15.1 % Final    MPV 12/04/2024 10.6  8.9 - 12.7 fL Final    Platelets 12/04/2024 331  149 - 390 Thousands/uL Final    nRBC 12/04/2024 0  /100 WBCs Final    Segmented % 12/04/2024 71  43 - 75 % Final    Immature Grans % 12/04/2024 0  0 - 2 % Final    Lymphocytes % 12/04/2024 18  14 - 44 % Final    Monocytes % 12/04/2024 9  4 - 12 % Final    Eosinophils Relative 12/04/2024 1  0 - 6 % Final    Basophils Relative 12/04/2024 1  0 - 1 % Final    Absolute Neutrophils 12/04/2024 4.55  1.85 - 7.62 Thousands/µL Final    Absolute Immature Grans 12/04/2024 0.02  0.00 - 0.20  Thousand/uL Final    Absolute Lymphocytes 12/04/2024 1.11  0.60 - 4.47 Thousands/µL Final    Absolute Monocytes 12/04/2024 0.54  0.17 - 1.22 Thousand/µL Final    Eosinophils Absolute 12/04/2024 0.06  0.00 - 0.61 Thousand/µL Final    Basophils Absolute 12/04/2024 0.03  0.00 - 0.10 Thousands/µL Final    Sodium 12/04/2024 138  135 - 147 mmol/L Final    Potassium 12/04/2024 4.3  3.5 - 5.3 mmol/L Final    Chloride 12/04/2024 101  96 - 108 mmol/L Final    CO2 12/04/2024 31  21 - 32 mmol/L Final    ANION GAP 12/04/2024 6  4 - 13 mmol/L Final    BUN 12/04/2024 18  5 - 25 mg/dL Final    Creatinine 12/04/2024 1.04  0.60 - 1.30 mg/dL Final    Standardized to IDMS reference method    Glucose 12/04/2024 124  65 - 140 mg/dL Final    If the patient is fasting, the ADA then defines impaired fasting glucose as > 100 mg/dL and diabetes as > or equal to 123 mg/dL.    Calcium 12/04/2024 9.4  8.4 - 10.2 mg/dL Final    AST 12/04/2024 25  13 - 39 U/L Final    ALT 12/04/2024 12  7 - 52 U/L Final    Specimen collection should occur prior to Sulfasalazine administration due to the potential for falsely depressed results.     Alkaline Phosphatase 12/04/2024 65  34 - 104 U/L Final    Total Protein 12/04/2024 7.7  6.4 - 8.4 g/dL Final    Albumin 12/04/2024 3.7  3.5 - 5.0 g/dL Final    Total Bilirubin 12/04/2024 0.43  0.20 - 1.00 mg/dL Final    Use of this assay is not recommended for patients undergoing treatment with eltrombopag due to the potential for falsely elevated results.  N-acetyl-p-benzoquinone imine (metabolite of Acetaminophen) will generate erroneously low results in samples for patients that have taken an overdose of Acetaminophen.    eGFR 12/04/2024 79  ml/min/1.73sq m Final    TSH 3RD GENERATON 12/04/2024 7.801 (H)  0.450 - 4.500 uIU/mL Final    Adult TSH (3rd generation) reference range follows the recommended guidelines of the American Thyroid Association, January, 2020.    Lipase 12/04/2024 27  11 - 82 u/L Final     Cholesterol 12/04/2024 182  See Comment mg/dL Final    Cholesterol:         Pediatric <18 Years        Desirable          <170 mg/dL      Borderline High    170-199 mg/dL      High               >=200 mg/dL        Adult >=18 Years            Desirable         <200 mg/dL      Borderline High   200-239 mg/dL      High              >239 mg/dL      Triglycerides 12/04/2024 136  See Comment mg/dL Final    Triglyceride:     0-9Y            <75mg/dL     10Y-17Y         <90 mg/dL       >=18Y     Normal          <150 mg/dL     Borderline High 150-199 mg/dL     High            200-499 mg/dL        Very High       >499 mg/dL    Specimen collection should occur prior to Metamizole administration due to the potential for falsely depressed results.    HDL, Direct 12/04/2024 38 (L)  >=40 mg/dL Final    LDL Calculated 12/04/2024 117 (H)  0 - 100 mg/dL Final    LDL Cholesterol:     Optimal           <100 mg/dl     Near Optimal      100-129 mg/dl     Above Optimal       Borderline High 130-159 mg/dl       High            160-189 mg/dl       Very High       >189 mg/dl         This screening LDL is a calculated result.   It does not have the accuracy of the Direct Measured LDL in the monitoring of patients with hyperlipidemia and/or statin therapy.   Direct Measure LDL (DRH998) must be ordered separately in these patients.    Magnesium 12/04/2024 2.2  1.9 - 2.7 mg/dL Final       - No language barrier.   - History obtained from patient    - Discuss patient's care, with patient permission or by chart review, with      PMH:   has a past medical history of Arthralgia, Babesiosis, Benign paroxysmal vertigo, Cancer (HCC), Dry skin dermatitis, Fullness of abdomen, Meniere disease, Pneumonia of left lower lobe due to infectious organism, and Vitamin D deficiency.    PSH:   has a past surgical history that includes Fibula Fracture Surgery (Right); Tibia fracture surgery (Right); Tonsillectomy; IR renal angiogram (11/18/2022); and pr nephrectomy  w/prtl ureterect open rib rescj rad (Right, 11/21/2022).     Social History:  Tobacco Use: Medium Risk (12/5/2024)    Patient History     Smoking Tobacco Use: Former     Smokeless Tobacco Use: Former     Passive Exposure: Never     Alcohol Use: Not on file     No illicit use       ROS:  Pertinent positives/negatives: .     Some ROS may be present in the HPI and would take precedent over these standard questions asked below.   Review of Systems   Constitutional:  Negative for chills and fever.   Gastrointestinal:  Positive for abdominal pain and constipation. Negative for diarrhea, nausea and vomiting.   Genitourinary:  Negative for difficulty urinating, dysuria, flank pain, frequency, hematuria and testicular pain.   Musculoskeletal:  Positive for back pain. Negative for neck pain and neck stiffness.        CONSTITUTIONAL:  No lethargy. No unexpected weight loss. No change in behavior.  EYES:  No pain, redness, or discharge. No loss of vision. No orbital trauma or pain.   ENT:  No tinnitus or decreased hearing. No epistaxis/purulent rhinorrhea. No voice change, airway closing, trismus.   CARDIOVASCULAR:  No chest pain. No skin mottling or pallor. No change in exertional capacity  RESPIRATORY:  No hemoptysis. No paroxysmal nocturnal dyspnea. No stridor. No apnea or bluing.   GASTROINTESTINAL:  No vomiting, diarrhea. No distension. No melena. No hematochezia.   GENITOURINARY:  No nocturia. No hematuria or foul smelling or cloudy urine. No discharge. No sores/adenopathy.   MUSCULOSKELETAL:  No contracture.  No new deformity.   INTEGUMENTARY:  No swelling. No unexpected contusions. No abrasions. No lymphangitis.  NEUROLOGIC:  No meningismus. No new numbness of the extremities. No new focal weakness. No postural instability  PSYCHIATRIC:  No SI HI AVH  HEMATOLOGICAL:  No bleeding. No petechiae. No bruising.  ALLERGIES:  No urticaria. No sudden abd cramping. No stridor.    PE:     Physical exam highlights:   Physical Exam        Vitals:    12/05/24 0510 12/05/24 0530 12/05/24 0630   BP: 146/98 137/90 148/92   BP Location:  Right arm Right arm   Pulse: 75 72 74   Resp: 18 18 18   Temp: 98 °F (36.7 °C)     TempSrc: Oral     SpO2: 98% 99% 95%     Vitals reviewed by me.   Nursing note reviewed  Chaperone present for all sensitive exam.  Const: No acute distress. Alert. Nontoxic. Not diaphoretic.    HEENT: External ears normal. No protrusion drainage swelling. Nose normal. No drainage/traumatic deformity. MM. Mouth with baseline/symmetric movement. No trismus.   Eyes: No squinting. No icterus. No tearing/swelling/drainage. Tracks through the room with normal EOM.   Neck: ROM normal. No rigidity. No meningismus.  Cards: Rate as per vitals Compared to monitor sinus unless documented. Regular Well perfused.  Pulm: Effort and excursion normal. No distress. No audible wheezing/no stridor. Normal resp rate without retraction or change in work of breathing.  Abd: No distension beyond baseline. No fluctuant wave. Patient without peritoneal pain with shifting/bumping the bed.   MSK: ROM normal baseline. No deformity. No contractures from baseline.   Skin: No new rashes visible. Well perfused. No wounds visualized on exposed skin  Neuro: Nonfocal. Baseline. CN grossly intact. Moving all four with coordination.   Psych: Normal behavior and affect.        A:  - Nursing note reviewed.    Ddx and MDM  Considered diagnoses    Likely component of chronic oxy coming off, and now w/ lowered endogenous pain control, I.e. withdrawal    However wanting to remain off narcotics for the constipation.     Feeling better after tylenol IV and toradol (we discuss renal effects )    Still a little back pain and abd pain persists and he feels it's too out of normal to go home.   At this time labs looking normal  Trial muscle relaxer  Defers narcotics  Offer ketamine pain dosing    Otherwise    R/o divertic  CBC  CT ab pelv w  Saline washout contrast  He's too thin to  tell inflammation without contrast in a CT wo    R/o ureterolithiasis  R/o appendicitis  CT ab pelv  R/o gilles  CMP CT ab pelv  R/o panc  Lipase CT ab pelv    R/o urinary infection  UA      Signed out AM team pending CT read        Dispo decision       My conversation with consultant reveals:        Decision rules:                           My read of the XR/CT scan reveals:     CT abdomen pelvis with contrast    (Results Pending)       Orders Placed This Encounter   Procedures    CT abdomen pelvis with contrast    CBC and differential    Comprehensive metabolic panel    Lipase    UA (URINE) with reflex to Scope     Labs Reviewed   LIPASE - Normal       Result Value Ref Range Status    Lipase 37  11 - 82 u/L Final   CBC AND DIFFERENTIAL    WBC 5.54  4.31 - 10.16 Thousand/uL Final    RBC 4.36  3.88 - 5.62 Million/uL Final    Hemoglobin 12.4  12.0 - 17.0 g/dL Final    Hematocrit 38.8  36.5 - 49.3 % Final    MCV 89  82 - 98 fL Final    MCH 28.4  26.8 - 34.3 pg Final    MCHC 32.0  31.4 - 37.4 g/dL Final    RDW 14.0  11.6 - 15.1 % Final    MPV 9.6  8.9 - 12.7 fL Final    Platelets 275  149 - 390 Thousands/uL Final    nRBC 0  /100 WBCs Final    Segmented % 69  43 - 75 % Final    Immature Grans % 0  0 - 2 % Final    Lymphocytes % 17  14 - 44 % Final    Monocytes % 12  4 - 12 % Final    Eosinophils Relative 1  0 - 6 % Final    Basophils Relative 1  0 - 1 % Final    Absolute Neutrophils 3.89  1.85 - 7.62 Thousands/µL Final    Absolute Immature Grans 0.01  0.00 - 0.20 Thousand/uL Final    Absolute Lymphocytes 0.93  0.60 - 4.47 Thousands/µL Final    Absolute Monocytes 0.64  0.17 - 1.22 Thousand/µL Final    Eosinophils Absolute 0.04  0.00 - 0.61 Thousand/µL Final    Basophils Absolute 0.03  0.00 - 0.10 Thousands/µL Final   COMPREHENSIVE METABOLIC PANEL    Sodium 138  135 - 147 mmol/L Final    Potassium 4.4  3.5 - 5.3 mmol/L Final    Chloride 103  96 - 108 mmol/L Final    CO2 28  21 - 32 mmol/L Final    ANION GAP 7  4 - 13  mmol/L Final    BUN 15  5 - 25 mg/dL Final    Creatinine 0.90  0.60 - 1.30 mg/dL Final    Comment: Standardized to IDMS reference method    Glucose 106  65 - 140 mg/dL Final    Comment: If the patient is fasting, the ADA then defines impaired fasting glucose as > 100 mg/dL and diabetes as > or equal to 123 mg/dL.    Calcium 9.6  8.4 - 10.2 mg/dL Final    AST 24  13 - 39 U/L Final    ALT 11  7 - 52 U/L Final    Comment: Specimen collection should occur prior to Sulfasalazine administration due to the potential for falsely depressed results.     Alkaline Phosphatase 63  34 - 104 U/L Final    Total Protein 6.9  6.4 - 8.4 g/dL Final    Albumin 3.6  3.5 - 5.0 g/dL Final    Total Bilirubin 0.57  0.20 - 1.00 mg/dL Final    Comment: Use of this assay is not recommended for patients undergoing treatment with eltrombopag due to the potential for falsely elevated results.  N-acetyl-p-benzoquinone imine (metabolite of Acetaminophen) will generate erroneously low results in samples for patients that have taken an overdose of Acetaminophen.    eGFR 95  ml/min/1.73sq m Final    Narrative:     National Kidney Disease Foundation guidelines for Chronic Kidney Disease (CKD):     Stage 1 with normal or high GFR (GFR > 90 mL/min/1.73 square meters)    Stage 2 Mild CKD (GFR = 60-89 mL/min/1.73 square meters)    Stage 3A Moderate CKD (GFR = 45-59 mL/min/1.73 square meters)    Stage 3B Moderate CKD (GFR = 30-44 mL/min/1.73 square meters)    Stage 4 Severe CKD (GFR = 15-29 mL/min/1.73 square meters)    Stage 5 End Stage CKD (GFR <15 mL/min/1.73 square meters)  Note: GFR calculation is accurate only with a steady state creatinine   URINALYSIS WITH REFLEX TO SCOPE       *Each of these labs was reviewed. Particular standout labs will be noted in the ED Course above     Final Diagnosis:  1. Back pain    2. Abdominal pain    3. Constipation          P:  - hospital tx includes   Medications   sodium chloride 0.9 % bolus 1,000 mL (1,000 mL  Intravenous New Bag 12/5/24 0551)   polyethylene glycol (MIRALAX) packet 17 g (17 g Oral Given 12/5/24 0549)   senna-docusate sodium (SENOKOT S) 8.6-50 mg per tablet 1 tablet (1 tablet Oral Given 12/5/24 0549)   bisacodyl (DULCOLAX) rectal suppository 10 mg (10 mg Rectal Given 12/5/24 0549)   methylnaltrexone (Relistor) subcutaneous injection 12 mg (12 mg Subcutaneous Given 12/5/24 0641)   lactulose (CHRONULAC) oral solution 20 g (20 g Oral Given 12/5/24 0549)   ketorolac (TORADOL) injection 15 mg (15 mg Intravenous Given 12/5/24 0552)   acetaminophen (Ofirmev) injection 1,000 mg (1,000 mg Intravenous New Bag 12/5/24 0552)   methocarbamol (ROBAXIN) tablet 1,000 mg (1,000 mg Oral Given 12/5/24 0705)   iohexol (OMNIPAQUE) 350 MG/ML injection (MULTI-DOSE) 100 mL (100 mL Intravenous Given 12/5/24 0646)         - disposition  Time reflects when diagnosis was documented in both MDM as applicable and the Disposition within this note       Time User Action Codes Description Comment    12/5/2024  7:07 AM Osman Telles [M54.9] Back pain     12/5/2024  7:07 AM Osman Telles [R10.9] Abdominal pain     12/5/2024  7:07 AM Osman Telles [K59.00] Constipation           ED Disposition       ED Disposition   Discharge    Condition   Stable    Date/Time   Thu Dec 5, 2024  7:07 AM    Comment   Stuart Munoz discharge to home/self care.                   Follow-up Information       Follow up With Specialties Details Why Contact Info Additional Information    [facility]   [address] [address] [phone] [facility], [address], [place], [place], [address] [phone]    Marycarmen Hackett MD Family Medicine   [facility] [address] [facility] [address]  [phone]               - patient will call their PCP to let them know they were in the emergency department. We discuss return  precautions and patient is agreeable with plan and aformentioned disposition.       - additional treatment intended, if consistent with primary provider:  - patient to follow with :      Patient's Medications   Discharge Prescriptions    ACETAMINOPHEN (TYLENOL) 500 MG TABLET    Take 2 tablets (1,000 mg total) by mouth every 8 (eight) hours       Start Date: 12/5/2024 End Date: --       Order Dose: 1,000 mg       Quantity: 66 tablet    Refills: 0    BISACODYL (DULCOLAX) 10 MG SUPPOSITORY    Insert 1 suppository (10 mg total) into the rectum daily       Start Date: 12/5/2024 End Date: --       Order Dose: 10 mg       Quantity: 12 suppository    Refills: 0    DICLOFENAC SODIUM (VOLTAREN) 1 %    Apply 2 g topically 4 (four) times a day       Start Date: 12/5/2024 End Date: --       Order Dose: 2 g       Quantity: 240 g    Refills: 0    LACTULOSE (CEPHULAC) 20 G PACKET    Take 1 packet (20 g total) by mouth 2 (two) times a day       Start Date: 12/5/2024 End Date: --       Order Dose: 20 g       Quantity: 30 each    Refills: 0    METHOCARBAMOL (ROBAXIN) 750 MG TABLET    Take 1 tablet (750 mg total) by mouth 3 (three) times a day for 10 days       Start Date: 12/5/2024 End Date: 12/15/2024       Order Dose: 750 mg       Quantity: 30 tablet    Refills: 0    POLYETHYLENE GLYCOL (MIRALAX) 17 G PACKET    Take 17 g by mouth daily       Start Date: 12/5/2024 End Date: --       Order Dose: 17 g       Quantity: 510 g    Refills: 0    SENNA-DOCUSATE SODIUM (SENOKOT S) 8.6-50 MG PER TABLET    Take 1 tablet by mouth daily       Start Date: 12/5/2024 End Date: --       Order Dose: 1 tablet       Quantity: 20 tablet    Refills: 0     No discharge procedures on file.  Prior to Admission Medications   Prescriptions Last Dose Informant Patient Reported? Taking?   CANNABIDIOL PO  Self, Spouse/Significant Other Yes No   Sig: Take by mouth   DULoxetine (CYMBALTA) 30 mg delayed release capsule  Self, Spouse/Significant Other No No   Sig:  "TAKE 1 CAPSULE BY MOUTH EVERY DAY   Tivozanib HCl (Fotivda) 1.34 MG CAPS  Self No No   Sig: Take 1.34 mg by mouth daily 21 days on, followed by 7 days off   acetaminophen (TYLENOL) 500 mg tablet  Self, Spouse/Significant Other No No   Sig: Take 2 tablets (1,000 mg total) by mouth 3 (three) times a day as needed for mild pain, moderate pain, headaches or fever   apixaban (Eliquis) 5 mg  Self No No   Sig: Take 1 tablet (5 mg total) by mouth 2 (two) times a day   gabapentin (NEURONTIN) 300 mg capsule  Self No No   Sig: Take 2 capsules (600mg) in the morning and 2 capsule (600mg) in the evening   levothyroxine 25 mcg tablet  Self, Spouse/Significant Other No No   Sig: Take 1 tablet (25 mcg total) by mouth daily   lidocaine (Lidoderm) 5 %  Self No No   Sig: Apply 1 patch topically over 12 hours daily Remove & Discard patch within 12 hours - apply to R side of lower spine   naloxone (NARCAN) 4 mg/0.1 mL nasal spray  Self No No   Sig: Administer 1 spray into a nostril. If no response after 2-3 minutes, give another dose in the other nostril using a new spray.   nystatin (MYCOSTATIN) 500,000 units/5 mL suspension  Self, Spouse/Significant Other Yes No   oxyCODONE (ROXICODONE) 10 MG TABS  Self No No   Sig: Take 1 tablet (10 mg total) by mouth every 4 (four) hours as needed (cancer pain) Max Daily Amount: 60 mg Do not start before November 18, 2024.   polyethylene glycol (MIRALAX) 17 g packet  Self No No   Sig: Take 17 g by mouth daily   senna (SENOKOT) 8.6 mg  Self No No   Sig: Take 1-2 tablets (8.6-17.2 mg total) by mouth daily as needed for constipation (constipation)   traZODone (DESYREL) 100 mg tablet  Self No No   Sig: Take 1 tablet (100 mg total) by mouth daily at bedtime      Facility-Administered Medications: None       Portions of the record may have been created with voice recognition software. Occasional wrong word or \"sound a like\" substitutions may have occurred due to the inherent limitations of voice " recognition software. Read the chart carefully and recognize, using context, where substitutions have occurred.    Electronically signed by:  MD Osman Patten MD  12/05/24 0775

## 2024-12-05 NOTE — PATIENT COMMUNICATION
Called spouse Jenifer and reviewed pain management and bowel plan. She expressed appreciation. Thank you all for helping Mr Munoz.

## 2024-12-05 NOTE — TELEPHONE ENCOUNTER
Denice Krishnan, RN routed this conversation to [facility]  Stuart YAMILKA Munoz to [facility] (supporting Marycarmen Hackett MD)         12/4/24  4:40 PM  Hi Dr. Hackett,  Hope all is well. Stuart wanted me to send you a message to see if there is something stronger for him to use over the counter for his stomach. He would like not to go to the er and try something else. Do you have any suggestions?   He says he has taken the miralax. I am not home all day not sure what he has done with that.  Thank you for your help.     Jenifer

## 2024-12-05 NOTE — TELEPHONE ENCOUNTER
Callled and spoke with patient's wife. He is at [facility]  ER now being evaluated for abdominal pain and pneumonia. Scheduled ER follow up with Dr Hackett 12/11.

## 2024-12-05 NOTE — DISCHARGE INSTRUCTIONS
"You have been evaluated in the Emergency Department today for abdominal pain. Your evaluation was not suggestive of any emergent condition requiring medical intervention at this time. However, some abdominal problems make take more time to appear. Therefore, it is important for you to watch for any new symptoms or worsening of your current condition.      Please follow up with your primary care physician as soon as possible. If you do not have a primary doctor, you can call \"[facility]\" to schedule an appointment with one.     Return to the Emergency Department if you experience worsening or uncontrolled pain, fevers 100.4°F or greater, recurrent vomiting, inability to tolerate food or fluids by mouth, bloody stools or vomit, black or tarry stools, or any other concerning symptoms.    Your CT results are as follows:    \"Interval progression of metastatic disease in the abdomen with worsening of extensive adenopathy in the upper abdomen, retroperitoneum and mesentery. Indeterminate subcentimeter hypodensity in the liver for which metastatic disease cannot be excluded.     Interval progression of pulmonary metastatic disease in the visualized chest. Groundglass densities in the right lower lobe may represent interstitial edema or infection. New small right pleural effusion\"    As discussed given your cough you will be treated for possible pneumonia.  Please take the antibiotics as prescribed.    Please follow-up with your family doctor and oncologist as soon as possible.    "

## 2024-12-05 NOTE — ED CARE HANDOFF
Emergency Department Sign Out Note        Sign out and transfer of care from  See Separate Emergency Department note.     The patient, Stuart Munoz, was evaluated by the previous provider for abd pain.    Workup Completed:  Labs, CT scan    ED Course / Workup Pending (followup):  Pending CT scan read                                  ED Course as of 12/05/24 0801   Thu Dec 05, 2024   0800 Patient reevaluated.  Resting comfortably.  Feeling better.  Discussed results and findings.  Explained no obvious emergent causes of abdominal pain in his abdomen.  Discussed worsening metastatic disease.  Also discussed fluid in the lung and possible pneumonia.  Given patient has a cough we will treat for possible pneumonia.  Will discharge.  Return precautions discussed.  Patient verbalized understanding and agreed to plan of care.             Disposition  Final diagnoses:   Back pain   Abdominal pain   Constipation   Metastatic disease (HCC)   Lymphadenopathy     Time reflects when diagnosis was documented in both MDM as applicable and the Disposition within this note       Time User Action Codes Description Comment    12/5/2024  7:07 AM Osman Telles Add [M54.9] Back pain     12/5/2024  7:07 AM Osman Telles Add [R10.9] Abdominal pain     12/5/2024  7:07 AM Osman Telles Add [K59.00] Constipation     12/5/2024  7:57 AM Zeke Christopher Add [C79.9] Metastatic disease (HCC)     12/5/2024  7:57 AM Zeke Christopher Add [R59.1] Lymphadenopathy           ED Disposition       ED Disposition   Discharge    Condition   Stable    Date/Time   Thu Dec 5, 2024  7:07 AM    Comment   Stuart Munoz discharge to home/self care.                   Follow-up Information       Follow up With Specialties Details Why Contact Info Additional Information    [facility]   [address] [place] [id number] [phone] [facility], [address] [place] [address]  [address] [place] [address], [id number] [phone]    Marycarmen Hackett MD Family Medicine   [address] [facility] [address]  [phone]             Patient's Medications   Discharge Prescriptions    ACETAMINOPHEN (TYLENOL) 500 MG TABLET    Take 2 tablets (1,000 mg total) by mouth every 8 (eight) hours       Start Date: 12/5/2024 End Date: --       Order Dose: 1,000 mg       Quantity: 66 tablet    Refills: 0    AMOXICILLIN-CLAVULANATE (AUGMENTIN) 875-125 MG PER TABLET    Take 1 tablet by mouth every 12 (twelve) hours for 5 days       Start Date: 12/5/2024 End Date: 12/10/2024       Order Dose: 1 tablet       Quantity: 10 tablet    Refills: 0    BISACODYL (DULCOLAX) 10 MG SUPPOSITORY    Insert 1 suppository (10 mg total) into the rectum daily       Start Date: 12/5/2024 End Date: --       Order Dose: 10 mg       Quantity: 12 suppository    Refills: 0    DICLOFENAC SODIUM (VOLTAREN) 1 %    Apply 2 g topically 4 (four) times a day       Start Date: 12/5/2024 End Date: --       Order Dose: 2 g       Quantity: 240 g    Refills: 0    DOXYCYCLINE HYCLATE (VIBRAMYCIN) 100 MG CAPSULE    Take 1 capsule (100 mg total) by mouth 2 (two) times a day for 5 days       Start Date: 12/5/2024 End Date: 12/10/2024       Order Dose: 100 mg       Quantity: 10 capsule    Refills: 0    LACTULOSE (CEPHULAC) 20 G PACKET    Take 1 packet (20 g total) by mouth 2 (two) times a day       Start Date: 12/5/2024 End Date: --       Order Dose: 20 g       Quantity: 30 each    Refills: 0    METHOCARBAMOL (ROBAXIN) 750 MG TABLET    Take 1 tablet (750 mg total) by mouth 3 (three) times a day for 10 days       Start Date: 12/5/2024 End Date: 12/15/2024       Order Dose: 750 mg       Quantity: 30 tablet    Refills: 0    POLYETHYLENE GLYCOL (MIRALAX) 17 G PACKET    Take 17 g by mouth daily       Start Date: 12/5/2024 End Date: --       Order Dose: 17 g       Quantity: 510 g    Refills: 0    SENNA-DOCUSATE SODIUM (SENOKOT S) 8.6-50 MG PER  TABLET    Take 1 tablet by mouth daily       Start Date: 12/5/2024 End Date: --       Order Dose: 1 tablet       Quantity: 20 tablet    Refills: 0     No discharge procedures on file.       ED Provider  Electronically Signed by     Zeke Christopher DO  12/05/24 0808

## 2024-12-11 ENCOUNTER — OFFICE VISIT (OUTPATIENT)
Dept: FAMILY MEDICINE CLINIC | Facility: CLINIC | Age: 54
End: 2024-12-11
Payer: COMMERCIAL

## 2024-12-11 VITALS
DIASTOLIC BLOOD PRESSURE: 88 MMHG | HEIGHT: 72 IN | RESPIRATION RATE: 12 BRPM | BODY MASS INDEX: 20.05 KG/M2 | WEIGHT: 148 LBS | TEMPERATURE: 97.3 F | HEART RATE: 102 BPM | SYSTOLIC BLOOD PRESSURE: 110 MMHG | OXYGEN SATURATION: 98 %

## 2024-12-11 DIAGNOSIS — G47.00 INSOMNIA, UNSPECIFIED TYPE: Primary | ICD-10-CM

## 2024-12-11 DIAGNOSIS — C64.1 CLEAR CELL CARCINOMA OF RIGHT KIDNEY (HCC): ICD-10-CM

## 2024-12-11 DIAGNOSIS — Z90.5 H/O RIGHT NEPHRECTOMY: ICD-10-CM

## 2024-12-11 PROCEDURE — 99213 OFFICE O/P EST LOW 20 MIN: CPT | Performed by: FAMILY MEDICINE

## 2024-12-11 RX ORDER — HYDROXYZINE PAMOATE 25 MG/1
25 CAPSULE ORAL
Qty: 30 CAPSULE | Refills: 0 | Status: SHIPPED | OUTPATIENT
Start: 2024-12-11

## 2024-12-11 NOTE — PROGRESS NOTES
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

## 2024-12-11 NOTE — ASSESSMENT & PLAN NOTE
Orders:  •  hydrOXYzine pamoate (VISTARIL) 25 mg capsule; Take 1 capsule (25 mg total) by mouth daily at bedtime as needed for anxiety (insomnia)

## 2024-12-18 ENCOUNTER — OFFICE VISIT (OUTPATIENT)
Dept: HEMATOLOGY ONCOLOGY | Facility: MEDICAL CENTER | Age: 54
End: 2024-12-18
Payer: COMMERCIAL

## 2024-12-18 VITALS
HEIGHT: 72 IN | OXYGEN SATURATION: 97 % | TEMPERATURE: 97.7 F | SYSTOLIC BLOOD PRESSURE: 104 MMHG | BODY MASS INDEX: 20.64 KG/M2 | HEART RATE: 84 BPM | WEIGHT: 152.4 LBS | DIASTOLIC BLOOD PRESSURE: 66 MMHG | RESPIRATION RATE: 15 BRPM

## 2024-12-18 DIAGNOSIS — C64.1 CLEAR CELL CARCINOMA OF RIGHT KIDNEY (HCC): ICD-10-CM

## 2024-12-18 DIAGNOSIS — G89.3 CANCER-RELATED PAIN: ICD-10-CM

## 2024-12-18 DIAGNOSIS — Z90.5 H/O RIGHT NEPHRECTOMY: ICD-10-CM

## 2024-12-18 DIAGNOSIS — Z51.5 PALLIATIVE CARE PATIENT: ICD-10-CM

## 2024-12-18 DIAGNOSIS — C78.00 MALIGNANT NEOPLASM METASTATIC TO LUNG, UNSPECIFIED LATERALITY (HCC): Primary | ICD-10-CM

## 2024-12-18 DIAGNOSIS — J40 BRONCHITIS: ICD-10-CM

## 2024-12-18 PROCEDURE — 99215 OFFICE O/P EST HI 40 MIN: CPT | Performed by: INTERNAL MEDICINE

## 2024-12-18 RX ORDER — AZITHROMYCIN 250 MG/1
TABLET, FILM COATED ORAL
Qty: 6 TABLET | Refills: 0 | Status: SHIPPED | OUTPATIENT
Start: 2024-12-18 | End: 2024-12-22

## 2024-12-18 NOTE — TELEPHONE ENCOUNTER
Medication: oxycodone 10mg  Dose/Frequency: Take 1 tablet (10 mg total) by mouth every 4 (four) hours as needed (cancer pain) Max Daily Amount: 60 mg     Quantity: 120    Pharmacy: [facility]    Office:   [] PCP/Provider -   [x] Speciality/Provider - Dr. Tirado    Does the patient have enough for 3 days?   unsure

## 2024-12-18 NOTE — PROGRESS NOTES
Stuart Munoz  1970  [address] [facility] [place]  [address]  [place] [id number]    DISCUSSION/SUMMARY:    54-year-old with stage IV (multiple pulmonary nodules, mediastinal adenopathy, questionable liver lesion) clear-cell renal cell carcinoma.  Mr. Munoz was originally started on pembrolizumab and axitinib.  Patient had trouble tolerating the axitinib and this was switched to once a day.  Eventual follow-up scans demonstrated a mixed response but clearly new lesions and enlarging lesions.  Options were discussed and patient was started on Cabozantinib, 40 mg a day.  Because of GI side effects, patient was never able to get up to 60 mg a day.    Mr. Munoz was subsequently found to have disease progression and was started on tivozananib, 1.3 mg daily 21/28 (approximately 10 weeks ago).  Patient is just beginning his third cycle.    CAT scan the abdomen/pelvis from approximately 12 days ago demonstrates disease progression.  We discussed options.  Mr. Munoz and his wife demonstrated an excellent understanding of the situation.  Patient understands that his cancer is likely not curable, the goal is to try and keep the patient alive as long as possible with a good quality of life.  The plan is to complete the third cycle of the tivozananib then rescan (early January 2025).  If evidence of disease progression, a treatment change will be needed.    Patient is concerned about the cough, CAT scan of the abdomen/pelvis included the base of the lungs which commented on the possibility of disease progression versus pneumonia.  Patient was recently treated with doxycycline and amoxicillin.  Patient has no allergies.  To be sure, patient is to take Levaquin Z-Jesus.  Patient/wife understand that the cough may be due to disease progression - they will monitor.    Patient is to return in approximately 3 to 4 weeks but this may change depending upon the  above.    Prior CAT scan demonstrated portal vein thrombosis.  Patient will continue with the Eliquis 5 mg twice a day, no bruising or bleeding issues.  No respiratory issues.  Patient understands that the anticoagulation is lifelong.    Mr. Munoz knows that he will eventually need to have repeat scanning.    Carefully review your medication list and verify that the list is accurate and up-to-date. Please call the hematology/oncology office if there are medications missing from the list, medications on the list that you are not currently taking or if there is a dosage or instruction that is different from how you're taking that medication.    Patient goals and areas of care: Continue with tivozananib, Z-Jesus   Barriers to care: None  Patient is able to self-care  ______________________________________________________________________________________    Chief Complaint   Patient presents with    Follow-up    Metastatic renal cell carcinoma     History of Present Illness: 54-year-old male with relatively good general health previously presenting to the emergency room recently with abrupt shortness of breath and dyspnea on exertion.  CTA/chest did not demonstrate any PE but patient was found to have an incidental right renal mass.  Work-up was initiated; this included a urology evaluation.    Mr. Munoz was found to have clear-cell renal cell carcinoma and underwent planned preoperative angioembolization with subsequent right radical nephrectomy via chevron incision.  Patient was subsequently discharged and was then referred to oncology for evaluation.    Follow-up CAT scan of the chest demonstrated a number of new pulmonary nodules distant with metastatic disease.  Options were discussed and patient was placed on axitinib and pembrolizumab.  Patient could not tolerate the axitinib twice a day, this was changed to once a day.  Eventual follow-up scans demonstrated disease progression and patient was placed on  Cabozantinib, 40 mg a day.      While I was on medical leave, patient had additional scanning demonstrating disease progression.  Mr. Munoz is now on tivozananib.  Patient has just started the third cycle; returns for follow-up.    Mr. Munoz states feeling +/-.  Appetite is okay, weight is more or less stable.  No fevers or signs of infection.  Chronic cough is the same as before, no sputum or hemoptysis.  Patient states having more abdominal pain and back pain.  Patient sees palliative care, pain is controlled.  Activities are about the same as before.  No  issues.    Because of symptoms, patient was seen in the emergency room on December 5, 2024.  CAT scan of the abdomen/pelvis performed, results are listed below.    Review of Systems   Constitutional:  Positive for fatigue.   HENT: Negative.     Eyes: Negative.    Respiratory: Negative.     Cardiovascular: Negative.    Gastrointestinal: Negative.    Endocrine: Negative.    Genitourinary: Negative.    Musculoskeletal:  Positive for arthralgias. Negative for back pain.   Skin: Negative.    Allergic/Immunologic: Negative.    Neurological: Negative.    Hematological: Negative.    Psychiatric/Behavioral:  The patient is nervous/anxious.    All other systems reviewed and are negative.    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)    Fungal dermatitis    Dry skin dermatitis    RBBB    BMI 25.0-25.9,adult    H/O right nephrectomy    Malignant neoplasm metastatic to lung (HCC)    Palliative care patient    Cancer-related pain    Therapeutic opioid-induced constipation (OIC)    Anxiousness    Dysphoric mood    Loss of appetite    Retroperitoneal lymphadenopathy    Hypothyroidism    BMI 35.0-35.9,adult    Alteration in appetite    Chronic post-operative pain    Gastroesophageal reflux disease with esophagitis     Hypertension    Weight loss, unintentional    Oral mucositis due to antineoplastic therapy    BMI 21.0-21.9, adult    Dysgeusia    Bilateral calf pain    Varicose veins of left leg with edema    Dehydration    Loose stools    Insomnia    Need for Tdap vaccination    History of gastroesophageal reflux (GERD)    Diarrhea    Cut of finger    Xerostomia    Irregular bowel habits    Medical marijuana use    Backache with radiation    Reactive depression (situational)    Situational mixed anxiety and depressive disorder    Chronic pain syndrome    Myalgia    Arthralgia    Chronic, continuous use of opioids    Portal vein thrombosis     Past Medical History:   Diagnosis Date    Arthralgia     last assessed 02/10/2015    Babesiosis     last assessed 12/30/14    Benign paroxysmal vertigo     last assessed 05/22/15    Cancer (HCC)     right kidney removed 11/21/22-oral chemotherapy and IV immunotherapy every 21 days    Dry skin dermatitis     Fullness of abdomen     with eating    Meniere disease     last assessed 04/15/13    Pneumonia of left lower lobe due to infectious organism     last assessed 04/04/16    Vitamin D deficiency     last assessed 12/30/14     Past Surgical History:   Procedure Laterality Date    FIBULA FRACTURE SURGERY Right     IR RENAL ANGIOGRAM  11/18/2022    RI NEPHRECTOMY W/PRTL URETERECT OPEN RIB RESCJ RAD Right 11/21/2022    Procedure: NEPHRECTOMY ABDOMINAL APPROACH;  Surgeon: Lisa Webster MD;  Location: [facility];  Service: Urology    TIBIA FRACTURE SURGERY Right     TONSILLECTOMY       Family History   Problem Relation Age of Onset    Meniere's disease Mother     Hearing loss Mother     Cancer Father         ?renal/bladder?    No Known Problems Family     Colon cancer Maternal Grandfather      Social History     Socioeconomic History    Marital status: /Civil Union     Spouse name: Not on file    Number of children: Not on file    Years of education: Not on file    Highest education  level: Not on file   Occupational History    Not on file   Tobacco Use    Smoking status: Former     Current packs/day: 0.00     Average packs/day: 1 pack/day for 38.6 years (38.6 ttl pk-yrs)     Types: Cigarettes     Start date: 1984     Quit date: 2022     Years since quittin.2     Passive exposure: Never    Smokeless tobacco: Former     Types: Chew    Tobacco comments:     Quit 2 mos ago   Vaping Use    Vaping status: Never Used   Substance and Sexual Activity    Alcohol use: Not Currently     Alcohol/week: 1.0 standard drink of alcohol     Comment: 1 daily    Drug use: No    Sexual activity: Yes     Partners: Female     Birth control/protection: Female Sterilization   Other Topics Concern    Not on file   Social History Narrative    Daily coffee consumption 6 cups/day    Wife: Jenifer        From 22  note:    Primary Care Provider: Marycarmen Hackett MD    Primary Insurance: BLUE CROSS    Active Health Care Proxies: There are no active Health Care Proxies on file.    Primary Caregiver: Self    Support Systems: Self, Spouse/significant other    County of Residence: [place]    What city do you live in?: [place]    Home entry access options. Select all that apply.: Stairs    Number of steps to enter home.: 3    Type of Current Residence: [address] [place] [address] home    Living Arrangements: Lives w/ Spouse/significant other    Functional Status: Independent    Completes ADLs independently?: Yes    Ambulates independently?: Yes    Does patient use assisted devices?: No    Does patient currently own DME?: No     Social Drivers of Health     Financial Resource Strain: Not on file   Food Insecurity: Not on file   Transportation Needs: Not on file   Physical Activity: Not on file   Stress: Not on file   Social Connections: Not on file   Intimate Partner Violence: Not on file   Housing Stability: Not on file       Current Outpatient Medications:     acetaminophen (TYLENOL) 500 mg tablet, Take 2 tablets (1,000 mg total)  by mouth 3 (three) times a day as needed for mild pain, moderate pain, headaches or fever, Disp: 180 tablet, Rfl: 2    acetaminophen (TYLENOL) 500 mg tablet, Take 2 tablets (1,000 mg total) by mouth every 8 (eight) hours, Disp: 66 tablet, Rfl: 0    apixaban (Eliquis) 5 mg, Take 1 tablet (5 mg total) by mouth 2 (two) times a day, Disp: 60 tablet, Rfl: 5    bisacodyl (DULCOLAX) 10 mg suppository, Insert 1 suppository (10 mg total) into the rectum daily, Disp: 12 suppository, Rfl: 0    CANNABIDIOL PO, Take by mouth, Disp: , Rfl:     Diclofenac Sodium (VOLTAREN) 1 %, Apply 2 g topically 4 (four) times a day, Disp: 240 g, Rfl: 0    DULoxetine (CYMBALTA) 30 mg delayed release capsule, TAKE 1 CAPSULE BY MOUTH EVERY DAY, Disp: 90 capsule, Rfl: 1    gabapentin (NEURONTIN) 300 mg capsule, Take 2 capsules (600mg) in the morning and 2 capsule (600mg) in the evening, Disp: 120 capsule, Rfl: 2    hydrOXYzine pamoate (VISTARIL) 25 mg capsule, Take 1 capsule (25 mg total) by mouth daily at bedtime as needed for anxiety (insomnia), Disp: 30 capsule, Rfl: 0    lactulose (CEPHULAC) 20 g packet, Take 1 packet (20 g total) by mouth 2 (two) times a day, Disp: 30 each, Rfl: 0    levothyroxine 25 mcg tablet, Take 1 tablet (25 mcg total) by mouth daily, Disp: 90 tablet, Rfl: 3    lidocaine (Lidoderm) 5 %, Apply 1 patch topically over 12 hours daily Remove & Discard patch within 12 hours - apply to R side of lower spine, Disp: 30 patch, Rfl: 5    methocarbamol (ROBAXIN) 750 mg tablet, Take 1 tablet (750 mg total) by mouth 3 (three) times a day for 10 days, Disp: 30 tablet, Rfl: 0    naloxone (NARCAN) 4 mg/0.1 mL nasal spray, Administer 1 spray into a nostril. If no response after 2-3 minutes, give another dose in the other nostril using a new spray., Disp: 1 each, Rfl: 1    nystatin (MYCOSTATIN) 500,000 units/5 mL suspension, , Disp: , Rfl:     oxyCODONE (ROXICODONE) 10 MG TABS, Take 1 tablet (10 mg total) by mouth every 4 (four) hours as  needed (cancer pain) Max Daily Amount: 60 mg Do not start before November 18, 2024., Disp: 120 tablet, Rfl: 0    polyethylene glycol (MIRALAX) 17 g packet, Take 17 g by mouth daily, Disp: 30 each, Rfl: 2    polyethylene glycol (MIRALAX) 17 g packet, Take 17 g by mouth daily, Disp: 510 g, Rfl: 0    senna (SENOKOT) 8.6 mg, Take 1-2 tablets (8.6-17.2 mg total) by mouth daily as needed for constipation (constipation), Disp: 60 tablet, Rfl: 2    senna-docusate sodium (SENOKOT S) 8.6-50 mg per tablet, Take 1 tablet by mouth daily, Disp: 20 tablet, Rfl: 0    Tivozanib HCl (Fotivda) 1.34 MG CAPS, Take 1.34 mg by mouth daily 21 days on, followed by 7 days off, Disp: 21 capsule, Rfl: 5    traZODone (DESYREL) 100 mg tablet, Take 1 tablet (100 mg total) by mouth daily at bedtime, Disp: 30 tablet, Rfl: 2    No Known Allergies    Vitals:    12/18/24 1053   BP: 104/66   Pulse: 84   Resp: 15   Temp: 97.7 °F (36.5 °C)   SpO2: 97%     Physical Exam  Constitutional:       Appearance: He is well-developed.   HENT:      Head: Normocephalic and atraumatic.      Right Ear: External ear normal.      Left Ear: External ear normal.   Eyes:      Conjunctiva/sclera: Conjunctivae normal.      Pupils: Pupils are equal, round, and reactive to light.   Cardiovascular:      Rate and Rhythm: Normal rate and regular rhythm.      Heart sounds: Normal heart sounds.   Pulmonary:      Effort: Pulmonary effort is normal.      Breath sounds: Normal breath sounds.      Comments: Good entry bilaterally, clear  Abdominal:      General: Bowel sounds are normal.      Palpations: Abdomen is soft.   Musculoskeletal:         General: Normal range of motion.      Cervical back: Normal range of motion and neck supple.   Skin:     General: Skin is warm.   Neurological:      Mental Status: He is alert and oriented to person, place, and time.      Deep Tendon Reflexes: Reflexes are normal and symmetric.   Psychiatric:         Behavior: Behavior normal.         Thought  Content: Thought content normal.         Judgment: Judgment normal.     Extremities: No lower extreme edema bilaterally, no cords, pulses are 1+  Lymphatics: No adenopathy in the neck, supraclavicular region, axilla bilaterally    Labs    12/5/2024 WBC = 5.54 hemoglobin = 12.4 hematocrit = 38.8 platelet = 275 neutrophil = 69% BUN = 15 creatinine = 0.90 calcium = 9.6 AST = 24 ALT = 11 alkaline phosphatase = 63 total protein = 6.9 total bilirubin = 0.57    Imaging    12/5/2024 CAT scan abdomen pelvis with contrast    IMPRESSION:     Interval progression of metastatic disease in the abdomen with worsening of extensive adenopathy in the upper abdomen, retroperitoneum and mesentery. Indeterminate subcentimeter hypodensity in the liver for which metastatic disease cannot be excluded.     Interval progression of pulmonary metastatic disease in the visualized chest. Groundglass densities in the right lower lobe may represent interstitial edema or infection. New small right pleural effusion.    8/23/2024 bone scan whole body.  Impression stated very subtle visualization of right ischial tuberosity known metastatic lesion as a subtle area of photopenia.  No single to graphic evidence of new osseous metastatic disease.    8/23/2024 CAT scan chest abdomen pelvis with contrast    IMPRESSION:  Progressing disease with new lymphadenopathy in the retroperitoneum, portacaval region     Multiple non measurable lung nodules in the both lungs with a new 1 cm lung nodule in the right midlung, suggest progression     Development of portal vein thrombosis in the left branch of the portal vein and its segmental branches (image 112 series 2,)  The lytic lesion which was noted in the right esophagoplasty, mildly larger      1/30/2024 CAT scan chest abdomen pelvis with contrast    IMPRESSION:     Previously seen left hilar and subcarinal lymphadenopathy has normalized.     Interval significant improvement in the pulmonary nodular metastases,  with many of the smaller pulmonary nodules resolved and many of the larger nodules decreased in size.  Improved retroperitoneal adenopathy.     However the lytic metastasis in the right ischial tuberosity has increased in size currently 3.6 x 2.3 cm, previously 2.5 x 2.4 cm (series 3 image 265.    1/30/2024 bone scan whole body    IMPRESSION:     1. Patient's known lytic lesion on the right ischial tuberosity demonstrates subtle central photopenia surrounded by minimal increased tracer activity and is difficult to distinguish definitively on scintigraphy.     There is otherwise no additional focal tracer activity characteristic of additional sites of metastatic disease. Please note that lytic lesions have variable tracer activity on bone scan and the lack of focal tracer activity does not preclude the   possibility of an underlying osteolytic aggressive/malignant process.     2. Absent right kidney.    Pathology    Case Report   Surgical Pathology Report                         Case: [id number]                                    Authorizing Provider:  Lisa Webster MD        Collected:           11/21/2022 1213               Ordering Location:     [facility]      Received:            11/21/2022 2301                                      Hospital Operating Room                                                       Pathologist:           Jose Evans MD                                                                  Specimen:    Kidney, Right                                                                              Final Diagnosis   A. Kidney, Right, nephrectomy:  - Clear cell renal cell carcinoma.  See comment and synoptic report.  - One lymph node positive for metastatic carcinoma (1/1).     Comment: Immunohistochemistry is diffusely positive in the tumor cells for PAX8 and carbonic anhydrase IX.  CK7  and P504S have non-specific staining.  The findings are consistent with the diagnosis.     8th Ed AJCC Tumor Stage:  at least Stage III - pT2b, pN1, G3.  Representative tumor block: A7   Electronically signed by Jose Evans MD on 12/5/2022 at 10:16 AM     Synoptic Checklist   KIDNEY: Nephrectomy  8th Edition - Protocol posted: 6/30/2021  KIDNEY: NEPHRECTOMY, PARTIAL OR RADICAL - All Specimens  SPECIMEN   Procedure  Total nephrectomy    Specimen Laterality  Right    TUMOR   Tumor Focality  Unifocal    Tumor Size  Greatest Dimension (Centimeters): 13.5 cm   Histologic Type  Clear cell renal cell carcinoma    Histologic Grade (WHO / ISUP)  G3 (nucleoli conspicuous and eosinophilic at 100x magnification)    Tumor Extent  Limited to kidney    Sarcomatoid Features  Not identified    Rhabdoid Features  Not identified    Tumor Necrosis  Present    Percentage of Tumor Necrosis  30 %   Lymphovascular Invasion  Not identified    MARGINS   Margin Status  All margins negative for invasive carcinoma    REGIONAL LYMPH NODES   Regional Lymph Node Status  Tumor present in regional lymph node(s)    Number of Lymph Nodes with Tumor  1    Jabari Site(s) with Tumor  Hilar    Size of Largest Jabari Metastatic Deposit  0.3 cm   Number of Lymph Nodes Examined  1    PATHOLOGIC STAGE CLASSIFICATION (pTNM, AJCC 8th Edition)   Reporting of pT, pN, and (when applicable) pM categories is based on information available to the pathologist at the time the report is issued. As per the AJCC (Chapter 1, 8th Ed.) it is the managing physician’s responsibility to establish the final pathologic stage based upon all pertinent information, including but potentially not limited to this pathology report.   Primary Tumor (pT)  pT2b    Regional Lymph Nodes (pN)  pN1    ADDITIONAL FINDINGS   Additional Findings in Nonneoplastic Kidney  Tubuloglomerular necrosis    .

## 2024-12-19 RX ORDER — OXYCODONE HYDROCHLORIDE 10 MG/1
10 TABLET ORAL EVERY 4 HOURS PRN
Qty: 120 TABLET | Refills: 0 | Status: SHIPPED | OUTPATIENT
Start: 2024-12-19

## 2024-12-19 NOTE — TELEPHONE ENCOUNTER
"Reviewed most recent [facility] note, reviewed PDMP, reviewed medications. Refilling medication ( oxyIR ).        When using opioids for pain control, constipation is common and patients should act to prevent it:  Drink PLENTY of water. This is important to keep the gut moving.  Some people have success w/ using prunes, prune juice, certain fruits or vegetables (apples, bananas, prunes, pears, raspberries, and vegetables like string beans, broccoli, spinach, kale, squash, lentils, peas, and beans), or fiber gummies.  Try a probiotic. This could be yogurt or kefir, or fermented beverages such as kombucha, but probiotics are also available in capsule form. Aim for 10-15 billion colony-forming-units, w/ bacteria such as Lactobacillus / Saccharomyces / Actinomyces.  Osmotic laxatives (Miralax, magnesium citrate, Milk of Magnesia) can be very useful for opioid-induced constipation (OIC); take daily to prevent OIC.  Bulk laxatives (Citrucel, Metamucil, Fibercon, Benefiber, wheat germ) are useful for constipation in patients who are not taking opioids, but are not recommended if you are taking opioids.  Colace is good for softening hard stools, or preventing constipation when opioids are being used - but does not stimulate the bowel to move things along once constipation has occurred.  You can use senna, 1 to 2 tabs, once or twice daily as needed for constipation. Use as directed on the box/bottle. Senna is also available in a tea (\"Smooth Move\"). Should that not be enough for your constipation, you can try Dulcolax.  Should that not be enough, consider an enema.  All of these medications are available over-the-counter.  "

## 2025-01-03 DIAGNOSIS — G47.00 INSOMNIA, UNSPECIFIED TYPE: ICD-10-CM

## 2025-01-06 RX ORDER — HYDROXYZINE PAMOATE 25 MG/1
25 CAPSULE ORAL
Qty: 90 CAPSULE | Refills: 1 | Status: SHIPPED | OUTPATIENT
Start: 2025-01-06

## 2025-01-07 ENCOUNTER — HOSPITAL ENCOUNTER (OUTPATIENT)
Dept: RADIOLOGY | Facility: HOSPITAL | Age: 55
Discharge: HOME | End: 2025-01-07
Attending: INTERNAL MEDICINE
Payer: COMMERCIAL

## 2025-01-07 DIAGNOSIS — C64.1 CLEAR CELL CARCINOMA OF RIGHT KIDNEY (HCC): ICD-10-CM

## 2025-01-07 PROCEDURE — 71260 CT THORAX DX C+: CPT

## 2025-01-07 PROCEDURE — 74177 CT ABD & PELVIS W/CONTRAST: CPT

## 2025-01-07 RX ADMIN — IOHEXOL 100 ML: 350 INJECTION, SOLUTION INTRAVENOUS at 12:38

## 2025-01-10 ENCOUNTER — TELEPHONE (OUTPATIENT)
Age: 55
End: 2025-01-10

## 2025-01-10 NOTE — TELEPHONE ENCOUNTER
from radiology called, asking for Dr. Campbell to call Dr. Olivia to discuss the CT scan for the patient. He can be reached at [phone] and op 3

## 2025-01-10 NOTE — TELEPHONE ENCOUNTER
Discussed results of CT scan with radiologist  Spoke with patient  Patient admits to SOB , is afebrile  Patient completed a z pac prescribed by PCP with some resolution of respiratory symptoms but is now increasingly SOB with moist coug for yellow sputum  Patient will call PCP with symptoms and seek emergency care with worsening symptoms  Offered to move up f/u with Dr Campbell to 1/13/2024 from 1/14/2024  Patient declines

## 2025-01-14 ENCOUNTER — OFFICE VISIT (OUTPATIENT)
Dept: HEMATOLOGY ONCOLOGY | Facility: MEDICAL CENTER | Age: 55
End: 2025-01-14
Payer: COMMERCIAL

## 2025-01-14 VITALS
HEIGHT: 72 IN | SYSTOLIC BLOOD PRESSURE: 112 MMHG | BODY MASS INDEX: 19.05 KG/M2 | HEART RATE: 110 BPM | RESPIRATION RATE: 14 BRPM | DIASTOLIC BLOOD PRESSURE: 84 MMHG | OXYGEN SATURATION: 97 % | WEIGHT: 140.6 LBS | TEMPERATURE: 97.6 F

## 2025-01-14 DIAGNOSIS — C78.00 MALIGNANT NEOPLASM METASTATIC TO LUNG, UNSPECIFIED LATERALITY (HCC): Primary | ICD-10-CM

## 2025-01-14 DIAGNOSIS — Z51.5 PALLIATIVE CARE PATIENT: ICD-10-CM

## 2025-01-14 DIAGNOSIS — C64.1 CLEAR CELL CARCINOMA OF RIGHT KIDNEY (HCC): ICD-10-CM

## 2025-01-14 DIAGNOSIS — G89.3 CANCER-RELATED PAIN: ICD-10-CM

## 2025-01-14 DIAGNOSIS — Z90.5 H/O RIGHT NEPHRECTOMY: ICD-10-CM

## 2025-01-14 PROCEDURE — 99215 OFFICE O/P EST HI 40 MIN: CPT | Performed by: INTERNAL MEDICINE

## 2025-01-14 RX ORDER — OXYCODONE HYDROCHLORIDE 10 MG/1
10 TABLET ORAL EVERY 4 HOURS PRN
Qty: 120 TABLET | Refills: 0 | Status: SHIPPED | OUTPATIENT
Start: 2025-01-14 | End: 2025-01-20

## 2025-01-14 NOTE — PROGRESS NOTES
Stuart Munoz  1970  [address] [facility]  [address] [facility] [place] [id number]    DISCUSSION/SUMMARY:    54-year-old with stage IV (multiple pulmonary nodules, mediastinal adenopathy, questionable liver lesion) clear-cell renal cell carcinoma.  Mr. Munoz's been through a number of regimens.    Patient was initially started on pembrolizumab and axitinib  Patient had trouble tolerating the axitinib and this was switched to once a day.  Eventual follow-up scans demonstrated a mixed response but clearly new lesions and enlarging lesions.      Patient then started on Cabozantinib, 40 mg a day.  Because of GI side effects, patient was never able to get up to 60 mg a day.    Mr. Munoz was subsequently found to have disease progression and was started on tivozananib, 1.3 mg daily 21/28 (approximately 12 weeks ago).  Patient completed 3 cycles.    Patient was recently seen/evaluated the emergency room and underwent repeat scanning.  There is significant disease progression.  Options are becoming extremely limited.  Performance status is still pretty good.  Patient understands that his options are becoming more limited.    FDA approves nivolumab and hyaluronidase-nvhy for subcutaneous injection    On December 27, 2024, the Food and Drug Administration approved nivolumab and hyaluronidase-nvhy (Opdivo Qvantig, [employer]) for subcutaneous injection across approved adult, solid tumor nivolumab (Opdivo, [employer]) indications as monotherapy, monotherapy maintenance following completion of Opdivo plus Yervoy (ipilimumab) combination therapy, or in combination with chemotherapy or cabozantinib.    The approval includes indications for renal cell carcinoma, melanoma, non-small cell lung cancer, head and neck squamous cell carcinoma, urothelial carcinoma, colorectal cancer, hepatocellular carcinoma, esophageal carcinoma,  gastric cancer, gastroesophageal junction cancer, and esophageal adenocarcinoma. Opdivo Qvantig is not indicated in combination with intravenous ipilimumab. The prescribing information provides specific indications and further information for Opdivo Qvantig at Drugs@FDA    The above is 1 regimen the patient has not seen, this is obviously new.  Patient is aware that it is only been approved for a few weeks.  I have also contacted a colleague at [facility] to see if he can be seen in second opinion there to see if there are any other options.  This needs to happen as soon as possible (possibly a virtual - but this is up to [place]).    There is 1 other option that this patient has not seen.  NCCN guidelines 3.2025 list everolimus plus lenvatinib as an option for patients who have received prior IO therapy.    Lenvatinib in combination with Everolimus    On May 13, 2016, the U. S. Food and Drug Administration approved lenvatinib capsules (Lenvima, [employer] in combination with everolimus, for the treatment of advanced renal cell carcinoma following one prior anti-angiogenic therapy. Lenvatinib was first approved in 2015 for the treatment of locally recurrent or metastatic, progressive, radioactive iodine-refractory differentiated thyroid cancer.     The current approval was based on a randomized, multicenter study in patients with advanced or metastatic renal cell carcinoma who previously received anti-angiogenic therapy. The major efficacy outcome measure was investigator-assessed progression-free survival (PFS) evaluated according to RECIST v1.1.       The trial randomized 153 patients 1:1:1 to lenvatinib 18 mg plus everolimus 5 mg (N=51), lenvatinib 24 mg monotherapy (N=52), or everolimus 10 mg monotherapy (N=50). All medications were administered orally once daily. Metastases were present in 95% of patients. [facility] [place] favorable, intermediate, and poor risk prognostic  categories were seen, respectively, in 24%, 37%, and 39% of patients receiving lenvatinib plus everolimus and were well balanced between arms.     The hazard ratio for the comparison of investigator-assessed PFS between lenvatinib plus everolimus and everolimus was 0.37 (95% CI: 0.22, 0.62). The median PFS was 14.6 (95% CI: 5.9, 20.1) months for the lenvatinib plus everolimus arm versus 5.5 (95% CI: 3.5, 7.1) months for patients on the everolimus arm. This treatment effect was supported by a retrospective independent review of radiographs in these two arms with an observed hazard ratio of0.43 (95% CI: 0.24, 0.75). The hazard ratio for a post-hoc, updated comparison of overall survival between the lenvatinib plus everolimus and everolimus arms was 0.67 (95% CI: 0.42, 1.08).     Comparison of investigator-assessed PFS between lenvatinib monotherapy and everolimus monotherapy supported the activity of lenvatinib in renal cell cancer. The combination of lenvatinib plus everolimus demonstrated numerically superior PFS, objective response rate, and overall survival, compared to lenvatinib monotherapy. There was no pre-specified plan for multiple comparisons.      The most common adverse reactions (greater than 30%) with lenvatinib in combination with everolimus were diarrhea, fatigue, arthralgia/myalgia, decreased appetite, vomiting, nausea, stomatitis/oral inflammation, hypertension, peripheral edema, cough, abdominal pain, dyspnea, rash, decreased weight, bleeding events, and proteinuria. Diarrhea was increased with the combination of lenvatinib plus everolimus, 19 % grade 3-4, and was added to the package insert as a new safety Warning.       The recommended dose and schedule is lenvatinib 18 mg plus everolimus 5 mg taken by mouth once daily.     This application was approved before the Prescription Drug User Fee Act (PDUFA) goal date of May 16, 2016. Lenvatinib in combination with everolimus received Breakthrough  Therapy Designation for the treatment of advanced renal cell cancer following one prior anti-angiogenic therapy and the application was granted Priority Review. A description of these expedited programs is in the Guidance for Industry: Expedited Programs for Serious Conditions-Drugs and Biologics, available at: http://www.fda.gov/downloads/drugs/guidancecomplianceregulatoryinformation/guidances/cak916523.pdf.     Full prescribing information is available at: http://www.accessdata.fda.gov/drugsatfda_docs/label/2016/798344c007vtg.pdf      Patient has a pending appointment with his PCP tomorrow; Mr. Munoz should keep this appointment.    Patient has a pending appointment with palliative care next week.  In the meantime we will write the pain medications to get the patient through the weekend.    Prior CAT scan demonstrated portal vein thrombosis.  Patient will continue with the Eliquis 5 mg twice a day, no bruising or bleeding issues.  No respiratory issues.  Patient understands that the anticoagulation is lifelong.    Patient was given a 3-week follow-up but this will likely change.    Carefully review your medication list and verify that the list is accurate and up-to-date. Please call the hematology/oncology office if there are medications missing from the list, medications on the list that you are not currently taking or if there is a dosage or instruction that is different from how you're taking that medication.    Patient goals and areas of care: See second opinion, see PCP tomorrow  Barriers to care: None  Patient is able to self-care  ______________________________________________________________________________________    Chief Complaint   Patient presents with    Follow-up    Metastatic renal cell carcinoma     History of Present Illness: 54-year-old male with relatively good general health previously presenting to the emergency room recently with abrupt shortness of breath and dyspnea on exertion.   CTA/chest did not demonstrate any PE but patient was found to have an incidental right renal mass.  Work-up was initiated; this included a urology evaluation.    Mr. Munoz was found to have clear-cell renal cell carcinoma and underwent planned preoperative angioembolization with subsequent right radical nephrectomy via chevron incision.  Patient was subsequently discharged and was then referred to oncology for evaluation.    Follow-up CAT scan of the chest demonstrated a number of new pulmonary nodules distant with metastatic disease.  Options were discussed and patient was placed on axitinib and pembrolizumab.  Patient could not tolerate the axitinib twice a day, this was changed to once a day.  Eventual follow-up scans demonstrated disease progression and patient was placed on Cabozantinib, 40 mg a day.      While I was on medical leave, patient had additional scanning demonstrating disease progression.  Mr. Munoz was placed on tivozananib.  Patient completed 3 cycles.    Mr. Munoz was recently once again admitted to the emergency room.  Patient was having trouble breathing.  This is better.  CAT scans were repeated, there is no evidence of significant disease progression.  Results are listed below.    States feeling otherwise +/-.  No nausea or vomiting but appetite is decreased.  Patient has lost more weight.  Pain is more or less controlled with the present regimen.  No fevers or signs of infection.  No problem with excessive bruising or bleeding.  Activities are extremely limited.    Review of Systems   Constitutional:  Positive for activity change, appetite change and fatigue.   HENT: Negative.     Eyes: Negative.    Respiratory: Negative.     Cardiovascular: Negative.    Gastrointestinal: Negative.    Endocrine: Negative.    Genitourinary: Negative.    Musculoskeletal:  Positive for arthralgias. Negative for back pain.   Skin: Negative.    Allergic/Immunologic: Negative.    Neurological: Negative.     Hematological: Negative.    Psychiatric/Behavioral:  The patient is nervous/anxious.    All other systems reviewed and are negative.    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)    Fungal dermatitis    Dry skin dermatitis    RBBB    BMI 25.0-25.9,adult    H/O right nephrectomy    Malignant neoplasm metastatic to lung (HCC)    Palliative care patient    Cancer-related pain    Therapeutic opioid-induced constipation (OIC)    Anxiousness    Dysphoric mood    Loss of appetite    Retroperitoneal lymphadenopathy    Hypothyroidism    BMI 35.0-35.9,adult    Alteration in appetite    Chronic post-operative pain    Gastroesophageal reflux disease with esophagitis    Hypertension    Weight loss, unintentional    Oral mucositis due to antineoplastic therapy    BMI 21.0-21.9, adult    Dysgeusia    Bilateral calf pain    Varicose veins of left leg with edema    Dehydration    Loose stools    Insomnia    Need for Tdap vaccination    History of gastroesophageal reflux (GERD)    Diarrhea    Cut of finger    Xerostomia    Irregular bowel habits    Medical marijuana use    Backache with radiation    Reactive depression (situational)    Situational mixed anxiety and depressive disorder    Chronic pain syndrome    Myalgia    Arthralgia    Chronic, continuous use of opioids    Portal vein thrombosis     Past Medical History:   Diagnosis Date    Arthralgia     last assessed 02/10/2015    Babesiosis     last assessed 12/30/14    Benign paroxysmal vertigo     last assessed 05/22/15    Cancer (HCC)     right kidney removed 11/21/22-oral chemotherapy and IV immunotherapy every 21 days    Dry skin dermatitis     Fullness of abdomen     with eating    Meniere disease     last assessed 04/15/13    Pneumonia of left lower lobe due to infectious organism     last assessed 04/04/16    Vitamin D  deficiency     last assessed 14     Past Surgical History:   Procedure Laterality Date    FIBULA FRACTURE SURGERY Right     IR RENAL ANGIOGRAM  2022    CT NEPHRECTOMY W/PRTL URETERECT OPEN RIB RESCJ RAD Right 2022    Procedure: NEPHRECTOMY ABDOMINAL APPROACH;  Surgeon: Lisa Webster MD;  Location: [facility];  Service: Urology    TIBIA FRACTURE SURGERY Right     TONSILLECTOMY       Family History   Problem Relation Age of Onset    Meniere's disease Mother     Hearing loss Mother     Cancer Father         ?renal/bladder?    No Known Problems Family     Colon cancer Maternal Grandfather      Social History     Socioeconomic History    Marital status: /Civil Union     Spouse name: Not on file    Number of children: Not on file    Years of education: Not on file    Highest education level: Not on file   Occupational History    Not on file   Tobacco Use    Smoking status: Former     Current packs/day: 0.00     Average packs/day: 1 pack/day for 38.6 years (38.6 ttl pk-yrs)     Types: Cigarettes     Start date: 1984     Quit date: 2022     Years since quittin.3     Passive exposure: Never    Smokeless tobacco: Former     Types: Chew    Tobacco comments:     Quit 2 mos ago   Vaping Use    Vaping status: Never Used   Substance and Sexual Activity    Alcohol use: Not Currently     Alcohol/week: 1.0 standard drink of alcohol     Comment: 1 daily    Drug use: No    Sexual activity: Yes     Partners: Female     Birth control/protection: Female Sterilization   Other Topics Concern    Not on file   Social History Narrative    Daily coffee consumption 6 cups/day    Wife: Jenifer        From 22  note:    Primary Care Provider: Marycarmen Hackett MD    Primary Insurance: BLUE CROSS    Active Health Care Proxies: There are no active Health Care Proxies on file.    Primary Caregiver: Self    Support Systems: Self, Spouse/significant other    County of Residence: [place]    What city do you  live in?: [place]    Home entry access options. Select all that apply.: Stairs    Number of steps to enter home.: 3    Type of Current Residence: 2 story home    Living Arrangements: Lives w/ Spouse/significant other    Functional Status: Independent    Completes ADLs independently?: Yes    Ambulates independently?: Yes    Does patient use assisted devices?: No    Does patient currently own DME?: No     Social Drivers of Health     Financial Resource Strain: Not on file   Food Insecurity: Not on file   Transportation Needs: Not on file   Physical Activity: Not on file   Stress: Not on file   Social Connections: Not on file   Intimate Partner Violence: Not on file   Housing Stability: Not on file       Current Outpatient Medications:     acetaminophen (TYLENOL) 500 mg tablet, Take 2 tablets (1,000 mg total) by mouth 3 (three) times a day as needed for mild pain, moderate pain, headaches or fever, Disp: 180 tablet, Rfl: 2    acetaminophen (TYLENOL) 500 mg tablet, Take 2 tablets (1,000 mg total) by mouth every 8 (eight) hours, Disp: 66 tablet, Rfl: 0    apixaban (Eliquis) 5 mg, Take 1 tablet (5 mg total) by mouth 2 (two) times a day, Disp: 60 tablet, Rfl: 5    bisacodyl (DULCOLAX) 10 mg suppository, Insert 1 suppository (10 mg total) into the rectum daily, Disp: 12 suppository, Rfl: 0    CANNABIDIOL PO, Take by mouth, Disp: , Rfl:     Diclofenac Sodium (VOLTAREN) 1 %, Apply 2 g topically 4 (four) times a day, Disp: 240 g, Rfl: 0    DULoxetine (CYMBALTA) 30 mg delayed release capsule, TAKE 1 CAPSULE BY MOUTH EVERY DAY, Disp: 90 capsule, Rfl: 1    gabapentin (NEURONTIN) 300 mg capsule, Take 2 capsules (600mg) in the morning and 2 capsule (600mg) in the evening, Disp: 120 capsule, Rfl: 2    hydrOXYzine pamoate (VISTARIL) 25 mg capsule, TAKE 1 CAPSULE (25 MG TOTAL) BY MOUTH DAILY AT BEDTIME AS NEEDED FOR ANXIETY (INSOMNIA), Disp: 90 capsule, Rfl: 1    lactulose (CEPHULAC) 20 g packet, Take 1 packet (20 g total) by mouth 2  (two) times a day, Disp: 30 each, Rfl: 0    levothyroxine 25 mcg tablet, Take 1 tablet (25 mcg total) by mouth daily, Disp: 90 tablet, Rfl: 3    lidocaine (Lidoderm) 5 %, Apply 1 patch topically over 12 hours daily Remove & Discard patch within 12 hours - apply to R side of lower spine, Disp: 30 patch, Rfl: 5    methocarbamol (ROBAXIN) 750 mg tablet, Take 1 tablet (750 mg total) by mouth 3 (three) times a day for 10 days, Disp: 30 tablet, Rfl: 0    naloxone (NARCAN) 4 mg/0.1 mL nasal spray, Administer 1 spray into a nostril. If no response after 2-3 minutes, give another dose in the other nostril using a new spray., Disp: 1 each, Rfl: 1    nystatin (MYCOSTATIN) 500,000 units/5 mL suspension, , Disp: , Rfl:     oxyCODONE (ROXICODONE) 10 MG TABS, Take 1 tablet (10 mg total) by mouth every 4 (four) hours as needed (cancer pain) Max Daily Amount: 60 mg, Disp: 120 tablet, Rfl: 0    polyethylene glycol (MIRALAX) 17 g packet, Take 17 g by mouth daily, Disp: 30 each, Rfl: 2    polyethylene glycol (MIRALAX) 17 g packet, Take 17 g by mouth daily, Disp: 510 g, Rfl: 0    senna (SENOKOT) 8.6 mg, Take 1-2 tablets (8.6-17.2 mg total) by mouth daily as needed for constipation (constipation), Disp: 60 tablet, Rfl: 2    senna-docusate sodium (SENOKOT S) 8.6-50 mg per tablet, Take 1 tablet by mouth daily, Disp: 20 tablet, Rfl: 0    Tivozanib HCl (Fotivda) 1.34 MG CAPS, Take 1.34 mg by mouth daily 21 days on, followed by 7 days off, Disp: 21 capsule, Rfl: 5    traZODone (DESYREL) 100 mg tablet, Take 1 tablet (100 mg total) by mouth daily at bedtime, Disp: 30 tablet, Rfl: 2    No Known Allergies    Vitals:    01/14/25 1259   BP: 112/84   Pulse: (!) 110   Resp: 14   Temp: 97.6 °F (36.4 °C)   SpO2: 97%     Physical Exam  Constitutional:       Appearance: He is well-developed.   HENT:      Head: Normocephalic and atraumatic.      Right Ear: External ear normal.      Left Ear: External ear normal.   Eyes:      Conjunctiva/sclera:  Conjunctivae normal.      Pupils: Pupils are equal, round, and reactive to light.   Cardiovascular:      Rate and Rhythm: Normal rate and regular rhythm.      Heart sounds: Normal heart sounds.   Pulmonary:      Effort: Pulmonary effort is normal.      Breath sounds: Normal breath sounds.      Comments: Good entry bilaterally, clear  Abdominal:      General: Bowel sounds are normal.      Palpations: Abdomen is soft.   Musculoskeletal:         General: Normal range of motion.      Cervical back: Normal range of motion and neck supple.   Skin:     General: Skin is warm.   Neurological:      Mental Status: He is alert and oriented to person, place, and time.      Deep Tendon Reflexes: Reflexes are normal and symmetric.   Psychiatric:         Behavior: Behavior normal.         Thought Content: Thought content normal.         Judgment: Judgment normal.     Extremities: No lower extreme edema bilaterally, no cords, pulses are 1+  Lymphatics: No adenopathy in the neck, supraclavicular region, axilla bilaterally    Labs    12/5/2024 WBC = 5.54 hemoglobin = 12.4 hematocrit = 38.8 platelet = 275 neutrophil = 69% BUN = 15 creatinine = 0.90 calcium = 9.6 AST = 24 ALT = 11 alkaline phosphatase = 63 total protein = 6.9 total bilirubin = 0.57    Imaging    1/7/2025 CAT scan chest abdomen pelvis with contrast    LUNGS: There are diffuse patchy groundglass opacities and smooth interlobular septal thickening throughout the right lung, with lesser similar changes within the left lower lobe. There are numerous superimposed pulmonary nodules, with representative   nodules as described (series 3):  Image 79, left upper lobe, 0.9 x 1.4 cm, new.  Image 128, left lower lobe, 1.3 x 1.2 cm, new.  Image 146, right lower lobe, 0.7 cm, new.  Image 178, juxtapleural left lower lobe, 1.2 x 1.8 cm, new.  Image 185, medial right lower lobe, 0.9 x 0.7 cm, new.     PLEURA: Small right and trace left pleural effusions    MEDIASTINUM AND ELLIE: Prominent  diffuse heterogeneous mediastinal and hilar lymphadenopathy. Representative lymph nodes are seen on series 2:  Image 38, right paratracheal, 4.5 x 2.8 cm, previously subcentimeter.  Image 62, right hilar, 3.6 x 3.3 cm, previously 2.8 x 1.7 cm.  Image 71, left hilar, 1.9 x 2.5 cm, previously subcentimeter    IMPRESSION:     1. Marked interval progression of the metastatic disease, including numerous new pulmonary nodules, hepatic metastasis and mesenteric/retroperitoneal lymphadenopathy.  2. Mild diffuse anasarca, with small pleural effusions, trace ascites and subcutaneous edema.  3. Prominent diffuse groundglass opacities and smooth interlobular septal thickening throughout the right greater than left lung. The groundglass opacities have increased since the most recent prior CT abdomen. The differential diagnosis again includes   asymmetric pulmonary interstitial/alveolar edema and infectious etiologies.       12/5/2024 CAT scan abdomen pelvis with contrast    IMPRESSION:     Interval progression of metastatic disease in the abdomen with worsening of extensive adenopathy in the upper abdomen, retroperitoneum and mesentery. Indeterminate subcentimeter hypodensity in the liver for which metastatic disease cannot be excluded.     Interval progression of pulmonary metastatic disease in the visualized chest. Groundglass densities in the right lower lobe may represent interstitial edema or infection. New small right pleural effusion.    8/23/2024 bone scan whole body.  Impression stated very subtle visualization of right ischial tuberosity known metastatic lesion as a subtle area of photopenia.  No single to graphic evidence of new osseous metastatic disease.    8/23/2024 CAT scan chest abdomen pelvis with contrast    IMPRESSION:  Progressing disease with new lymphadenopathy in the retroperitoneum, portacaval region     Multiple non measurable lung nodules in the both lungs with a new 1 cm lung nodule in the right  midlung, suggest progression     Development of portal vein thrombosis in the left branch of the portal vein and its segmental branches (image 112 series 2,)  The lytic lesion which was noted in the right esophagoplasty, mildly larger      1/30/2024 CAT scan chest abdomen pelvis with contrast    IMPRESSION:     Previously seen left hilar and subcarinal lymphadenopathy has normalized.     Interval significant improvement in the pulmonary nodular metastases, with many of the smaller pulmonary nodules resolved and many of the larger nodules decreased in size.  Improved retroperitoneal adenopathy.     However the lytic metastasis in the right ischial tuberosity has increased in size currently 3.6 x 2.3 cm, previously 2.5 x 2.4 cm (series 3 image 265.    1/30/2024 bone scan whole body    IMPRESSION:     1. Patient's known lytic lesion on the right ischial tuberosity demonstrates subtle central photopenia surrounded by minimal increased tracer activity and is difficult to distinguish definitively on scintigraphy.     There is otherwise no additional focal tracer activity characteristic of additional sites of metastatic disease. Please note that lytic lesions have variable tracer activity on bone scan and the lack of focal tracer activity does not preclude the   possibility of an underlying osteolytic aggressive/malignant process.     2. Absent right kidney.    Pathology    Case Report   Surgical Pathology Report                         Case: [id number]                                    Authorizing Provider:  Lisa Webster MD        Collected:           11/21/2022 1213               Ordering Location:     [facility]      Received:            11/21/2022 2301                                      Hospital Operating Room                                                       Pathologist:           Jose Evans MD                                                                   Specimen:    Kidney, Right                                                                              Final Diagnosis   A. Kidney, Right, nephrectomy:  - Clear cell renal cell carcinoma.  See comment and synoptic report.  - One lymph node positive for metastatic carcinoma (1/1).     Comment: Immunohistochemistry is diffusely positive in the tumor cells for PAX8 and carbonic anhydrase IX.  CK7 and P504S have non-specific staining.  The findings are consistent with the diagnosis.     8th Ed AJCC Tumor Stage:  at least Stage III - pT2b, pN1, G3.  Representative tumor block: A7   Electronically signed by Jose Evans MD on 12/5/2022 at 10:16 AM     Synoptic Checklist   KIDNEY: Nephrectomy  8th Edition - Protocol posted: 6/30/2021  KIDNEY: NEPHRECTOMY, PARTIAL OR RADICAL - All Specimens  SPECIMEN   Procedure  Total nephrectomy    Specimen Laterality  Right    TUMOR   Tumor Focality  Unifocal    Tumor Size  Greatest Dimension (Centimeters): 13.5 cm   Histologic Type  Clear cell renal cell carcinoma    Histologic Grade (WHO / ISUP)  G3 (nucleoli conspicuous and eosinophilic at 100x magnification)    Tumor Extent  Limited to kidney    Sarcomatoid Features  Not identified    Rhabdoid Features  Not identified    Tumor Necrosis  Present    Percentage of Tumor Necrosis  30 %   Lymphovascular Invasion  Not identified    MARGINS   Margin Status  All margins negative for invasive carcinoma    REGIONAL LYMPH NODES   Regional Lymph Node Status  Tumor present in regional lymph node(s)    Number of Lymph Nodes with Tumor  1    Jabari Site(s) with Tumor  Hilar    Size of Largest Jabari Metastatic Deposit  0.3 cm   Number of Lymph Nodes Examined  1    PATHOLOGIC STAGE CLASSIFICATION (pTNM, AJCC 8th Edition)   Reporting of pT, pN, and (when applicable) pM categories is based on information available to the pathologist at the time the report is issued. As per the  AJCC (Chapter 1, 8th Ed.) it is the managing physician’s responsibility to establish the final pathologic stage based upon all pertinent information, including but potentially not limited to this pathology report.   Primary Tumor (pT)  pT2b    Regional Lymph Nodes (pN)  pN1    ADDITIONAL FINDINGS   Additional Findings in Nonneoplastic Kidney  Tubuloglomerular necrosis    .

## 2025-01-15 ENCOUNTER — OFFICE VISIT (OUTPATIENT)
Dept: FAMILY MEDICINE CLINIC | Facility: CLINIC | Age: 55
End: 2025-01-15
Payer: COMMERCIAL

## 2025-01-15 DIAGNOSIS — G47.00 INSOMNIA, UNSPECIFIED TYPE: ICD-10-CM

## 2025-01-15 DIAGNOSIS — C64.1 CLEAR CELL CARCINOMA OF RIGHT KIDNEY (HCC): Primary | ICD-10-CM

## 2025-01-15 DIAGNOSIS — Z90.5 H/O RIGHT NEPHRECTOMY: ICD-10-CM

## 2025-01-15 DIAGNOSIS — J90 PLEURAL EFFUSION: ICD-10-CM

## 2025-01-15 PROCEDURE — 99214 OFFICE O/P EST MOD 30 MIN: CPT | Performed by: FAMILY MEDICINE

## 2025-01-16 DIAGNOSIS — C64.1 CLEAR CELL CARCINOMA OF RIGHT KIDNEY (HCC): Primary | ICD-10-CM

## 2025-01-16 RX ORDER — EVEROLIMUS 5 MG/1
5 TABLET ORAL DAILY
Qty: 30 TABLET | Refills: 5 | Status: SHIPPED | OUTPATIENT
Start: 2025-01-16

## 2025-01-16 RX ORDER — LENVATINIB 18 MG/DAY
18 KIT ORAL DAILY
Qty: 90 EACH | Refills: 5 | Status: SHIPPED | OUTPATIENT
Start: 2025-01-16

## 2025-01-17 ENCOUNTER — DOCUMENTATION (OUTPATIENT)
Age: 55
End: 2025-01-17

## 2025-01-17 NOTE — PROGRESS NOTES
Received request for patient to start on Lenvima and Everolimus.    Lenvima came back approved  Authorized from December 17, 2024 to January 16, 2026     Everolimus came back approved  Authorized from December 17, 2024 to January 16, 2026

## 2025-01-20 ENCOUNTER — OFFICE VISIT (OUTPATIENT)
Dept: PALLIATIVE MEDICINE | Facility: CLINIC | Age: 55
End: 2025-01-20
Payer: COMMERCIAL

## 2025-01-20 ENCOUNTER — DOCUMENTATION (OUTPATIENT)
Dept: HEMATOLOGY ONCOLOGY | Facility: MEDICAL CENTER | Age: 55
End: 2025-01-20

## 2025-01-20 VITALS
BODY MASS INDEX: 18.28 KG/M2 | HEIGHT: 72 IN | SYSTOLIC BLOOD PRESSURE: 110 MMHG | TEMPERATURE: 97.2 F | DIASTOLIC BLOOD PRESSURE: 60 MMHG | WEIGHT: 135 LBS

## 2025-01-20 DIAGNOSIS — G47.00 INSOMNIA: Primary | ICD-10-CM

## 2025-01-20 DIAGNOSIS — G89.3 CANCER-RELATED PAIN: ICD-10-CM

## 2025-01-20 DIAGNOSIS — Z51.5 PALLIATIVE CARE PATIENT: ICD-10-CM

## 2025-01-20 DIAGNOSIS — Z90.5 H/O RIGHT NEPHRECTOMY: ICD-10-CM

## 2025-01-20 DIAGNOSIS — C64.1 CLEAR CELL CARCINOMA OF RIGHT KIDNEY (HCC): ICD-10-CM

## 2025-01-20 PROCEDURE — 99214 OFFICE O/P EST MOD 30 MIN: CPT | Performed by: STUDENT IN AN ORGANIZED HEALTH CARE EDUCATION/TRAINING PROGRAM

## 2025-01-20 RX ORDER — OXYCODONE HYDROCHLORIDE 10 MG/1
10-15 TABLET ORAL EVERY 4 HOURS PRN
Start: 2025-01-20

## 2025-01-20 NOTE — PROGRESS NOTES
Patient has been scheduled to see Dr. Flynn at [place] on 2/6/25 11am referred by Dr. Campbell for a second opinion.

## 2025-01-20 NOTE — ASSESSMENT & PLAN NOTE
Following Dr. Campbell of [facility] Oncology  Currently awaiting start of Lenvatinib (Lenvima) and Everolimus (Afinitor)  Patient awaiting also potential appointment with Nathan Nichols for a second opinion.    Orders:    oxyCODONE (ROXICODONE) 10 MG TABS; Take 1-1.5 tablets (10-15 mg total) by mouth every 4 (four) hours as needed (cancer pain) Max Daily Amount: 90 mg

## 2025-01-20 NOTE — PROGRESS NOTES
Name: Stuart Munoz      : 1970      MRN: [id number]  Encounter Provider: Aniceto Gómez DO  Encounter Date: 2025   Encounter department: 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 Campbell of Medical Oncology  Currently awaiting start of Lenvatinib (Lenvima) and Everolimus (Afinitor)  Patient awaiting also potential appointment with Nathan Nichols for a second opinion.    Orders:    oxyCODONE (ROXICODONE) 10 MG TABS; Take 1-1.5 tablets (10-15 mg total) by mouth every 4 (four) hours as needed (cancer pain) Max Daily Amount: 90 mg    Cancer-related pain  Abdominal pain and back pain rated a 10 out of 10 today. Patient with known interval progression of metastatic disease with new pulmonary nodules, hepatic metastasis and mesenteric/ retroperitoneal lymphadenopathy as per CT CAP from  likely contributing to patient's ongoing pain issues.    Discussed options for opioid titration with patient and spouse's understanding and agreement.    Plan:  1) OxyIR 10mg q4 hours PRN for moderate pain  2) OxyIR 15mg q4 hours PRN for severe pain   -patient knows to call my office to let me know how he is doing.   -3 days prior to running out of [facility] prescribed medications, patient to call our office to provide refills on time.  3) monitor for constipation, bowel regimen recommended to avoid OIC   -patient uses combination of Senna and Miralax as needed to help with OIC   -patient may use Senna 8.6mg qHS, if no improvement, can go to 2 tablets qHS (no more than 4 tablets in a day - 2 tabs in the morning and 2 qHS if this is necessary/no response with lower dosing). Patient may also use Miralax daily PRN as an adjunct as discussed with patient today.  4) multi-modal with APAP 1,000 mg q8 hours PRN (max of 3,000 mg in a 24 hour period)  5) Patient has been on Gabapentin 600mg BID.   -feels there has been no real difference with the higher dose. Patient  would like to try to decrease his dosing and wean if able.   -Plan is to decrease Gabapentin to 300mg in the morning and 600mg qHS to see if he is able to tolerate. If so without side effects, will further taper by another 300mg after 1 week.    Medication safety issues - Do not drive under the influence of narcotics (including opioids), watch for adverse effects including confusion / altered mental status / respiratory depression (slowed breathing), keep medications stored in a safe/locked environment, do not use alcohol while opioids or other narcotics are in your system. Do not travel with more than the minimum number of tablets or capsules required for the trip.    ER Precautions  Watch for red flag symptoms including, but not limited to fevers, chills, chest pain, shortness of breath, intractable nausea/vomiting/diarrhea, or acute intractable pain (especially if pain is new or has changed).      Orders:    oxyCODONE (ROXICODONE) 10 MG TABS; Take 1-1.5 tablets (10-15 mg total) by mouth every 4 (four) hours as needed (cancer pain) Max Daily Amount: 90 mg    Insomnia  Doing well with Trazodone 100mg qHS as this remains effective for patient.    Plan  1) Continue Trazodone 100 mg qHS         Palliative care patient  Psychosocial   Supportive listening provided  Normalized experience of patient/family  Provided anxiety containment     Referrals Placed / Medical Equipment Ordered  -None    Follow-Up Recommendations  -Follow-up with PCP and current medical specialists  -Follow-up with palliative care: 4 weeks    Orders:    oxyCODONE (ROXICODONE) 10 MG TABS; Take 1-1.5 tablets (10-15 mg total) by mouth every 4 (four) hours as needed (cancer pain) Max Daily Amount: 90 mg    H/O right nephrectomy    Orders:    oxyCODONE (ROXICODONE) 10 MG TABS; Take 1-1.5 tablets (10-15 mg total) by mouth every 4 (four) hours as needed (cancer pain) Max Daily Amount: 90 mg        Decisional apparatus: Patient is competent on my exam  today. If competence is lost, patient's substitute decision maker would default to spouse by [place] Act 169.   Advance Directive / Living Will / POLST: On file     PDMP Review: I have reviewed the patient's controlled substance dispensing history in the Prescription Drug Monitoring Program in compliance with the [facility] regulations before prescribing any controlled substances.    History of Present Illness   Stuart Munoz is a 54 y.o. male who presents for follow-up.    Palliative diagnosis - Stage 4 Clear Cell carcinoma of right kidney    Patient is seen today in office. Alert, oriented, pleasantly conversant.  Patient is seen accompanied by his spouse.    Appetite has been fair.    Pain to the abdomen. Rated a 10 out of 10. States most of his difficulty is with pain in when he is trying to get comfortable in his bed. He states he has a difficult time in finding a position that allows him to sleep due to his pain. He has not been sleeping well due to his recent pain levels. He does feel the Trazodone was helpful when it first was started but feels the pain is causing most of his sleep disturbance.    Patient is well supported by his spouse and family.      Medical History Reviewed by provider this encounter:  Tobacco  Allergies  Meds  Problems  Med Hx  Surg Hx  Fam Hx     .  Past Medical History   Past Medical History:   Diagnosis Date    Arthralgia     last assessed 02/10/2015    Babesiosis     last assessed 12/30/14    Benign paroxysmal vertigo     last assessed 05/22/15    Cancer (HCC)     right kidney removed 11/21/22-oral chemotherapy and IV immunotherapy every 21 days    Dry skin dermatitis     Fullness of abdomen     with eating    Meniere disease     last assessed 04/15/13    Pneumonia of left lower lobe due to infectious organism     last assessed 04/04/16    Vitamin D deficiency     last assessed 12/30/14     Past Surgical History:   Procedure Laterality Date    FIBULA FRACTURE SURGERY Right     IR RENAL  ANGIOGRAM  11/18/2022    AR NEPHRECTOMY W/PRTL URETERECT OPEN RIB RESCJ RAD Right 11/21/2022    Procedure: NEPHRECTOMY ABDOMINAL APPROACH;  Surgeon: Lisa Webster MD;  Location: [facility];  Service: Urology    TIBIA FRACTURE SURGERY Right     TONSILLECTOMY       Family History   Problem Relation Age of Onset    Meniere's disease Mother     Hearing loss Mother     Cancer Father         ?renal/bladder?    No Known Problems Family     Colon cancer Maternal Grandfather       reports that he quit smoking about 2 years ago. His smoking use included cigarettes. He started smoking about 41 years ago. He has a 38.6 pack-year smoking history. He has never been exposed to tobacco smoke. He has quit using smokeless tobacco.  His smokeless tobacco use included chew. He reports that he does not currently use alcohol after a past usage of about 1.0 standard drink of alcohol per week. He reports that he does not use drugs.  Current Outpatient Medications on File Prior to Visit   Medication Sig Dispense Refill    acetaminophen (TYLENOL) 500 mg tablet Take 2 tablets (1,000 mg total) by mouth 3 (three) times a day as needed for mild pain, moderate pain, headaches or fever 180 tablet 2    acetaminophen (TYLENOL) 500 mg tablet Take 2 tablets (1,000 mg total) by mouth every 8 (eight) hours 66 tablet 0    apixaban (Eliquis) 5 mg Take 1 tablet (5 mg total) by mouth 2 (two) times a day 60 tablet 5    bisacodyl (DULCOLAX) 10 mg suppository Insert 1 suppository (10 mg total) into the rectum daily 12 suppository 0    CANNABIDIOL PO Take by mouth      Diclofenac Sodium (VOLTAREN) 1 % Apply 2 g topically 4 (four) times a day 240 g 0    DULoxetine (CYMBALTA) 30 mg delayed release capsule TAKE 1 CAPSULE BY MOUTH EVERY DAY 90 capsule 1    everolimus (Afinitor) 5 MG tablet Take 1 tablet (5 mg total) by mouth daily 30 tablet 5    gabapentin (NEURONTIN) 300 mg capsule Take 2 capsules (600mg) in the morning and 2 capsule (600mg) in the evening 120  capsule 2    hydrOXYzine pamoate (VISTARIL) 25 mg capsule TAKE 1 CAPSULE (25 MG TOTAL) BY MOUTH DAILY AT BEDTIME AS NEEDED FOR ANXIETY (INSOMNIA) 90 capsule 1    lactulose (CEPHULAC) 20 g packet Take 1 packet (20 g total) by mouth 2 (two) times a day 30 each 0    Lenvatinib, 18 MG Daily Dose, (Lenvima, 18 MG Daily Dose,) 10 MG & 2 x 4 MG CPPK Take 18 mg by mouth daily 90 each 5    levothyroxine 25 mcg tablet Take 1 tablet (25 mcg total) by mouth daily 90 tablet 3    lidocaine (Lidoderm) 5 % Apply 1 patch topically over 12 hours daily Remove & Discard patch within 12 hours - apply to R side of lower spine 30 patch 5    naloxone (NARCAN) 4 mg/0.1 mL nasal spray Administer 1 spray into a nostril. If no response after 2-3 minutes, give another dose in the other nostril using a new spray. 1 each 1    nystatin (MYCOSTATIN) 500,000 units/5 mL suspension       polyethylene glycol (MIRALAX) 17 g packet Take 17 g by mouth daily 30 each 2    polyethylene glycol (MIRALAX) 17 g packet Take 17 g by mouth daily 510 g 0    senna (SENOKOT) 8.6 mg Take 1-2 tablets (8.6-17.2 mg total) by mouth daily as needed for constipation (constipation) 60 tablet 2    senna-docusate sodium (SENOKOT S) 8.6-50 mg per tablet Take 1 tablet by mouth daily 20 tablet 0    traZODone (DESYREL) 100 mg tablet Take 1 tablet (100 mg total) by mouth daily at bedtime 30 tablet 2    [DISCONTINUED] oxyCODONE (ROXICODONE) 10 MG TABS Take 1 tablet (10 mg total) by mouth every 4 (four) hours as needed (cancer pain) Max Daily Amount: 60 mg 120 tablet 0    methocarbamol (ROBAXIN) 750 mg tablet Take 1 tablet (750 mg total) by mouth 3 (three) times a day for 10 days 30 tablet 0     No current facility-administered medications on file prior to visit.   No Known Allergies   Current Outpatient Medications on File Prior to Visit   Medication Sig Dispense Refill    acetaminophen (TYLENOL) 500 mg tablet Take 2 tablets (1,000 mg total) by mouth 3 (three) times a day as needed  for mild pain, moderate pain, headaches or fever 180 tablet 2    acetaminophen (TYLENOL) 500 mg tablet Take 2 tablets (1,000 mg total) by mouth every 8 (eight) hours 66 tablet 0    apixaban (Eliquis) 5 mg Take 1 tablet (5 mg total) by mouth 2 (two) times a day 60 tablet 5    bisacodyl (DULCOLAX) 10 mg suppository Insert 1 suppository (10 mg total) into the rectum daily 12 suppository 0    CANNABIDIOL PO Take by mouth      Diclofenac Sodium (VOLTAREN) 1 % Apply 2 g topically 4 (four) times a day 240 g 0    DULoxetine (CYMBALTA) 30 mg delayed release capsule TAKE 1 CAPSULE BY MOUTH EVERY DAY 90 capsule 1    everolimus (Afinitor) 5 MG tablet Take 1 tablet (5 mg total) by mouth daily 30 tablet 5    gabapentin (NEURONTIN) 300 mg capsule Take 2 capsules (600mg) in the morning and 2 capsule (600mg) in the evening 120 capsule 2    hydrOXYzine pamoate (VISTARIL) 25 mg capsule TAKE 1 CAPSULE (25 MG TOTAL) BY MOUTH DAILY AT BEDTIME AS NEEDED FOR ANXIETY (INSOMNIA) 90 capsule 1    lactulose (CEPHULAC) 20 g packet Take 1 packet (20 g total) by mouth 2 (two) times a day 30 each 0    Lenvatinib, 18 MG Daily Dose, (Lenvima, 18 MG Daily Dose,) 10 MG & 2 x 4 MG CPPK Take 18 mg by mouth daily 90 each 5    levothyroxine 25 mcg tablet Take 1 tablet (25 mcg total) by mouth daily 90 tablet 3    lidocaine (Lidoderm) 5 % Apply 1 patch topically over 12 hours daily Remove & Discard patch within 12 hours - apply to R side of lower spine 30 patch 5    naloxone (NARCAN) 4 mg/0.1 mL nasal spray Administer 1 spray into a nostril. If no response after 2-3 minutes, give another dose in the other nostril using a new spray. 1 each 1    nystatin (MYCOSTATIN) 500,000 units/5 mL suspension       polyethylene glycol (MIRALAX) 17 g packet Take 17 g by mouth daily 30 each 2    polyethylene glycol (MIRALAX) 17 g packet Take 17 g by mouth daily 510 g 0    senna (SENOKOT) 8.6 mg Take 1-2 tablets (8.6-17.2 mg total) by mouth daily as needed for constipation  (constipation) 60 tablet 2    senna-docusate sodium (SENOKOT S) 8.6-50 mg per tablet Take 1 tablet by mouth daily 20 tablet 0    traZODone (DESYREL) 100 mg tablet Take 1 tablet (100 mg total) by mouth daily at bedtime 30 tablet 2    [DISCONTINUED] oxyCODONE (ROXICODONE) 10 MG TABS Take 1 tablet (10 mg total) by mouth every 4 (four) hours as needed (cancer pain) Max Daily Amount: 60 mg 120 tablet 0    methocarbamol (ROBAXIN) 750 mg tablet Take 1 tablet (750 mg total) by mouth 3 (three) times a day for 10 days 30 tablet 0     No current facility-administered medications on file prior to visit.      Social History     Tobacco Use    Smoking status: Former     Current packs/day: 0.00     Average packs/day: 1 pack/day for 38.6 years (38.6 ttl pk-yrs)     Types: Cigarettes     Start date: 1984     Quit date: 2022     Years since quittin.3     Passive exposure: Never    Smokeless tobacco: Former     Types: Chew    Tobacco comments:     Quit 2 mos ago   Vaping Use    Vaping status: Never Used   Substance and Sexual Activity    Alcohol use: Not Currently     Alcohol/week: 1.0 standard drink of alcohol     Comment: 1 daily    Drug use: No    Sexual activity: Yes     Partners: Female     Birth control/protection: Female Sterilization        Objective   /60 (BP Location: Left arm, Patient Position: Sitting, Cuff Size: Standard)   Temp (!) 97.2 °F (36.2 °C) (Temporal)   Ht 6' (1.829 m)   Wt 61.2 kg (135 lb)   BMI 18.31 kg/m²     Physical Exam  Vitals reviewed.   Constitutional:       General: He is not in acute distress.     Appearance: He is ill-appearing (chronically). He is not toxic-appearing or diaphoretic.      Comments: Thin body habitus.   HENT:      Head: Normocephalic and atraumatic.      Nose: Nose normal.      Mouth/Throat:      Mouth: Mucous membranes are moist.   Eyes:      General:         Right eye: No discharge.         Left eye: No discharge.   Cardiovascular:      Rate and Rhythm: Normal  rate.   Pulmonary:      Effort: Pulmonary effort is normal. No respiratory distress.      Breath sounds: No wheezing.   Abdominal:      General: Abdomen is flat. There is no distension.   Musculoskeletal:         General: No swelling.   Skin:     General: Skin is warm and dry.      Coloration: Skin is not jaundiced or pale.   Neurological:      General: No focal deficit present.      Mental Status: He is alert. Mental status is at baseline.   Psychiatric:         Mood and Affect: Mood normal.         Behavior: Behavior normal.         Thought Content: Thought content normal.         Judgment: Judgment normal.         Recent labs:  Lab Results   Component Value Date/Time    SODIUM 138 12/05/2024 05:43 AM    K 4.4 12/05/2024 05:43 AM    BUN 15 12/05/2024 05:43 AM    CREATININE 0.90 12/05/2024 05:43 AM    GLUC 106 12/05/2024 05:43 AM    CALCIUM 9.6 12/05/2024 05:43 AM    AST 24 12/05/2024 05:43 AM    ALT 11 12/05/2024 05:43 AM    ALB 3.6 12/05/2024 05:43 AM    TP 6.9 12/05/2024 05:43 AM    EGFR 95 12/05/2024 05:43 AM     Lab Results   Component Value Date/Time    HGB 12.4 12/05/2024 05:43 AM    WBC 5.54 12/05/2024 05:43 AM     12/05/2024 05:43 AM    INR 1.05 11/18/2022 01:48 PM     Lab Results   Component Value Date/Time    YUP3MTLIITDI 7.801 (H) 12/04/2024 12:38 PM       Recent Imaging:  Procedure: CT chest abdomen pelvis w contrast  Result Date: 1/10/2025  Narrative: CT CHEST, ABDOMEN AND PELVIS WITH IV CONTRAST INDICATION: C64.1: Malignant neoplasm of right kidney, except renal pelvis. COMPARISON: CT 8/23/2024 and CT 12/5/2024 TECHNIQUE: CT examination of the chest, abdomen and pelvis was performed. Multiplanar 2D reformatted images were created from the source data. This examination, like all CT scans performed in the [facility], was performed utilizing techniques to minimize radiation dose exposure, including the use of iterative reconstruction and automated exposure control. Radiation dose  length product (DLP) for this visit: 353.24 mGy-cm IV Contrast: 100 mL of iohexol (OMNIPAQUE) Enteric Contrast: Not administered. FINDINGS: CHEST LUNGS: There are diffuse patchy groundglass opacities and smooth interlobular septal thickening throughout the right lung, with lesser similar changes within the left lower lobe. There are numerous superimposed pulmonary nodules, with representative nodules as described (series 3): Image 79, left upper lobe, 0.9 x 1.4 cm, new. Image 128, left lower lobe, 1.3 x 1.2 cm, new. Image 146, right lower lobe, 0.7 cm, new. Image 178, juxtapleural left lower lobe, 1.2 x 1.8 cm, new. Image 185, medial right lower lobe, 0.9 x 0.7 cm, new. PLEURA: Small right and trace left pleural effusions HEART/GREAT VESSELS: The heart is not grossly enlarged, with moderate multi-vessel coronary artery calcifications. The pericardium is unremarkable. Fusiform ectasia of the ascending thoracic aorta, measuring 4 cm. MEDIASTINUM AND ELLIE: Prominent diffuse heterogeneous mediastinal and hilar lymphadenopathy. Representative lymph nodes are seen on series 2: Image 38, right paratracheal, 4.5 x 2.8 cm, previously subcentimeter. Image 62, right hilar, 3.6 x 3.3 cm, previously 2.8 x 1.7 cm. Image 71, left hilar, 1.9 x 2.5 cm, previously subcentimeter CHEST WALL AND LOWER NECK: There is a right lateral chest wall 4.5 x 2.7 cm lipoma, with partial subpleural extension. No suspicious features or significant change. ABDOMEN LIVER/BILIARY TREE: There is ill-defined nodularity throughout the fissure surrounding the falciform ligament. There is an enlarging 8 mm hypodense dense nodule within segment 8/4; subcapsular segment 7 lesion measuring 9 mm (2/117); slightly enlarged and at least 2 additional new 6 mm lesions within segment segment 5. GALLBLADDER: No calcified gallstones. No pericholecystic inflammatory change. SPLEEN: Unremarkable. PANCREAS: Unremarkable. ADRENAL GLANDS: Unremarkable. KIDNEYS/URETERS: The  right kidney is surgically absent. The left kidney and ureter are unremarkable. STOMACH AND BOWEL: Unremarkable. APPENDIX: No findings to suggest appendicitis. ABDOMINOPELVIC CAVITY: There is a small amount of simple density free pelvic fluid, new since the prior study. There is extensive conglomerate lymphadenopathy throughout the retroperitoneum of the upper abdomen and extending along the gastrohepatic region. On series 2, image 117, the gastrohepatic lymphadenopathy measures 3.8 x 6.7 cm, new since the prior study. The conglomerate retroperitoneal lymphadenopathy measures 7.4 x 12.8 cm, and series 2 oh image 133. Previously smaller separate/discrete lymph nodes were seen throughout the aortocaval and periaortic region. Smaller lymph nodes are seen throughout the central mesentery have also significantly increased. VESSELS: Moderate diffuse atherosclerotic changes/calcifications throughout the aorta and central branch vessels. The IVC is grossly unremarkable. PELVIS REPRODUCTIVE ORGANS: Unremarkable for patient's age. URINARY BLADDER: Unremarkable. ABDOMINAL WALL/INGUINAL REGIONS: Mild diffuse subcutaneous edema. BONES: No acute fracture or suspicious osseous lesion. There is an expansile expansile lucent lesion within the right ischium, unchanged since the prior study, but new since 2022.     Impression: 1. Marked interval progression of the metastatic disease, including numerous new pulmonary nodules, hepatic metastasis and mesenteric/retroperitoneal lymphadenopathy. 2. Mild diffuse anasarca, with small pleural effusions, trace ascites and subcutaneous edema. 3. Prominent diffuse groundglass opacities and smooth interlobular septal thickening throughout the right greater than left lung. The groundglass opacities have increased since the most recent prior CT abdomen. The differential diagnosis again includes asymmetric pulmonary interstitial/alveolar edema and infectious etiologies. This was directly discussed  with Dr. Campbell's associate, Samantha Damon RN at 2:40 p.m. on 1/10/2025. Workstation performed: [id number]     Procedure: CT abdomen pelvis with contrast  Result Date: 12/5/2024  Narrative: CT ABDOMEN AND PELVIS WITH IV CONTRAST INDICATION: lower abd pain, lower back pain. History of metastatic renal carcinoma. COMPARISON: Multiple prior studies, most recent a CT scan of 8/23/2024. TECHNIQUE: CT examination of the abdomen and pelvis was performed. Multiplanar 2D reformatted images were created from the source data. This examination, like all CT scans performed in the [facility], was performed utilizing techniques to minimize radiation dose exposure, including the use of iterative reconstruction and automated exposure control. Radiation dose length product (DLP) for this visit: 481.78 mGy-cm IV Contrast: 100 mL of iohexol (OMNIPAQUE) Enteric Contrast: Not administered. FINDINGS: ABDOMEN LOWER CHEST: Multiple new and enlarging nodules in the visualized lungs, for instance 9 mm juxtapleural left lower lobe nodule on image 2/4, previously 4 mm, and new 10 mm juxtapleural nodule in the medial right lung base on image 2/21. Mild groundglass densities in the posterior right lower lobe which may be infectious versus interstitial edema. New small right pleural effusion. LIVER/BILIARY TREE: Indeterminate 5 mm hypodensity in the anterior right hepatic lobe on image 2/24, not definitely seen on prior studies. 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 Stable small hemangioma in the inferior margin of the right hepatic lobe, image 2/82. Partial left hepatectomy. GALLBLADDER: No calcified gallstones. No pericholecystic inflammatory change. SPLEEN: Unremarkable. PANCREAS: Unremarkable. ADRENAL GLANDS: Unremarkable. KIDNEYS/URETERS: Status post right nephrectomy with no abnormality in the nephrectomy bed. Unremarkable left kidney. STOMACH AND BOWEL: Unremarkable.  APPENDIX: No findings to suggest appendicitis. ABDOMINOPELVIC CAVITY: Interval enlargement of multiple lymph nodes in the upper abdomen, increased extensive retroperitoneal adenopathy, and multiple newly enlarged mesenteric lymph nodes. Reference lesions: New 1.6 cm short axis gastrohepatic lymph node, image 2/27. 3.0 cm portacaval lymph node, previously 1.2 cm, image 2/42. 3.0 cm left retroperitoneal lymph node image 2/53, previously 2.2 cm. 1.7 cm mesenteric lymph node in the left anterior abdomen on image 2/70. Minimal pelvic ascites. No pneumoperitoneum. VESSELS: Atherosclerosis without abdominal aortic aneurysm. Retroaortic left renal vein. PELVIS REPRODUCTIVE ORGANS: Unremarkable for patient's age. URINARY BLADDER: Unremarkable. ABDOMINAL WALL/INGUINAL REGIONS: Unremarkable. BONES: No significant change in the 5.2 cm lytic lesion in the right ischium, image 602/109.     Impression: Interval progression of metastatic disease in the abdomen with worsening of extensive adenopathy in the upper abdomen, retroperitoneum and mesentery. Indeterminate subcentimeter hypodensity in the liver for which metastatic disease cannot be excluded. Interval progression of pulmonary metastatic disease in the visualized chest. Groundglass densities in the right lower lobe may represent interstitial edema or infection. New small right pleural effusion. Workstation performed: [id number]       Administrative Statements   I have spent a total time of 32 minutes in caring for this patient on the day of the visit/encounter including Risks and benefits of tx options, Instructions for management, Patient and family education, Importance of tx compliance, Risk factor reductions, Impressions, Counseling / Coordination of care, Documenting in the medical record, Reviewing / ordering tests, medicine, procedures  , and Obtaining or reviewing history  . Topics discussed with the patient / family include symptom assessment and management,  medication review, medication adjustment, psychosocial support, goals of care, medical marijuana, supportive listening, and anticipatory guidance.

## 2025-01-20 NOTE — ASSESSMENT & PLAN NOTE
Doing well with Trazodone 100mg qHS as this remains effective for patient.    Plan  1) Continue Trazodone 100 mg qHS

## 2025-01-20 NOTE — ASSESSMENT & PLAN NOTE
Orders:    oxyCODONE (ROXICODONE) 10 MG TABS; Take 1-1.5 tablets (10-15 mg total) by mouth every 4 (four) hours as needed (cancer pain) Max Daily Amount: 90 mg

## 2025-01-20 NOTE — ASSESSMENT & PLAN NOTE
Psychosocial   Supportive listening provided  Normalized experience of patient/family  Provided anxiety containment     Referrals Placed / Medical Equipment Ordered  -None    Follow-Up Recommendations  -Follow-up with PCP and current medical specialists  -Follow-up with palliative care: 4 weeks    Orders:    oxyCODONE (ROXICODONE) 10 MG TABS; Take 1-1.5 tablets (10-15 mg total) by mouth every 4 (four) hours as needed (cancer pain) Max Daily Amount: 90 mg

## 2025-01-20 NOTE — ASSESSMENT & PLAN NOTE
Abdominal pain and back pain rated a 10 out of 10 today. Patient with known interval progression of metastatic disease with new pulmonary nodules, hepatic metastasis and mesenteric/ retroperitoneal lymphadenopathy as per CT CAP from 17/2025 likely contributing to patient's ongoing pain issues.    Discussed options for opioid titration with patient and spouse's understanding and agreement.    Plan:  1) OxyIR 10mg q4 hours PRN for moderate pain  2) OxyIR 15mg q4 hours PRN for severe pain   -patient knows to call my office to let me know how he is doing.   -3 days prior to running out of [facility] prescribed medications, patient to call our office to provide refills on time.  3) monitor for constipation, bowel regimen recommended to avoid OIC   -patient uses combination of Senna and Miralax as needed to help with OIC   -patient may use Senna 8.6mg qHS, if no improvement, can go to 2 tablets qHS (no more than 4 tablets in a day - 2 tabs in the morning and 2 qHS if this is necessary/no response with lower dosing). Patient may also use Miralax daily PRN as an adjunct as discussed with patient today.  4) multi-modal with APAP 1,000 mg q8 hours PRN (max of 3,000 mg in a 24 hour period)  5) Patient has been on Gabapentin 600mg BID.   -feels there has been no real difference with the higher dose. Patient would like to try to decrease his dosing and wean if able.   -Plan is to decrease Gabapentin to 300mg in the morning and 600mg qHS to see if he is able to tolerate. If so without side effects, will further taper by another 300mg after 1 week.    Medication safety issues - Do not drive under the influence of narcotics (including opioids), watch for adverse effects including confusion / altered mental status / respiratory depression (slowed breathing), keep medications stored in a safe/locked environment, do not use alcohol while opioids or other narcotics are in your system. Do not travel with more than the minimum number of tablets  or capsules required for the trip.    ER Precautions  Watch for red flag symptoms including, but not limited to fevers, chills, chest pain, shortness of breath, intractable nausea/vomiting/diarrhea, or acute intractable pain (especially if pain is new or has changed).      Orders:    oxyCODONE (ROXICODONE) 10 MG TABS; Take 1-1.5 tablets (10-15 mg total) by mouth every 4 (four) hours as needed (cancer pain) Max Daily Amount: 90 mg

## 2025-01-23 DIAGNOSIS — G89.4 CHRONIC PAIN SYNDROME: ICD-10-CM

## 2025-01-23 DIAGNOSIS — F43.23 SITUATIONAL MIXED ANXIETY AND DEPRESSIVE DISORDER: ICD-10-CM

## 2025-01-23 RX ORDER — DULOXETIN HYDROCHLORIDE 30 MG/1
30 CAPSULE, DELAYED RELEASE ORAL DAILY
Qty: 90 CAPSULE | Refills: 1 | Status: SHIPPED | OUTPATIENT
Start: 2025-01-23

## 2025-01-26 VITALS
WEIGHT: 138.8 LBS | HEIGHT: 72 IN | DIASTOLIC BLOOD PRESSURE: 70 MMHG | RESPIRATION RATE: 18 BRPM | HEART RATE: 92 BPM | SYSTOLIC BLOOD PRESSURE: 100 MMHG | BODY MASS INDEX: 18.8 KG/M2 | OXYGEN SATURATION: 98 % | TEMPERATURE: 97 F

## 2025-01-27 DIAGNOSIS — Z51.5 PALLIATIVE CARE PATIENT: ICD-10-CM

## 2025-01-27 DIAGNOSIS — G89.3 CANCER-RELATED PAIN: ICD-10-CM

## 2025-01-27 DIAGNOSIS — Z90.5 H/O RIGHT NEPHRECTOMY: ICD-10-CM

## 2025-01-27 DIAGNOSIS — C64.1 CLEAR CELL CARCINOMA OF RIGHT KIDNEY (HCC): ICD-10-CM

## 2025-01-27 RX ORDER — GABAPENTIN 300 MG/1
CAPSULE ORAL
Qty: 120 CAPSULE | Refills: 2 | Status: SHIPPED | OUTPATIENT
Start: 2025-01-27 | End: 2025-01-31

## 2025-01-27 NOTE — ASSESSMENT & PLAN NOTE
Follows w Oncologist--Dr. Jermaine Campbell-input appreciated-  Oncology considering start of Lenvima and Afinitor  Referred to [facility] for second opinion  Has upcoming appt next week  Also has follow-up w Palliative Care--Dr. Aniceto Gómez-1/20/25

## 2025-01-27 NOTE — PROGRESS NOTES
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 Jermaine Campbell-input appreciated-  Oncology considering start of Lenvima and Afinitor  Referred to [facility] for second opinion  Has upcoming appt next week  Also has follow-up w Palliative Care--Dr. Aniceto Gómez-25       H/O right nephrectomy         Pleural effusion  D/w pt/wife option for therapeutic throracentesis with either interventional radiologist and/or pulmonologist if sxs progress  Declines at present--will follow-up after consult w [facility]           Insomnia, unspecified type  Cont. trazodone            History of Present Illness   HPI    In w wife for follow-up  Interval hx reviewed  Noted disease progression  Further wgt decrease and ongoing sleep diff  Intermittent abd discomfort and dyspnea on exertion  +pleural effusion on recent scan  Has appt w palliative care next week-  has also been ref for second opinion for next tx rj to [facility] from oncologist-    Review of Systems   Constitutional:  Positive for fatigue. Negative for fever.   HENT:  Negative for sore throat.    Respiratory:          Dyspnea on exertion   Cardiovascular: Negative.    Gastrointestinal:  Negative for blood in stool, diarrhea and vomiting.   Genitourinary:  Positive for dysuria and flank pain.   Musculoskeletal:  Positive for arthralgias, back pain and myalgias.   Skin:  Negative for rash.   Neurological:  Positive for weakness. Negative for numbness.   Psychiatric/Behavioral:  Positive for sleep disturbance. Negative for hallucinations, self-injury and suicidal ideas. The patient is nervous/anxious.        Objective   There were no vitals taken for this visit.     Physical Exam  Vitals and nursing note reviewed.    Constitutional:       General: He is awake. He is not in acute distress.     Appearance: He is well-groomed. He is ill-appearing.   Eyes:      General: No scleral icterus.     Conjunctiva/sclera: Conjunctivae normal.   Cardiovascular:      Rate and Rhythm: Normal rate and regular rhythm.   Pulmonary:      Effort: Pulmonary effort is normal. No tachypnea, bradypnea, accessory muscle usage or respiratory distress.      Comments: Decreased basilar breath sounds  Abdominal:      General: Bowel sounds are normal.      Palpations: Abdomen is soft.      Tenderness: There is no guarding or rebound.   Musculoskeletal:      Cervical back: Neck supple. No tenderness. Normal range of motion.      Right lower leg: No edema.      Left lower leg: No edema.   Skin:     General: Skin is warm and dry.      Coloration: Skin is not jaundiced.   Neurological:      General: No focal deficit present.      Mental Status: He is alert and oriented to person, place, and time.      Cranial Nerves: No cranial nerve deficit, dysarthria or facial asymmetry.   Psychiatric:         Attention and Perception: Attention normal.         Mood and Affect: Mood and affect normal.         Speech: Speech normal.         Behavior: Behavior is cooperative.         Cognition and Memory: Cognition and memory normal.

## 2025-01-28 ENCOUNTER — TELEPHONE (OUTPATIENT)
Dept: HEMATOLOGY ONCOLOGY | Facility: MEDICAL CENTER | Age: 55
End: 2025-01-28

## 2025-01-28 ENCOUNTER — HOSPITAL ENCOUNTER (INPATIENT)
Facility: HOSPITAL | Age: 55
LOS: 3 days | Discharge: HOSPICE | DRG: 686 | End: 2025-01-31
Admitting: STUDENT IN AN ORGANIZED HEALTH CARE EDUCATION/TRAINING PROGRAM
Payer: COMMERCIAL

## 2025-01-28 DIAGNOSIS — C64.1 CLEAR CELL CARCINOMA OF RIGHT KIDNEY (HCC): ICD-10-CM

## 2025-01-28 DIAGNOSIS — R62.7 FAILURE TO THRIVE IN ADULT: ICD-10-CM

## 2025-01-28 DIAGNOSIS — I81 PORTAL VEIN THROMBOSIS: ICD-10-CM

## 2025-01-28 DIAGNOSIS — C79.9 METASTATIC MALIGNANT NEOPLASM, UNSPECIFIED SITE (HCC): Primary | ICD-10-CM

## 2025-01-28 DIAGNOSIS — R10.9 ABDOMINAL PAIN: ICD-10-CM

## 2025-01-28 DIAGNOSIS — C64.1 CLEAR CELL CARCINOMA OF RIGHT KIDNEY (HCC): Primary | ICD-10-CM

## 2025-01-28 DIAGNOSIS — C78.00 MALIGNANT NEOPLASM METASTATIC TO LUNG, UNSPECIFIED LATERALITY (HCC): ICD-10-CM

## 2025-01-28 DIAGNOSIS — Z51.5 PALLIATIVE CARE PATIENT: ICD-10-CM

## 2025-01-28 LAB
ALBUMIN SERPL BCG-MCNC: 3.2 G/DL (ref 3.5–5)
ALP SERPL-CCNC: 83 U/L (ref 34–104)
ALT SERPL W P-5'-P-CCNC: 12 U/L (ref 7–52)
ANION GAP SERPL CALCULATED.3IONS-SCNC: 12 MMOL/L (ref 4–13)
AST SERPL W P-5'-P-CCNC: 32 U/L (ref 13–39)
BASOPHILS # BLD AUTO: 0 THOUSANDS/ΜL (ref 0–0.1)
BASOPHILS NFR BLD AUTO: 0 % (ref 0–1)
BILIRUB SERPL-MCNC: 1.7 MG/DL (ref 0.2–1)
BUN SERPL-MCNC: 26 MG/DL (ref 5–25)
CALCIUM ALBUM COR SERPL-MCNC: 9.2 MG/DL (ref 8.3–10.1)
CALCIUM SERPL-MCNC: 8.6 MG/DL (ref 8.4–10.2)
CHLORIDE SERPL-SCNC: 97 MMOL/L (ref 96–108)
CO2 SERPL-SCNC: 24 MMOL/L (ref 21–32)
CREAT SERPL-MCNC: 0.82 MG/DL (ref 0.6–1.3)
EOSINOPHIL # BLD AUTO: 0 THOUSAND/ΜL (ref 0–0.61)
EOSINOPHIL NFR BLD AUTO: 0 % (ref 0–6)
ERYTHROCYTE [DISTWIDTH] IN BLOOD BY AUTOMATED COUNT: 15.9 % (ref 11.6–15.1)
GFR SERPL CREATININE-BSD FRML MDRD: 98 ML/MIN/1.73SQ M
GLUCOSE SERPL-MCNC: 100 MG/DL (ref 65–140)
HCT VFR BLD AUTO: 38 % (ref 36.5–49.3)
HGB BLD-MCNC: 11.8 G/DL (ref 12–17)
IMM GRANULOCYTES # BLD AUTO: 0.03 THOUSAND/UL (ref 0–0.2)
IMM GRANULOCYTES NFR BLD AUTO: 0 % (ref 0–2)
LYMPHOCYTES # BLD AUTO: 0.61 THOUSANDS/ΜL (ref 0.6–4.47)
LYMPHOCYTES NFR BLD AUTO: 7 % (ref 14–44)
MCH RBC QN AUTO: 25.4 PG (ref 26.8–34.3)
MCHC RBC AUTO-ENTMCNC: 31.1 G/DL (ref 31.4–37.4)
MCV RBC AUTO: 82 FL (ref 82–98)
MONOCYTES # BLD AUTO: 1.07 THOUSAND/ΜL (ref 0.17–1.22)
MONOCYTES NFR BLD AUTO: 13 % (ref 4–12)
NEUTROPHILS # BLD AUTO: 6.61 THOUSANDS/ΜL (ref 1.85–7.62)
NEUTS SEG NFR BLD AUTO: 80 % (ref 43–75)
NRBC BLD AUTO-RTO: 0 /100 WBCS
PLATELET # BLD AUTO: 198 THOUSANDS/UL (ref 149–390)
PMV BLD AUTO: 9.7 FL (ref 8.9–12.7)
POTASSIUM SERPL-SCNC: 4.3 MMOL/L (ref 3.5–5.3)
PROT SERPL-MCNC: 7.1 G/DL (ref 6.4–8.4)
RBC # BLD AUTO: 4.64 MILLION/UL (ref 3.88–5.62)
SODIUM SERPL-SCNC: 133 MMOL/L (ref 135–147)
WBC # BLD AUTO: 8.32 THOUSAND/UL (ref 4.31–10.16)

## 2025-01-28 PROCEDURE — 36415 COLL VENOUS BLD VENIPUNCTURE: CPT

## 2025-01-28 PROCEDURE — 85025 COMPLETE CBC W/AUTO DIFF WBC: CPT

## 2025-01-28 PROCEDURE — 80053 COMPREHEN METABOLIC PANEL: CPT

## 2025-01-28 PROCEDURE — 96361 HYDRATE IV INFUSION ADD-ON: CPT

## 2025-01-28 PROCEDURE — 99285 EMERGENCY DEPT VISIT HI MDM: CPT

## 2025-01-28 PROCEDURE — 99284 EMERGENCY DEPT VISIT MOD MDM: CPT

## 2025-01-28 PROCEDURE — 96374 THER/PROPH/DIAG INJ IV PUSH: CPT

## 2025-01-28 PROCEDURE — 99222 1ST HOSP IP/OBS MODERATE 55: CPT

## 2025-01-28 RX ORDER — SENNOSIDES 8.6 MG
1 TABLET ORAL DAILY PRN
Status: DISCONTINUED | OUTPATIENT
Start: 2025-01-28 | End: 2025-01-29

## 2025-01-28 RX ORDER — GABAPENTIN 300 MG/1
300 CAPSULE ORAL DAILY
Status: DISCONTINUED | OUTPATIENT
Start: 2025-01-29 | End: 2025-01-29

## 2025-01-28 RX ORDER — POLYETHYLENE GLYCOL 3350 17 G/17G
17 POWDER, FOR SOLUTION ORAL DAILY
Status: DISCONTINUED | OUTPATIENT
Start: 2025-01-29 | End: 2025-01-29

## 2025-01-28 RX ORDER — SODIUM CHLORIDE, SODIUM GLUCONATE, SODIUM ACETATE, POTASSIUM CHLORIDE, MAGNESIUM CHLORIDE, SODIUM PHOSPHATE, DIBASIC, AND POTASSIUM PHOSPHATE .53; .5; .37; .037; .03; .012; .00082 G/100ML; G/100ML; G/100ML; G/100ML; G/100ML; G/100ML; G/100ML
100 INJECTION, SOLUTION INTRAVENOUS CONTINUOUS
Status: DISCONTINUED | OUTPATIENT
Start: 2025-01-28 | End: 2025-01-29

## 2025-01-28 RX ORDER — HYDROMORPHONE HCL/PF 1 MG/ML
1 SYRINGE (ML) INJECTION EVERY 4 HOURS PRN
Status: DISCONTINUED | OUTPATIENT
Start: 2025-01-28 | End: 2025-01-29

## 2025-01-28 RX ORDER — CALCIUM CARBONATE 500 MG/1
1000 TABLET, CHEWABLE ORAL DAILY PRN
Status: DISCONTINUED | OUTPATIENT
Start: 2025-01-28 | End: 2025-01-29

## 2025-01-28 RX ORDER — METHOCARBAMOL 500 MG/1
750 TABLET, FILM COATED ORAL 3 TIMES DAILY
Status: DISCONTINUED | OUTPATIENT
Start: 2025-01-28 | End: 2025-01-29

## 2025-01-28 RX ORDER — ACETAMINOPHEN 325 MG/1
975 TABLET ORAL EVERY 6 HOURS PRN
Status: DISCONTINUED | OUTPATIENT
Start: 2025-01-28 | End: 2025-01-29

## 2025-01-28 RX ORDER — AMOXICILLIN 250 MG
1 CAPSULE ORAL DAILY
Status: DISCONTINUED | OUTPATIENT
Start: 2025-01-29 | End: 2025-01-29

## 2025-01-28 RX ORDER — BISACODYL 10 MG
10 SUPPOSITORY, RECTAL RECTAL DAILY
Status: DISCONTINUED | OUTPATIENT
Start: 2025-01-29 | End: 2025-01-29

## 2025-01-28 RX ORDER — LIDOCAINE 50 MG/G
1 PATCH TOPICAL DAILY
Status: DISCONTINUED | OUTPATIENT
Start: 2025-01-29 | End: 2025-01-29

## 2025-01-28 RX ORDER — ONDANSETRON 2 MG/ML
4 INJECTION INTRAMUSCULAR; INTRAVENOUS EVERY 6 HOURS PRN
Status: DISCONTINUED | OUTPATIENT
Start: 2025-01-28 | End: 2025-01-29

## 2025-01-28 RX ORDER — LEVOTHYROXINE SODIUM 25 UG/1
25 TABLET ORAL
Status: DISCONTINUED | OUTPATIENT
Start: 2025-01-29 | End: 2025-01-29

## 2025-01-28 RX ORDER — MAGNESIUM HYDROXIDE/ALUMINUM HYDROXICE/SIMETHICONE 120; 1200; 1200 MG/30ML; MG/30ML; MG/30ML
30 SUSPENSION ORAL ONCE
Status: COMPLETED | OUTPATIENT
Start: 2025-01-28 | End: 2025-01-28

## 2025-01-28 RX ORDER — FAMOTIDINE 10 MG/ML
20 INJECTION, SOLUTION INTRAVENOUS ONCE
Status: COMPLETED | OUTPATIENT
Start: 2025-01-28 | End: 2025-01-28

## 2025-01-28 RX ORDER — GABAPENTIN 300 MG/1
600 CAPSULE ORAL
Status: DISCONTINUED | OUTPATIENT
Start: 2025-01-28 | End: 2025-01-29

## 2025-01-28 RX ORDER — OXYCODONE HYDROCHLORIDE 10 MG/1
10 TABLET ORAL EVERY 4 HOURS PRN
Status: DISCONTINUED | OUTPATIENT
Start: 2025-01-28 | End: 2025-01-29

## 2025-01-28 RX ORDER — TRAZODONE HYDROCHLORIDE 100 MG/1
100 TABLET ORAL
Status: DISCONTINUED | OUTPATIENT
Start: 2025-01-28 | End: 2025-01-29

## 2025-01-28 RX ORDER — DULOXETIN HYDROCHLORIDE 30 MG/1
30 CAPSULE, DELAYED RELEASE ORAL DAILY
Status: DISCONTINUED | OUTPATIENT
Start: 2025-01-29 | End: 2025-01-29

## 2025-01-28 RX ADMIN — APIXABAN 5 MG: 5 TABLET, FILM COATED ORAL at 20:29

## 2025-01-28 RX ADMIN — FAMOTIDINE 20 MG: 10 INJECTION, SOLUTION INTRAVENOUS at 17:51

## 2025-01-28 RX ADMIN — ALUMINUM HYDROXIDE, MAGNESIUM HYDROXIDE, AND DIMETHICONE 30 ML: 200; 20; 200 SUSPENSION ORAL at 17:51

## 2025-01-28 RX ADMIN — GABAPENTIN 600 MG: 300 CAPSULE ORAL at 23:03

## 2025-01-28 RX ADMIN — TRAZODONE HYDROCHLORIDE 100 MG: 100 TABLET ORAL at 23:04

## 2025-01-28 RX ADMIN — SODIUM CHLORIDE, SODIUM GLUCONATE, SODIUM ACETATE, POTASSIUM CHLORIDE, MAGNESIUM CHLORIDE, SODIUM PHOSPHATE, DIBASIC, AND POTASSIUM PHOSPHATE 100 ML/HR: .53; .5; .37; .037; .03; .012; .00082 INJECTION, SOLUTION INTRAVENOUS at 23:05

## 2025-01-28 RX ADMIN — METHOCARBAMOL TABLETS 750 MG: 500 TABLET, COATED ORAL at 20:29

## 2025-01-28 RX ADMIN — HYDROMORPHONE HYDROCHLORIDE 1 MG: 1 INJECTION, SOLUTION INTRAMUSCULAR; INTRAVENOUS; SUBCUTANEOUS at 20:25

## 2025-01-28 RX ADMIN — SODIUM CHLORIDE 1000 ML: 0.9 INJECTION, SOLUTION INTRAVENOUS at 17:54

## 2025-01-28 RX ADMIN — OXYCODONE HYDROCHLORIDE 15 MG: 10 TABLET ORAL at 23:02

## 2025-01-28 NOTE — TELEPHONE ENCOUNTER
Received call from patient's wife Deedee  Patient is in pain, not taking PO liquids  Wife is taking patient to [facility]  Dr Campbell aware

## 2025-01-28 NOTE — ED PROVIDER NOTES
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)     1/28/2025  8:01 PM Jocelin Grimes Add [C78.00] Malignant neoplasm metastatic to lung, unspecified laterality (HCC)     1/28/2025  8:04 PM KokenanvChana Add [Z51.5] Palliative care patient     1/29/2025 10:12 AM Vicky Kline Add [I81] Portal vein thrombosis     1/29/2025 10:12 AM Vicky Kline Modify [R10.9] Abdominal pain     1/29/2025  1:35 PM Vicky Kline Add [C79.9] Metastatic malignant neoplasm, unspecified site (HCC)           ED Disposition       ED Disposition   Admit    Condition   Stable    Date/Time   Tue Jan 28, 2025  5:41 PM    Comment   Case was discussed with ronal and the patient's admission status was agreed to be Admission Status: inpatient status to the service of ronal .               Assessment & Plan       Medical Decision Making  54-year-old male present emergency room due to failure to thrive in setting of metastatic cancer.  Obtain basic labs, started IV fluids, trial of GI cocktail for pain.  Offered stronger pain medication, but at this point in time patient is declining.  Reached out to patient's oncologist Dr. Campbell without any response, discussed with internal medicine who are agreeable with admitting, doubt benefit of further labs, imaging, or ED intervention at this time.    Amount and/or Complexity of Data Reviewed  Labs: ordered.    Risk  OTC drugs.  Prescription drug management.  Decision regarding hospitalization.             Medications   sodium chloride 0.9 % bolus 1,000 mL (1,000 mL Intravenous New Bag 1/28/25 5079)   Famotidine (PF) (PEPCID) injection 20 mg (20 mg Intravenous Given  1/28/25 1751)   aluminum-magnesium hydroxide-simethicone (MAALOX) oral suspension 30 mL (30 mL Oral Given 1/28/25 1751)       ED Risk Strat Scores                          SBIRT 22yo+      Flowsheet Row Most Recent Value   Initial Alcohol Screen: US AUDIT-C     1. How often do you have a drink containing alcohol? 0 Filed at: 01/28/2025 1833   2. How many drinks containing alcohol do you have on a typical day you are drinking?  0 Filed at: 01/28/2025 1833   3a. Male UNDER 65: How often do you have five or more drinks on one occasion? 0 Filed at: 01/28/2025 1833   3b. FEMALE Any Age, or MALE 65+: How often do you have 4 or more drinks on one occassion? 0 Filed at: 01/28/2025 1833   Audit-C Score 0 Filed at: 01/28/2025 1833   LANCE: How many times in the past year have you...    Used an illegal drug or used a prescription medication for non-medical reasons? Never Filed at: 01/28/2025 1833                            History of Present Illness       Chief Complaint   Patient presents with    Medical Problem     Sent by Oncology for hydration and pain control. Metastatic cancer.        Past Medical History:   Diagnosis Date    Arthralgia     last assessed 02/10/2015    Babesiosis     last assessed 12/30/14    Benign paroxysmal vertigo     last assessed 05/22/15    Cancer (HCC)     right kidney removed 11/21/22-oral chemotherapy and IV immunotherapy every 21 days    Dry skin dermatitis     Fullness of abdomen     with eating    Meniere disease     last assessed 04/15/13    Pneumonia of left lower lobe due to infectious organism     last assessed 04/04/16    Vitamin D deficiency     last assessed 12/30/14      Past Surgical History:   Procedure Laterality Date    FIBULA FRACTURE SURGERY Right     IR RENAL ANGIOGRAM  11/18/2022    PA NEPHRECTOMY W/PRTL URETERECT OPEN RIB RESCJ RAD Right 11/21/2022    Procedure: NEPHRECTOMY ABDOMINAL APPROACH;  Surgeon: iLsa Webster MD;  Location: [facility];  Service: Urology    TIBIA  FRACTURE SURGERY Right     TONSILLECTOMY        Family History   Problem Relation Age of Onset    Meniere's disease Mother     Hearing loss Mother     Cancer Father         ?renal/bladder?    No Known Problems Family     Colon cancer Maternal Grandfather       Social History     Tobacco Use    Smoking status: Former     Current packs/day: 0.00     Average packs/day: 1 pack/day for 38.6 years (38.6 ttl pk-yrs)     Types: Cigarettes     Start date: 1984     Quit date: 2022     Years since quittin.4     Passive exposure: Never    Smokeless tobacco: Former     Types: Chew    Tobacco comments:     Quit 2 mos ago   Vaping Use    Vaping status: Never Used   Substance Use Topics    Alcohol use: Not Currently     Alcohol/week: 1.0 standard drink of alcohol     Comment: 1 daily    Drug use: No      E-Cigarette/Vaping    E-Cigarette Use Never User       E-Cigarette/Vaping Substances    Nicotine No     THC No     CBD No     Flavoring No     Other No     Unknown No       I have reviewed and agree with the history as documented.     54-year-old male with history of metastatic kidney cancer, presents emergency department due to failure to thrive at home.  Patient was sent in by his oncologist due to 1 to 2 weeks of no p.o. intake, 15 pound weight loss, generalized abdominal pain.  Notes that he is prescribed pain medications, that helped a small amount, but do not seem to help with his eating.  When he tries to eat anything, he then develops sharper pains in the abdomen.  This occurs with fluids or solids.  Denies any other associated complaints.  Family is considering hospice, part of admission today per patient and wife is to further evaluate this potential.        Review of Systems   All other systems reviewed and are negative.          Objective       ED Triage Vitals [25 1720]   Temperature Pulse Blood Pressure Respirations SpO2 Patient Position - Orthostatic VS   97.7 °F (36.5 °C) 91 131/97 20 98 % Sitting       Temp Source Heart Rate Source BP Location FiO2 (%) Pain Score    Oral Monitor Left arm -- 9      Vitals      Date and Time Temp Pulse SpO2 Resp BP Pain Score FACES Pain Rating User   01/30/25 1538 -- -- -- -- -- 6 --    01/30/25 1106 -- -- -- -- -- 6 --    01/30/25 1101 -- -- -- -- -- 6 --    01/30/25 1005 -- -- -- -- -- 10 - Worst Possible Pain --    01/30/25 0739 -- -- -- -- -- 10 - Worst Possible Pain --    01/30/25 0730 -- 82 -- 16 -- 10 - Worst Possible Pain --    01/30/25 0405 -- -- -- -- -- 8 --    01/30/25 0212 -- -- -- -- -- 9 --    01/29/25 2208 -- -- -- -- -- 10 - Worst Possible Pain --    01/29/25 1825 -- -- -- -- -- 8 --    01/29/25 1618 -- -- -- -- -- 10 - Worst Possible Pain --    01/29/25 1002 -- -- -- -- -- 9 --    01/29/25 0952 -- -- -- -- -- 9 --    01/29/25 0930 -- -- 94 % -- -- -- --    01/29/25 0830 98.5 °F (36.9 °C) 94 92 % 18 110/84 -- -- RK   01/29/25 0735 -- -- 92 % -- -- No Pain -- RK   01/29/25 0552 -- 98 91 % -- -- 9 --    01/29/25 0254 -- -- -- -- 138/85 used the [employer] system is not working correctly -- -- AL   01/29/25 0254 98.6 °F (37 °C) 109 91 % 14 -- -- -- DII   01/29/25 0100 -- -- 88 % -- -- -- -- JR   01/28/25 2302 -- -- -- -- -- 9 -- JR   01/28/25 2144 98.7 °F (37.1 °C) -- -- 18 -- -- -- LC   01/28/25 2144 -- -- -- -- 136/97 -- -- DII   01/28/25 2115 -- 85 95 % -- 140/94 -- -- SS   01/28/25 2100 -- 87 95 % -- 146/90 -- -- SS   01/28/25 2045 -- 84 94 % -- 134/94 -- -- KD   01/28/25 2025 -- -- -- -- -- 10 - Worst Possible Pain -- SS   01/28/25 2015 -- 82 93 % 20 147/96 -- -- SS   01/28/25 1945 -- 88 95 % 20 135/94 -- -- SS   01/28/25 1930 -- 87 91 % -- 140/95 -- -- KD   01/28/25 1915 -- 82 91 % -- 136/94 -- -- KD   01/28/25 1830 -- 88 91 % 19 134/90 -- -- CS   01/28/25 1815 -- 89 93 % 20 135/92 -- -- CS   01/28/25 1720 97.7 °F (36.5 °C) 91 98 % 20 131/97 9 -- SW            Physical Exam  Vitals and nursing note reviewed.    Constitutional:       General: He is not in acute distress.     Appearance: He is well-developed.      Comments: Cachectic   HENT:      Head: Normocephalic and atraumatic.   Cardiovascular:      Rate and Rhythm: Normal rate and regular rhythm.      Heart sounds: No murmur heard.  Pulmonary:      Effort: Pulmonary effort is normal. No respiratory distress.   Abdominal:      Palpations: Abdomen is soft.   Musculoskeletal:         General: No swelling.   Skin:     General: Skin is warm and dry.      Capillary Refill: Capillary refill takes less than 2 seconds.   Neurological:      Mental Status: He is alert.   Psychiatric:         Mood and Affect: Mood normal.         Results Reviewed       Procedure Component Value Units Date/Time    Basic metabolic panel [[id number]]  (Abnormal) Collected: 01/29/25 0605    Lab Status: Final result Specimen: Blood from Arm, Left Updated: 01/29/25 0632     Sodium 133 mmol/L      Potassium 4.0 mmol/L      Chloride 101 mmol/L      CO2 23 mmol/L      ANION GAP 9 mmol/L      BUN 20 mg/dL      Creatinine 0.70 mg/dL      Glucose 89 mg/dL      Calcium 8.3 mg/dL      eGFR 105 ml/min/1.73sq m     Narrative:      National Kidney Disease Foundation guidelines for Chronic Kidney Disease (CKD):     Stage 1 with normal or high GFR (GFR > 90 mL/min/1.73 square meters)    Stage 2 Mild CKD (GFR = 60-89 mL/min/1.73 square meters)    Stage 3A Moderate CKD (GFR = 45-59 mL/min/1.73 square meters)    Stage 3B Moderate CKD (GFR = 30-44 mL/min/1.73 square meters)    Stage 4 Severe CKD (GFR = 15-29 mL/min/1.73 square meters)    Stage 5 End Stage CKD (GFR <15 mL/min/1.73 square meters)  Note: GFR calculation is accurate only with a steady state creatinine    CBC (With Platelets) [[id number]]  (Abnormal) Collected: 01/29/25 0605    Lab Status: Final result Specimen: Blood from Arm, Left Updated: 01/29/25 0611     WBC 6.96 Thousand/uL      RBC 4.39 Million/uL      Hemoglobin 11.3 g/dL      Hematocrit 35.6 %       MCV 81 fL      MCH 25.7 pg      MCHC 31.7 g/dL      RDW 16.1 %      Platelets 188 Thousands/uL      MPV 10.2 fL     Comprehensive metabolic panel [[id number]]  (Abnormal) Collected: 01/28/25 1750    Lab Status: Final result Specimen: Blood from Arm, Right Updated: 01/28/25 1820     Sodium 133 mmol/L      Potassium 4.3 mmol/L      Chloride 97 mmol/L      CO2 24 mmol/L      ANION GAP 12 mmol/L      BUN 26 mg/dL      Creatinine 0.82 mg/dL      Glucose 100 mg/dL      Calcium 8.6 mg/dL      Corrected Calcium 9.2 mg/dL      AST 32 U/L      ALT 12 U/L      Alkaline Phosphatase 83 U/L      Total Protein 7.1 g/dL      Albumin 3.2 g/dL      Total Bilirubin 1.70 mg/dL      eGFR 98 ml/min/1.73sq m     Narrative:      National Kidney Disease Foundation guidelines for Chronic Kidney Disease (CKD):     Stage 1 with normal or high GFR (GFR > 90 mL/min/1.73 square meters)    Stage 2 Mild CKD (GFR = 60-89 mL/min/1.73 square meters)    Stage 3A Moderate CKD (GFR = 45-59 mL/min/1.73 square meters)    Stage 3B Moderate CKD (GFR = 30-44 mL/min/1.73 square meters)    Stage 4 Severe CKD (GFR = 15-29 mL/min/1.73 square meters)    Stage 5 End Stage CKD (GFR <15 mL/min/1.73 square meters)  Note: GFR calculation is accurate only with a steady state creatinine    CBC and differential [[id number]]  (Abnormal) Collected: 01/28/25 1750    Lab Status: Final result Specimen: Blood from Arm, Right Updated: 01/28/25 1802     WBC 8.32 Thousand/uL      RBC 4.64 Million/uL      Hemoglobin 11.8 g/dL      Hematocrit 38.0 %      MCV 82 fL      MCH 25.4 pg      MCHC 31.1 g/dL      RDW 15.9 %      MPV 9.7 fL      Platelets 198 Thousands/uL      nRBC 0 /100 WBCs      Segmented % 80 %      Immature Grans % 0 %      Lymphocytes % 7 %      Monocytes % 13 %      Eosinophils Relative 0 %      Basophils Relative 0 %      Absolute Neutrophils 6.61 Thousands/µL      Absolute Immature Grans 0.03 Thousand/uL      Absolute Lymphocytes 0.61 Thousands/µL      Absolute  Monocytes 1.07 Thousand/µL      Eosinophils Absolute 0.00 Thousand/µL      Basophils Absolute 0.00 Thousands/µL             No orders to display       Procedures    ED Medication and Procedure Management   Prior to Admission Medications   Prescriptions Last Dose Informant Patient Reported? Taking?   CANNABIDIOL PO  Self Yes No   Sig: Take by mouth   DULoxetine (CYMBALTA) 30 mg delayed release capsule 1/28/2025  No Yes   Sig: TAKE 1 CAPSULE BY MOUTH EVERY DAY   Diclofenac Sodium (VOLTAREN) 1 %  Self No No   Sig: Apply 2 g topically 4 (four) times a day   Lenvatinib, 18 MG Daily Dose, (Lenvima, 18 MG Daily Dose,) 10 MG & 2 x 4 MG CPPK 1/27/2025  No Yes   Sig: Take 18 mg by mouth daily   acetaminophen (TYLENOL) 500 mg tablet  Self No No   Sig: Take 2 tablets (1,000 mg total) by mouth 3 (three) times a day as needed for mild pain, moderate pain, headaches or fever   acetaminophen (TYLENOL) 500 mg tablet  Self No No   Sig: Take 2 tablets (1,000 mg total) by mouth every 8 (eight) hours   apixaban (Eliquis) 5 mg 1/28/2025 Self No Yes   Sig: Take 1 tablet (5 mg total) by mouth 2 (two) times a day   bisacodyl (DULCOLAX) 10 mg suppository  Self No No   Sig: Insert 1 suppository (10 mg total) into the rectum daily   everolimus (Afinitor) 5 MG tablet Not Taking  No No   Sig: Take 1 tablet (5 mg total) by mouth daily   Patient not taking: Reported on 1/28/2025   gabapentin (NEURONTIN) 300 mg capsule 1/28/2025  No Yes   Sig: Take 2 capsules (600mg) in the morning and 2 capsule (600mg) in the evening   Patient taking differently: Take 1 capsules (300mg) in the morning and 2 capsule (600mg) in the evening   hydrOXYzine pamoate (VISTARIL) 25 mg capsule Not Taking  No No   Sig: TAKE 1 CAPSULE (25 MG TOTAL) BY MOUTH DAILY AT BEDTIME AS NEEDED FOR ANXIETY (INSOMNIA)   Patient not taking: Reported on 1/28/2025   lactulose (CEPHULAC) 20 g packet Not Taking Self No No   Sig: Take 1 packet (20 g total) by mouth 2 (two) times a day    Patient not taking: Reported on 1/28/2025   levothyroxine 25 mcg tablet 1/28/2025 Self No Yes   Sig: Take 1 tablet (25 mcg total) by mouth daily   lidocaine (Lidoderm) 5 %  Self No No   Sig: Apply 1 patch topically over 12 hours daily Remove & Discard patch within 12 hours - apply to R side of lower spine   methocarbamol (ROBAXIN) 750 mg tablet  Self No No   Sig: Take 1 tablet (750 mg total) by mouth 3 (three) times a day for 10 days   naloxone (NARCAN) 4 mg/0.1 mL nasal spray  Self No No   Sig: Administer 1 spray into a nostril. If no response after 2-3 minutes, give another dose in the other nostril using a new spray.   nystatin (MYCOSTATIN) 500,000 units/5 mL suspension  Self Yes No   oxyCODONE (ROXICODONE) 10 MG TABS 1/28/2025  No Yes   Sig: Take 1-1.5 tablets (10-15 mg total) by mouth every 4 (four) hours as needed (cancer pain) Max Daily Amount: 90 mg   polyethylene glycol (MIRALAX) 17 g packet  Self No No   Sig: Take 17 g by mouth daily   polyethylene glycol (MIRALAX) 17 g packet  Self No No   Sig: Take 17 g by mouth daily   senna (SENOKOT) 8.6 mg  Self No No   Sig: Take 1-2 tablets (8.6-17.2 mg total) by mouth daily as needed for constipation (constipation)   senna-docusate sodium (SENOKOT S) 8.6-50 mg per tablet  Self No No   Sig: Take 1 tablet by mouth daily   traZODone (DESYREL) 100 mg tablet 1/27/2025 Self No Yes   Sig: Take 1 tablet (100 mg total) by mouth daily at bedtime      Facility-Administered Medications: None     Current Discharge Medication List        START taking these medications    Details   LORazepam (ATIVAN) 2 mg/mL concentrated solution Take 0.5 mL (1 mg total) by mouth every 8 (eight) hours as needed for anxiety  Qty: 30 mL, Refills: 0    Associated Diagnoses: Metastatic malignant neoplasm, unspecified site (HCC)      morphine 20 MG/5ML solution Take 1.3 mL (5.2 mg total) by mouth every 4 (four) hours as needed for severe pain for up to 10 days Max Daily Amount: 31.2 mg  Qty: 100 mL,  Refills: 0    Associated Diagnoses: Metastatic malignant neoplasm, unspecified site (HCC)           STOP taking these medications       apixaban (Eliquis) 5 mg Comments:   Reason for Stopping:         DULoxetine (CYMBALTA) 30 mg delayed release capsule Comments:   Reason for Stopping:         gabapentin (NEURONTIN) 300 mg capsule Comments:   Reason for Stopping:         levothyroxine 25 mcg tablet Comments:   Reason for Stopping:         oxyCODONE (ROXICODONE) 10 MG TABS Comments:   Reason for Stopping:         traZODone (DESYREL) 100 mg tablet Comments:   Reason for Stopping:         Lenvatinib, 18 MG Daily Dose, (Lenvima, 18 MG Daily Dose,) 10 MG & 2 x 4 MG CPPK Comments:   Reason for Stopping:         acetaminophen (TYLENOL) 500 mg tablet Comments:   Reason for Stopping:         acetaminophen (TYLENOL) 500 mg tablet Comments:   Reason for Stopping:         bisacodyl (DULCOLAX) 10 mg suppository Comments:   Reason for Stopping:         CANNABIDIOL PO Comments:   Reason for Stopping:         Diclofenac Sodium (VOLTAREN) 1 % Comments:   Reason for Stopping:         everolimus (Afinitor) 5 MG tablet Comments:   Reason for Stopping:         hydrOXYzine pamoate (VISTARIL) 25 mg capsule Comments:   Reason for Stopping:         lactulose (CEPHULAC) 20 g packet Comments:   Reason for Stopping:         lidocaine (Lidoderm) 5 % Comments:   Reason for Stopping:         methocarbamol (ROBAXIN) 750 mg tablet Comments:   Reason for Stopping:         naloxone (NARCAN) 4 mg/0.1 mL nasal spray Comments:   Reason for Stopping:         nystatin (MYCOSTATIN) 500,000 units/5 mL suspension Comments:   Reason for Stopping:         polyethylene glycol (MIRALAX) 17 g packet Comments:   Reason for Stopping:         polyethylene glycol (MIRALAX) 17 g packet Comments:   Reason for Stopping:         senna (SENOKOT) 8.6 mg Comments:   Reason for Stopping:         senna-docusate sodium (SENOKOT S) 8.6-50 mg per tablet Comments:   Reason for  Stopping:             No discharge procedures on file.  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

## 2025-01-28 NOTE — TELEPHONE ENCOUNTER
Pt's spouse calling in regarding possibly her  going on hospice, she wants to speak to Samantha regarding what may happen, she states she wants to prepare her kids. Jenifer is very upset during the call. Support provided.

## 2025-01-28 NOTE — TELEPHONE ENCOUNTER
Spoke with patient's wife  Emotional support given  She will take patient to [facility] ED to be admitted

## 2025-01-29 PROBLEM — C79.9 METASTATIC CANCER (HCC): Status: ACTIVE | Noted: 2025-01-29

## 2025-01-29 LAB
ANION GAP SERPL CALCULATED.3IONS-SCNC: 9 MMOL/L (ref 4–13)
BUN SERPL-MCNC: 20 MG/DL (ref 5–25)
CALCIUM SERPL-MCNC: 8.3 MG/DL (ref 8.4–10.2)
CHLORIDE SERPL-SCNC: 101 MMOL/L (ref 96–108)
CO2 SERPL-SCNC: 23 MMOL/L (ref 21–32)
CREAT SERPL-MCNC: 0.7 MG/DL (ref 0.6–1.3)
ERYTHROCYTE [DISTWIDTH] IN BLOOD BY AUTOMATED COUNT: 16.1 % (ref 11.6–15.1)
GFR SERPL CREATININE-BSD FRML MDRD: 105 ML/MIN/1.73SQ M
GLUCOSE SERPL-MCNC: 89 MG/DL (ref 65–140)
HCT VFR BLD AUTO: 35.6 % (ref 36.5–49.3)
HGB BLD-MCNC: 11.3 G/DL (ref 12–17)
MCH RBC QN AUTO: 25.7 PG (ref 26.8–34.3)
MCHC RBC AUTO-ENTMCNC: 31.7 G/DL (ref 31.4–37.4)
MCV RBC AUTO: 81 FL (ref 82–98)
PLATELET # BLD AUTO: 188 THOUSANDS/UL (ref 149–390)
PMV BLD AUTO: 10.2 FL (ref 8.9–12.7)
POTASSIUM SERPL-SCNC: 4 MMOL/L (ref 3.5–5.3)
RBC # BLD AUTO: 4.39 MILLION/UL (ref 3.88–5.62)
SODIUM SERPL-SCNC: 133 MMOL/L (ref 135–147)
WBC # BLD AUTO: 6.96 THOUSAND/UL (ref 4.31–10.16)

## 2025-01-29 PROCEDURE — 80048 BASIC METABOLIC PNL TOTAL CA: CPT

## 2025-01-29 PROCEDURE — NC001 PR NO CHARGE: Performed by: INTERNAL MEDICINE

## 2025-01-29 PROCEDURE — 85027 COMPLETE CBC AUTOMATED: CPT

## 2025-01-29 PROCEDURE — 99233 SBSQ HOSP IP/OBS HIGH 50: CPT | Performed by: STUDENT IN AN ORGANIZED HEALTH CARE EDUCATION/TRAINING PROGRAM

## 2025-01-29 PROCEDURE — 99223 1ST HOSP IP/OBS HIGH 75: CPT | Performed by: INTERNAL MEDICINE

## 2025-01-29 RX ORDER — LORAZEPAM 2 MG/ML
1 INJECTION INTRAMUSCULAR
Status: DISCONTINUED | OUTPATIENT
Start: 2025-01-29 | End: 2025-01-31 | Stop reason: HOSPADM

## 2025-01-29 RX ORDER — BISACODYL 10 MG
10 SUPPOSITORY, RECTAL RECTAL DAILY PRN
Status: DISCONTINUED | OUTPATIENT
Start: 2025-01-29 | End: 2025-01-31 | Stop reason: HOSPADM

## 2025-01-29 RX ORDER — LORAZEPAM 2 MG/ML
1 CONCENTRATE ORAL EVERY 8 HOURS PRN
Qty: 30 ML | Refills: 0 | Status: SHIPPED | OUTPATIENT
Start: 2025-01-29 | End: 2025-02-28

## 2025-01-29 RX ORDER — OXYCODONE HCL 5 MG/5 ML
5 SOLUTION, ORAL ORAL EVERY 4 HOURS PRN
Qty: 30 ML | Refills: 0 | Status: SHIPPED | OUTPATIENT
Start: 2025-01-29 | End: 2025-01-30

## 2025-01-29 RX ORDER — GLYCOPYRROLATE 0.2 MG/ML
0.1 INJECTION INTRAMUSCULAR; INTRAVENOUS EVERY 4 HOURS PRN
Status: DISCONTINUED | OUTPATIENT
Start: 2025-01-29 | End: 2025-01-31 | Stop reason: HOSPADM

## 2025-01-29 RX ADMIN — SENNOSIDES AND DOCUSATE SODIUM 1 TABLET: 50; 8.6 TABLET ORAL at 08:36

## 2025-01-29 RX ADMIN — APIXABAN 5 MG: 5 TABLET, FILM COATED ORAL at 08:36

## 2025-01-29 RX ADMIN — LEVOTHYROXINE SODIUM 25 MCG: 25 TABLET ORAL at 05:44

## 2025-01-29 RX ADMIN — MORPHINE SULFATE 2 MG: 2 INJECTION, SOLUTION INTRAMUSCULAR; INTRAVENOUS at 18:25

## 2025-01-29 RX ADMIN — HYDROMORPHONE HYDROCHLORIDE 1 MG: 1 INJECTION, SOLUTION INTRAMUSCULAR; INTRAVENOUS; SUBCUTANEOUS at 10:02

## 2025-01-29 RX ADMIN — OXYCODONE HYDROCHLORIDE 15 MG: 10 TABLET ORAL at 05:52

## 2025-01-29 RX ADMIN — DICLOFENAC SODIUM 2 G: 10 GEL TOPICAL at 08:40

## 2025-01-29 RX ADMIN — MORPHINE SULFATE 2 MG: 2 INJECTION, SOLUTION INTRAMUSCULAR; INTRAVENOUS at 16:18

## 2025-01-29 RX ADMIN — METHOCARBAMOL TABLETS 750 MG: 500 TABLET, COATED ORAL at 08:36

## 2025-01-29 RX ADMIN — DULOXETINE 30 MG: 30 CAPSULE, DELAYED RELEASE ORAL at 08:36

## 2025-01-29 RX ADMIN — DICLOFENAC SODIUM 2 G: 10 GEL TOPICAL at 12:00

## 2025-01-29 RX ADMIN — GABAPENTIN 300 MG: 300 CAPSULE ORAL at 08:36

## 2025-01-29 RX ADMIN — SODIUM CHLORIDE, SODIUM GLUCONATE, SODIUM ACETATE, POTASSIUM CHLORIDE, MAGNESIUM CHLORIDE, SODIUM PHOSPHATE, DIBASIC, AND POTASSIUM PHOSPHATE 100 ML/HR: .53; .5; .37; .037; .03; .012; .00082 INJECTION, SOLUTION INTRAVENOUS at 09:00

## 2025-01-29 RX ADMIN — MORPHINE SULFATE 2 MG: 2 INJECTION, SOLUTION INTRAMUSCULAR; INTRAVENOUS at 22:08

## 2025-01-29 RX ADMIN — POLYETHYLENE GLYCOL 3350 17 G: 17 POWDER, FOR SOLUTION ORAL at 08:38

## 2025-01-29 NOTE — ASSESSMENT & PLAN NOTE
Patient presenting from home with 10 pound weight loss over the last week, has not been eating due to lack of appetite, nausea and vomiting.  In ED, was given Maalox, Pepcid and 1L NS bolus.  CBC on admission with hemoglobin 11.8, CMP reviewed with mild dehydration.  Will start IVFs for hydration  Diet as tolerated  As needed antiemetics

## 2025-01-29 NOTE — PROGRESS NOTES
Progress Note - Hospitalist   Name: Stuart Munoz 54 y.o. male I MRN: [id number]  Unit/Bed#: 4 [place] [address]-01 I Date of Admission: 1/28/2025   Date of Service: 1/29/2025 I Hospital Day: 1    Assessment & Plan  Failure to thrive in adult  Patient presenting from home with 10 pound weight loss over the last week, has not been eating due to lack of appetite, nausea and vomiting.  In ED, was given Maalox, Pepcid and 1L NS bolus.  CBC on admission with hemoglobin 11.8, CMP reviewed with mild dehydration.  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 Jermaine Campbell, hematology/oncology  Stage IV (multiple pulmonary nodules, mediastinal adenopathy, questionable liver lesion) clear-cell renal cell carcinoma. History of right nephrectomy November 2022.  Was started lenvatinib (Lenvima) and everolimus (Afinitor), though per wife, patient has not tolerating these medications due to nausea and vomiting.  Appreciate oncology consultation  Palliative care patient  Discussed with family and patient at bedside, there are ongoing discussions regarding decisions for hospice.  Transitioned to hospice  Cancer-related pain  Home regimen: OxyIR 10mg q4 hours PRN for moderate pain and OxyIR 15mg q4 hours PRN for severe pain, gabapentin 300 mg in the morning and 600 mg nightly  Will continue home regimen and add on breakthrough IV Dilaudid  Aqua K-pad, lidocaine patch  Metastatic cancer (HCC)  See plan under failure to thrive    VTE Pharmacologic Prophylaxis: VTE Score: 3  Patient is comfort measures    Mobility:   Basic Mobility Inpatient Raw Score: 19  JH-HLM Goal: 6: Walk 10 steps or more  JH-HLM Achieved: 6: Walk 10 steps or more  JH-HLM Goal achieved. Continue to encourage appropriate mobility.    Patient Centered Rounds: I performed  bedside rounds with nursing staff today.   Discussions with Specialists or Other Care Team Provider: case management    Education and Discussions with Family / Patient: Updated  (wife) at bedside.    Current Length of Stay: 1 day(s)  Current Patient Status: Inpatient   Certification Statement: The patient will continue to require additional inpatient hospital stay due to pain control  Discharge Plan: Anticipate discharge tomorrow to home.    Code Status: Level 4 - Comfort Care    Subjective   Pt confirmed he wishes to transition for comfort measures level 4.     Objective :  Temp:  [97.7 °F (36.5 °C)-98.7 °F (37.1 °C)] 98.5 °F (36.9 °C)  HR:  [] 94  BP: (110-147)/(84-97) 110/84  Resp:  [14-20] 18  SpO2:  [88 %-98 %] 94 %  O2 Device: None (Room air)    Body mass index is 16.75 kg/m².     Input and Output Summary (last 24 hours):   No intake or output data in the 24 hours ending 01/29/25 1352    Physical Exam  Vitals and nursing note reviewed.   Constitutional:       General: He is not in acute distress.     Appearance: He is well-developed. He is ill-appearing.   HENT:      Head: Normocephalic and atraumatic.   Eyes:      Conjunctiva/sclera: Conjunctivae normal.   Cardiovascular:      Rate and Rhythm: Normal rate and regular rhythm.      Heart sounds: No murmur heard.  Pulmonary:      Effort: Pulmonary effort is normal. No respiratory distress.      Breath sounds: Rhonchi present.   Abdominal:      General: There is distension.      Palpations: Abdomen is soft.      Tenderness: There is no abdominal tenderness.   Musculoskeletal:         General: No swelling.   Skin:     General: Skin is warm and dry.   Neurological:      Mental Status: He is alert. Mental status is at baseline.   Psychiatric:         Mood and Affect: Mood normal.           Lines/Drains:    Urinary Catheter:  Goal for removal: N/A- Discharging with Ramos                 Lab Results: I have reviewed the following results:   Results  from last 7 days   Lab Units 01/29/25  0605 01/28/25  1750   WBC Thousand/uL 6.96 8.32   HEMOGLOBIN g/dL 11.3* 11.8*   HEMATOCRIT % 35.6* 38.0   PLATELETS Thousands/uL 188 198   SEGS PCT %  --  80*   LYMPHO PCT %  --  7*   MONO PCT %  --  13*   EOS PCT %  --  0     Results from last 7 days   Lab Units 01/29/25  0605 01/28/25  1750   SODIUM mmol/L 133* 133*   POTASSIUM mmol/L 4.0 4.3   CHLORIDE mmol/L 101 97   CO2 mmol/L 23 24   BUN mg/dL 20 26*   CREATININE mg/dL 0.70 0.82   ANION GAP mmol/L 9 12   CALCIUM mg/dL 8.3* 8.6   ALBUMIN g/dL  --  3.2*   TOTAL BILIRUBIN mg/dL  --  1.70*   ALK PHOS U/L  --  83   ALT U/L  --  12   AST U/L  --  32   GLUCOSE RANDOM mg/dL 89 100                       Recent Cultures (last 7 days):         Imaging Results Review: No pertinent imaging studies reviewed.  Other Study Results Review: No additional pertinent studies reviewed.    Last 24 Hours Medication List:     Current Facility-Administered Medications:     bisacodyl (DULCOLAX) rectal suppository 10 mg, Daily PRN    glycopyrrolate (ROBINUL) injection 0.1 mg, Q4H PRN    LORazepam (ATIVAN) injection 1 mg, Q10 Min PRN    morphine injection 2 mg, Q1H PRN    Administrative Statements   Today, Patient Was Seen By: Vicky Kline DO      **Please Note: This note may have been constructed using a voice recognition system.**

## 2025-01-29 NOTE — PLAN OF CARE
Problem: PAIN - ADULT  Goal: Verbalizes/displays adequate comfort level or baseline comfort level  Description: Interventions:  - Encourage patient to monitor pain and request assistance  - Assess pain using appropriate pain scale  - Administer analgesics based on type and severity of pain and evaluate response  - Implement non-pharmacological measures as appropriate and evaluate response  - Consider cultural and social influences on pain and pain management  - Notify physician/advanced practitioner if interventions unsuccessful or patient reports new pain  Outcome: Progressing     Problem: INFECTION - ADULT  Goal: Absence or prevention of progression during hospitalization  Description: INTERVENTIONS:  - Assess and monitor for signs and symptoms of infection  - Monitor lab/diagnostic results  - Monitor all insertion sites, i.e. indwelling lines, tubes, and drains  - Monitor endotracheal if appropriate and nasal secretions for changes in amount and color  - [place] appropriate cooling/warming therapies per order  - Administer medications as ordered  - Instruct and encourage patient and family to use good hand hygiene technique  - Identify and instruct in appropriate isolation precautions for identified infection/condition  Outcome: Progressing  Goal: Absence of fever/infection during neutropenic period  Description: INTERVENTIONS:  - Monitor WBC    Outcome: Progressing     Problem: SAFETY ADULT  Goal: Patient will remain free of falls  Description: INTERVENTIONS:  - Educate patient/family on patient safety including physical limitations  - Instruct patient to call for assistance with activity   - Consult OT/PT to assist with strengthening/mobility   - Keep Call bell within reach  - Keep bed low and locked with side rails adjusted as appropriate  - Keep care items and personal belongings within reach  - Initiate and maintain comfort rounds  - Make Fall Risk Sign visible to staff  - Offer Toileting every 2 Hours,  in advance of need  - Initiate/Maintain bed alarm  - Obtain necessary fall risk management equipment: yellow socks  - Apply yellow socks and bracelet for high fall risk patients  - Consider moving patient to room near nurses station  Outcome: Progressing  Goal: Maintain or return to baseline ADL function  Description: INTERVENTIONS:  -  Assess patient's ability to carry out ADLs; assess patient's baseline for ADL function and identify physical deficits which impact ability to perform ADLs (bathing, care of mouth/teeth, toileting, grooming, dressing, etc.)  - Assess/evaluate cause of self-care deficits   - Assess range of motion  - Assess patient's mobility; develop plan if impaired  - Assess patient's need for assistive devices and provide as appropriate  - Encourage maximum independence but intervene and supervise when necessary  - Involve family in performance of ADLs  - Assess for home care needs following discharge   - Consider OT consult to assist with ADL evaluation and planning for discharge  - Provide patient education as appropriate  Outcome: Progressing  Goal: Maintains/Returns to pre admission functional level  Description: INTERVENTIONS:  - Perform AM-PAC 6 Click Basic Mobility/ Daily Activity assessment daily.  - Set and communicate daily mobility goal to care team and patient/family/caregiver.   - Collaborate with rehabilitation services on mobility goals if consulted  - Perform Range of Motion 3 times a day.  - Reposition patient every 2 hours.  - Dangle patient 3 times a day  - Stand patient 3 times a day  - Ambulate patient 3 times a day  - Out of bed to chair 3 times a day   - Out of bed for meals 3 times a day  - Out of bed for toileting  - Record patient progress and toleration of activity level   Outcome: Progressing     Problem: DISCHARGE PLANNING  Goal: Discharge to home or other facility with appropriate resources  Description: INTERVENTIONS:  - Identify barriers to discharge w/patient and  caregiver  - Arrange for needed discharge resources and transportation as appropriate  - Identify discharge learning needs (meds, wound care, etc.)  - Arrange for interpretive services to assist at discharge as needed  - Refer to Case Management Department for coordinating discharge planning if the patient needs post-hospital services based on physician/advanced practitioner order or complex needs related to functional status, cognitive ability, or social support system  Outcome: Progressing     Problem: Knowledge Deficit  Goal: Patient/family/caregiver demonstrates understanding of disease process, treatment plan, medications, and discharge instructions  Description: Complete learning assessment and assess knowledge base.  Interventions:  - Provide teaching at level of understanding  - Provide teaching via preferred learning methods  Outcome: Progressing

## 2025-01-29 NOTE — ASSESSMENT & PLAN NOTE
Discussed with family and patient at bedside, there are ongoing discussions regarding decisions for hospice.  Appreciate palliative care consultation

## 2025-01-29 NOTE — ASSESSMENT & PLAN NOTE
Discussed with family and patient at bedside, there are ongoing discussions regarding decisions for hospice.  Transitioned to hospice

## 2025-01-29 NOTE — H&P
H&P - Hospitalist   Name: Stuart Munoz 54 y.o. male I MRN: [id number]  Unit/Bed#: ED 01 I Date of Admission: 1/28/2025   Date of Service: 1/28/2025 I Hospital Day: 0     Assessment & Plan  Failure to thrive in adult  Patient presenting from home with 10 pound weight loss over the last week, has not been eating due to lack of appetite, nausea and vomiting.  In ED, was given Maalox, Pepcid and 1L NS bolus.  CBC on admission with hemoglobin 11.8, CMP reviewed with mild dehydration.  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 Jermaine Campbell, hematology/oncology  Stage IV (multiple pulmonary nodules, mediastinal adenopathy, questionable liver lesion) clear-cell renal cell carcinoma. History of right nephrectomy November 2022.  Was started lenvatinib (Lenvima) and everolimus (Afinitor), though per wife, patient has not tolerating these medications due to nausea and vomiting.  Appreciate oncology consultation  Palliative care patient  Discussed with family and patient at bedside, there are ongoing discussions regarding decisions for hospice.  Appreciate palliative care consultation  Cancer-related pain  Home regimen: OxyIR 10mg q4 hours PRN for moderate pain and OxyIR 15mg q4 hours PRN for severe pain, gabapentin 300 mg in the morning and 600 mg nightly  Will continue home regimen and add on breakthrough IV Dilaudid  Aqua K-pad, lidocaine patch      VTE Pharmacologic Prophylaxis: VTE Score: 3 Moderate Risk (Score 3-4) - Pharmacological DVT Prophylaxis Ordered: apixaban (Eliquis).  Code Status: Level 3 - DNAR and DNI   Discussion with family: Updated  (wife, son, and daughter) at bedside.    Anticipated Length of Stay: Patient will be admitted on an inpatient basis with an anticipated length of stay of greater than 2 midnights secondary to failure to thrive, requiring possibly palliative care consultation and hospice  consultation for safe dispo planning.    History of Present Illness   Chief Complaint: Failure to thrive    Stuart Munoz is a 54 y.o. male with a PMH of BPV, vitamin D D deficiency, cancer related pain, clear-cell carcinoma right kidney s/p right nephrectomy on chemotherapy who presents with failure to thrive. Patient presenting from home with 10 pound weight loss over the last week, has not been eating due to lack of appetite, nausea and vomiting. In ED, was given Maalox, Pepcid and 1L NS bolus.  CBC on admission with hemoglobin 11.8, CMP reviewed with mild dehydration. Follows with Dr. Jermaine Campbell, hematology/oncology. Stage IV (multiple pulmonary nodules, mediastinal adenopathy, questionable liver lesion) clear-cell renal cell carcinoma. History of right nephrectomy November 2022. Was started lenvatinib (Lenvima) and everolimus (Afinitor), though per wife, patient has not tolerating these medications due to nausea and vomiting. Discussed with family and patient at bedside, there are ongoing discussions regarding decisions for hospice.  Patient denies any CP, SOB, lightheadedness, dizziness.  Patient denies any diarrhea constipation or abdominal pain.  Endorses nausea and vomiting.  Patient denies any urinary symptoms.  Patient denies any fever or chills at home.    Review of Systems   Constitutional:  Positive for activity change, appetite change, fatigue and unexpected weight change. Negative for chills and fever.   HENT:  Negative for ear pain and sore throat.    Eyes:  Negative for pain and visual disturbance.   Respiratory:  Negative for cough and shortness of breath.    Cardiovascular:  Negative for chest pain and palpitations.   Gastrointestinal:  Negative for abdominal pain and vomiting.   Genitourinary:  Negative for difficulty urinating, dysuria, flank pain and hematuria.   Musculoskeletal:  Negative for arthralgias and back pain.   Skin:  Negative for color change and rash.   Allergic/Immunologic:  Positive for immunocompromised state.   Neurological:  Positive for weakness. Negative for seizures and syncope.   All other systems reviewed and are negative.      Historical Information   Past Medical History:   Diagnosis Date    Arthralgia     last assessed 02/10/2015    Babesiosis     last assessed 14    Benign paroxysmal vertigo     last assessed 05/22/15    Cancer (HCC)     right kidney removed 22-oral chemotherapy and IV immunotherapy every 21 days    Dry skin dermatitis     Fullness of abdomen     with eating    Meniere disease     last assessed 04/15/13    Pneumonia of left lower lobe due to infectious organism     last assessed 16    Vitamin D deficiency     last assessed 14     Past Surgical History:   Procedure Laterality Date    FIBULA FRACTURE SURGERY Right     IR RENAL ANGIOGRAM  2022    FL NEPHRECTOMY W/PRTL URETERECT OPEN RIB RESCJ RAD Right 2022    Procedure: NEPHRECTOMY ABDOMINAL APPROACH;  Surgeon: Lisa Webster MD;  Location: [facility];  Service: Urology    TIBIA FRACTURE SURGERY Right     TONSILLECTOMY       Social History     Tobacco Use    Smoking status: Former     Current packs/day: 0.00     Average packs/day: 1 pack/day for 38.6 years (38.6 ttl pk-yrs)     Types: Cigarettes     Start date: 1984     Quit date: 2022     Years since quittin.3     Passive exposure: Never    Smokeless tobacco: Former     Types: Chew    Tobacco comments:     Quit 2 mos ago   Vaping Use    Vaping status: Never Used   Substance and Sexual Activity    Alcohol use: Not Currently     Alcohol/week: 1.0 standard drink of alcohol     Comment: 1 daily    Drug use: No    Sexual activity: Yes     Partners: Female     Birth control/protection: Female Sterilization     E-Cigarette/Vaping    E-Cigarette Use Never User      E-Cigarette/Vaping Substances    Nicotine No     THC No     CBD No     Flavoring No     Other No     Unknown No      Family History   Problem Relation  Age of Onset    Meniere's disease Mother     Hearing loss Mother     Cancer Father         ?renal/bladder?    No Known Problems Family     Colon cancer Maternal Grandfather      Social History:  Marital Status: /Civil Union   Occupation: not addressed  Patient Pre-hospital Living Situation: Home  Patient Pre-hospital Level of Mobility: unable to be assessed at time of evaluation  Patient Pre-hospital Diet Restrictions: none    Meds/Allergies   I have reviewed home medications with patient personally.  Prior to Admission medications    Medication Sig Start Date End Date Taking? Authorizing Provider   apixaban (Eliquis) 5 mg Take 1 tablet (5 mg total) by mouth 2 (two) times a day 9/26/24 3/25/25 Yes Stuart Lester MD   DULoxetine (CYMBALTA) 30 mg delayed release capsule TAKE 1 CAPSULE BY MOUTH EVERY DAY 1/23/25  Yes Marycarmen Hackett MD   gabapentin (NEURONTIN) 300 mg capsule Take 2 capsules (600mg) in the morning and 2 capsule (600mg) in the evening  Patient taking differently: Take 1 capsules (300mg) in the morning and 2 capsule (600mg) in the evening 1/27/25  Yes Jeet Shay DO   Lenvatinib, 18 MG Daily Dose, (Lenvima, 18 MG Daily Dose,) 10 MG & 2 x 4 MG CPPK Take 18 mg by mouth daily 1/16/25  Yes Jermaine Campbell MD   levothyroxine 25 mcg tablet Take 1 tablet (25 mcg total) by mouth daily 3/6/24  Yes Marycarmen Hackett MD   oxyCODONE (ROXICODONE) 10 MG TABS Take 1-1.5 tablets (10-15 mg total) by mouth every 4 (four) hours as needed (cancer pain) Max Daily Amount: 90 mg 1/20/25  Yes Aniceto Gómez DO   traZODone (DESYREL) 100 mg tablet Take 1 tablet (100 mg total) by mouth daily at bedtime 11/15/24  Yes Brando Tirado MD   acetaminophen (TYLENOL) 500 mg tablet Take 2 tablets (1,000 mg total) by mouth 3 (three) times a day as needed for mild pain, moderate pain, headaches or fever 3/5/24   Brando Tirado MD   acetaminophen (TYLENOL) 500 mg tablet Take 2 tablets (1,000 mg total) by mouth every 8 (eight) hours  12/5/24   Osman Telles MD   bisacodyl (DULCOLAX) 10 mg suppository Insert 1 suppository (10 mg total) into the rectum daily 12/5/24   Osman Telles MD   CANNABIDIOL PO Take by mouth    Historical Provider, MD   Diclofenac Sodium (VOLTAREN) 1 % Apply 2 g topically 4 (four) times a day 12/5/24   Osman Telles MD   everolimus (Afinitor) 5 MG tablet Take 1 tablet (5 mg total) by mouth daily  Patient not taking: Reported on 1/28/2025 1/16/25   Jermaine Campbell MD   hydrOXYzine pamoate (VISTARIL) 25 mg capsule TAKE 1 CAPSULE (25 MG TOTAL) BY MOUTH DAILY AT BEDTIME AS NEEDED FOR ANXIETY (INSOMNIA)  Patient not taking: Reported on 1/28/2025 1/6/25   Marycarmen Hackett MD   lactulose (CEPHULAC) 20 g packet Take 1 packet (20 g total) by mouth 2 (two) times a day  Patient not taking: Reported on 1/28/2025 12/5/24   Osman Telles MD   lidocaine (Lidoderm) 5 % Apply 1 patch topically over 12 hours daily Remove & Discard patch within 12 hours - apply to R side of lower spine 8/13/24   Brando Tirado MD   methocarbamol (ROBAXIN) 750 mg tablet Take 1 tablet (750 mg total) by mouth 3 (three) times a day for 10 days 12/5/24 12/15/24  Osman Telles MD   naloxone (NARCAN) 4 mg/0.1 mL nasal spray Administer 1 spray into a nostril. If no response after 2-3 minutes, give another dose in the other nostril using a new spray. 8/13/24   Brando Tirado MD   nystatin (MYCOSTATIN) 500,000 units/5 mL suspension  6/20/24   Historical Provider, MD   polyethylene glycol (MIRALAX) 17 g packet Take 17 g by mouth daily 11/15/24   Brando Tirado MD   polyethylene glycol (MIRALAX) 17 g packet Take 17 g by mouth daily 12/5/24   Osman Telles MD   senna (SENOKOT) 8.6 mg Take 1-2 tablets (8.6-17.2 mg total) by mouth daily as needed for constipation (constipation) 11/15/24   Brando Tirado MD   senna-docusate sodium (SENOKOT S) 8.6-50 mg per tablet Take 1 tablet by mouth daily 12/5/24   Osman Telles MD     No Known  Allergies    Objective :  Temp:  [97.7 °F (36.5 °C)] 97.7 °F (36.5 °C)  HR:  [82-91] 88  BP: (131-140)/(90-97) 135/94  Resp:  [19-20] 20  SpO2:  [91 %-98 %] 95 %  O2 Device: None (Room air)    Physical Exam  Vitals and nursing note reviewed.   Constitutional:       General: He is awake. He is not in acute distress.     Appearance: He is well-developed. He is cachectic. He is ill-appearing.   HENT:      Head: Normocephalic and atraumatic.      Mouth/Throat:      Mouth: Mucous membranes are moist.      Pharynx: Oropharynx is clear.   Eyes:      Conjunctiva/sclera: Conjunctivae normal.   Cardiovascular:      Rate and Rhythm: Normal rate and regular rhythm.      Pulses: Normal pulses.      Heart sounds: Normal heart sounds. No murmur heard.  Pulmonary:      Effort: Pulmonary effort is normal. No respiratory distress.      Breath sounds: Normal breath sounds. No wheezing, rhonchi or rales.   Abdominal:      General: Abdomen is flat. Bowel sounds are normal. There is no distension.      Palpations: Abdomen is soft.      Tenderness: There is no abdominal tenderness. There is no guarding.   Musculoskeletal:         General: No swelling.      Cervical back: Neck supple.      Right lower leg: No edema.      Left lower leg: No edema.   Skin:     General: Skin is warm and dry.   Neurological:      General: No focal deficit present.      Mental Status: He is alert and oriented to person, place, and time.   Psychiatric:         Mood and Affect: Mood normal.        Lines/Drains:    Urinary Catheter:  Goal for removal: N/A - Chronic Ramos               Lab Results: I have reviewed the following results:  Results from last 7 days   Lab Units 01/28/25  1750   WBC Thousand/uL 8.32   HEMOGLOBIN g/dL 11.8*   HEMATOCRIT % 38.0   PLATELETS Thousands/uL 198   SEGS PCT % 80*   LYMPHO PCT % 7*   MONO PCT % 13*   EOS PCT % 0     Results from last 7 days   Lab Units 01/28/25  1750   SODIUM mmol/L 133*   POTASSIUM mmol/L 4.3   CHLORIDE mmol/L  "97   CO2 mmol/L 24   BUN mg/dL 26*   CREATININE mg/dL 0.82   ANION GAP mmol/L 12   CALCIUM mg/dL 8.6   ALBUMIN g/dL 3.2*   TOTAL BILIRUBIN mg/dL 1.70*   ALK PHOS U/L 83   ALT U/L 12   AST U/L 32   GLUCOSE RANDOM mg/dL 100             No results found for: \"HGBA1C\"        Imaging Results Review: I reviewed radiology reports from this admission including: CT abdomen/pelvis.  Other Study Results Review: No additional pertinent studies reviewed.    Administrative Statements       ** Please Note: This note has been constructed using a voice recognition system. **    "

## 2025-01-29 NOTE — CASE MANAGEMENT
Case Management Assessment & Discharge Planning Note    Patient name Stuart Munoz  Location [address] [place] [address] [place] [address]-* MRN [id number]  : 1970 Date 2025       Current Admission Date: 2025  Current Admission Diagnosis:Failure to thrive in adult   Patient Active Problem List    Diagnosis Date Noted Date Diagnosed    Metastatic cancer (HCC) 2025     Failure to thrive in adult 2025     Portal vein thrombosis 2024     Chronic, continuous use of opioids 2024     Situational mixed anxiety and depressive disorder 2024     Chronic pain syndrome 2024     Myalgia 2024     Arthralgia 2024     Backache with radiation 2024     Reactive depression (situational) 2024     Medical marijuana use 2024     Xerostomia 2023     Irregular bowel habits 2023     Cut of finger 2023     Need for Tdap vaccination 2023     History of gastroesophageal reflux (GERD) 2023     Diarrhea 2023     Loose stools 08/15/2023     Insomnia 08/15/2023     Dehydration 2023     Bilateral calf pain 2023     Varicose veins of left leg with edema 2023     Dysgeusia 2023     BMI 21.0-21.9, adult 2023     Weight loss, unintentional 2023     Oral mucositis due to antineoplastic therapy 2023     Gastroesophageal reflux disease with esophagitis 2023     Hypertension 2023     Alteration in appetite 2023     Chronic post-operative pain 2023     Hypothyroidism 2023     BMI 35.0-35.9,adult 2023     Palliative care patient 2023     Cancer-related pain 2023     Therapeutic opioid-induced constipation (OIC) 2023     Anxiousness 2023     Dysphoric mood 2023     Loss of appetite 2023     Retroperitoneal lymphadenopathy 2023     H/O right nephrectomy 2023     Malignant neoplasm metastatic to lung (HCC) 2023     Fungal  dermatitis 01/20/2023     Dry skin dermatitis 01/20/2023     RBBB 01/20/2023     BMI 25.0-25.9,adult 01/20/2023     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) 01/04/2023     Right renal mass 11/18/2022     History of anesthesia complications 11/16/2022     Meniere disease, left 03/29/2018     Hyperlipidemia 10/25/2013       LOS (days): 1  Geometric Mean LOS (GMLOS) (days):   Days to GMLOS:     OBJECTIVE:    Risk of Unplanned Readmission Score: 25.29         Current admission status: Inpatient  Referral Reason: Hospice, Other (Discharge planning)    Preferred Pharmacy:   [facility]/pharmacy #[id number] - [place], [place] - [address]  [address] [address]  Phone: [phone] Fax: [phone]    [id number] - [place], [place] - [address] [address]  [address] [address]  [address] [place] [address]  Phone: [phone] Fax: [phone]    Primary Care Provider: Marycarmen Hackett MD    Primary Insurance: BLUE CROSS  Secondary Insurance:     ASSESSMENT:  Active Health Care Proxies    There are no active Health Care Proxies on file.        Readmission Root Cause  30 Day Readmission: No    Patient Information  Admitted from:: Home  Mental Status: Alert  During Assessment patient was accompanied by: Spouse  Assessment information provided by:: Spouse, Patient  Primary Caregiver: Spouse  Caregiver's Name:: Jenifer Munoz (spouse)  Caregiver's Relationship to Patient:: Family Member  Caregiver's Telephone Number:: [phone]  Support Systems: Spouse/significant other, Family members, Daughter  County of Residence: [place]  What [facility] do you live in?: [place]  Living Arrangements: Lives w/ Spouse/significant other    Activities of Daily Living Prior to Admission  Functional Status: Assistance  Completes ADLs independently?: No  Level of ADL dependence: Assistance  Does patient use assisted devices?: No  Does patient currently own DME?: No  Does patient currently have HHC?: No    Patient  Information Continued  Does patient have prescription coverage?: Yes  Does patient receive dialysis treatments?: No    Means of Transportation  Means of Transport to Appts:: Family transport      DISCHARGE DETAILS:    Discharge planning discussed with:: Patient and spouse Jenifer  Freedom of Choice: Yes    Comments - Freedom of Choice: HAYDEE spoke with patient and spouse at bedside to introduce role of CM and discuss hospice consult.  Both are in agreement with plan for home hospice.  Spouse prefers not to have a blanket referral placed and request was made for referral for [facility].  SW placed referral in AIDIN and notified hospice liaison Hodan of referral.  Liaison plans to visit with patient and wife at bedside today at 1300.  Attending and nursing notified of plan.      CM contacted family/caregiver?: Yes  Were Treatment Team discharge recommendations reviewed with patient/caregiver?: Yes  Did patient/caregiver verbalize understanding of patient care needs?: Yes  Were patient/caregiver advised of the risks associated with not following Treatment Team discharge recommendations?: Yes    Contacts  Patient Contacts: Jenifer Munoz (spouse)  Relationship to Patient:: Family  Contact Method: In Person  Reason/Outcome: Emergency Contact, Continuity of Care, Referral, Discharge Planning    Requested Home Health Care         Is the patient interested in HHC at discharge?: No    DME Referral Provided  Referral made for DME?: No    Other Referral/Resources/Interventions Provided:  Interventions: Hospice    Would you like to participate in our [facility] service program?  : No - Declined    Treatment Team Recommendation: Home, Hospice  Discharge Destination Plan:: Home, Hospice  Transport at Discharge : Family

## 2025-01-29 NOTE — ASSESSMENT & PLAN NOTE
Home regimen: OxyIR 10mg q4 hours PRN for moderate pain and OxyIR 15mg q4 hours PRN for severe pain, gabapentin 300 mg in the morning and 600 mg nightly  Will continue home regimen and add on breakthrough IV Dilaudid  Aqua K-pad, lidocaine patch

## 2025-01-29 NOTE — UTILIZATION REVIEW
Initial Clinical Review    Admission: Date/Time/Statement:   Admission Orders (From admission, onward)       Ordered        01/28/25 1859  INPATIENT ADMISSION  Once                          Orders Placed This Encounter   Procedures    INPATIENT ADMISSION     Standing Status:   Standing     Number of Occurrences:   1     Level of Care:   Med Surg [16]     Estimated length of stay:   More than 2 Midnights     Certification:   I certify that inpatient services are medically necessary for this patient for a duration of greater than two midnights. See H&P and MD Progress Notes for additional information about the patient's course of treatment.     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 Metastatic cancer.        Initial Presentation:   54 yom to ER from home. Sent by oncology for reported 10 pound weight loss over the last week. Pt started on started lenvatinib (Lenvima) and everolimus (Afinitor), though per wife, patient has not tolerating these medications due to nausea and vomiting, has not been eating due to lack of appetite. Hx BPV, vitamin D deficiency, cancer related pain, clear-cell carcinoma right kidney s/p right nephrectomy on chemotherapy. Presents cachectic, nausea. Admission  labs: Hgb 11.8, Na 133, BUN 26, alb 3.2, tbili 1.70.  Admitted to inpatient status for failure to thrive. Plan: IVF, oncology consulted.       Anticipated Length of Stay/Certification Statement:   Patient will be admitted on an inpatient basis with an anticipated length of stay of greater than 2 midnights secondary to failure to thrive, requiring possibly  palliative care consultation and hospice consultation for safe dispo planning.     Date: 1/29/25   Day 2:   Lungs with rhonchi, abd distended. Pain control continued. Pt confirmed he wishes to transition for comfort measures. Hospice consulted.    Per oncology: metastatic renal cell carcinoma.   Pt has been through a number of regimens. Patient was recently found to have additional disease progression and a new regimen was tried. Patient could not tolerate this regimen (difficult regimen to tolerate but options were becoming more limited). Patient was scheduled for second opinion at [facility] next week; patient and wife believe that he will not be able to make it down to [place]. The point is somewhat academic, patient and wife have decided to pursue hospice (I believe this is the appropriate thing to do). Hospice evaluation consult has been placed by the hospitalist team.       ED Treatment-Medication Administration from 01/28/2025 1549 to 01/28/2025 2138         Date/Time Order Dose Route Action     01/28/2025 1754 sodium chloride 0.9 % bolus 1,000 mL 1,000 mL Intravenous New Bag     01/28/2025 1751 Famotidine (PF) (PEPCID) injection 20 mg 20 mg Intravenous Given     01/28/2025 1751 aluminum-magnesium hydroxide-simethicone (MAALOX) oral suspension 30 mL 30 mL Oral Given     01/28/2025 2029 apixaban (ELIQUIS) tablet 5 mg 5 mg Oral Given     01/28/2025 2029 methocarbamol (ROBAXIN) tablet 750 mg 750 mg Oral Given     01/28/2025 2025 HYDROmorphone (DILAUDID) injection 1 mg 1 mg Intravenous Given            Scheduled Medications:  Medications 01/20 01/21 01/22 01/23 01/24 01/25 01/26 01/27 01/28 01/29   aluminum-magnesium hydroxide-simethicone (MAALOX) oral suspension 30 mL  Dose: 30 mL  Freq: Once Route: PO  Start: 01/28/25 1745 End: 01/28/25 1751   Admin Instructions:               1751         apixaban (ELIQUIS) tablet 5 mg  Dose: 5 mg  Freq: 2 times daily Route: PO  Start: 01/28/25 2015 End: 01/29/25 0250    Admin Instructions:      Order specific questions:               2029 0836     1330-D/C'd      bisacodyl (DULCOLAX) rectal suppository 10 mg  Dose: 10 mg  Freq: Daily Route: RE  Start: 01/29/25 0900 End: 01/29/25 1330             (4737)     1330-D/C'd      Diclofenac Sodium (VOLTAREN) 1 % topical gel 2 g  Dose: 2 g  Freq: 4 times daily Route: TP  Start: 01/28/25 2200 End: 01/29/25 1330   Order specific questions:                [phone]) [phone]     1200     1330-D/C'd      DULoxetine (CYMBALTA) delayed release capsule 30 mg  Dose: 30 mg  Freq: Daily Route: PO  Start: 01/29/25 0900 End: 01/29/25 1330   Admin Instructions:                0836     1330-D/C'd      Famotidine (PF) (PEPCID) injection 20 mg  Dose: 20 mg  Freq: Once Route: IV  Start: 01/28/25 1745 End: 01/28/25 1753   Admin Instructions:               1751         gabapentin (NEURONTIN) capsule 300 mg  Dose: 300 mg  Freq: Daily Route: PO  Start: 01/29/25 0900 End: 01/29/25 1330   Admin Instructions:                0836     1330-D/C'd      gabapentin (NEURONTIN) capsule 600 mg  Dose: 600 mg  Freq: Daily at bedtime Route: PO  Start: 01/28/25 2200 End: 01/29/25 1330   Admin Instructions:               2303      1330-D/C'd      levothyroxine tablet 25 mcg  Dose: 25 mcg  Freq: Daily (early morning) Route: PO  Start: 01/29/25 0600 End: 01/29/25 1330   Admin Instructions:                0544     1330-D/C'd      lidocaine (LIDODERM) 5 % patch 1 patch  Dose: 1 patch  Freq: Daily Route: TP  Start: 01/29/25 0900 End: 01/29/25 1330   Admin Instructions:      Order specific questions:                (0237)     1330-D/C'd      methocarbamol (ROBAXIN) tablet 750 mg  Dose: 750 mg  Freq: 3 times daily Route: PO  Start: 01/28/25 2100 End: 01/29/25 1330 2029 0836     1330-D/C'd      polyethylene glycol (MIRALAX) packet 17 g  Dose: 17 g  Freq: Daily Route: PO  Start: 01/29/25 0900 End: 01/29/25 1330   Admin Instructions:                0838     1330-D/C'd       senna-docusate sodium (SENOKOT S) 8.6-50 mg per tablet 1 tablet  Dose: 1 tablet  Freq: Daily Route: PO  Start: 01/29/25 0900 End: 01/29/25 1330             0836     1330-D/C'd      sodium chloride 0.9 % bolus 1,000 mL  Dose: 1,000 mL  Freq: Once Route: IV  Last Dose: 1,000 mL (01/28/25 1754)  Start: 01/28/25 1745 End: 01/28/25 1854            1754         traZODone (DESYREL) tablet 100 mg  Dose: 100 mg  Freq: Daily at bedtime Route: PO  Indications of Use: INSOMNIA  Start: 01/28/25 2200 End: 01/29/25 1330   Admin Instructions:               2304      1330-D/C'd                  Continuous Meds Sorted by Name  for Stuart Munoz as of 01/20/25 through 1/29/25  Legend:       Medications 01/20 01/21 01/22 01/23 01/24 01/25 01/26 01/27 01/28 01/29   multi-electrolyte (PLASMALYTE-A/ISOLYTE-S PH 7.4) IV solution  Rate: 100 mL/hr Dose: 100 mL/hr  Freq: Continuous Route: IV  Indications of Use: IV Hydration  Last Dose: 100 mL/hr (01/29/25 0900)  Start: 01/28/25 2015 End: 01/29/25 1330            2305      0900     1330 [C]     1330-D/C'd                  PRN Meds Sorted by Name  for Stuart Munoz as of 01/20/25 through 1/29/25  Legend:       Medications 01/20 01/21 01/22 01/23 01/24 01/25 01/26 01/27 01/28 01/29   acetaminophen (TYLENOL) tablet 975 mg  Dose: 975 mg  Freq: Every 6 hours PRN Route: PO  PRN Reasons: mild pain,headaches,fever  Indications of Use: FEVER,HEADACHE,MILD PAIN  Start: 01/28/25 2005 End: 01/29/25 1330             1330-D/C'd      bisacodyl (DULCOLAX) rectal suppository 10 mg  Dose: 10 mg  Freq: Daily PRN Route: RE  PRN Reason: constipation  PRN Comment: if no bowel movement in past 48 hours  Start: 01/29/25 1351                calcium carbonate (TUMS) chewable tablet 1,000 mg  Dose: 1,000 mg  Freq: Daily PRN Route: PO  PRN Reasons: indigestion,heartburn  Start: 01/28/25 2005 End: 01/29/25 1330   Admin Instructions:                1330-D/C'd      glycopyrrolate (ROBINUL) injection 0.1  mg  Dose: 0.1 mg  Freq: Every 4 hours PRN Route: IV  PRN Reason: pulmonary secretions  PRN Comment: excessive secretions  Indications of Use: EXCESSIVE UPPER AIRWAY SECRETIONS  Start: 01/29/25 1351   Admin Instructions:                   HYDROmorphone (DILAUDID) injection 1 mg  Dose: 1 mg  Freq: Every 4 hours PRN Route: IV  PRN Comment: Breakthrough pain  Start: 01/28/25 2005 End: 01/29/25 1330   Admin Instructions:               2025      (0952) [C]     1002     1330-D/C'd      LORazepam (ATIVAN) injection 1 mg  Dose: 1 mg  Freq: Every 10 minutes PRN Route: IV  PRN Comment: seizure activity  Start: 01/29/25 1351   Admin Instructions:                   morphine injection 2 mg  Dose: 2 mg  Freq: Every 1 hour PRN Route: IV  PRN Reason: severe pain  PRN Comment: Pain, dyspnea, air hunger  Start: 01/29/25 1351   Admin Instructions:                   ondansetron (ZOFRAN) injection 4 mg  Dose: 4 mg  Freq: Every 6 hours PRN Route: IV  PRN Reasons: nausea,vomiting  Start: 01/28/25 2005 End: 01/29/25 1330   Admin Instructions:                1330-D/C'd       oxyCODONE (ROXICODONE) immediate release tablet 10 mg  Dose: 10 mg  Freq: Every 4 hours PRN Route: PO  PRN Reason: moderate pain  PRN Comment: cancer pain  Start: 01/28/25 1958 End: 01/29/25 1330   Admin Instructions:                          1330-D/C'd      Or   oxyCODONE (ROXICODONE) IR tablet 15 mg  Dose: 15 mg  Freq: Every 4 hours PRN Route: PO  PRN Reason: severe pain  PRN Comment: cancer pain  Start: 01/28/25 1958 End: 01/29/25 1330   Admin Instructions:               2302      0552     1330-D/C'd      senna (SENOKOT) tablet 8.6 mg  Dose: 1 tablet  Freq: Daily PRN Route: PO  PRN Reason: constipation  PRN Comment: constipation  Start: 01/28/25 2005 End: 01/29/25 1330             1330-D/C'd      Legend:       Burqqlmxahq07/2001/2101/2201/2301/2401/2501/2601/2701/2801/29            ED Triage Vitals [01/28/25 1720]   Temperature Pulse Respirations Blood Pressure SpO2  Pain Score   97.7 °F (36.5 °C) 91 20 131/97 98 % 9     Weight (last 2 days)       Date/Time Weight    01/29/25 0930 56 (123.5)    01/28/25 1720 56.1 (123.6)            Vital Signs (last 3 days)       Date/Time Temp Pulse Resp BP MAP (mmHg) SpO2 O2 Device Patient Position - Orthostatic VS Sabin Coma Scale Score Pain    01/29/25 1002 -- -- -- -- -- -- -- -- -- 9 01/29/25 0952 -- -- -- -- -- -- -- -- -- 9    01/29/25 0930 -- -- -- -- -- 94 % -- -- -- --    01/29/25 0830 98.5 °F (36.9 °C) 94 18 110/84 93 92 % -- -- -- --    01/29/25 0735 -- -- -- -- -- 92 % -- -- -- No Pain    01/29/25 0552 -- 98 -- -- -- 91 % -- -- -- 9    01/29/25 02:54:43 98.6 °F (37 °C) 109 14 138/85 -- 91 % -- Lying -- --    01/29/25 0100 -- -- -- -- -- 88 % -- -- -- --    01/28/25 2302 -- -- -- -- -- -- -- -- -- 9 01/28/25 21:44:42 98.7 °F (37.1 °C) -- 18 136/97 110 -- None (Room air) Lying -- --    01/28/25 2115 -- 85 -- 140/94 112 95 % -- -- -- --    01/28/25 2100 -- 87 -- 146/90 114 95 % -- -- -- --    01/28/25 2045 -- 84 -- 134/94 110 94 % -- -- -- --    01/28/25 2025 -- -- -- -- -- -- -- -- -- 10 - Worst Possible Pain    01/28/25 2015 -- 82 20 147/96 116 93 % -- -- -- --    01/28/25 1945 -- 88 20 135/94 111 95 % -- -- -- --    01/28/25 1930 -- 87 -- 140/95 113 91 % -- -- -- --    01/28/25 1915 -- 82 -- 136/94 110 91 % -- -- -- --    01/28/25 1830 -- 88 19 134/90 106 91 % None (Room air) -- -- --    01/28/25 1815 -- 89 20 135/92 110 93 % None (Room air) -- 15 --    01/28/25 1720 97.7 °F (36.5 °C) 91 20 131/97 -- 98 % None (Room air) Sitting -- 9              Pertinent Labs/Diagnostic Test Results:   Radiology:  No orders to display     Cardiology:  No orders to display     GI:  No orders to display           Results from last 7 days   Lab Units 01/29/25  0605 01/28/25  1750   WBC Thousand/uL 6.96 8.32   HEMOGLOBIN g/dL 11.3* 11.8*   HEMATOCRIT % 35.6* 38.0   PLATELETS Thousands/uL 188 198   TOTAL NEUT ABS Thousands/µL  --  6.61        "  Results from last 7 days   Lab Units 01/29/25  0605 01/28/25  1750   SODIUM mmol/L 133* 133*   POTASSIUM mmol/L 4.0 4.3   CHLORIDE mmol/L 101 97   CO2 mmol/L 23 24   ANION GAP mmol/L 9 12   BUN mg/dL 20 26*   CREATININE mg/dL 0.70 0.82   EGFR ml/min/1.73sq m 105 98   CALCIUM mg/dL 8.3* 8.6     Results from last 7 days   Lab Units 01/28/25  1750   AST U/L 32   ALT U/L 12   ALK PHOS U/L 83   TOTAL PROTEIN g/dL 7.1   ALBUMIN g/dL 3.2*   TOTAL BILIRUBIN mg/dL 1.70*         Results from last 7 days   Lab Units 01/29/25  0605 01/28/25  1750   GLUCOSE RANDOM mg/dL 89 100             No results found for: \"BETA-HYDROXYBUTYRATE\"                                                                                                                                         Past Medical History:   Diagnosis Date    Arthralgia     last assessed 02/10/2015    Babesiosis     last assessed 12/30/14    Benign paroxysmal vertigo     last assessed 05/22/15    Cancer (HCC)     right kidney removed 11/21/22-oral chemotherapy and IV immunotherapy every 21 days    Dry skin dermatitis     Fullness of abdomen     with eating    Meniere disease     last assessed 04/15/13    Pneumonia of left lower lobe due to infectious organism     last assessed 04/04/16    Vitamin D deficiency     last assessed 12/30/14     Present on Admission:   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 male    Network Utilization Review Department  ATTENTION: Please call with any questions or concerns to [phone] and carefully listen to the prompts so that you are directed to the right person. All voicemails are confidential.   For Discharge needs, contact " Care Management DC Support Team at [phone] opt. [phone]  Send all requests for admission clinical reviews, approved or denied determinations and any other requests to dedicated fax number below belonging to the campus where the patient is receiving treatment. List of dedicated fax numbers for the Facilities:  FACILITY NAME UR FAX NUMBER   ADMISSION DENIALS (Administrative/Medical Necessity) [phone]   DISCHARGE SUPPORT TEAM (NETWORK) [phone]   PARENT CHILD HEALTH (Maternity/NICU/Pediatrics) [phone]   [facility] [phone]   [facility] [phone]   [facility] [phone]   [facility] [phone]   [facility] [phone]   [facility] [phone]   [facility] [phone]   [facility] [phone]   [facility] [phone]   [facility] [phone]   [facility] [phone]   [facility] [phone]

## 2025-01-29 NOTE — PROGRESS NOTES
Patient:  DYLAN THOMSON    MRN:  [id number]    [place] Request ID:  [id number]    Level of care reserved:  Hospice    Partner Reserved:  Navaerin [facility], [place] [id number] [phone]    Clinical needs requested:    Geography searched:  10 miles around [id number]    Start of Service:    Request sent:  10:38am EST on 1/29/2025 by Beryl Cedillo    Partner reserved:  3:38pm EST on 1/29/2025 by Beryl Cedillo    Choice list shared:  10:42am EST on 1/29/2025 by Beryl Cedillo

## 2025-01-29 NOTE — ASSESSMENT & PLAN NOTE
Patient presenting from home with 10 pound weight loss over the last week, has not been eating due to lack of appetite, nausea and vomiting.  In ED, was given Maalox, Pepcid and 1L NS bolus.  CBC on admission with hemoglobin 11.8, CMP reviewed with mild dehydration.  Oncology consulted - spoke with family who wish to pursue hospice   Hospice consulted plan for d/c home with home hospice tomorrow  Transitioned to Level 4 comfort measures  Pain control

## 2025-01-29 NOTE — ASSESSMENT & PLAN NOTE
Follows with Dr. Jermaine Campbell, hematology/oncology  Stage IV (multiple pulmonary nodules, mediastinal adenopathy, questionable liver lesion) clear-cell renal cell carcinoma. History of right nephrectomy November 2022.  Was started lenvatinib (Lenvima) and everolimus (Afinitor), though per wife, patient has not tolerating these medications due to nausea and vomiting.  Appreciate oncology consultation

## 2025-01-29 NOTE — H&P
Stuart Munoz  1970    HEMATOLOGY/ONCOLOGY CONSULTATION REPORT    DISCUSSION/SUMMARY:    54-year-old male with history of metastatic renal cell carcinoma.  Mr. Munoz has been through a number of regimens.  Patient was recently found to have additional disease progression and a new regimen was tried.  Patient could not tolerate this regimen (difficult regimen to tolerate but options were becoming more limited).    Patient was scheduled for second opinion at [facility] next week; patient and wife believe that Mr. Munoz will not be able to make it down to [place].  The point is somewhat academic, patient and wife have decided to pursue hospice (I believe this is the appropriate thing to do).  Hospice evaluation consult has been placed by the hospitalist team.    The goal now should be pain control, end-of-life care, family support, hospice etc, evaluation as above.  From an oncology standpoint, additional scans, blood work etc not needed.  Patient would like to get home as soon as possible, this can happen if no medical issues and the home hospice has been set up.    The above was discussed with the patient and wife; all questions were answered.  Will follow with you.  Please do not hesitate to contact me if you have any questions or need additional information.  Thank you for this consult.  _________________________________________________________________________________    Chief Complaint   Patient presents with    Medical Problem     Sent by Oncology for hydration and pain control. Metastatic cancer.      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)   10/5/2022 -  Cancer Staged    Staging form: Kidney, AJCC 8th Edition  - Clinical stage from 10/5/2022: Stage IV (cT1b, cNX, pM1) - Signed by Jermaine Campbell MD on 1/24/2024  Stage prefix: Initial diagnosis  Histologic grade (G): G1  Histologic grading system: 4 grade system       1/4/2023 Initial Diagnosis    Clear cell carcinoma of  right kidney (HCC)     1/31/2023 - 9/19/2023 Chemotherapy    alteplase (CATHFLO), 2 mg, Intracatheter, Every 2 hour PRN, 11 of 11 cycles  pembrolizumab (KEYTRUDA) IVPB, 200 mg, Intravenous, Once, 11 of 11 cycles  Administration: 200 mg (1/31/2023), 200 mg (2/21/2023), 200 mg (3/14/2023), 200 mg (4/4/2023), 200 mg (4/25/2023), 200 mg (5/16/2023), 200 mg (6/27/2023), 200 mg (7/18/2023), 200 mg (8/8/2023), 200 mg (8/29/2023), 200 mg (9/19/2023)     4/22/2024 - 5/1/2024 Radiation    Treatments:  Course: C1 SBRT    Plan ID Energy Fractions Dose per Fraction (cGy) Dose Correction (cGy) Total Dose Delivered (cGy) Elapsed Days   SBRT R ISHIAL 6X-FFF 5 / 5 700 0 3,500 9      Treatment Dates:  4/22/2024 - 5/1/2024.        History of Present Illness: 54-year-old male with prior good general health.  Patient presented to the emergency room previously (2+ years ago) with shortness of breath and dyspnea on exertion.  No PE but patient was found to have an incidental right renal mass.  Workup was initiated, patient was seen by urology.  Patient was found to have clear-cell carcinoma, pulmonary metastases upfront.  Patient eventually began systemic treatments for metastatic renal cell carcinoma.  Initial diagnosis was in November 2022.    Recently patient was found to have additional disease progression.  Patient was started on a new regimen recently but had vomiting with the lenvatinib, self discontinued a few days ago.    Presently patient states feeling okay, better than before.  No shortness of breath or dyspnea exertion.  No pain control issues.  Appetite is +/- but no nausea or vomiting.  Activities are extremely limited.  No headaches or dizziness.    Review of Systems   Constitutional:  Positive for activity change, appetite change, fatigue and unexpected weight change.   HENT: Negative.     Eyes: Negative.    Respiratory: Negative.     Cardiovascular: Negative.    Gastrointestinal: Negative.    Endocrine: Negative.     Genitourinary: Negative.    Musculoskeletal: Negative.    Skin: Negative.    Allergic/Immunologic: Negative.    Neurological: Negative.    Hematological: Negative.    Psychiatric/Behavioral: Negative.     All other systems reviewed and are negative.    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)    Fungal dermatitis    Dry skin dermatitis    RBBB    BMI 25.0-25.9,adult    H/O right nephrectomy    Malignant neoplasm metastatic to lung (HCC)    Palliative care patient    Cancer-related pain    Therapeutic opioid-induced constipation (OIC)    Anxiousness    Dysphoric mood    Loss of appetite    Retroperitoneal lymphadenopathy    Hypothyroidism    BMI 35.0-35.9,adult    Alteration in appetite    Chronic post-operative pain    Gastroesophageal reflux disease with esophagitis    Hypertension    Weight loss, unintentional    Oral mucositis due to antineoplastic therapy    BMI 21.0-21.9, adult    Dysgeusia    Bilateral calf pain    Varicose veins of left leg with edema    Dehydration    Loose stools    Insomnia    Need for Tdap vaccination    History of gastroesophageal reflux (GERD)    Diarrhea    Cut of finger    Xerostomia    Irregular bowel habits    Medical marijuana use    Backache with radiation    Reactive depression (situational)    Situational mixed anxiety and depressive disorder    Chronic pain syndrome    Myalgia    Arthralgia    Chronic, continuous use of opioids    Portal vein thrombosis    Failure to thrive in adult    Metastatic cancer (HCC)     Past Medical History:   Diagnosis Date    Arthralgia     last assessed 02/10/2015    Babesiosis     last assessed 12/30/14    Benign paroxysmal vertigo     last assessed 05/22/15    Cancer (HCC)     right kidney removed 11/21/22-oral chemotherapy and IV immunotherapy every 21 days    Dry skin dermatitis     Fullness of  abdomen     with eating    Meniere disease     last assessed 04/15/13    Pneumonia of left lower lobe due to infectious organism     last assessed 16    Vitamin D deficiency     last assessed 14     Past Surgical History:   Procedure Laterality Date    FIBULA FRACTURE SURGERY Right     IR RENAL ANGIOGRAM  2022    UT NEPHRECTOMY W/PRTL URETERECT OPEN RIB RESCJ RAD Right 2022    Procedure: NEPHRECTOMY ABDOMINAL APPROACH;  Surgeon: Lisa Webster MD;  Location: [facility];  Service: Urology    TIBIA FRACTURE SURGERY Right     TONSILLECTOMY       Family History   Problem Relation Age of Onset    Meniere's disease Mother     Hearing loss Mother     Cancer Father         ?renal/bladder?    No Known Problems Family     Colon cancer Maternal Grandfather      Social History     Socioeconomic History    Marital status: /Civil Union     Spouse name: Not on file    Number of children: Not on file    Years of education: Not on file    Highest education level: Not on file   Occupational History    Not on file   Tobacco Use    Smoking status: Former     Current packs/day: 0.00     Average packs/day: 1 pack/day for 38.6 years (38.6 ttl pk-yrs)     Types: Cigarettes     Start date: 1984     Quit date: 2022     Years since quittin.3     Passive exposure: Never    Smokeless tobacco: Former     Types: Chew    Tobacco comments:     Quit 2 mos ago   Vaping Use    Vaping status: Never Used   Substance and Sexual Activity    Alcohol use: Not Currently     Alcohol/week: 1.0 standard drink of alcohol     Comment: 1 daily    Drug use: No    Sexual activity: Yes     Partners: Female     Birth control/protection: Female Sterilization   Other Topics Concern    Not on file   Social History Narrative    Daily coffee consumption 6 cups/day    Wife: Jenifer        From 22  note:    Primary Care Provider: Marycarmen Hackett MD    Primary Insurance: BLUE CROSS    Active Health Care Proxies: There are  no active Health Care Proxies on file.    Primary Caregiver: Self    Support Systems: Self, Spouse/significant other    County of Residence: [place]    What city do you live in?: [place]    Home entry access options. Select all that apply.: Stairs    Number of steps to enter home.: 3    Type of Current Residence: [address] [place] home    Living Arrangements: Lives w/ Spouse/significant other    Functional Status: Independent    Completes ADLs independently?: Yes    Ambulates independently?: Yes    Does patient use assisted devices?: No    Does patient currently own DME?: No     Social Drivers of Health     Financial Resource Strain: Not on file   Food Insecurity: No Food Insecurity (2025)    Nursing - Inadequate Food Risk Classification     Worried About Running Out of Food in the Last Year: Not on file     Ran Out of Food in the Last Year: Not on file     Ran Out of Food in the Last Year: Never true   Transportation Needs: No Transportation Needs (2025)    Nursing - Transportation Risk Classification     Lack of Transportation: Not on file     Lack of Transportation: No   Physical Activity: Not on file   Stress: Not on file   Social Connections: Not on file   Intimate Partner Violence: Unknown (2025)    Nursing IPS     Feels Physically and Emotionally Safe: Not on file     Physically Hurt by Someone: Not on file     Humiliated or Emotionally Abused by Someone: Not on file     Physically Hurt by Someone: No     Hurt or Threatened by Someone: No   Housing Stability: Unknown (2025)    Nursing: Inadequate Housing Risk Classification     Has Housing: Not on file     Worried About Losing Housing: Not on file     Unable to Get Utilities: Not on file     Unable to Pay for Housing in the Last Year: No     Has Housin       Current Facility-Administered Medications:     acetaminophen (TYLENOL) tablet 975 mg, 975 mg, Oral, Q6H PRN, BERNADETTE Pederson    apixaban (ELIQUIS) tablet 5 mg, 5 mg, Oral, BID,  CASEY PedersonNP, 5 mg at 01/29/25 0836    bisacodyl (DULCOLAX) rectal suppository 10 mg, 10 mg, Rectal, Daily, BERNADETTE Pederson    calcium carbonate (TUMS) chewable tablet 1,000 mg, 1,000 mg, Oral, Daily PRN, BERNADETTE Pederson    Diclofenac Sodium (VOLTAREN) 1 % topical gel 2 g, 2 g, Topical, 4x Daily, CASEY PedersonNP, 2 g at 01/29/25 0840    DULoxetine (CYMBALTA) delayed release capsule 30 mg, 30 mg, Oral, Daily, BERNADETTE Pederson, 30 mg at 01/29/25 0836    gabapentin (NEURONTIN) capsule 300 mg, 300 mg, Oral, Daily, CASEY PedersonNP, 300 mg at 01/29/25 0836    gabapentin (NEURONTIN) capsule 600 mg, 600 mg, Oral, HS, CASEY PedersonNP, 600 mg at 01/28/25 2303    HYDROmorphone (DILAUDID) injection 1 mg, 1 mg, Intravenous, Q4H PRN, CASEY PedersonNP, 1 mg at 01/29/25 1002    levothyroxine tablet 25 mcg, 25 mcg, Oral, Early Morning, CASEY PedersonNP, 25 mcg at 01/29/25 0544    lidocaine (LIDODERM) 5 % patch 1 patch, 1 patch, Topical, Daily, BERNADETTE Pederson    methocarbamol (ROBAXIN) tablet 750 mg, 750 mg, Oral, TID, CSAEY PedersonNP, 750 mg at 01/29/25 0836    multi-electrolyte (PLASMALYTE-A/ISOLYTE-S PH 7.4) IV solution, 100 mL/hr, Intravenous, Continuous, BERNADETTE Pederson, Last Rate: 100 mL/hr at 01/29/25 0900, 100 mL/hr at 01/29/25 0900    ondansetron (ZOFRAN) injection 4 mg, 4 mg, Intravenous, Q6H PRN, BERNADETTE Pederson    oxyCODONE (ROXICODONE) immediate release tablet 10 mg, 10 mg, Oral, Q4H PRN **OR** oxyCODONE (ROXICODONE) IR tablet 15 mg, 15 mg, Oral, Q4H PRN, BERNADETTE Pederson, 15 mg at 01/29/25 0552    polyethylene glycol (MIRALAX) packet 17 g, 17 g, Oral, Daily, BERNADETTE Pederson, 17 g at 01/29/25 0838    senna (SENOKOT) tablet 8.6 mg, 1 tablet, Oral, Daily PRN, BERNADETTE Pederson     senna-docusate sodium (SENOKOT S) 8.6-50 mg per tablet 1 tablet, 1 tablet, Oral, Daily, BERNADETTE Pederson, 1 tablet at 01/29/25 0836    traZODone (DESYREL) tablet 100 mg, 100 mg, Oral, HS, BERNADETTE Pederson, 100 mg at 01/28/25 2304    No Known Allergies    Vitals:    01/29/25 0930   BP:    Pulse:    Resp:    Temp:    SpO2: 94%     Physical Exam  Constitutional:       Appearance: He is well-developed.      Comments: Middle-age male, no respiratory distress, no signs of pain, significant weight loss, pale appearing   HENT:      Head: Normocephalic and atraumatic.      Right Ear: External ear normal.      Left Ear: External ear normal.   Eyes:      Conjunctiva/sclera: Conjunctivae normal.      Pupils: Pupils are equal, round, and reactive to light.   Cardiovascular:      Rate and Rhythm: Normal rate and regular rhythm.      Heart sounds: Normal heart sounds.   Pulmonary:      Effort: Pulmonary effort is normal.      Breath sounds: Normal breath sounds.      Comments: Scattered bilateral rhonchi  Abdominal:      General: Bowel sounds are normal.      Palpations: Abdomen is soft.   Musculoskeletal:         General: Normal range of motion.      Cervical back: Normal range of motion and neck supple.   Skin:     General: Skin is warm.   Neurological:      Mental Status: He is alert and oriented to person, place, and time.      Deep Tendon Reflexes: Reflexes are normal and symmetric.   Psychiatric:         Behavior: Behavior normal.         Thought Content: Thought content normal.         Judgment: Judgment normal.     Extremities: 0-1+ bilateral lower extremity FANNY, no cords, pulses are 1+ BUN = 20 creatinine = 0.70  Lymphatics: No adenopathy in the neck, supraclavicular region, axilla bilaterally    Labs    1/29/2025 WBC = 6.96 hemoglobin = 11.3 hematocrit = 35.6 platelet = 188    1/28/2025 AST = 32 ALT = 12 alkaline phosphatase = 83 total protein = 7.1 total bilirubin =  1.70    Imaging    1/7/2025 CAT scan chest abdomen pelvis with contrast    IMPRESSION:     1. Marked interval progression of the metastatic disease, including numerous new pulmonary nodules, hepatic metastasis and mesenteric/retroperitoneal lymphadenopathy.  2. Mild diffuse anasarca, with small pleural effusions, trace ascites and subcutaneous edema.  3. Prominent diffuse groundglass opacities and smooth interlobular septal thickening throughout the right greater than left lung. The groundglass opacities have increased since the most recent prior CT abdomen. The differential diagnosis again includes   asymmetric pulmonary interstitial/alveolar edema and infectious etiologies.    Pathology     Case Report   Surgical Pathology Report                         Case: [id number]                                    Authorizing Provider:  Lisa Webster MD        Collected:           11/21/2022 1213               Ordering Location:     [facility]      Received:            11/21/2022 2301                                      Hospital Operating Room                                                       Pathologist:           Jose Evans MD                                                                  Specimen:    Kidney, Right                                                                               Final Diagnosis   A. Kidney, Right, nephrectomy:  - Clear cell renal cell carcinoma.  See comment and synoptic report.  - One lymph node positive for metastatic carcinoma (1/1).     Comment: Immunohistochemistry is diffusely positive in the tumor cells for PAX8 and carbonic anhydrase IX.  CK7 and P504S have non-specific staining.  The findings are consistent with the diagnosis.     8th Ed AJCC Tumor Stage:  at least Stage III - pT2b, pN1, G3.  Representative tumor block: A7   Electronically signed by Jose  Golden Evans MD on 12/5/2022 at 10:16 AM      Synoptic Checklist   KIDNEY: Nephrectomy  8th Edition - Protocol posted: 6/30/2021  KIDNEY: NEPHRECTOMY, PARTIAL OR RADICAL - All Specimens       SPECIMEN   Procedure   Total nephrectomy    Specimen Laterality   Right    TUMOR   Tumor Focality   Unifocal    Tumor Size   Greatest Dimension (Centimeters): 13.5 cm   Histologic Type   Clear cell renal cell carcinoma    Histologic Grade (WHO / ISUP)   G3 (nucleoli conspicuous and eosinophilic at 100x magnification)    Tumor Extent   Limited to kidney    Sarcomatoid Features   Not identified    Rhabdoid Features   Not identified    Tumor Necrosis   Present    Percentage of Tumor Necrosis   30 %   Lymphovascular Invasion   Not identified    MARGINS   Margin Status   All margins negative for invasive carcinoma    REGIONAL LYMPH NODES   Regional Lymph Node Status   Tumor present in regional lymph node(s)    Number of Lymph Nodes with Tumor   1    Jabari Site(s) with Tumor   Hilar    Size of Largest Jabari Metastatic Deposit   0.3 cm   Number of Lymph Nodes Examined   1    PATHOLOGIC STAGE CLASSIFICATION (pTNM, AJCC 8th Edition)   Reporting of pT, pN, and (when applicable) pM categories is based on information available to the pathologist at the time the report is issued. As per the AJCC (Chapter 1, 8th Ed.) it is the managing physician’s responsibility to establish the final pathologic stage based upon all pertinent information, including but potentially not limited to this pathology report.   Primary Tumor (pT)   pT2b    Regional Lymph Nodes (pN)   pN1    ADDITIONAL FINDINGS   Additional Findings in Nonneoplastic Kidney   Tubuloglomerular necrosis    .

## 2025-01-30 PROCEDURE — 99233 SBSQ HOSP IP/OBS HIGH 50: CPT | Performed by: STUDENT IN AN ORGANIZED HEALTH CARE EDUCATION/TRAINING PROGRAM

## 2025-01-30 RX ORDER — OXYCODONE HCL 5 MG/5 ML
5 SOLUTION, ORAL ORAL ONCE
Refills: 0 | Status: DISCONTINUED | OUTPATIENT
Start: 2025-01-30 | End: 2025-01-30

## 2025-01-30 RX ORDER — OXYCODONE HYDROCHLORIDE 10 MG/1
10 TABLET ORAL EVERY 4 HOURS PRN
Status: DISCONTINUED | OUTPATIENT
Start: 2025-01-30 | End: 2025-01-31 | Stop reason: HOSPADM

## 2025-01-30 RX ORDER — HYDROMORPHONE HYDROCHLORIDE 2 MG/1
4 TABLET ORAL EVERY 4 HOURS PRN
Status: DISCONTINUED | OUTPATIENT
Start: 2025-01-30 | End: 2025-01-31 | Stop reason: HOSPADM

## 2025-01-30 RX ORDER — OXYCODONE HCL 5 MG/5 ML
5 SOLUTION, ORAL ORAL
Refills: 0 | Status: DISCONTINUED | OUTPATIENT
Start: 2025-01-30 | End: 2025-01-30

## 2025-01-30 RX ORDER — FENTANYL 50 UG/1
50 PATCH TRANSDERMAL
Status: DISCONTINUED | OUTPATIENT
Start: 2025-01-30 | End: 2025-01-31 | Stop reason: HOSPADM

## 2025-01-30 RX ORDER — MORPHINE SULFATE 20 MG/5ML
5 SOLUTION ORAL EVERY 4 HOURS PRN
Qty: 100 ML | Refills: 0 | Status: SHIPPED | OUTPATIENT
Start: 2025-01-30 | End: 2025-02-09

## 2025-01-30 RX ADMIN — MORPHINE SULFATE 2 MG: 2 INJECTION, SOLUTION INTRAMUSCULAR; INTRAVENOUS at 10:05

## 2025-01-30 RX ADMIN — FENTANYL 50 MCG: 50 PATCH TRANSDERMAL at 11:01

## 2025-01-30 RX ADMIN — OXYCODONE HYDROCHLORIDE 10 MG: 10 TABLET ORAL at 15:38

## 2025-01-30 RX ADMIN — MORPHINE SULFATE 2 MG: 2 INJECTION, SOLUTION INTRAMUSCULAR; INTRAVENOUS at 04:05

## 2025-01-30 RX ADMIN — MORPHINE SULFATE 2 MG: 2 INJECTION, SOLUTION INTRAMUSCULAR; INTRAVENOUS at 02:12

## 2025-01-30 RX ADMIN — OXYCODONE HYDROCHLORIDE 5 MG: 5 SOLUTION ORAL at 11:06

## 2025-01-30 RX ADMIN — HYDROMORPHONE HYDROCHLORIDE 4 MG: 2 TABLET ORAL at 19:50

## 2025-01-30 RX ADMIN — MORPHINE SULFATE 2 MG: 2 INJECTION, SOLUTION INTRAMUSCULAR; INTRAVENOUS at 07:39

## 2025-01-30 NOTE — ASSESSMENT & PLAN NOTE
Patient presenting from home with 10 pound weight loss over the last week, has not been eating due to lack of appetite, nausea and vomiting.  In ED, was given Maalox, Pepcid and 1L NS bolus.  CBC on admission with hemoglobin 11.8, CMP reviewed with mild dehydration.  Oncology consulted - spoke with family who wish to pursue hospice   Hospice consulted plan for d/c home with home hospice tomorrow  Transitioned to Level 4 comfort measures  Discharge delayed due to pain  Added fentanyl patch 50 mcg q 72 hours  Added oral roxicodone 5 mg every 3 hours prn

## 2025-01-30 NOTE — CASE MANAGEMENT
Case Management Discharge Planning Note    Patient name Stuart Munoz  Location [address] [address]/[address] [place] [address]-* MRN [id number]  : 1970 Date 2025       Current Admission Date: 2025  Current Admission Diagnosis:Failure to thrive in adult   Patient Active Problem List    Diagnosis Date Noted Date Diagnosed    Metastatic cancer (HCC) 2025     Failure to thrive in adult 2025     Portal vein thrombosis 2024     Chronic, continuous use of opioids 2024     Situational mixed anxiety and depressive disorder 2024     Chronic pain syndrome 2024     Myalgia 2024     Arthralgia 2024     Backache with radiation 2024     Reactive depression (situational) 2024     Medical marijuana use 2024     Xerostomia 2023     Irregular bowel habits 2023     Cut of finger 2023     Need for Tdap vaccination 2023     History of gastroesophageal reflux (GERD) 2023     Diarrhea 2023     Loose stools 08/15/2023     Insomnia 08/15/2023     Dehydration 2023     Bilateral calf pain 2023     Varicose veins of left leg with edema 2023     Dysgeusia 2023     BMI 21.0-21.9, adult 2023     Weight loss, unintentional 2023     Oral mucositis due to antineoplastic therapy 2023     Gastroesophageal reflux disease with esophagitis 2023     Hypertension 2023     Alteration in appetite 2023     Chronic post-operative pain 2023     Hypothyroidism 2023     BMI 35.0-35.9,adult 2023     Palliative care patient 2023     Cancer-related pain 2023     Therapeutic opioid-induced constipation (OIC) 2023     Anxiousness 2023     Dysphoric mood 2023     Loss of appetite 2023     Retroperitoneal lymphadenopathy 2023     H/O right nephrectomy 2023     Malignant neoplasm metastatic to lung (HCC) 2023     Fungal dermatitis 2023      Dry skin dermatitis 01/20/2023     RBBB 01/20/2023     BMI 25.0-25.9,adult 01/20/2023     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  [address] [address]  Phone: [phone] Fax: [phone]    [facility]  [address] [facility] [id number] [place] [address]  [address] [place] [address]  Phone: [phone] Fax: [phone]    [facility] [address] [address]  Phone: [phone] Fax: [phone]    Primary Care Provider: Marycarmen Hackett MD    Primary Insurance: BLUE CROSS  Secondary Insurance:     DISCHARGE DETAILS:    Discharge planning discussed with:: Patient and spouse Jenifer  Freedom of Choice: Yes     CM contacted family/caregiver?: Yes  Were Treatment Team discharge recommendations reviewed with patient/caregiver?: Yes  Did patient/caregiver verbalize understanding of patient care needs?: Yes    Contacts  Patient Contacts: Jenifer Munoz (spouse)  Relationship to Patient:: Family  Contact Method: In Person  Reason/Outcome: Discharge Planning, Continuity of Care    CM met with patient and family at bedside to introduce self/role and discuss CM's discussion with hospice nurse Hodan. CM made patient/wife aware that the recommendation is for patient to try oral medications while still here in hospital to monitor how patient tolerates pain to ensure he will be comfortable enough to go home.

## 2025-01-30 NOTE — PROGRESS NOTES
Progress Note - Hospitalist   Name: Stuart Munoz 54 y.o. male I MRN: [id number]  Unit/Bed#: 4 [place] [address] I Date of Admission: 1/28/2025   Date of Service: 1/30/2025 I Hospital Day: 2    Assessment & Plan  Failure to thrive in adult  Patient presenting from home with 10 pound weight loss over the last week, has not been eating due to lack of appetite, nausea and vomiting.  In ED, was given Maalox, Pepcid and 1L NS bolus.  CBC on admission with hemoglobin 11.8, CMP reviewed with mild dehydration.  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 Jermaine Campbell, hematology/oncology  Stage IV (multiple pulmonary nodules, mediastinal adenopathy, questionable liver lesion) clear-cell renal cell carcinoma. History of right nephrectomy November 2022.  Was started lenvatinib (Lenvima) and everolimus (Afinitor), though per wife, patient has not tolerating these medications due to nausea and vomiting.  Appreciate oncology consultation  Palliative care patient  Discussed with family and patient at bedside, there are ongoing discussions regarding decisions for hospice.  Transitioned to hospice  Cancer-related pain  Home regimen: OxyIR 10mg q4 hours PRN for moderate pain and OxyIR 15mg q4 hours PRN for severe pain, gabapentin 300 mg in the morning and 600 mg nightly  Will continue home regimen and add on breakthrough IV Dilaudid  Aqua K-pad, lidocaine patch  Metastatic cancer (HCC)  See plan under failure to thrive    VTE Pharmacologic Prophylaxis: VTE Score: 3  Patient is comfort measures    Mobility:   Basic Mobility Inpatient Raw Score: 19  JH-HLM Goal: 6: Walk 10 steps or more  JH-HLM Achieved: 6: Walk 10 steps or more  JH-HLM  UROLOGY Office Visit Note      4/11/2017    UROLOGY CHIEF COMPLAINT  Chief Complaint   Patient presents with   •  Symptoms     Luts   • Benign Prostatic Hypertrophy   • Erectile Dysfunction       UROLOGY HISTORY OF PRESENT ILLNESS    Suraj Zapien is a 74 year old male who presents in follow-up for hematuria, ED and BPH.    INITIAL PRESENTATION:  The patient is a very nice retired  who presents in F/U at the request of Dr. Epperson for evaluation of gross hematuria. This was painless. He does have a history of nephrolithiasis and he passed stones in 1965 and 1967. He has had a normal renal ultrasound. 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 He has had a normal PSA and digital rectal examination per Dr. Epperson's notes. Review of systems  is otherwise nonrevealing. We discussed the nature of hematuria and the rationale for hematuria work up. We completed this with a CT urogram at [facility] and then a cystoscopy with Versed sedation. The patient's hematuria work up is consistent with small stones as well as prostatic bleeding. We started him on Avodart. He noted some sexual side effects related to this and wished to stop the medicine. He did not have very much in the way of bladder outlet obstructive symptoms. Digital rectal examination is within normal limits. PSA is 1. After stopping the Avodart the patient did note an increase in his obstructive voiding symptoms and we did start him on an alpha-blocker. With Flomax he has been voiding much better. He still has some sexual side effects. He wished to try Viagra. We will give him samples. We discussed precautions of this. He reports he has had normal cardiac stress testing and he is very active. We also check a testosterone level. He will call with intervening concerns, questions, or problems.          PAST INTERVAL HISTORY / PROBLEM  Goal achieved. Continue to encourage appropriate mobility.    Patient Centered Rounds: I performed bedside rounds with nursing staff today.   Discussions with Specialists or Other Care Team Provider: case management    Education and Discussions with Family / Patient: Updated  (wife) at bedside.    Current Length of Stay: 2 day(s)  Current Patient Status: Inpatient   Certification Statement: The patient will continue to require additional inpatient hospital stay due to pain control  Discharge Plan: Anticipate discharge tomorrow to home.    Code Status: Level 4 - Comfort Care    Subjective   Patient states he continues to have significant pain    Objective :       Body mass index is 16.75 kg/m².     Input and Output Summary (last 24 hours):   No intake or output data in the 24 hours ending 01/30/25 1256    Physical Exam  Vitals and nursing note reviewed.   Constitutional:       General: He is not in acute distress.     Appearance: He is well-developed. He is ill-appearing.   HENT:      Head: Normocephalic and atraumatic.   Eyes:      Conjunctiva/sclera: Conjunctivae normal.   Cardiovascular:      Rate and Rhythm: Normal rate and regular rhythm.      Heart sounds: No murmur heard.  Pulmonary:      Effort: Pulmonary effort is normal. No respiratory distress.      Breath sounds: Rhonchi present.   Abdominal:      General: There is distension.      Palpations: Abdomen is soft.      Tenderness: There is no abdominal tenderness.   Musculoskeletal:         General: No swelling.   Skin:     General: Skin is warm and dry.   Neurological:      Mental Status: He is alert. Mental status is at baseline.   Psychiatric:         Mood and Affect: Mood normal.           Lines/Drains:    Urinary Catheter:  Goal for removal: N/A- Discharging with Ramos                 Lab Results: I have reviewed the following results:   Results from last 7 days   Lab Units 01/29/25  0605 01/28/25  1750   WBC Thousand/uL 6.96 8.32  LIST:       There is a genitourinary disease history of 7/10 - GROSS HEMATURIA, H/O STONES, NORMAL RENAL U/S, NORMAL PSA  7/11 - STOPPED AVODART DUE TO SEXUAL SIDE EFFECTS, TRIAL OF FLOMAX, AUA = 4/35. He has a medical history of hypertension.        8/7/12 - aua = 16/35.  Four recent episodes of gross hematuria.  2/11/13 - feeling better and no more gross hematuria.  3/31/15 - Patient voiding ok.  He was diagnosed with PMR.  He reports urination was worse on prednisone.  He is on flomax and finasteride.  No stone pain.  Not using viagra.  4/5/16  He denies symptoms of gross hematuria, urinary tract infection and urinary retention. He is satisfied from a quality of life standpoint with respect to his voiding habits.  He has tried off both the flomax and finasteride but feels that he needs both of them.    CURRENT INTERVAL HISTORY:  4/11/17  - American U[employer]ic Association/International Prostate Symptom Score is 15/35. He denies symptoms of gross hematuria, urinary tract infection and urinary retention. He is satisfied from a quality of life standpoint with respect to his voiding habits.  He is on both finasteride and flomax          PSA, Total   Date Value   03/18/2014 2.26 ng/mL   02/05/2013 2.18 ng/mL   07/26/2012 2.04 NG/ML   03/23/2011 1.08 NG/ML   10/22/2009 2.83 NG/ML         PAST MEDICAL HISTORY    Calculus of kidney                              01/01/1965      Comment: with passage    Hyperlipidemia                                                Hypertension                                                  Personal history of colonic polyps                            KERATOSIS SEBORRHEIC                            07/11/2000      Comment: right periauricular area, left dorsal hand    TENDONITIS SHOULDER                             06/10/2008      Comment: nos    Calcaneal spur                                  10/09/2007      Comment: right    Diabetes Mellitus                               10/09/2007       HEMOGLOBIN g/dL 11.3* 11.8*   HEMATOCRIT % 35.6* 38.0   PLATELETS Thousands/uL 188 198   SEGS PCT %  --  80*   LYMPHO PCT %  --  7*   MONO PCT %  --  13*   EOS PCT %  --  0     Results from last 7 days   Lab Units 01/29/25  0605 01/28/25  1750   SODIUM mmol/L 133* 133*   POTASSIUM mmol/L 4.0 4.3   CHLORIDE mmol/L 101 97   CO2 mmol/L 23 24   BUN mg/dL 20 26*   CREATININE mg/dL 0.70 0.82   ANION GAP mmol/L 9 12   CALCIUM mg/dL 8.3* 8.6   ALBUMIN g/dL  --  3.2*   TOTAL BILIRUBIN mg/dL  --  1.70*   ALK PHOS U/L  --  83   ALT U/L  --  12   AST U/L  --  32   GLUCOSE RANDOM mg/dL 89 100                       Recent Cultures (last 7 days):         Imaging Results Review: No pertinent imaging studies reviewed.  Other Study Results Review: No additional pertinent studies reviewed.    Last 24 Hours Medication List:     Current Facility-Administered Medications:     bisacodyl (DULCOLAX) rectal suppository 10 mg, Daily PRN    fentaNYL (DURAGESIC) 50 mcg/hr TD 72 hr patch 50 mcg, Q72H    glycopyrrolate (ROBINUL) injection 0.1 mg, Q4H PRN    LORazepam (ATIVAN) injection 1 mg, Q10 Min PRN    morphine injection 2 mg, Q1H PRN    oxyCODONE (ROXICODONE) oral solution 5 mg, Q3H PRN    Administrative Statements   Today, Patient Was Seen By: Vicky Kline DO      **Please Note: This note may have been constructed using a voice recognition system.**   Hypoglycemia, unspecified                       11/01/2004    FRACTURE CLOSED TIBIA-PLATEAU[823.00]                           Comment: H/O RIGHT TIBIA PLATEAU FX    Impotence of organic origin                                   BPH (benign prostatic hypertrophy) with urinar*               Gross hematuria                                               PAST SURGICAL HISTORY      COLONOSCOPY DIAGNOSTIC                          05/22/2003      Comment: neg test / small internal hemorrhoids    COLONOSCOPY REMOVE LESIONS BY SNARE             05/16/2000      Comment: adenomatous rectal  polyp, proctalgia    ESOPHAGOGASTRODUODENOSCOPY TRANSORAL FLEX W/BX* 05/18/2000      Comment: GERD    PROSTATE SPECIFIC ANTIGEN SCREENING             06/10/2008      Comment: 2.2    CYSTOURETHROSCOPY                               08/18/2010      Comment: Cystoscopy (30)    MEDICATIONS    Current Outpatient Prescriptions   Medication Sig   • amLODIPine (NORVASC) 5 MG tablet TAKE 1 TABLET BY MOUTH EVERY DAY   • metoPROLOL (TOPROL-XL) 50 MG 24 hr tablet TAKE 1 TABLET BY MOUTH EVERY DAY   • LORazepam (ATIVAN) 1 MG tablet TAKE 1 TABLET BY MOUTH EVERY 8 HOURS AS NEEDED   • finasteride (PROSCAR) 5 MG tablet TAKE 1 TABLET BY MOUTH EVERY DAY   • tamsulosin (FLOMAX) 0.4 MG Cap TAKE ONE CAPSULE BY MOUTH DAILY AFTER A MEAL   • Cholecalciferol (VITAMIN D) 1000 UNITS capsule Take 1,000 Units by mouth daily.   • Multiple Vitamin (MULTI VITAMIN MENS PO) Take  by mouth.   • metroNIDAZOLE (METROGEL) 1 % gel Apply 1 application topically daily.   • sildenafil (VIAGRA) 100 MG tablet Take 1 tablet by mouth daily as needed for Erectile Dysfunction.     No current facility-administered medications for this visit.        ALLERGIES    ALLERGIES:  No Known Allergies    Review of Systems  Negative other than hpi    PHYSICAL EXAMINATION    Vitals signs:  Blood pressure 138/70, height 5' 9.5\" (1.765 m), weight 83 kg.  General:  Alert and oriented. No acute  distress.  Psych:   Pleasant and appropriate affect.  HEENT:  Normal, cephalic, atraumatic.      DIGITAL RECTAL EXAMINATION: Prostate smooth and symmetric. Seminal vesicles nonpalpable. Sphincter tone normal. 2 plus      RESULTS  Creatinine (mg/dL)   Date Value   03/14/2017 0.90     GFR Estimate, Non  (no units)   Date Value   03/14/2017 83     GFR Estimate,  (no units)   Date Value   03/14/2017 >90       Lab Results   Component Value Date    PSA 2.26 03/18/2014       ASSESSMENT/PLAN  Patient Active Problem List   Diagnosis   • Hyperlipidemia   • Hypertension   • Hematuria, unspecified   • Hypertrophy of prostate with urinary obstruction and other lower urinary tract symptoms (LUTS)   • Impotence of organic origin   • Pain in joint, shoulder region   • Pain in joint, pelvic region and thigh   • PMR (polymyalgia rheumatica)   • Primary osteoarthritis of left knee       ED - options discussed     I will see him back in 24 months since hematuria has resolved and he is satisfied with his voiding symptoms     He will continue finasteride and flomax    He feels better off steroids for pmr    Option of surgical therapy explained    DISCUSSION    This gentlemen has lower urinary tract symptons (LUTS). This often results from benign prostatic hyperplasia (BPH). The natural history of BPH was discussed. The potential long-term effects of this condition on the bladder were reviewed. Medical therapy is typically  the first-line treatment for patients with LUTS secondary to BPH. Alpha-blockers and 5-alpha reductase inhibitors are effective treatments for many men. They can be used as monotherapy or in combination. These two strategies were compared. To complete the discussion, indwelling catheterization, intermittent catheterization, office-based ablative techniques (TUMT), laser prostatectomy, and transurethral resection of the prostate (TURP) were discussed. Questions were answered.

## 2025-01-30 NOTE — PLAN OF CARE
Problem: PAIN - ADULT  Goal: Verbalizes/displays adequate comfort level or baseline comfort level  Description: Interventions:  - Encourage patient to monitor pain and request assistance  - Assess pain using appropriate pain scale  - Administer analgesics based on type and severity of pain and evaluate response  - Implement non-pharmacological measures as appropriate and evaluate response  - Consider cultural and social influences on pain and pain management  - Notify physician/advanced practitioner if interventions unsuccessful or patient reports new pain  Outcome: Progressing     Problem: INFECTION - ADULT  Goal: Absence or prevention of progression during hospitalization  Description: INTERVENTIONS:  - Assess and monitor for signs and symptoms of infection  - Monitor lab/diagnostic results  - Monitor all insertion sites, i.e. indwelling lines, tubes, and drains  - Monitor endotracheal if appropriate and nasal secretions for changes in amount and color  - Rhodelia appropriate cooling/warming therapies per order  - Administer medications as ordered  - Instruct and encourage patient and family to use good hand hygiene technique  - Identify and instruct in appropriate isolation precautions for identified infection/condition  Outcome: Progressing  Goal: Absence of fever/infection during neutropenic period  Description: INTERVENTIONS:  - Monitor WBC    Outcome: Progressing     Problem: SAFETY ADULT  Goal: Patient will remain free of falls  Description: INTERVENTIONS:  - Educate patient/family on patient safety including physical limitations  - Instruct patient to call for assistance with activity   - Consult OT/PT to assist with strengthening/mobility   - Keep Call bell within reach  - Keep bed low and locked with side rails adjusted as appropriate  - Keep care items and personal belongings within reach  - Initiate and maintain comfort rounds  - Make Fall Risk Sign visible to staff  - Offer Toileting every 2 Hours,  in advance of need  - Initiate/Maintain bed alarm  - Obtain necessary fall risk management equipment: fall risk sign on door  - Apply yellow socks and bracelet for high fall risk patients  - Consider moving patient to room near nurses station  Outcome: Progressing  Goal: Maintain or return to baseline ADL function  Description: INTERVENTIONS:  -  Assess patient's ability to carry out ADLs; assess patient's baseline for ADL function and identify physical deficits which impact ability to perform ADLs (bathing, care of mouth/teeth, toileting, grooming, dressing, etc.)  - Assess/evaluate cause of self-care deficits   - Assess range of motion  - Assess patient's mobility; develop plan if impaired  - Assess patient's need for assistive devices and provide as appropriate  - Encourage maximum independence but intervene and supervise when necessary  - Involve family in performance of ADLs  - Assess for home care needs following discharge   - Consider OT consult to assist with ADL evaluation and planning for discharge  - Provide patient education as appropriate  Outcome: Progressing  Goal: Maintains/Returns to pre admission functional level  Description: INTERVENTIONS:  - Perform AM-PAC 6 Click Basic Mobility/ Daily Activity assessment daily.  - Set and communicate daily mobility goal to care team and patient/family/caregiver.   - Collaborate with rehabilitation services on mobility goals if consulted  - Perform Range of Motion 2 times a day.  - Reposition patient every 2 hours.  - Dangle patient 2 times a day  - Stand patient 2 times a day  - Ambulate patient 3 times a day  - Out of bed to chair 3 times a day   - Out of bed for meals 3 times a day  - Out of bed for toileting  - Record patient progress and toleration of activity level   Outcome: Progressing     Problem: DISCHARGE PLANNING  Goal: Discharge to home or other facility with appropriate resources  Description: INTERVENTIONS:  - Identify barriers to discharge  w/patient and caregiver  - Arrange for needed discharge resources and transportation as appropriate  - Identify discharge learning needs (meds, wound care, etc.)  - Arrange for interpretive services to assist at discharge as needed  - Refer to Case Management Department for coordinating discharge planning if the patient needs post-hospital services based on physician/advanced practitioner order or complex needs related to functional status, cognitive ability, or social support system  Outcome: Progressing     Problem: Knowledge Deficit  Goal: Patient/family/caregiver demonstrates understanding of disease process, treatment plan, medications, and discharge instructions  Description: Complete learning assessment and assess knowledge base.  Interventions:  - Provide teaching at level of understanding  - Provide teaching via preferred learning methods  Outcome: Progressing

## 2025-01-31 ENCOUNTER — TRANSITIONAL CARE MANAGEMENT (OUTPATIENT)
Dept: FAMILY MEDICINE CLINIC | Facility: CLINIC | Age: 55
End: 2025-01-31

## 2025-01-31 ENCOUNTER — TELEPHONE (OUTPATIENT)
Dept: HEMATOLOGY ONCOLOGY | Facility: MEDICAL CENTER | Age: 55
End: 2025-01-31

## 2025-01-31 VITALS
BODY MASS INDEX: 16.73 KG/M2 | RESPIRATION RATE: 16 BRPM | HEART RATE: 82 BPM | OXYGEN SATURATION: 94 % | WEIGHT: 123.5 LBS | HEIGHT: 72 IN | DIASTOLIC BLOOD PRESSURE: 75 MMHG | TEMPERATURE: 98.5 F | SYSTOLIC BLOOD PRESSURE: 108 MMHG

## 2025-01-31 PROCEDURE — NC001 PR NO CHARGE: Performed by: INTERNAL MEDICINE

## 2025-01-31 PROCEDURE — 99239 HOSP IP/OBS DSCHRG MGMT >30: CPT | Performed by: HOSPITALIST

## 2025-01-31 RX ADMIN — HYDROMORPHONE HYDROCHLORIDE 4 MG: 2 TABLET ORAL at 12:39

## 2025-01-31 RX ADMIN — OXYCODONE HYDROCHLORIDE 10 MG: 10 TABLET ORAL at 08:40

## 2025-01-31 RX ADMIN — OXYCODONE HYDROCHLORIDE 10 MG: 10 TABLET ORAL at 04:20

## 2025-01-31 RX ADMIN — OXYCODONE HYDROCHLORIDE 10 MG: 10 TABLET ORAL at 00:14

## 2025-01-31 NOTE — TELEPHONE ENCOUNTER
Call received by Karie.    Karie called from [facility] and requested if pathology reports, genetic testing and treatment records can be faxed over to [phone].     Per Karie she did check care everywhere but was unable to obtain those reports.       Please send records.       Thank you!

## 2025-01-31 NOTE — DISCHARGE SUMMARY
Discharge Summary - Hospitalist   Name: Stuart Munoz 54 y.o. male I MRN: [id number]  Unit/Bed#: 4 [address] I Date of Admission: 1/28/2025   Date of Service: 1/31/2025 I Hospital Day: 3     Assessment & Plan  Failure to thrive in adult  Patient presenting from home with 10 pound weight loss over the last week, has not been eating due to lack of appetite, nausea and vomiting.  In ED, was given Maalox, Pepcid and 1L NS bolus.  CBC on admission with hemoglobin 11.8, CMP reviewed with mild dehydration.  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 Jermaine Campbell, hematology/oncology  Stage IV (multiple pulmonary nodules, mediastinal adenopathy, questionable liver lesion) clear-cell renal cell carcinoma. History of right nephrectomy November 2022.  Was started lenvatinib (Lenvima) and everolimus (Afinitor), though per wife, patient has not tolerating these medications due to nausea and vomiting.  Appreciate oncology consultation  Palliative care patient  Discussed with family and patient at bedside, there are ongoing discussions regarding decisions for hospice.  Transitioned to hospice  Cancer-related pain  Home regimen: OxyIR 10mg q4 hours PRN for moderate pain and OxyIR 15mg q4 hours PRN for severe pain, gabapentin 300 mg in the morning and 600 mg nightly  Will continue home regimen and add on breakthrough IV Dilaudid  Aqua K-pad, lidocaine patch  Metastatic cancer (HCC)  See plan under failure to thrive     Medical Problems       Resolved Problems  Date Reviewed: 1/26/2025   None           Admission Date:   Admission Orders (From admission, onward)       Ordered        01/28/25 8739  INPATIENT ADMISSION  Once                           Discharge Date: 01/31/25      Reason for Admission: [facility]    Hospital Course:   Stuart Munoz is a 54 y.o. male patient who originally presented to the hospital on 1/28/2025 due to failure to thrive.the patient was seen by oncology for his right kidney clear-cell carcinoma.  The patient apparently has not been tolerating lenvatinib (Lenvima) and everolimus (Afinitor), currently.  After the patient and wife discussed with oncology, they decided to pursue hospice care.  The patient met with the hospice team and arrangements were made for hospice care at home today.  Comfort meds were prescribed by the hospice team.  This morning I did confirm with the patient that hospice care was what he wishes for.  Patient is AAO x 3 and he has capacity to make decisions for himself.  Wife is also supportive of his decision.       Condition at Discharge: stable    Discharge Day Visit / Exam:   * Please refer to separate progress note for these details *    Discussion with Family: Wife present at bedside    Discharge instructions/Information to patient and family:   See after visit summary for information provided to patient and family.      Provisions for Follow-Up Care:  See after visit summary for information related to follow-up care and any pertinent home health orders.      Mobility at time of Discharge:   Basic Mobility Inpatient Raw Score: 19  JH-HLM Goal: 6: Walk 10 steps or more  JH-HLM Achieved: 6: Walk 10 steps or more       Disposition:   Home hospice    Discharge Medications:  See after visit summary for reconciled discharge medications provided to patient and/or family.      Administrative Statements   Discharge Statement:  I have spent a total time of 32 minutes in caring for this patient on the day of the visit/encounter.   **Please Note: This note may have been constructed using a voice recognition system**

## 2025-01-31 NOTE — CASE MANAGEMENT
Case Management Discharge Planning Note    Patient name Stuart Munoz  Location [address] [address]/[address] [address]-* MRN [id number]  : 1970 Date 2025       Current Admission Date: 2025  Current Admission Diagnosis:Failure to thrive in adult   Patient Active Problem List    Diagnosis Date Noted Date Diagnosed    Metastatic cancer (HCC) 2025     Failure to thrive in adult 2025     Portal vein thrombosis 2024     Chronic, continuous use of opioids 2024     Situational mixed anxiety and depressive disorder 2024     Chronic pain syndrome 2024     Myalgia 2024     Arthralgia 2024     Backache with radiation 2024     Reactive depression (situational) 2024     Medical marijuana use 2024     Xerostomia 2023     Irregular bowel habits 2023     Cut of finger 2023     Need for Tdap vaccination 2023     History of gastroesophageal reflux (GERD) 2023     Diarrhea 2023     Loose stools 08/15/2023     Insomnia 08/15/2023     Dehydration 2023     Bilateral calf pain 2023     Varicose veins of left leg with edema 2023     Dysgeusia 2023     BMI 21.0-21.9, adult 2023     Weight loss, unintentional 2023     Oral mucositis due to antineoplastic therapy 2023     Gastroesophageal reflux disease with esophagitis 2023     Hypertension 2023     Alteration in appetite 2023     Chronic post-operative pain 2023     Hypothyroidism 2023     BMI 35.0-35.9,adult 2023     Palliative care patient 2023     Cancer-related pain 2023     Therapeutic opioid-induced constipation (OIC) 2023     Anxiousness 2023     Dysphoric mood 2023     Loss of appetite 2023     Retroperitoneal lymphadenopathy 2023     H/O right nephrectomy 2023     Malignant neoplasm metastatic to lung (HCC) 2023     Fungal dermatitis 2023      Dry skin dermatitis 01/20/2023     RBBB 01/20/2023     BMI 25.0-25.9,adult 01/20/2023     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) 01/04/2023     Right renal mass 11/18/2022     History of anesthesia complications 11/16/2022     Meniere disease, left 03/29/2018     Hyperlipidemia 10/25/2013       LOS (days): 3  Geometric Mean LOS (GMLOS) (days):   Days to GMLOS:     OBJECTIVE:  Risk of Unplanned Readmission Score: 22.34     Current admission status: Inpatient   Preferred Pharmacy:   [facility]/pharmacy #[id number] - [place], [place] - [address]  [address] [address]  Phone: [phone] Fax: [phone]    [id number] - [facility] [id number] [place] [address]  [address] [facility]  [address] [place] [address]  Phone: [phone] Fax: [phone]    [facility] [address] [address]  Phone: [phone] Fax: [phone]    Primary Care Provider: Marycarmen Hackett MD    Primary Insurance: BLUE CROSS  Secondary Insurance:     DISCHARGE DETAILS:    [facility] hospice nurse Hodan STEVENSON met with patient and confirmed discharge plan is for patient to discharge home with hospice services. Hodan stated that patient's wife will drive patient home, all of patient's medications are ready at the pharmacy to , and patient can discharge home anytime.     CM made attending aware.

## 2025-02-03 ENCOUNTER — TELEPHONE (OUTPATIENT)
Age: 55
End: 2025-02-03

## 2025-02-03 NOTE — TELEPHONE ENCOUNTER
Darlene Lucianokam @ [phone] calling re pt's recent hospitalization. Ref case 2025, [id number]    Darlene asking if pt had s/e from the Fotivda, How long he was on it?  When did he stop it. When did he have a loss of appetite and did the Fotivda cause nausea/vomiting?  What action was taken and did he continue taking medication.    Darlene will also be faxing this questioner to the office.

## 2025-02-03 NOTE — TELEPHONE ENCOUNTER
Called [facility] and LVM on Karie's phone indicating that appointment can be cancelled due to hospice status. Provided [facility] callback number.

## 2025-02-04 ENCOUNTER — TELEPHONE (OUTPATIENT)
Dept: PALLIATIVE MEDICINE | Facility: CLINIC | Age: 55
End: 2025-02-04

## 2025-02-04 NOTE — TELEPHONE ENCOUNTER
As per chart review Plan for hospice, No palliative needs at this time. Removing Pt from HFU list.

## 2025-02-05 NOTE — TELEPHONE ENCOUNTER
Karie called back, we confirmed cancellation of appointment at [facility].  Understanding verbalized.

## 2025-02-10 ENCOUNTER — TELEPHONE (OUTPATIENT)
Dept: PALLIATIVE MEDICINE | Facility: CLINIC | Age: 55
End: 2025-02-10

## 2025-02-10 NOTE — TELEPHONE ENCOUNTER
Pharmacy faxed over paper for refill on Senna-Time 8.6 mg tablet but its not on med list to request.
